# Patient Record
Sex: FEMALE | Race: WHITE | NOT HISPANIC OR LATINO | Employment: FULL TIME | URBAN - METROPOLITAN AREA
[De-identification: names, ages, dates, MRNs, and addresses within clinical notes are randomized per-mention and may not be internally consistent; named-entity substitution may affect disease eponyms.]

---

## 2017-01-03 RX ORDER — DICLOFENAC SODIUM 75 MG/1
TABLET, DELAYED RELEASE ORAL
Qty: 180 TABLET | Refills: 0 | Status: SHIPPED | OUTPATIENT
Start: 2017-01-03 | End: 2017-07-17 | Stop reason: SDUPTHER

## 2017-01-24 ENCOUNTER — OFFICE VISIT (OUTPATIENT)
Dept: FAMILY MEDICINE CLINIC | Facility: CLINIC | Age: 62
End: 2017-01-24

## 2017-01-24 VITALS
RESPIRATION RATE: 16 BRPM | WEIGHT: 243.3 LBS | HEART RATE: 78 BPM | OXYGEN SATURATION: 94 % | DIASTOLIC BLOOD PRESSURE: 78 MMHG | TEMPERATURE: 98.4 F | SYSTOLIC BLOOD PRESSURE: 124 MMHG | BODY MASS INDEX: 38.19 KG/M2 | HEIGHT: 67 IN

## 2017-01-24 DIAGNOSIS — R73.9 BLOOD GLUCOSE ELEVATED: ICD-10-CM

## 2017-01-24 DIAGNOSIS — E78.00 PURE HYPERCHOLESTEROLEMIA: ICD-10-CM

## 2017-01-24 DIAGNOSIS — Z00.00 HEALTHCARE MAINTENANCE: Primary | ICD-10-CM

## 2017-01-24 PROCEDURE — 90471 IMMUNIZATION ADMIN: CPT | Performed by: INTERNAL MEDICINE

## 2017-01-24 PROCEDURE — 90715 TDAP VACCINE 7 YRS/> IM: CPT | Performed by: INTERNAL MEDICINE

## 2017-01-24 PROCEDURE — 99396 PREV VISIT EST AGE 40-64: CPT | Performed by: INTERNAL MEDICINE

## 2017-01-24 NOTE — PROGRESS NOTES
"Subjective   Patient ID: Liza Aaron is a 61 y.o. female presents with   Chief Complaint   Patient presents with   • Annual Exam     already had labs in december HPI - this patient presents today for a yearly physical.  Since I saw her last she's lost about 15 pounds.  She feels better is exercising and eating a better diet.  She is caught up on routine screenings such as colonoscopy and mammogram.  We reviewed her labs cholesterol and sugar and blood pressure well controlled.  She needs a TDap.    Assessment plan    Health care maintenance-continue exercise diet and weight loss.  T DTaP    Hyperlipidemia Crestor 10    Elevated fasting glucose-exercise diet weight loss            Allergies   Allergen Reactions   • Penicillins        The following portions of the patient's history were reviewed and updated as appropriate: allergies, current medications, past family history, past medical history, past social history, past surgical history and problem list.      Review of Systems   Constitutional: Negative.    HENT: Negative.    Eyes: Negative.    Respiratory: Negative.    Cardiovascular: Negative.    Gastrointestinal: Negative.    Genitourinary: Positive for frequency.   Musculoskeletal: Positive for arthralgias.   Skin: Negative.    Neurological: Negative.    Psychiatric/Behavioral: Negative.        Objective     Vitals:    01/24/17 0807   BP: 124/78   Pulse: 78   Resp: 16   Temp: 98.4 °F (36.9 °C)   TempSrc: Oral   SpO2: 94%   Weight: 243 lb 4.8 oz (110 kg)   Height: 66.5\" (168.9 cm)         Physical Exam   Constitutional: She is oriented to person, place, and time. She appears well-developed and well-nourished. No distress.   HENT:   Head: Normocephalic and atraumatic.   Eyes: Conjunctivae and EOM are normal. Pupils are equal, round, and reactive to light. Right eye exhibits no discharge. Left eye exhibits no discharge. No scleral icterus.   Neck: Normal range of motion. Neck supple. No tracheal deviation " present. No thyromegaly present.   Cardiovascular: Normal rate, regular rhythm, normal heart sounds and normal pulses.  Exam reveals no gallop.    No murmur heard.  Pulmonary/Chest: Effort normal and breath sounds normal. No respiratory distress. She has no wheezes. She has no rales.   Abdominal: Soft. Bowel sounds are normal. There is no tenderness.   Musculoskeletal: Normal range of motion.   Neurological: She is alert and oriented to person, place, and time.   Skin: Skin is warm and dry. No rash noted. No erythema. No pallor.   Psychiatric: She has a normal mood and affect. Her behavior is normal. Judgment and thought content normal.   Nursing note and vitals reviewed.        Liza PALACIO was seen today for annual exam.    Diagnoses and all orders for this visit:    Healthcare maintenance    Blood glucose elevated    Pure hypercholesterolemia    Other orders  -     Tdap Vaccine Greater Than or Equal To 6yo IM        Call or return to clinic prn if these symptoms worsen or fail to improve as anticipated.

## 2017-01-24 NOTE — MR AVS SNAPSHOT
Liza Aaron   1/24/2017 8:00 AM   Office Visit    Dept Phone:  [phone]   Encounter #:  [id number]    Provider:  Radhames Olmos MD   Department:  [facility] PRIMARY CARE                Your Full Care Plan              Today's Medication Changes          These changes are accurate as of: 1/24/17  8:53 AM.  If you have any questions, ask your nurse or doctor.               Stop taking medication(s)listed here:     azithromycin 250 MG tablet   Commonly known as:  ZITHROMAX Z-KHADAR   Stopped by:  Radhames Olmos MD           HYDROcodone-homatropine 5-1.5 MG/5ML syrup   Commonly known as:  HYCODAN   Stopped by:  Radhames Olmos MD                      Your Updated Medication List          This list is accurate as of: 1/24/17  8:53 AM.  Always use your most recent med list.                aspirin-sod bicarb-citric acid 325 MG effervescent tablet   Commonly known as:  ONUR-SELTZER       Calcium 500 MG tablet       CRESTOR 10 MG tablet   Generic drug:  rosuvastatin   TAKE ONE TABLET BY MOUTH DAILY       diclofenac 75 MG EC tablet   Commonly known as:  VOLTAREN   TAKE ONE TABLET BY MOUTH TWICE A DAY WITH FOOD       MULTI COMPLETE PO               We Performed the Following     Tdap Vaccine Greater Than or Equal To 8yo IM       You Were Diagnosed With        Codes Comments    Healthcare maintenance    -  Primary ICD-10-CM: Z00.00  ICD-9-CM: V70.0       Instructions     None    Patient Instructions History      Upcoming Appointments     Visit Type Date Time Department    PHYSICAL 1/24/2017  8:00 AM [employer] Signup     [facility] allows you to send messages to your doctor, view your test results, renew your prescriptions, schedule appointments, and more. To sign up, go to [employer] and click on the Sign Up Now link in the New User? box. Enter your [facility] Activation Code exactly as it appears below along with the last four digits of your  "Social Security Number and your Date of Birth () to complete the sign-up process. If you do not sign up before the expiration date, you must request a new code.    [employer] Activation Code: [email or web address]  Expires: 2017  8:52 AM    If you have questions, you can email [email or web address] or call [phone] to talk to our [employer] staff. Remember, [employer] is NOT to be used for urgent needs. For medical emergencies, dial 911.               Other Info from Your Visit           Allergies     Penicillins        Reason for Visit     Annual Exam already had labs in december      Vital Signs     Blood Pressure Pulse Temperature Respirations Height Weight    124/78 78 98.4 °F (36.9 °C) (Oral) 16 66.5\" (168.9 cm) 243 lb 4.8 oz (110 kg)    Oxygen Saturation Body Mass Index Smoking Status             94% 38.68 kg/m2 Former Smoker         Problems and Diagnoses Noted     Routine medical exam        "

## 2017-04-03 ENCOUNTER — OFFICE VISIT (OUTPATIENT)
Dept: SURGERY | Facility: CLINIC | Age: 62
End: 2017-04-03

## 2017-04-03 VITALS — WEIGHT: 243.2 LBS | OXYGEN SATURATION: 97 % | HEART RATE: 72 BPM | BODY MASS INDEX: 38.17 KG/M2 | HEIGHT: 67 IN

## 2017-04-03 DIAGNOSIS — K43.2 INCISIONAL HERNIA, WITHOUT OBSTRUCTION OR GANGRENE: Primary | ICD-10-CM

## 2017-04-03 PROCEDURE — 99204 OFFICE O/P NEW MOD 45 MIN: CPT | Performed by: SURGERY

## 2017-04-03 NOTE — PROGRESS NOTES
Chief Complaint:  Incisional hernia    HPI    Patient is a 62 y.o. female who happens to be R  at [employer], who comes in with a possible incisional hernia.  She had a GRICEL/BSO by Dr. Moya, in December 2004, and had a follow-up exam with Dr. Brittney Madera recently.  Happily her pathology on that surgery was benign.  Dr. Madera thought perhaps she had a hernia, this being discovered when Liza indicated that she was having mild discomfort at the site.  The pain is worse after she's been sitting and she stands up described as sharp, perhaps 4 or 5 out of 10.  It's notable when she does twisting, but not necessarily lifting.  She says it is sore sometimes, but not often associated with severe pain.  She's been noticing this for months.    She denies any family history of having any hernias.  Her mother did have breast cancer, and she has stayed up to date on her mammograms, due for one soon.    She is active, swimming, doing water aerobics and exercises to increase her core strength, and has been trying to lose weight.    Past Medical History:   Diagnosis Date   • Diverticulosis    • Hyperglycemia    • Hyperlipidemia      Past Surgical History:   Procedure Laterality Date   • COLONOSCOPY N/A 12/14/2015    Diverticulosis in the entire examined colon, non-bleeding internal hemorrhodis, no specimens collected-Dr. Rosario Flores   • TOTAL ABDOMINAL HYSTERECTOMY WITH SALPINGO OOPHORECTOMY Bilateral 12/16/2004    Dr. Dominick Villarreal   • UMBILICAL HERNIA REPAIR N/A 12/16/2004    Dr. Dominick Villarreal     Family History   Problem Relation Age of Onset   • Cancer Mother    • Breast cancer Mother    • Hypertension Mother    • Heart disease Father    • Cancer Maternal Grandmother      Social History     Social History   • Marital status: Unknown     Spouse name: EDDIE IVAN   • Number of children: 5   • Years of education: [employer]YEARS [employer]     Occupational History   •  [employer]     Social History  "Main Topics   • Smoking status: Former Smoker   • Smokeless tobacco: Never Used   • Alcohol use Yes   • Drug use: No   • Sexual activity: Defer     Other Topics Concern   • Not on file     Social History Narrative       Occupation/Additional Social Hx:  [employer]      Current Outpatient Prescriptions:   •  Calcium 500 MG tablet, Take 2 tablets by mouth., Disp: , Rfl:   •  CRESTOR 10 MG tablet, TAKE ONE TABLET BY MOUTH DAILY, Disp: 90 tablet, Rfl: 0  •  diclofenac (VOLTAREN) 75 MG EC tablet, TAKE ONE TABLET BY MOUTH TWICE A DAY WITH FOOD, Disp: 180 tablet, Rfl: 0  •  Multiple Vitamins-Minerals (MULTI COMPLETE PO), Take  by mouth daily., Disp: , Rfl:     Allergies   Allergen Reactions   • Penicillins Rash       Preventative Medicine  Colonoscopy: 2015    Review of Systems   Respiratory: Shortness of breath: mild shortness of breath with exercise.    Cardiovascular: Negative for chest pain and leg swelling.   All other systems reviewed and are negative.      Vitals:    04/03/17 1350   Pulse: 72   SpO2: 97%   Weight: 243 lb 3.2 oz (110 kg)   Height: 67\" (170.2 cm)       PHYSICAL EXAM:    Pulse 72  Ht 67\" (170.2 cm)  Wt 243 lb 3.2 oz (110 kg)  SpO2 97%  BMI 38.09 kg/m2  Body mass index is 38.09 kg/(m^2).    Constitutional: well developed, well nourished, well-groomed  Eyes: sclera nonicteric, conjunctiva not injected  ENMT: Hearing intact, trachea midline, thyroid without masses  CVS: RRR, no murmur, peripheral edema not present  Respiratory: CTA, normal respiratory effort   Gastrointestinal: no hepatosplenomegaly, abdomen soft, visible suggestion of asymmetry at the umbilicus and off to the right below that, in the region of her incision that extended upward and around to the right of the umbilicus, protruding out about 1-2 cm from the abdominal wall plane, with fatty tissue protruding and eating reducible about the size of a walnut, estimated fascial defect 1.5-2 cm.  Prominent ventral diastases, " likely 4 cm at least, with then saucer like convex protrusions leading up to the defect, consistent with possible adjacent hernias or very thin fascia adjacent to the fascial defect.  Midline incisional scar from the right of the umbilicus down towards the pubis.  Hernia much more detectable with the patient standing then when recumbent, .  Genitourinary: inguinal hernia not present  Musculoskeletal: gait normal, muscle mass .,  Normal range of motion, not stiff  Skin: warm and dry, no rashes visible  Neurological: awake and alert, seems to have reasonable capacity for understanding for medical decision making  Psychiatric: appears to have reasonable judgement, obviously capable of making medical decisions, affable  Lymphatics: no cervical adenopathy, no axillary adenopathy    Radiographic Findings: None to review    Lab Findings: None to review    IMPRESSION:  · Incisional hernia, reducible, with likely adjacent additional small defects, of a [place] cheese type nature, versus adjacent very thin fascia.  Discussed with her the difficulty of her repair in this situation.  Is extremely unlikely the bowel would get caught in this defect based on its size, and exam today.  · Ventral diastases, notable  · BMI 38  · Mother with breast cancer  · Actively engaging in exercise      PLAN:  · Long discussion regarding the hernia repair types, with the risk associated with intraperitoneal mesh versus a larger operation, with bilateral musculofascial advancement flaps, closure of the posterior rectus, with extraperitoneal dissolvable mesh, and return to a more ergonomic core.  Discussed the need to potentially excise skin tissue, and an eccentric umbilicus, which is too dramatic can result in skin sloughing.    Noted the likelihood that this is certainly not urgent, the ability to schedule around her convenience, the need to consider at least 4 weeks off the more complex surgery is chosen, but with likely the better long-term  results.  Discussed the greater likelihood of complication from the more complex surgery, but again a less likely incidence of bowel adhesion for foreign body reaction, with the more complex surgery.  Recommended the surgery with intraperitoneal mesh if she simply wanted to deal with the present problem, but recommended consideration of the other if she wished better long-term results more prominently.    Schematic of the abdominal wall musculature and fascia and discuss the issue of the ventral diastases being a complicated factor as well.  Discussed the fact that complication rate increased as weight increases, but those rates have decreased with the use of transverses flap advances rather than the external oblique, and the use of the PRP, which I would recommend.     Recommended follow up in 6 months with visit sooner if symptoms escalate.  She should not stop her water exercises.     Liza Crawford MD  04/03/17

## 2017-05-08 ENCOUNTER — APPOINTMENT (OUTPATIENT)
Dept: WOMENS IMAGING | Facility: HOSPITAL | Age: 62
End: 2017-05-08

## 2017-05-08 PROCEDURE — 77067 SCR MAMMO BI INCL CAD: CPT | Performed by: RADIOLOGY

## 2017-05-08 PROCEDURE — 77063 BREAST TOMOSYNTHESIS BI: CPT | Performed by: RADIOLOGY

## 2017-05-08 PROCEDURE — G0202 SCR MAMMO BI INCL CAD: HCPCS | Performed by: RADIOLOGY

## 2017-07-17 RX ORDER — DICLOFENAC SODIUM 75 MG/1
TABLET, DELAYED RELEASE ORAL
Qty: 180 TABLET | Refills: 0 | Status: SHIPPED | OUTPATIENT
Start: 2017-07-17 | End: 2018-01-16 | Stop reason: SDUPTHER

## 2017-09-20 RX ORDER — ROSUVASTATIN CALCIUM 10 MG/1
TABLET, COATED ORAL
Qty: 90 TABLET | Refills: 1 | Status: SHIPPED | OUTPATIENT
Start: 2017-09-20 | End: 2018-04-12 | Stop reason: SDUPTHER

## 2018-01-17 RX ORDER — DICLOFENAC SODIUM 75 MG/1
TABLET, DELAYED RELEASE ORAL
Qty: 180 TABLET | Refills: 1 | Status: SHIPPED | OUTPATIENT
Start: 2018-01-17 | End: 2018-09-04 | Stop reason: SDUPTHER

## 2018-04-12 RX ORDER — ROSUVASTATIN CALCIUM 10 MG/1
TABLET, COATED ORAL
Qty: 90 TABLET | Refills: 1 | Status: SHIPPED | OUTPATIENT
Start: 2018-04-12 | End: 2018-09-04 | Stop reason: SDUPTHER

## 2018-05-14 ENCOUNTER — APPOINTMENT (OUTPATIENT)
Dept: WOMENS IMAGING | Facility: HOSPITAL | Age: 63
End: 2018-05-14

## 2018-05-14 PROCEDURE — 77063 BREAST TOMOSYNTHESIS BI: CPT | Performed by: RADIOLOGY

## 2018-05-14 PROCEDURE — 77067 SCR MAMMO BI INCL CAD: CPT | Performed by: RADIOLOGY

## 2018-05-17 DIAGNOSIS — Z23 IMMUNIZATION DUE: Primary | ICD-10-CM

## 2018-05-18 ENCOUNTER — CLINICAL SUPPORT (OUTPATIENT)
Dept: FAMILY MEDICINE CLINIC | Facility: CLINIC | Age: 63
End: 2018-05-18

## 2018-05-18 DIAGNOSIS — Z00.00 PREVENTATIVE HEALTH CARE: ICD-10-CM

## 2018-05-18 DIAGNOSIS — Z23 IMMUNIZATION DUE: Primary | ICD-10-CM

## 2018-05-18 PROCEDURE — 90471 IMMUNIZATION ADMIN: CPT | Performed by: INTERNAL MEDICINE

## 2018-05-18 PROCEDURE — 90632 HEPA VACCINE ADULT IM: CPT | Performed by: INTERNAL MEDICINE

## 2018-09-04 ENCOUNTER — TRANSCRIBE ORDERS (OUTPATIENT)
Dept: ADMINISTRATIVE | Facility: HOSPITAL | Age: 63
End: 2018-09-04

## 2018-09-04 ENCOUNTER — OFFICE VISIT (OUTPATIENT)
Dept: INTERNAL MEDICINE | Facility: CLINIC | Age: 63
End: 2018-09-04

## 2018-09-04 VITALS
DIASTOLIC BLOOD PRESSURE: 70 MMHG | BODY MASS INDEX: 41.78 KG/M2 | HEIGHT: 66 IN | OXYGEN SATURATION: 95 % | SYSTOLIC BLOOD PRESSURE: 100 MMHG | HEART RATE: 71 BPM | WEIGHT: 260 LBS | TEMPERATURE: 98.2 F

## 2018-09-04 DIAGNOSIS — E66.01 MORBID OBESITY (HCC): ICD-10-CM

## 2018-09-04 DIAGNOSIS — E78.2 MIXED HYPERLIPIDEMIA: ICD-10-CM

## 2018-09-04 DIAGNOSIS — Z13.6 SCREENING FOR CARDIOVASCULAR CONDITION: Primary | ICD-10-CM

## 2018-09-04 DIAGNOSIS — Z11.59 NEED FOR HEPATITIS C SCREENING TEST: ICD-10-CM

## 2018-09-04 DIAGNOSIS — M15.9 OSTEOARTHRITIS OF MULTIPLE JOINTS, UNSPECIFIED OSTEOARTHRITIS TYPE: ICD-10-CM

## 2018-09-04 DIAGNOSIS — Z00.00 PHYSICAL EXAM, ANNUAL: Primary | ICD-10-CM

## 2018-09-04 PROCEDURE — 99396 PREV VISIT EST AGE 40-64: CPT | Performed by: FAMILY MEDICINE

## 2018-09-04 RX ORDER — DICLOFENAC SODIUM 75 MG/1
75 TABLET, DELAYED RELEASE ORAL DAILY
Qty: 90 TABLET | Refills: 1 | Status: SHIPPED | OUTPATIENT
Start: 2018-09-04 | End: 2018-10-01

## 2018-09-04 RX ORDER — ROSUVASTATIN CALCIUM 10 MG/1
10 TABLET, COATED ORAL DAILY
Qty: 90 TABLET | Refills: 1 | Status: SHIPPED | OUTPATIENT
Start: 2018-09-04 | End: 2019-03-06 | Stop reason: SDUPTHER

## 2018-09-04 NOTE — PROGRESS NOTES
Subjective   Liza Aaron is a 63 y.o. female.   Chief Complaint   Patient presents with   • Annual Exam   • Osteoarthritis     hips and knees    • Hyperlipidemia       History of Present Illness     This is a new patient in our office.  She is here for complete physical exam without GYN evaluation and for follow-up on hypertension and osteoarthritis.    CPE- she has no complaints.  Medical history significant for hyperlipidemia.  Patient is on Crestor 10 mg a day.  She takes it everyday.  She has no side effects.  No muscle aches, no muscle cramps.  She was diagnosed years ago.  Osteoarthritis-localized in hips and knees.  Patient is on diclofenac 75 mg.  She takes it every day.  She takes one tablet a day.  She has no side effects.  She was off diclofenac for a few days recently and had sore hips and sore knees.  She does not smoke cigarettes.  She smoked for about 5 years 1 pack per day.  She quit in 1982.  Alcohol-she drinks 2-3 drinks a week.  No drugs.  She exercises 2-3 times a week for 45-60 minutes of water exercise.  Dental appointments every 4 months.  Last eye exam in 2016.  Tetanus vaccine in 2017.  Colonoscopy in December 2015.  Next was recommended 5 years later.  Last mammogram 8/2018.  GYN exam that included breast exam, rectal and pelvic exam in 8/2018.  Patient is post hysterectomy and bilateral oophorectomy secondary to enlarged ovary.  There was no cancer.      Family history of heart disease with her father having a first attack in his late 40s.    For more history please see health history form which was reviewed by me and will be scanned.    The following portions of the patient's history were reviewed and updated as appropriate: allergies, current medications, past family history, past medical history, past social history, past surgical history and problem list.    Review of Systems   Constitutional: Negative.    HENT: Negative.    Respiratory: Negative.    Cardiovascular: Negative.     Gastrointestinal: Negative.  Negative for blood in stool.   Neurological: Negative.          Objective   Wt Readings from Last 3 Encounters:   09/04/18 118 kg (260 lb)   04/03/17 110 kg (243 lb 3.2 oz)   01/24/17 110 kg (243 lb 4.8 oz)      Vitals:    09/04/18 0829   BP: 100/70   Pulse: 71   Temp: 98.2 °F (36.8 °C)   SpO2: 95%     Temp Readings from Last 3 Encounters:   09/04/18 98.2 °F (36.8 °C)   01/24/17 98.4 °F (36.9 °C) (Oral)   10/27/16 98.2 °F (36.8 °C) (Oral)     BP Readings from Last 3 Encounters:   09/04/18 100/70   01/24/17 124/78   10/27/16 130/74     Pulse Readings from Last 3 Encounters:   09/04/18 71   04/03/17 72   01/24/17 78       Physical Exam   Constitutional: She is oriented to person, place, and time. She appears well-developed and well-nourished. No distress.   HENT:   Head: Normocephalic and atraumatic. Hair is normal.   Right Ear: Hearing, tympanic membrane, external ear and ear canal normal. No drainage. No decreased hearing is noted.   Left Ear: Hearing, tympanic membrane, external ear and ear canal normal. No decreased hearing is noted.   Nose: No nasal deformity.   Mouth/Throat: Oropharynx is clear and moist.   Eyes: Pupils are equal, round, and reactive to light. Conjunctivae, EOM and lids are normal. Right eye exhibits no discharge. Left eye exhibits no discharge.   Neck: Normal range of motion. Neck supple. No JVD present. No tracheal deviation present. No thyromegaly present.   Cardiovascular: Normal rate, regular rhythm, normal heart sounds, intact distal pulses and normal pulses.  Exam reveals no gallop and no friction rub.    No murmur heard.  Pulmonary/Chest: Effort normal and breath sounds normal. No respiratory distress. She has no wheezes. She has no rales. She exhibits no tenderness.   Abdominal: Soft. She exhibits no distension and no mass. There is no tenderness. There is no rebound and no guarding. A hernia is present.   Musculoskeletal: Normal range of motion. She  exhibits no edema, tenderness or deformity.   Lymphadenopathy:     She has no cervical adenopathy.   Neurological: She is alert and oriented to person, place, and time. She has normal reflexes. She displays normal reflexes. No cranial nerve deficit. She exhibits normal muscle tone. Coordination normal.   Skin: Skin is warm and dry. No rash noted. She is not diaphoretic. No erythema.   Psychiatric: She has a normal mood and affect. Her behavior is normal. Judgment and thought content normal.   Vitals reviewed.      Assessment/Plan   Liza was seen today for annual exam, osteoarthritis and hyperlipidemia.    Diagnoses and all orders for this visit:    Physical exam, annual  -     CBC (No Diff)  -     Vitamin D 25 Hydroxy  -     Lipid Panel With LDL / HDL Ratio  -     Comprehensive Metabolic Panel  -     Thyroid Cascade Profile    Mixed hyperlipidemia    Osteoarthritis of multiple joints, unspecified osteoarthritis type    Morbid obesity (CMS/HCC)    Need for hepatitis C screening test  -     Hepatitis C Antibody    Other orders  -     rosuvastatin (CRESTOR) 10 MG tablet; Take 1 tablet by mouth Daily.  -     diclofenac (VOLTAREN) 75 MG EC tablet; Take 1 tablet by mouth Daily.        #1 CPE- we are checking labs.  Focus on reducing cardiac risk factors.  Patient will continue statin.  She will increase exercise to at least   30 minutes a day, 5 days a week.  Morbid obesity increases risk for developing diabetes and heart disease.  We talked about Weight Watchers.  Patient will get shingrix at the pharmacy.    #2 hyperlipidemia-continue current treatment.  Follow-up in 6 months.    #3 osteoarthritis-diclofenac as all other NSAIDs increases risk of GI bleeding, kidney failure and cardiovascular risks.  Patient will try to use Tylenol instead.  Maximum dose is 2 extra strength Tylenols every 8 hours.  Follow-up in 6 months.

## 2018-09-05 LAB
25(OH)D3+25(OH)D2 SERPL-MCNC: 28.5 NG/ML (ref 30–100)
ALBUMIN SERPL-MCNC: 4.4 G/DL (ref 3.5–5.2)
ALBUMIN/GLOB SERPL: 1.9 G/DL
ALP SERPL-CCNC: 71 U/L (ref 39–117)
ALT SERPL-CCNC: 14 U/L (ref 1–33)
AST SERPL-CCNC: 21 U/L (ref 1–32)
BILIRUB SERPL-MCNC: 0.6 MG/DL (ref 0.1–1.2)
BUN SERPL-MCNC: 10 MG/DL (ref 8–23)
BUN/CREAT SERPL: 14.3 (ref 7–25)
CALCIUM SERPL-MCNC: 9.4 MG/DL (ref 8.6–10.5)
CHLORIDE SERPL-SCNC: 105 MMOL/L (ref 98–107)
CHOLEST SERPL-MCNC: 165 MG/DL (ref 0–200)
CO2 SERPL-SCNC: 25.9 MMOL/L (ref 22–29)
CREAT SERPL-MCNC: 0.7 MG/DL (ref 0.57–1)
ERYTHROCYTE [DISTWIDTH] IN BLOOD BY AUTOMATED COUNT: 13.5 % (ref 11.7–13)
GLOBULIN SER CALC-MCNC: 2.3 GM/DL
GLUCOSE SERPL-MCNC: 107 MG/DL (ref 65–99)
HCT VFR BLD AUTO: 40.2 % (ref 35.6–45.5)
HCV AB S/CO SERPL IA: <0.1 S/CO RATIO (ref 0–0.9)
HDLC SERPL-MCNC: 52 MG/DL (ref 40–60)
HGB BLD-MCNC: 12.7 G/DL (ref 11.9–15.5)
LDLC SERPL CALC-MCNC: 88 MG/DL (ref 0–100)
LDLC/HDLC SERPL: 1.69 {RATIO}
MCH RBC QN AUTO: 29.7 PG (ref 26.9–32)
MCHC RBC AUTO-ENTMCNC: 31.6 G/DL (ref 32.4–36.3)
MCV RBC AUTO: 93.9 FL (ref 80.5–98.2)
PLATELET # BLD AUTO: 221 10*3/MM3 (ref 140–500)
POTASSIUM SERPL-SCNC: 4.5 MMOL/L (ref 3.5–5.2)
PROT SERPL-MCNC: 6.7 G/DL (ref 6–8.5)
RBC # BLD AUTO: 4.28 10*6/MM3 (ref 3.9–5.2)
SODIUM SERPL-SCNC: 145 MMOL/L (ref 136–145)
TRIGL SERPL-MCNC: 125 MG/DL (ref 0–150)
TSH SERPL DL<=0.005 MIU/L-ACNC: 3.54 UIU/ML (ref 0.45–4.5)
VLDLC SERPL CALC-MCNC: 25 MG/DL (ref 5–40)
WBC # BLD AUTO: 4.11 10*3/MM3 (ref 4.5–10.7)

## 2018-09-06 LAB
HBA1C MFR BLD: 5.1 % (ref 4.8–5.6)
Lab: NORMAL
WRITTEN AUTHORIZATION: NORMAL

## 2018-09-07 ENCOUNTER — HOSPITAL ENCOUNTER (OUTPATIENT)
Dept: CARDIOLOGY | Facility: HOSPITAL | Age: 63
Discharge: HOME | End: 2018-09-07
Admitting: FAMILY MEDICINE

## 2018-09-07 VITALS
DIASTOLIC BLOOD PRESSURE: 68 MMHG | HEART RATE: 64 BPM | HEIGHT: 66 IN | SYSTOLIC BLOOD PRESSURE: 117 MMHG | WEIGHT: 253 LBS | BODY MASS INDEX: 40.66 KG/M2

## 2018-09-07 DIAGNOSIS — Z13.6 SCREENING FOR CARDIOVASCULAR CONDITION: ICD-10-CM

## 2018-09-07 LAB
BH CV ECHO MEAS - DIST AO DIAM: 1.55 CM
BH CV VAS BP LEFT ARM: NORMAL MMHG
BH CV VAS BP RIGHT ARM: NORMAL MMHG
BH CV XLRA MEAS - MID AO DIAM: 1.81 CM
BH CV XLRA MEAS - PAD LEFT ABI DP: 1.14
BH CV XLRA MEAS - PAD LEFT ABI PT: 1.12
BH CV XLRA MEAS - PAD LEFT ARM: 117 MMHG
BH CV XLRA MEAS - PAD LEFT LEG DP: 134 MMHG
BH CV XLRA MEAS - PAD LEFT LEG PT: 132 MMHG
BH CV XLRA MEAS - PAD RIGHT ABI DP: 1.11
BH CV XLRA MEAS - PAD RIGHT ABI PT: 1.16
BH CV XLRA MEAS - PAD RIGHT ARM: 114 MMHG
BH CV XLRA MEAS - PAD RIGHT LEG DP: 130 MMHG
BH CV XLRA MEAS - PAD RIGHT LEG PT: 136 MMHG
BH CV XLRA MEAS - PROX AO DIAM: 2.1 CM
BH CV XLRA MEAS LEFT ICA/CCA RATIO: 1.51
BH CV XLRA MEAS LEFT MID CCA PSV: NORMAL CM/SEC
BH CV XLRA MEAS LEFT MID ICA PSV: NORMAL CM/SEC
BH CV XLRA MEAS LEFT PROX ECA PSV: NORMAL CM/SEC
BH CV XLRA MEAS RIGHT ICA/CCA RATIO: 0.95
BH CV XLRA MEAS RIGHT MID CCA PSV: NORMAL CM/SEC
BH CV XLRA MEAS RIGHT MID ICA PSV: NORMAL CM/SEC
BH CV XLRA MEAS RIGHT PROX ECA PSV: NORMAL CM/SEC

## 2018-09-07 PROCEDURE — 93799 UNLISTED CV SVC/PROCEDURE: CPT

## 2018-10-01 ENCOUNTER — OFFICE VISIT (OUTPATIENT)
Dept: SURGERY | Facility: CLINIC | Age: 63
End: 2018-10-01

## 2018-10-01 VITALS — OXYGEN SATURATION: 96 % | BODY MASS INDEX: 37.44 KG/M2 | WEIGHT: 247 LBS | HEART RATE: 90 BPM | HEIGHT: 68 IN

## 2018-10-01 DIAGNOSIS — K43.2 INCISIONAL HERNIA, WITHOUT OBSTRUCTION OR GANGRENE: Primary | ICD-10-CM

## 2018-10-01 PROCEDURE — 99213 OFFICE O/P EST LOW 20 MIN: CPT | Performed by: SURGERY

## 2018-12-14 ENCOUNTER — CLINICAL SUPPORT (OUTPATIENT)
Dept: INTERNAL MEDICINE | Facility: CLINIC | Age: 63
End: 2018-12-14

## 2018-12-14 DIAGNOSIS — Z23 NEED FOR HEPATITIS A VACCINATION: Primary | ICD-10-CM

## 2018-12-14 PROCEDURE — 90632 HEPA VACCINE ADULT IM: CPT | Performed by: FAMILY MEDICINE

## 2018-12-14 PROCEDURE — 90471 IMMUNIZATION ADMIN: CPT | Performed by: FAMILY MEDICINE

## 2019-03-06 ENCOUNTER — OFFICE VISIT (OUTPATIENT)
Dept: INTERNAL MEDICINE | Facility: CLINIC | Age: 64
End: 2019-03-06

## 2019-03-06 ENCOUNTER — HOSPITAL ENCOUNTER (OUTPATIENT)
Dept: GENERAL RADIOLOGY | Facility: HOSPITAL | Age: 64
Discharge: HOME | End: 2019-03-06
Admitting: FAMILY MEDICINE

## 2019-03-06 VITALS
OXYGEN SATURATION: 95 % | SYSTOLIC BLOOD PRESSURE: 118 MMHG | HEART RATE: 82 BPM | DIASTOLIC BLOOD PRESSURE: 80 MMHG | TEMPERATURE: 98.2 F | WEIGHT: 228 LBS | BODY MASS INDEX: 35.79 KG/M2 | HEIGHT: 67 IN

## 2019-03-06 DIAGNOSIS — M25.551 PAIN OF BOTH HIP JOINTS: ICD-10-CM

## 2019-03-06 DIAGNOSIS — M25.552 PAIN OF BOTH HIP JOINTS: ICD-10-CM

## 2019-03-06 DIAGNOSIS — R00.9 ABNORMAL HEART RATE: ICD-10-CM

## 2019-03-06 DIAGNOSIS — E55.9 VITAMIN D DEFICIENCY: ICD-10-CM

## 2019-03-06 DIAGNOSIS — E78.00 PURE HYPERCHOLESTEROLEMIA: ICD-10-CM

## 2019-03-06 DIAGNOSIS — Z82.49 FAMILY HISTORY OF HEART DISEASE: Primary | ICD-10-CM

## 2019-03-06 PROBLEM — E66.9 CLASS 2 OBESITY WITHOUT SERIOUS COMORBIDITY WITH BODY MASS INDEX (BMI) OF 35.0 TO 35.9 IN ADULT: Status: ACTIVE | Noted: 2018-09-04

## 2019-03-06 PROBLEM — E66.812 CLASS 2 OBESITY WITHOUT SERIOUS COMORBIDITY WITH BODY MASS INDEX (BMI) OF 35.0 TO 35.9 IN ADULT: Status: ACTIVE | Noted: 2018-09-04

## 2019-03-06 PROCEDURE — 99214 OFFICE O/P EST MOD 30 MIN: CPT | Performed by: FAMILY MEDICINE

## 2019-03-06 PROCEDURE — 73521 X-RAY EXAM HIPS BI 2 VIEWS: CPT

## 2019-03-06 PROCEDURE — 93000 ELECTROCARDIOGRAM COMPLETE: CPT | Performed by: FAMILY MEDICINE

## 2019-03-06 RX ORDER — ROSUVASTATIN CALCIUM 10 MG/1
10 TABLET, COATED ORAL DAILY
Qty: 90 TABLET | Refills: 1 | Status: SHIPPED | OUTPATIENT
Start: 2019-03-06 | End: 2019-09-20 | Stop reason: SDUPTHER

## 2019-03-06 NOTE — PROGRESS NOTES
Subjective   Liza Aaron is a 64 y.o. female.   Chief Complaint   Patient presents with   • Hyperlipidemia   • Hip Pain   • Vitamin D Deficiency       History of Present Illness     1  Hyperlipidemia-patient is on Crestor 10 mg a day.  She takes it every day.  She has no side effects.  She has no chest pain, no shortness of breath, no dizziness.  She has occasional extra beats, but they are asymptomatic.   She has a strong family history of heart disease.  Her brother was just recently diagnosed with coronary artery disease.  Her father had a heart disease and maternal grandfather had heart disease.    She exercises regularly.  She does water exercise 3-5 times a week for 60 minutes.  She joined [employer] in September 2018.  She lost 32 pounds.      2.  Vitamin D deficiency-patient is on multivitamin and vitamin D3 daily.  Not sure about those of Vitamin D.    3.  Hip pain-bilateral-noticed a few months ago when patient discontinued diclofenac.  She is able to function without diclofenac.  She takes Tylenol as needed.  She has pain in both hips.  It is achy pain, daily, intensity 5 out of 10.  Sometimes it wakes her up at night.  It is worse after being on her legs for long time.  No other symptoms associated.    The following portions of the patient's history were reviewed and updated as appropriate: allergies, current medications, past family history, past medical history, past social history and problem list.    Review of Systems   Constitutional: Negative.    Respiratory: Negative.    Cardiovascular: Negative.          Objective   Wt Readings from Last 3 Encounters:   03/06/19 103 kg (228 lb)   10/01/18 112 kg (247 lb)   09/07/18 115 kg (253 lb)      Vitals:    03/06/19 0902   BP: 118/80   Pulse: 82   Temp: 98.2 °F (36.8 °C)   SpO2: 95%     Temp Readings from Last 3 Encounters:   03/06/19 98.2 °F (36.8 °C)   09/04/18 98.2 °F (36.8 °C)   01/24/17 98.4 °F (36.9 °C) (Oral)     BP Readings from Last 3  Encounters:   03/06/19 118/80   09/07/18 117/68   09/04/18 100/70     Pulse Readings from Last 3 Encounters:   03/06/19 82   10/01/18 90   09/07/18 64       Physical Exam   Constitutional: She is oriented to person, place, and time. She appears well-developed and well-nourished.   HENT:   Head: Normocephalic and atraumatic.   Neck: Neck supple. Carotid bruit is not present. No thyromegaly present.   Cardiovascular: Normal rate, regular rhythm and normal heart sounds.   Pulmonary/Chest: Effort normal and breath sounds normal.   Musculoskeletal:   Decreased extension and internal rotation both hips.  MM strength 5/5 BL x LE.   Neurological: She is alert and oriented to person, place, and time.   Skin: Skin is warm, dry and intact.   Psychiatric: She has a normal mood and affect. Her behavior is normal.       Assessment/Plan   Liza was seen today for hyperlipidemia, hip pain and vitamin d deficiency.    Diagnoses and all orders for this visit:    Family history of heart disease  -     Ambulatory Referral to Cardiology    Pain of both hip joints  -     XR Hips Bilateral With or Without Pelvis 2 View; Future  -     Ambulatory Referral to Physical Therapy    Pure hypercholesterolemia  -     Lipid Panel With LDL / HDL Ratio  -     Comprehensive Metabolic Panel    Vitamin D deficiency  -     Vitamin D 25 Hydroxy    Other orders  -     rosuvastatin (CRESTOR) 10 MG tablet; Take 1 tablet by mouth Daily.        #1  hyperlipidemia-we will continue current treatment. Check labs today.  Patient did greater job losing weight.  She will continue to work with weight watchers.  Follow-up in 6 months.    2.  Family history of heart disease- EKG done in the office today is negative.  Tracing and interpretation will be scanned.  Referral to cardiologist.    3.  Hip pain-we will check an x-ray.  Referral to physical therapy.  Tylenol as needed.    4.  Vitamin D deficiency- check levels today.

## 2019-03-11 LAB
ALBUMIN SERPL-MCNC: 4.6 G/DL (ref 3.5–5.2)
ALBUMIN/GLOB SERPL: 2.3 G/DL
ALP SERPL-CCNC: 62 U/L (ref 39–117)
ALT SERPL-CCNC: 12 U/L (ref 1–33)
AST SERPL-CCNC: 20 U/L (ref 1–32)
BILIRUB SERPL-MCNC: 0.6 MG/DL (ref 0.1–1.2)
BUN SERPL-MCNC: 14 MG/DL (ref 8–23)
BUN/CREAT SERPL: 21.5 (ref 7–25)
CALCIUM SERPL-MCNC: 9.9 MG/DL (ref 8.6–10.5)
CHLORIDE SERPL-SCNC: 99 MMOL/L (ref 98–107)
CHOLEST SERPL-MCNC: 150 MG/DL (ref 0–200)
CO2 SERPL-SCNC: 27.5 MMOL/L (ref 22–29)
CREAT SERPL-MCNC: 0.65 MG/DL (ref 0.57–1)
GLOBULIN SER CALC-MCNC: 2 GM/DL
GLUCOSE SERPL-MCNC: 110 MG/DL (ref 65–99)
HDLC SERPL-MCNC: 60 MG/DL (ref 40–60)
LDLC SERPL CALC-MCNC: 63 MG/DL (ref 0–100)
LDLC/HDLC SERPL: 1.05 {RATIO}
POTASSIUM SERPL-SCNC: 4.1 MMOL/L (ref 3.5–5.2)
PROT SERPL-MCNC: 6.6 G/DL (ref 6–8.5)
SODIUM SERPL-SCNC: 139 MMOL/L (ref 136–145)
TRIGL SERPL-MCNC: 134 MG/DL (ref 0–150)
VLDLC SERPL CALC-MCNC: 26.8 MG/DL (ref 5–40)

## 2019-03-12 LAB — 25(OH)D3+25(OH)D2 SERPL-MCNC: 35.7 NG/ML (ref 30–100)

## 2019-03-21 ENCOUNTER — TREATMENT (OUTPATIENT)
Dept: PHYSICAL THERAPY | Facility: CLINIC | Age: 64
End: 2019-03-21

## 2019-03-21 DIAGNOSIS — M25.551 PAIN OF BOTH HIP JOINTS: Primary | ICD-10-CM

## 2019-03-21 DIAGNOSIS — M25.60 LIMITED JOINT RANGE OF MOTION (ROM): ICD-10-CM

## 2019-03-21 DIAGNOSIS — M25.552 PAIN OF BOTH HIP JOINTS: Primary | ICD-10-CM

## 2019-03-21 DIAGNOSIS — R26.89 IMPAIRED GAIT AND MOBILITY: ICD-10-CM

## 2019-03-21 PROCEDURE — 97162 PT EVAL MOD COMPLEX 30 MIN: CPT | Performed by: PHYSICAL THERAPIST

## 2019-03-21 PROCEDURE — 97012 MECHANICAL TRACTION THERAPY: CPT | Performed by: PHYSICAL THERAPIST

## 2019-03-21 PROCEDURE — 97110 THERAPEUTIC EXERCISES: CPT | Performed by: PHYSICAL THERAPIST

## 2019-03-21 NOTE — PROGRESS NOTES
Physical Therapy Initial Evaluation and Plan of Care        Patient: Liza aAron   : 1955  Diagnosis/ICD-10 Code:  Pain of both hip joints [M25.551, M25.552]  Referring practitioner: Minnie Whaley MD  Date of Initial Visit: 3/21/2019  Today's Date: 3/22/2019  Patient seen for 1 sessions           Subjective Questionnaire: LEFS: 40/80      Subjective Evaluation    History of Present Illness  Date of onset: 3/6/2019  Mechanism of injury: Patient reports hip pain as well as bilateral knee and low back pain.  States she has had to stop walking due to increased pain in back and knees.  Has been doing water exercises recently and would like to be able to return to walking.    PMH: Hyperlipidemia; Hyperglycemia; Diverticulosis, arthritis, history of falls a couple years ago, right knee torn meniscus-conservative management.    Subjective comment: Patient reports hip pain.  Patient Occupation: [employer] Quality of life: good    Pain  Current pain ratin  At worst pain ratin  Location: Bilateral hips-lateral and posterior  Quality: sharp and discomfort  Relieving factors: medications, rest and change in position (therapy pool)  Aggravating factors: standing, ambulation, squatting and sleeping (Sustained continuous walking 3 hours max limit due to pain.  Difficulty getting up from chair and out of car.)    Social Support  Lives in: multiple-level home  Lives with: spouse    Hand dominance: right    Diagnostic Tests  X-ray: abnormal    Treatments  No previous or current treatments  Patient Goals  Patient goals for therapy: decreased pain, increased motion and increased strength  Patient goal: Improve walking tolerance.           Objective       Palpation   Left   Tenderness of the gluteus medius, iliopsoas, lumbar paraspinals, piriformis, quadratus lumborum and TFL.     Right Tenderness of the gluteus medius, iliopsoas, lumbar paraspinals, piriformis, quadratus lumborum and TFL.      Tenderness     Left Hip   Tenderness in the PSIS, greater trochanter and sacroiliac joint.     Right Hip   Tenderness in the PSIS, greater trochanter and sacroiliac joint.     Active Range of Motion   Left Hip   Flexion: 80 degrees   Extension: 3 (Lag) degrees   Abduction: 15 degrees   Adduction: 12 degrees   External rotation (90/90): 45 degrees   Internal rotation (90/90): 5 (Lag) degrees     Right Hip   Flexion: 80 degrees   Extension: 5 (Lag) degrees   Abduction: 10 degrees   Adduction: 10 degrees   External rotation (90/90): 40 degrees   Internal rotation (90/90): 20 (Lag) degrees   Left Knee   Flexion: 120 degrees with pain  Extensor la degrees     Right Knee   Flexion: 110 degrees with pain  Extensor la degrees with pain    Joint Play   Left Hip   Hypomobile in the posterior hip capsule, anterior hip capsule, lateral hip capsule and long axis distraction.    Right Hip     Hypomobile in the posterior hip capsule, anterior hip capsule, lateral hip capsule and long axis distraction    Strength/Myotome Testing     Left Hip   Planes of Motion   Flexion: 3+  Extension: 4-  Abduction: 3-  Adduction: 4-    Right Hip   Planes of Motion   Flexion: 3+  Extension: 4-  Abduction: 3-  Adduction: 4-    Left Knee   Flexion: 4+  Extension: 4+  Quadriceps contraction: fair    Right Knee   Flexion: 4+  Extension: 4+  Quadriceps contraction: fair    Tests     Left Hip   Positive CAMMY and SI compression.   Modified Parth: Positive.     Right Hip   Positive CAMMY and SI compression.   Modified Parth: Positive.     Ambulation     Observational Gait   Gait: antalgic and asymmetric   Decreased walking speed.     Quality of Movement During Gait     Pelvis    Pelvis (Left): Positive Trendelenburg.   Pelvis (Right): Positive Trendelenburg.          Assessment & Plan     Assessment  Impairments: abnormal gait, abnormal or restricted ROM, impaired physical strength, pain with function and weight-bearing  intolerance  Assessment details: Liza Aaron is a pleasant 64 y.o. female that presents with signs and symptoms consistent with the above diagnosis.   Bilateral hip ROM is markedly limited, left more the right.  Bilateral knee ROM is limited as well.  Gait deviations are a result of limited hip and knee ROM as well as decreased bilateral hip strength.  Pt would benefit from skilled PT services in order to address listed impairments and increase tolerance to normal daily activities including ADLs, work and recreational activities.  She would also benefit from an orthopedic consultation.  Prognosis: fair  Prognosis details: Patient demonstrates fair rehab potential as evidenced by level of deficits and radiologic findings.  Functional Limitations: walking and uncomfortable because of pain  Goals  Plan Goals: Short Term Goals (2-3 wks):  1.  Patient will have increased bilateral hip ROM to: Flexion 100 deg, Extension 0 deg, IR 0 deg to allow for increased functional joint mobility.  2.  Patient will have increased bilateral knee ROM to: Flexion 125 deg, Extension 0 deg.  3.  Patient will have increased bilateral hip strength to 4/5 to allow for increased joint stabilization and support.  4.  Patient will have decreased pain to 5/10 at worst to allow for increased comfort with functional activities and allow for improved restorative sleep.  5.  Patient will have increased LE muscle flexibility to WNLs.      Long Term Goals (6 wks):  1.  Patient will be independent in performance of HEP for carryover upon discharge from skilled PT services.  2.  Patient will have normalized gait mechanics.  3.  Patient will report return to performance of ADLs/Work/Leisure activities with minimal to no symptom reproduction/pain limitations.  4.  Patient will having improved LEFS score to 50/80 or better.      Plan  Therapy options: will be seen for skilled physical therapy services  Planned modality interventions: iontophoresis,  cryotherapy, high voltage pulsed current (pain management), ultrasound and thermotherapy (hydrocollator packs)  Other planned modality interventions: Dry Needling  Planned therapy interventions: manual therapy, soft tissue mobilization, strengthening, stretching, joint mobilization, flexibility, functional ROM exercises, gait training and home exercise program  Frequency: 2x week  Duration in visits: 12  Treatment plan discussed with: patient  Plan details: Pt was educated on the importance of their HEP and their current need for continued skilled physical therapy. Patients goals and potential limitations were discussed and pt is in agreement with current plan of care and treatment emphasis.          Manual Therapy:    17     mins  13224;  Therapeutic Exercise:    8     mins  58949;     Neuromuscular Fide:        mins  73363;    Therapeutic Activity:          mins  10332;     Gait Training:           mins  74177;     Ultrasound:          mins  58278;    Electrical Stimulation:         mins  53459 ( );  Dry Needling          mins self-pay    Timed Treatment:   25   mins   Total Treatment:     70   mins    PT SIGNATURE: Dee Moise, PT   DATE TREATMENT INITIATED: 3/22/2019    Initial Certification  Certification Period: 6/20/2019  I certify that the therapy services are furnished while this patient is under my care.  The services outlined above are required by this patient, and will be reviewed every 90 days.     PHYSICIAN: Minnie Whaley MD      DATE:     Please sign and return via fax to [phone].. Thank you, [facility].

## 2019-03-21 NOTE — PATIENT INSTRUCTIONS
Access Code: [email or web address]   URL: [email or web address]   Date: 03/21/2019   Prepared by: Dee Fernandez   Seated Hip Adduction Isometrics with Ball - 10 reps - 1 sets - 5 sec. hold - 1x daily - 7x weekly   Seated Hip Internal Rotation AROM - 10 reps - 1 sets - 5 sec. hold - 1x daily - 7x weekly

## 2019-03-22 DIAGNOSIS — M19.041 OSTEOARTHRITIS OF BOTH HANDS, UNSPECIFIED OSTEOARTHRITIS TYPE: Primary | ICD-10-CM

## 2019-03-22 DIAGNOSIS — M19.042 OSTEOARTHRITIS OF BOTH HANDS, UNSPECIFIED OSTEOARTHRITIS TYPE: Primary | ICD-10-CM

## 2019-03-29 ENCOUNTER — TREATMENT (OUTPATIENT)
Dept: PHYSICAL THERAPY | Facility: CLINIC | Age: 64
End: 2019-03-29

## 2019-03-29 DIAGNOSIS — M25.60 LIMITED JOINT RANGE OF MOTION (ROM): ICD-10-CM

## 2019-03-29 DIAGNOSIS — M25.551 PAIN OF BOTH HIP JOINTS: Primary | ICD-10-CM

## 2019-03-29 DIAGNOSIS — M25.552 PAIN OF BOTH HIP JOINTS: Primary | ICD-10-CM

## 2019-03-29 DIAGNOSIS — R26.89 IMPAIRED GAIT AND MOBILITY: ICD-10-CM

## 2019-03-29 PROCEDURE — 97110 THERAPEUTIC EXERCISES: CPT | Performed by: PHYSICAL THERAPIST

## 2019-03-29 PROCEDURE — 97140 MANUAL THERAPY 1/> REGIONS: CPT | Performed by: PHYSICAL THERAPIST

## 2019-03-29 NOTE — PROGRESS NOTES
Physical Therapy Daily Progress Note        Patient: Liza Aaron   : 1955  Diagnosis/ICD-10 Code:  Pain of both hip joints [M25.551, M25.552]  Referring practitioner: Minnie Whaley MD  Date of Initial Visit: Type: THERAPY  Noted: 3/21/2019  Today's Date: 3/29/2019  Patient seen for 2 sessions         Liza Aaron reports:     Subjective   Patient reports she had a little soreness in her hips but that is better today and she feels she has a little more flexibility.    Objective   See Exercise, Manual, and Modality Logs for complete treatment.       Assessment/Plan  Patient tolerated exercise progression well with no adverse symptoms.  Improved mobility noted after treatment.  Patient to be out of town on vacation, will resume upon her return.  Progress per Plan of Care           Manual Therapy:    10     mins  36187;  Therapeutic Exercise:    25     mins  73913;     Neuromuscular Fide:        mins  73064;    Therapeutic Activity:          mins  71857;     Gait Training:           mins  22564;     Ultrasound:          mins  80670;    Electrical Stimulation:         mins  36290 ( );  Dry Needling          mins self-pay    Timed Treatment:   35   mins   Total Treatment:     40   mins    Dee Moise PT  Physical Therapist

## 2019-04-08 ENCOUNTER — TREATMENT (OUTPATIENT)
Dept: PHYSICAL THERAPY | Facility: CLINIC | Age: 64
End: 2019-04-08

## 2019-04-08 DIAGNOSIS — M25.60 LIMITED JOINT RANGE OF MOTION (ROM): ICD-10-CM

## 2019-04-08 DIAGNOSIS — M25.552 PAIN OF BOTH HIP JOINTS: Primary | ICD-10-CM

## 2019-04-08 DIAGNOSIS — R26.89 IMPAIRED GAIT AND MOBILITY: ICD-10-CM

## 2019-04-08 DIAGNOSIS — M25.551 PAIN OF BOTH HIP JOINTS: Primary | ICD-10-CM

## 2019-04-08 PROCEDURE — 97110 THERAPEUTIC EXERCISES: CPT | Performed by: PHYSICAL THERAPIST

## 2019-04-08 PROCEDURE — 97140 MANUAL THERAPY 1/> REGIONS: CPT | Performed by: PHYSICAL THERAPIST

## 2019-04-08 NOTE — PROGRESS NOTES
Physical Therapy Daily Progress Note        Patient: Liza Aaron   : 1955  Diagnosis/ICD-10 Code:  Pain of both hip joints [M25.551, M25.552]  Referring practitioner: Minnie Whaley MD  Date of Initial Visit: Type: THERAPY  Noted: 3/21/2019  Today's Date: 2019  Patient seen for 3 sessions         Liza Aaron reports:     Subjective   Patient reports she had increased soreness in hips after activity while on vacation and continues to have most of her pain at night.    Objective   See Exercise, Manual, and Modality Logs for complete treatment.       Assessment/Plan  Patient tolerated treatment and performed exercise program with mild discomfort.  Progressed HEP and issued written instructions for home use.  Continues with hard end feel in bilateral hip flexion and IR.  Progress per Plan of Care           Manual Therapy:    12     mins  83960;  Therapeutic Exercise:    20     mins  81479 (10 minutes concurrently);     Neuromuscular Fide:        mins  36983;    Therapeutic Activity:          mins  13232;     Gait Training:           mins  77847;     Ultrasound:          mins  81084;    Electrical Stimulation:         mins  33651 ( );  Dry Needling          mins self-pay    Timed Treatment:   32   mins   Total Treatment:     45   mins    Dee Moise PT  Physical Therapist

## 2019-04-15 ENCOUNTER — TREATMENT (OUTPATIENT)
Dept: PHYSICAL THERAPY | Facility: CLINIC | Age: 64
End: 2019-04-15

## 2019-04-15 DIAGNOSIS — M25.552 PAIN OF BOTH HIP JOINTS: Primary | ICD-10-CM

## 2019-04-15 DIAGNOSIS — M25.60 LIMITED JOINT RANGE OF MOTION (ROM): ICD-10-CM

## 2019-04-15 DIAGNOSIS — M25.551 PAIN OF BOTH HIP JOINTS: Primary | ICD-10-CM

## 2019-04-15 DIAGNOSIS — R26.89 IMPAIRED GAIT AND MOBILITY: ICD-10-CM

## 2019-04-15 PROCEDURE — 97140 MANUAL THERAPY 1/> REGIONS: CPT | Performed by: PHYSICAL THERAPIST

## 2019-04-15 PROCEDURE — 97110 THERAPEUTIC EXERCISES: CPT | Performed by: PHYSICAL THERAPIST

## 2019-04-15 NOTE — PROGRESS NOTES
Physical Therapy Daily Progress Note        Patient: Liza Aaron   : 1955  Diagnosis/ICD-10 Code:  Pain of both hip joints [M25.551, M25.552]  Referring practitioner: Minnie Whaley MD  Date of Initial Visit: Type: THERAPY  Noted: 3/21/2019  Today's Date: 4/15/2019  Patient seen for 4 sessions         Liza Aaron reports:       Subjective   Patient reports she did some painting this weekend and had increased discomfort and fatigue in both hips/legs.    Objective   See Exercise, Manual, and Modality Logs for complete treatment.       Assessment/Plan  Patient was able to to perform progressed exercises without adverse symptoms.  She had some mild discomfort with manual therapy although she did have increased hip ROM after.  Progress per Plan of Care           Manual Therapy:    13     mins  34007;  Therapeutic Exercise:    22     mins  02512;     Neuromuscular Fide:        mins  39645;    Therapeutic Activity:          mins  70433;     Gait Training:           mins  71957;     Ultrasound:          mins  42355;    Electrical Stimulation:         mins  87686 ( );  Dry Needling          mins self-pay    Timed Treatment:   35   mins   Total Treatment:     40   mins    Dee Moise PT  Physical Therapist

## 2019-04-16 ENCOUNTER — OFFICE VISIT (OUTPATIENT)
Dept: CARDIOLOGY | Facility: CLINIC | Age: 64
End: 2019-04-16

## 2019-04-16 VITALS
WEIGHT: 223 LBS | RESPIRATION RATE: 16 BRPM | HEIGHT: 68 IN | BODY MASS INDEX: 33.8 KG/M2 | HEART RATE: 71 BPM | SYSTOLIC BLOOD PRESSURE: 118 MMHG | DIASTOLIC BLOOD PRESSURE: 78 MMHG

## 2019-04-16 DIAGNOSIS — E66.09 CLASS 2 OBESITY DUE TO EXCESS CALORIES WITHOUT SERIOUS COMORBIDITY WITH BODY MASS INDEX (BMI) OF 35.0 TO 35.9 IN ADULT: ICD-10-CM

## 2019-04-16 DIAGNOSIS — Z82.49 FAMILY HISTORY OF PREMATURE CORONARY ARTERY DISEASE: ICD-10-CM

## 2019-04-16 DIAGNOSIS — E78.2 MIXED HYPERLIPIDEMIA: Primary | ICD-10-CM

## 2019-04-16 PROCEDURE — 93000 ELECTROCARDIOGRAM COMPLETE: CPT | Performed by: INTERNAL MEDICINE

## 2019-04-16 PROCEDURE — 99203 OFFICE O/P NEW LOW 30 MIN: CPT | Performed by: INTERNAL MEDICINE

## 2019-04-16 NOTE — PROGRESS NOTES
PATIENTINFORMATION    Date of Office Visit: 2019  Encounter Provider: Bridgett Julian MD  Place of Service: [facility] CARDIOLOGY  Patient Name: Liza Aaron  : 1955    Subjective:     Encounter Date:2019      Patient ID: Liza Aaron is a 64 y.o. female.      History of Present Illness    This is a nice lady with a family history of coronary disease.  Her father had his first heart attack at the age of 49.  Her brother, who is three years younger, just had his first heart attack at 60.  She has a personal history of hyperlipidemia, which is controlled with Crestor.  She had vascular screening in 2018, which was normal.  She does water aerobics and tries to exercise several times a week and is not having exertional symptoms at this time.  She denies chest pain, palpitations, edema, or lightheadedness.      Review of Systems   Constitution: Negative for fever, malaise/fatigue, weight gain and weight loss.   HENT: Negative for ear pain, hearing loss, nosebleeds and sore throat.    Eyes: Negative for double vision, pain, vision loss in left eye and vision loss in right eye.   Cardiovascular:        See history of present illness.   Respiratory: Positive for shortness of breath. Negative for cough, sleep disturbances due to breathing, snoring and wheezing.    Endocrine: Negative for cold intolerance, heat intolerance and polyuria.   Skin: Negative for itching, poor wound healing and rash.   Musculoskeletal: Positive for joint pain. Negative for joint swelling and myalgias.   Gastrointestinal: Negative for abdominal pain, diarrhea, hematochezia, nausea and vomiting.   Genitourinary: Negative for hematuria and hesitancy.   Neurological: Negative for numbness, paresthesias and seizures.   Psychiatric/Behavioral: Negative for depression. The patient is not nervous/anxious.            ECG 12 Lead  Date/Time: 2019 12:09 PM  Performed by: Bridgett Julian  "MD  Authorized by: Bridgett Julian MD   Comparison: compared with previous ECG from 3/6/2019  Similar to previous ECG  Rhythm: sinus rhythm  Ectopy: atrial premature contractions  BPM: 71    Clinical impression: normal ECG               Objective:     /78 (BP Location: Left arm, Patient Position: Sitting, Cuff Size: Large Adult)   Pulse 71   Resp 16   Ht 172.7 cm (68\")   Wt 101 kg (223 lb)   BMI 33.91 kg/m²  Body mass index is 33.91 kg/m².     Physical Exam   Constitutional: She appears well-developed.   HENT:   Head: Normocephalic and atraumatic.   Eyes: Conjunctivae and lids are normal. Pupils are equal, round, and reactive to light. Lids are everted and swept, no foreign bodies found.   Neck: Normal range of motion. No JVD present. Carotid bruit is not present. No tracheal deviation present. No thyroid mass present.   Cardiovascular: Normal rate, regular rhythm and normal heart sounds.  Extrasystoles are present.   Pulses:       Dorsalis pedis pulses are 2+ on the right side, and 2+ on the left side.   Pulmonary/Chest: Effort normal and breath sounds normal.   Abdominal: Normal appearance and bowel sounds are normal.   Musculoskeletal: Normal range of motion.   Neurological: She is alert. She has normal strength.   Skin: Skin is warm, dry and intact.   Psychiatric: She has a normal mood and affect. Her behavior is normal.   Vitals reviewed.          Assessment/Plan:       1. Family history of premature coronary disease.  I recommend a calcium score for further evaluation of her coronaries.  If her score were high, I would consider adding aspirin.    2. Hyperlipidemia.  I reviewed her labs from 03/2019, and I feel like she is on a perfect dose of Crestor.   3. Obesity.  She has been exercising and lost 35 pounds.  I encouraged her to continue with this.       Orders Placed This Encounter   Procedures   • CT Cardiac Calcium Score Without Dye     Standing Status:   Future     Standing Expiration Date:   " 4/15/2020   • ECG 12 Lead     This order was created via procedure documentation        Discharge Medications           Accurate as of 4/16/19 12:46 PM. If you have any questions, ask your nurse or doctor.               Continue These Medications      Instructions Start Date   Calcium 500 MG tablet   1 tablet, Oral, Daily      MULTI COMPLETE PO   1 tablet, Oral, Daily      rosuvastatin 10 MG tablet  Commonly known as:  CRESTOR   10 mg, Oral, Daily      VITAMIN D PO   500 mg, Oral, Daily                    Bridgett Julian MD  04/16/19  12:46 PM

## 2019-04-22 ENCOUNTER — TREATMENT (OUTPATIENT)
Dept: PHYSICAL THERAPY | Facility: CLINIC | Age: 64
End: 2019-04-22

## 2019-04-22 DIAGNOSIS — R26.89 IMPAIRED GAIT AND MOBILITY: ICD-10-CM

## 2019-04-22 DIAGNOSIS — M25.552 PAIN OF BOTH HIP JOINTS: Primary | ICD-10-CM

## 2019-04-22 DIAGNOSIS — M25.551 PAIN OF BOTH HIP JOINTS: Primary | ICD-10-CM

## 2019-04-22 DIAGNOSIS — M25.60 LIMITED JOINT RANGE OF MOTION (ROM): ICD-10-CM

## 2019-04-22 PROCEDURE — 97140 MANUAL THERAPY 1/> REGIONS: CPT | Performed by: PHYSICAL THERAPIST

## 2019-04-22 PROCEDURE — 97110 THERAPEUTIC EXERCISES: CPT | Performed by: PHYSICAL THERAPIST

## 2019-04-22 NOTE — PROGRESS NOTES
Physical Therapy Re-Assessment / Re-Certification        Patient: Liza Aaron   : 1955  Diagnosis/ICD-10 Code:  Pain of both hip joints [M25.551, M25.552]  Referring practitioner: Minnie Whaley MD  Date of Initial Visit: Type: THERAPY  Noted: 3/21/2019  Today's Date: 2019  Patient seen for 5 sessions      Subjective:   Liza Aaron reports:   Clinical Progress: improved  Home Program Compliance: Yes  Treatment has included: therapeutic exercise and manual therapy    Subjective   Patient reports she feels her legs are feeling better but still notes tightness.  States she is having an easier time putting her socks on her left side.    Objective     Active Range of Motion   Left Hip   Flexion: 88 degrees   Extension: 0 (Lag) degrees   Abduction: 15 degrees   Adduction: 12 degrees   External rotation (90/90): 45 degrees   Internal rotation (90/90): 0 degrees      Right Hip   Flexion: 85 degrees   Extension: 5 (Lag) degrees   Abduction: 15 degrees   Adduction: 10 degrees   External rotation (90/90): 40 degrees   Internal rotation (90/90): 7 (Lag) degrees     Left Knee   Flexion: 130 degrees with pain  Extensor la degrees      Right Knee   Flexion: 110 degrees with pain  Extensor lag: 15 degrees with pain    Strength/Myotome Testing      Left Hip   Planes of Motion   Flexion: 4  Extension: 4-  Abduction: 4  Adduction: 4-     Right Hip   Planes of Motion   Flexion: 4  Extension: 4-  Abduction: 3+  Adduction: 4-     Left Knee   Flexion: 4+  Extension: 4+       Right Knee   Flexion: 4+  Extension: 4+          Assessment/Plan   Patient presents with limited bilateral hip and knee ROM although some improvement is noted in ROM.  She continues to have limitations in strength and altered gait mechanics as a function of weakness and ROM limitations.  She will benefit from continued PT.  Progress toward previous goals: Progressing    Goal Review  Short Term Goals (2-3 wks):  1.  Patient will have increased  bilateral hip ROM to: Flexion 100 deg, Extension 0 deg, IR 0 deg to allow for increased functional joint mobility.  2.  Patient will have increased bilateral knee ROM to: Flexion 125 deg, Extension 0 deg.  3.  Patient will have increased bilateral hip strength to 4/5 to allow for increased joint stabilization and support.  4.  Patient will have decreased pain to 5/10 at worst to allow for increased comfort with functional activities and allow for improved restorative sleep.  5.  Patient will have increased LE muscle flexibility to WNLs.      Long Term Goals (4 wks):  1.  Patient will be independent in performance of HEP for carryover upon discharge from skilled PT services.  2.  Patient will have normalized gait mechanics.  3.  Patient will report return to performance of ADLs/Work/Leisure activities with minimal to no symptom reproduction/pain limitations.  4.  Patient will having improved LEFS score to 50/80 or better.      Recommendations: Continue as planned  Timeframe: 1 month  Prognosis to achieve goals: fair    PT Signature: Dee Moise PT      Based upon review of the patient's progress and continued therapy plan, it is my medical opinion that Liza Aaron should continue physical therapy treatment at [facility]  [address] [place] [id number] [phone].    Signature: __________________________________  Minnie Whaley MD    Manual Therapy:    10     mins  15427;  Therapeutic Exercise:    15     mins  12780 (10 minutes concurrently);     Neuromuscular Fide:        mins  59332;    Therapeutic Activity:          mins  99599;     Gait Training:           mins  17591;     Ultrasound:          mins  63445;    Electrical Stimulation:         mins  70145 ( );  Dry Needling          mins self-pay    Timed Treatment:   25   mins   Total Treatment:     40   mins

## 2019-04-29 ENCOUNTER — TREATMENT (OUTPATIENT)
Dept: PHYSICAL THERAPY | Facility: CLINIC | Age: 64
End: 2019-04-29

## 2019-04-29 DIAGNOSIS — M25.60 LIMITED JOINT RANGE OF MOTION (ROM): ICD-10-CM

## 2019-04-29 DIAGNOSIS — M25.552 PAIN OF BOTH HIP JOINTS: Primary | ICD-10-CM

## 2019-04-29 DIAGNOSIS — R26.89 IMPAIRED GAIT AND MOBILITY: ICD-10-CM

## 2019-04-29 DIAGNOSIS — M25.551 PAIN OF BOTH HIP JOINTS: Primary | ICD-10-CM

## 2019-04-29 PROCEDURE — 97110 THERAPEUTIC EXERCISES: CPT | Performed by: PHYSICAL THERAPIST

## 2019-04-29 PROCEDURE — 97140 MANUAL THERAPY 1/> REGIONS: CPT | Performed by: PHYSICAL THERAPIST

## 2019-04-29 NOTE — PROGRESS NOTES
Physical Therapy Daily Progress Note        Patient: Liza Aaron   : 1955  Diagnosis/ICD-10 Code:  Pain of both hip joints [M25.551, M25.552]  Referring practitioner: Minnie Whaley MD  Date of Initial Visit: Type: THERAPY  Noted: 3/21/2019  Today's Date: 2019  Patient seen for 6 sessions         Liza Aaron reports:       Subjective   Patient reports she continues to have reduced pain and was able to stand for over an hour before she required a sitting break.      Objective   See Exercise, Manual, and Modality Logs for complete treatment.       Assessment/Plan  Patient tolerated treatment well with no adverse symptoms. Her ROM is progressing, still having more limitation on the right hip than the left.  Progress per Plan of Care           Manual Therapy:    14     mins  12664;  Therapeutic Exercise:    13     mins  40126;     Neuromuscular Fide:        mins  34687;    Therapeutic Activity:          mins  86698;     Gait Training:           mins  13320;     Ultrasound:          mins  60540;    Electrical Stimulation:         mins  51131 ( );  Dry Needling          mins self-pay    Timed Treatment:   27   mins   Total Treatment:     30   mins    Dee Moise PT  Physical Therapist

## 2019-05-08 ENCOUNTER — TREATMENT (OUTPATIENT)
Dept: PHYSICAL THERAPY | Facility: CLINIC | Age: 64
End: 2019-05-08

## 2019-05-08 DIAGNOSIS — R26.89 IMPAIRED GAIT AND MOBILITY: ICD-10-CM

## 2019-05-08 DIAGNOSIS — M25.60 LIMITED JOINT RANGE OF MOTION (ROM): ICD-10-CM

## 2019-05-08 DIAGNOSIS — M25.552 PAIN OF BOTH HIP JOINTS: Primary | ICD-10-CM

## 2019-05-08 DIAGNOSIS — M25.551 PAIN OF BOTH HIP JOINTS: Primary | ICD-10-CM

## 2019-05-08 PROCEDURE — 97110 THERAPEUTIC EXERCISES: CPT | Performed by: PHYSICAL THERAPIST

## 2019-05-08 PROCEDURE — 97140 MANUAL THERAPY 1/> REGIONS: CPT | Performed by: PHYSICAL THERAPIST

## 2019-05-08 NOTE — PROGRESS NOTES
Physical Therapy Daily Progress Note        Patient: Liza Aaron   : 1955  Diagnosis/ICD-10 Code:  Pain of both hip joints [M25.551, M25.552]  Referring practitioner: Minnie Whaley MD  Date of Initial Visit: Type: THERAPY  Noted: 3/21/2019  Today's Date: 2019  Patient seen for 7 sessions         Liza Aaron reports:       Subjective   Patient reports she had a lot of fatigue and some soreness after all the [place] festivities.  States she is having less discomfort with routine activities but did have soreness after planting flowers.  States she is having less difficulty putting her socks and shoes on although she is still aware of the limited mobility and is still limited in her sitting, standing and walking tolerance.    Objective     Active Range of Motion   Left Hip   Flexion: 95 degrees   Extension: 3 (Lag) degrees   Abduction: 20 degrees   Adduction: 15 degrees   External rotation (90/90): 45 degrees   Internal rotation (90/90): 0 degrees      Right Hip   Flexion: 90 degrees   Extension: 5 (Lag) degrees   Abduction: 15 degrees   Adduction: 10 degrees   External rotation (90/90): 40 degrees   Internal rotation (90/90): 5 (Lag) degrees      Left Knee   Flexion: 125 degrees with pain  Extensor la degrees      Right Knee   Flexion: 100 degrees  Extensor lag: 15 degrees  See Exercise, Manual, and Modality Logs for complete treatment.       Assessment/Plan  Patient has had ROM improvements in bilateral knees and hips although limitations persist.  She is most limited in her right hip and knee.  Posture and gait mechanics continue to be altered due to ROM limitations.  Patient to see Dr. Johnson regarding knees and hips, will continue per Dr. Johnson's recommendations.             Manual Therapy:    20     mins  83457;  Therapeutic Exercise:    15     mins  39697;     Neuromuscular Fide:        mins  47703;    Therapeutic Activity:          mins  74726;     Gait Training:           mins  89218;      Ultrasound:          mins  66998;    Electrical Stimulation:         mins  81265 ( );  Dry Needling          mins self-pay    Timed Treatment:   35   mins   Total Treatment:     40   mins    Dee Moise, PT  Physical Therapist

## 2019-05-13 ENCOUNTER — HOSPITAL ENCOUNTER (OUTPATIENT)
Dept: CT IMAGING | Facility: HOSPITAL | Age: 64
Discharge: HOME | End: 2019-05-13
Admitting: INTERNAL MEDICINE

## 2019-05-13 DIAGNOSIS — E78.2 MIXED HYPERLIPIDEMIA: ICD-10-CM

## 2019-05-13 DIAGNOSIS — Z82.49 FAMILY HISTORY OF PREMATURE CORONARY ARTERY DISEASE: ICD-10-CM

## 2019-05-13 DIAGNOSIS — E66.09 CLASS 2 OBESITY DUE TO EXCESS CALORIES WITHOUT SERIOUS COMORBIDITY WITH BODY MASS INDEX (BMI) OF 35.0 TO 35.9 IN ADULT: ICD-10-CM

## 2019-05-13 PROCEDURE — 75571 CT HRT W/O DYE W/CA TEST: CPT

## 2019-05-14 ENCOUNTER — OFFICE VISIT (OUTPATIENT)
Dept: ORTHOPEDIC SURGERY | Facility: CLINIC | Age: 64
End: 2019-05-14

## 2019-05-14 VITALS — HEIGHT: 68 IN | BODY MASS INDEX: 34.25 KG/M2 | TEMPERATURE: 98 F | WEIGHT: 226 LBS

## 2019-05-14 DIAGNOSIS — M16.0 PRIMARY OSTEOARTHRITIS OF BOTH HIPS: Primary | ICD-10-CM

## 2019-05-14 PROCEDURE — 99203 OFFICE O/P NEW LOW 30 MIN: CPT | Performed by: ORTHOPAEDIC SURGERY

## 2019-05-14 NOTE — PROGRESS NOTES
"Patient: Liza Aaron  YOB: 1955 64 y.o. female  Medical Record Number: [id number]    Chief Complaints:   Chief Complaint   Patient presents with   • Left Hip - Establish Care   • Right Hip - Establish Care       History of Present Illness:Liza Aaron is a 64 y.o. female who presents with bilateral hip pain and stiffness.  Has been present for about a year although stiffness longer than that.  She describes a moderate intermittent ache worse with certain positions or activities.  It has progressed over this time..  She is working with a physical therapist on range of motion and has made some improvement.  Bilateral hip advanced osteoarthritis.  Not bad enough to consider surgery at this point.    Allergies:   Allergies   Allergen Reactions   • Penicillins Rash       Medications:   Current Outpatient Medications   Medication Sig Dispense Refill   • Calcium 500 MG tablet Take 1 tablet by mouth Daily.     • Cholecalciferol (VITAMIN D PO) Take 500 mg by mouth Daily.     • Multiple Vitamins-Minerals (MULTI COMPLETE PO) Take 1 tablet by mouth Daily.     • rosuvastatin (CRESTOR) 10 MG tablet Take 1 tablet by mouth Daily. 90 tablet 1     No current facility-administered medications for this visit.          The following portions of the patient's history were reviewed and updated as appropriate: allergies, current medications, past family history, past medical history, past social history, past surgical history and problem list.    Review of Systems:   A 14 point review of systems was performed. All systems negative except pertinent positives/negative listed in HPI above    Physical Exam:   Vitals:    05/14/19 1357   Temp: 98 °F (36.7 °C)   Weight: 103 kg (226 lb)   Height: 172.7 cm (68\")       General: A and O x 3, ASA, NAD    SCLERA:    Normal    DENTITION:   Normal  Hip:  bilateral    LEG ALIGNMENT:     Neutral        LEG LENGTH DISCREPANCY   :    none    GAIT:     Antalgic    SKIN:     No " abnormality    RANGE OF MOTION:     Limited by joint irritability    STRENGTH:     Limited by joint irratibility    DISTAL PULSES:    Paplable    DISTAL SENSATION :   Intact    LYMPHATICS:     No   lymphadenopathy    OTHER:          +/-   Stinchfeld test      -    log roll      -   Tenderness to palpation trochanteric bursa        Radiology:  Xrays 2views (AP bilateral hips, and lateral of the hip) taken at the hospital were reviewed  demonstrating  advanced, end-satge osteoarthritis with bone on bone articluation, periarticular osteophytes, and subchondral cysts    Assessment/Plan: I like to recheck her in 6 months.  She will continue to work on weight loss and stretching and strengthening with physical therapy.  We will repeat x-rays when she returns in 6 months.      Cesar Johnson MD  5/14/2019

## 2019-05-15 ENCOUNTER — TELEPHONE (OUTPATIENT)
Dept: CARDIOLOGY | Facility: CLINIC | Age: 64
End: 2019-05-15

## 2019-05-15 NOTE — TELEPHONE ENCOUNTER
Total coronary artery Agatston calcification score is 0.    1. Family history of premature coronary disease.  I recommend a calcium score for further evaluation of her coronaries.  If her score were high, I would consider adding aspirin.    2. Hyperlipidemia.  I reviewed her labs from 03/2019, and I feel like she is on a perfect dose of Crestor.   3. Obesity.  She has been exercising and lost 35 pounds.  I encouraged her to continue with this.        Please let her know that her calcium score is normal.  Continue current medications and keep exercising and weight loss.

## 2019-07-18 ENCOUNTER — APPOINTMENT (OUTPATIENT)
Dept: WOMENS IMAGING | Facility: HOSPITAL | Age: 64
End: 2019-07-18

## 2019-07-18 PROCEDURE — 77067 SCR MAMMO BI INCL CAD: CPT | Performed by: RADIOLOGY

## 2019-07-18 PROCEDURE — 77063 BREAST TOMOSYNTHESIS BI: CPT | Performed by: RADIOLOGY

## 2019-08-02 NOTE — PROGRESS NOTES
"Chief Complaint:  Incisional hernia    HPI    Patient is a 64 y.o. 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  We decided at that time that it was not urgent, and she certainly had plenty going on, and decided to wait.  Since then, things at work have quieted down a bit, but unfortunately she is having a lot of trouble with her hips, and plans right hip surgery in December, to be followed by the left hip at a time down the road.  She still describes very few symptoms with only sporadic discomfort and no \"real pain\".  She really cannot feel anything at that site.  She had a GRICEL/BSO by Dr. Moya, in December 2004, along with an umbilical hernia repair.    She denies any family history of having any hernias.  Her mother did have breast cancer..      She is active, swimming, doing water aerobics and exercises to increase her core strength, and has been trying to lose weight.  She has lost about 30 pounds.    Past Medical History:   Diagnosis Date   • Arthritis    • Diverticulosis    • Hyperglycemia    • Hyperlipidemia    • Pure hypercholesterolemia    • Vitamin D deficiency      Past Surgical History:   Procedure Laterality Date   • COLONOSCOPY N/A 12/14/2015    Diverticulosis in the entire examined colon, non-bleeding internal hemorrhodis, no specimens collected-Dr. Rosario Flores   • TOTAL ABDOMINAL HYSTERECTOMY WITH SALPINGO OOPHORECTOMY Bilateral 12/16/2004    Dr. Dominick Villarreal   • UMBILICAL HERNIA REPAIR N/A 12/16/2004    Dr. Dominick Villarreal     Family History   Problem Relation Age of Onset   • Breast cancer Mother    • Hypertension Mother    • Heart disease Father    • Cancer Maternal Grandmother    • Heart disease Brother      Social History     Socioeconomic History   • Marital status:      Spouse name: EDDIE IVAN   • Number of children: 5   • Years of education: " "17YEARS [employer] SCHO[employer]   • Highest education level: Not on file   Occupational History   • Occupation:      Employer: [employer]   Tobacco Use   • Smoking status: Former Smoker     Packs/day: 1.00     Years: 5.00     Pack years: 5.00     Types: Cigarettes     Last attempt to quit: 1982     Years since quittin.6   • Smokeless tobacco: Never Used   • Tobacco comment: caffiene twice a week   Substance and Sexual Activity   • Alcohol use: Yes     Comment: 3 drinks per week   • Drug use: No   • Sexual activity: Defer       Occupation/Additional Social Hx:  [employer]      Current Outpatient Medications:   •  Calcium 500 MG tablet, Take 1 tablet by mouth Daily., Disp: , Rfl:   •  Cholecalciferol (VITAMIN D PO), Take 500 mg by mouth Daily., Disp: , Rfl:   •  melatonin 1 MG tablet, Take 1 mg by mouth At Night As Needed for Sleep., Disp: , Rfl:   •  Multiple Vitamins-Minerals (MULTI COMPLETE PO), Take 1 tablet by mouth Daily., Disp: , Rfl:   •  rosuvastatin (CRESTOR) 10 MG tablet, Take 1 tablet by mouth Daily., Disp: 90 tablet, Rfl: 1    Allergies   Allergen Reactions   • Penicillins Rash       Preventative Medicine  Colonoscopy: 2015    Review of Systems   Respiratory: Shortness of breath: mild shortness of breath with exercise.    Cardiovascular: Negative for chest pain and leg swelling.   All other systems reviewed and are negative.      Vitals:    19 1342   Pulse: 64   SpO2: 97%   Weight: 104 kg (229 lb)   Height: 167.6 cm (66\")       PHYSICAL EXAM:    Pulse 64   Ht 167.6 cm (66\")   Wt 104 kg (229 lb)   SpO2 97%   BMI 36.96 kg/m²   Body mass index is 36.96 kg/m².    Constitutional: well developed, well nourished, well-groomed  Eyes: sclera nonicteric, conjunctiva not injected  ENMT: Hearing intact, trachea midline, thyroid without masses  CVS: RRR, no murmur, peripheral edema not present  Respiratory: CTA, normal respiratory effort "   Gastrointestinal: no hepatosplenomegaly, abdomen soft, visible suggestion of asymmetry at the umbilicus and off to the right of that, with visible bowing out, no discrete mass.  On palpation, however today there is a sense of a mass under there that larger than a walnut, and when I asked her to become recumbent and then set up, there is a pretty noticeable difference in the right side from the left with a large billowing out.  Midline incisional scar from the right of the umbilicus down towards the pubis.  She also has a ventral diastases  Genitourinary: inguinal hernia not present  Musculoskeletal: gait normal, muscle mass .  Normal range of motion, not stiff  Skin: warm and dry, no rashes visible  Neurological: awake and alert, seems to have reasonable capacity for understanding for medical decision making  Psychiatric: appears to have reasonable judgement, obviously capable of making medical decisions, affable  Lymphatics: no cervical adenopathy, no axillary adenopathy    Radiographic Findings: None to review    Lab Findings: None to review    IMPRESSION:  · Incisional hernia, reducible, with likely adjacent additional small defects, of a [place] cheese type nature suspected on prior visit, seemingly larger today, with a pretty dramatic bowing on the right which I could not see last time.  Discussed with her the difficulty of a repair if in fact she has multiple small incisional hernias, with the need for a large piece of mesh, with exposure of the bowel as a potential.    I think it is more likely than previously, that the bowel could get caught.  I discussed with her that I am really concerned if there is bowel in there and that she should not have her hip surgery prior to getting the hernia repaired.    · Ventral diastases, notable, about 3-4 cm  · Body mass index is 36.96 kg/m².   Has lost about 30 pounds through exercise and watching her diet   · mother with breast cancer, up to date on Mammo  · Actively  engaging in exercise    PLAN:  · Previous discussion regarding the hernia repair types, with the risk associated with intraperitoneal mesh versus a larger operation, with bilateral musculofascial advancement flaps, closure of the posterior rectus, with extraperitoneal dissolvable mesh, and return to a more ergonomic core.    · Discussed today that I think is more urgent than I had previously due to the fact that it seems larger, and if she is going to have hip surgery, then she will be straining her abdominal muscles much more dramatically, and may wind up with an incarceration.  To that and I think we need to make sure there is not bowel included there now, and I ordered a CT scan.  If in fact there is bowel they are going to go ahead and recommend that we proceed with the hernia first    Previously discussed the greater likelihood of complication from the more complex surgery, but again a less likely incidence of bowel adhesion for foreign body reaction, with the more complex surgery.  Recommended the surgery with intraperitoneal mesh if she simply wanted to deal with the present problem, but recommended consideration of the other if she wished better long-term results more prominently.    She should not stop her water exercises.     Liza Crawford MD  08/05/19  1:40 PM    In order to provide a more personal and interactive patient experience as well as improve efficiency, this note was started prior to the day of visit.    ADDEND  CT shows defect just above the umbilicus with 2.2 cm defect, 4 cm sac, and then about 1.5 cm below that, she has a 4.4 cm defect without defined sac.  The intervening tissue looks very thin.  Also, despite lack of symptoms, there is actually fairly convincing evidence of proximal descending colon diverticulitis.  Liza Crawford MD  08/21/19

## 2019-08-05 ENCOUNTER — OFFICE VISIT (OUTPATIENT)
Dept: SURGERY | Facility: CLINIC | Age: 64
End: 2019-08-05

## 2019-08-05 VITALS — WEIGHT: 229 LBS | HEART RATE: 64 BPM | HEIGHT: 66 IN | BODY MASS INDEX: 36.8 KG/M2 | OXYGEN SATURATION: 97 %

## 2019-08-05 DIAGNOSIS — K43.2 INCISIONAL HERNIA, WITHOUT OBSTRUCTION OR GANGRENE: Primary | ICD-10-CM

## 2019-08-05 DIAGNOSIS — E66.09 CLASS 2 OBESITY DUE TO EXCESS CALORIES WITHOUT SERIOUS COMORBIDITY WITH BODY MASS INDEX (BMI) OF 35.0 TO 35.9 IN ADULT: ICD-10-CM

## 2019-08-05 PROCEDURE — 99213 OFFICE O/P EST LOW 20 MIN: CPT | Performed by: SURGERY

## 2019-08-05 RX ORDER — UREA 10 %
1 LOTION (ML) TOPICAL NIGHTLY PRN
COMMUNITY
End: 2020-02-06 | Stop reason: HOSPADM

## 2019-08-15 ENCOUNTER — HOSPITAL ENCOUNTER (OUTPATIENT)
Dept: CT IMAGING | Facility: HOSPITAL | Age: 64
Discharge: HOME | End: 2019-08-15
Admitting: SURGERY

## 2019-08-15 DIAGNOSIS — K43.2 INCISIONAL HERNIA, WITHOUT OBSTRUCTION OR GANGRENE: ICD-10-CM

## 2019-08-15 LAB — CREAT BLDA-MCNC: 0.6 MG/DL (ref 0.6–1.3)

## 2019-08-15 PROCEDURE — 74177 CT ABD & PELVIS W/CONTRAST: CPT

## 2019-08-15 PROCEDURE — 82565 ASSAY OF CREATININE: CPT

## 2019-08-15 PROCEDURE — 25010000002 IOPAMIDOL 61 % SOLUTION: Performed by: SURGERY

## 2019-08-15 RX ADMIN — IOPAMIDOL 85 ML: 612 INJECTION, SOLUTION INTRAVENOUS at 09:09

## 2019-09-17 DIAGNOSIS — E78.00 PURE HYPERCHOLESTEROLEMIA: Primary | ICD-10-CM

## 2019-09-17 LAB
ALBUMIN SERPL-MCNC: 4.7 G/DL (ref 3.5–5.2)
ALBUMIN/GLOB SERPL: 2.2 G/DL
ALP SERPL-CCNC: 68 U/L (ref 39–117)
ALT SERPL-CCNC: 10 U/L (ref 1–33)
AST SERPL-CCNC: 24 U/L (ref 1–32)
BILIRUB SERPL-MCNC: 0.8 MG/DL (ref 0.2–1.2)
BUN SERPL-MCNC: 11 MG/DL (ref 8–23)
BUN/CREAT SERPL: 16.2 (ref 7–25)
CALCIUM SERPL-MCNC: 9.3 MG/DL (ref 8.6–10.5)
CHLORIDE SERPL-SCNC: 101 MMOL/L (ref 98–107)
CHOLEST SERPL-MCNC: 141 MG/DL (ref 0–200)
CO2 SERPL-SCNC: 27.7 MMOL/L (ref 22–29)
CREAT SERPL-MCNC: 0.68 MG/DL (ref 0.57–1)
GLOBULIN SER CALC-MCNC: 2.1 GM/DL
GLUCOSE SERPL-MCNC: 103 MG/DL (ref 65–99)
HDLC SERPL-MCNC: 54 MG/DL (ref 40–60)
LDLC SERPL CALC-MCNC: 64 MG/DL (ref 0–100)
LDLC/HDLC SERPL: 1.19 {RATIO}
POTASSIUM SERPL-SCNC: 4.6 MMOL/L (ref 3.5–5.2)
PROT SERPL-MCNC: 6.8 G/DL (ref 6–8.5)
SODIUM SERPL-SCNC: 141 MMOL/L (ref 136–145)
TRIGL SERPL-MCNC: 115 MG/DL (ref 0–150)
VLDLC SERPL CALC-MCNC: 23 MG/DL

## 2019-09-20 ENCOUNTER — OFFICE VISIT (OUTPATIENT)
Dept: INTERNAL MEDICINE | Facility: CLINIC | Age: 64
End: 2019-09-20

## 2019-09-20 VITALS
WEIGHT: 229 LBS | OXYGEN SATURATION: 98 % | DIASTOLIC BLOOD PRESSURE: 80 MMHG | HEIGHT: 67 IN | HEART RATE: 78 BPM | BODY MASS INDEX: 35.94 KG/M2 | TEMPERATURE: 98.4 F | SYSTOLIC BLOOD PRESSURE: 120 MMHG

## 2019-09-20 DIAGNOSIS — R73.9 HYPERGLYCEMIA: ICD-10-CM

## 2019-09-20 DIAGNOSIS — E66.09 CLASS 2 OBESITY DUE TO EXCESS CALORIES WITHOUT SERIOUS COMORBIDITY WITH BODY MASS INDEX (BMI) OF 35.0 TO 35.9 IN ADULT: ICD-10-CM

## 2019-09-20 DIAGNOSIS — E78.00 PURE HYPERCHOLESTEROLEMIA: Primary | ICD-10-CM

## 2019-09-20 DIAGNOSIS — Z00.00 PHYSICAL EXAM, ANNUAL: ICD-10-CM

## 2019-09-20 PROCEDURE — 99213 OFFICE O/P EST LOW 20 MIN: CPT | Performed by: FAMILY MEDICINE

## 2019-09-20 RX ORDER — ROSUVASTATIN CALCIUM 10 MG/1
10 TABLET, COATED ORAL DAILY
Qty: 90 TABLET | Refills: 1 | Status: SHIPPED | OUTPATIENT
Start: 2019-09-20 | End: 2020-03-16

## 2019-09-20 NOTE — PROGRESS NOTES
Subjective   Liza Aaron is a 64 y.o. female.   Chief Complaint   Patient presents with   • Hyperlipidemia       History of Present Illness     #1  Hyperlipidemia-on Crestor 10 mg a day.  She takes it every day.  She has no side effects.  No muscle aches, no muscle cramps.  LDL 64, HDL 54, LFTs normal.     OA- Severe osteoarthritis of both hips.  Physical therapy helped a little, patient is planning hip replacement.  She saw Dr. Johnson.  She would like to lose some extra weight prior to surgery.  She is very successful with weight watchers.  She gained 3 pounds from May, but she was recently on vacation.  She continues with weight watchers.  She does water exercise and she enjoys it.    The following portions of the patient's history were reviewed and updated as appropriate: allergies, current medications, past medical history, past social history and problem list.    Review of Systems   Constitutional: Negative.    Musculoskeletal: Positive for arthralgias. Negative for myalgias.         Objective   Wt Readings from Last 3 Encounters:   09/20/19 104 kg (229 lb)   08/05/19 104 kg (229 lb)   05/14/19 103 kg (226 lb)      Vitals:    09/20/19 1515   BP: 120/80   Pulse: 78   Temp: 98.4 °F (36.9 °C)   SpO2: 98%     Temp Readings from Last 3 Encounters:   09/20/19 98.4 °F (36.9 °C)   05/14/19 98 °F (36.7 °C)   03/06/19 98.2 °F (36.8 °C)     BP Readings from Last 3 Encounters:   09/20/19 120/80   04/16/19 118/78   03/06/19 118/80     Pulse Readings from Last 3 Encounters:   09/20/19 78   08/05/19 64   04/16/19 71       Physical Exam   Constitutional: She is oriented to person, place, and time. She appears well-developed and well-nourished.   HENT:   Head: Normocephalic and atraumatic.   Neck: Neck supple. Carotid bruit is not present. No thyromegaly present.   Cardiovascular: Normal rate, regular rhythm and normal heart sounds.   Pulmonary/Chest: Effort normal and breath sounds normal.   Neurological: She is alert and  oriented to person, place, and time.   Skin: Skin is warm, dry and intact.   Psychiatric: She has a normal mood and affect. Her behavior is normal.       Assessment/Plan   Liza was seen today for hyperlipidemia.    Diagnoses and all orders for this visit:    Pure hypercholesterolemia    Class 2 obesity due to excess calories without serious comorbidity with body mass index (BMI) of 35.0 to 35.9 in adult    Physical exam, annual  -     CBC (No Diff); Future  -     Comprehensive Metabolic Panel; Future  -     Lipid Panel With LDL / HDL Ratio; Future  -     Thyroid Cascade Profile; Future  -     Vitamin D 25 Hydroxy; Future  -     Hemoglobin A1c; Future    Hyperglycemia  -     Hemoglobin A1c; Future    Other orders  -     rosuvastatin (CRESTOR) 10 MG tablet; Take 1 tablet by mouth Daily.        #1 HLP-we will continue current treatment.  Follow-up in 6 months.    #2 Obesity-great job with weight loss.  Continue to work with weight watchers.  Exercise as tolerated.

## 2019-10-01 ENCOUNTER — OFFICE VISIT (OUTPATIENT)
Dept: ORTHOPEDIC SURGERY | Facility: CLINIC | Age: 64
End: 2019-10-01

## 2019-10-01 VITALS — BODY MASS INDEX: 35.63 KG/M2 | HEIGHT: 67 IN | WEIGHT: 227 LBS | TEMPERATURE: 98.3 F

## 2019-10-01 DIAGNOSIS — M16.0 PRIMARY OSTEOARTHRITIS OF BOTH HIPS: Primary | ICD-10-CM

## 2019-10-01 DIAGNOSIS — M16.11 PRIMARY OSTEOARTHRITIS OF RIGHT HIP: ICD-10-CM

## 2019-10-01 DIAGNOSIS — M25.561 RIGHT KNEE PAIN, UNSPECIFIED CHRONICITY: ICD-10-CM

## 2019-10-01 PROCEDURE — 73562 X-RAY EXAM OF KNEE 3: CPT | Performed by: ORTHOPAEDIC SURGERY

## 2019-10-01 PROCEDURE — 99214 OFFICE O/P EST MOD 30 MIN: CPT | Performed by: ORTHOPAEDIC SURGERY

## 2019-10-01 PROCEDURE — 73521 X-RAY EXAM HIPS BI 2 VIEWS: CPT | Performed by: ORTHOPAEDIC SURGERY

## 2019-10-01 RX ORDER — MELOXICAM 15 MG/1
15 TABLET ORAL ONCE
Status: CANCELLED | OUTPATIENT
Start: 2020-02-05 | End: 2019-10-01

## 2019-10-01 RX ORDER — CLINDAMYCIN PHOSPHATE 900 MG/50ML
900 INJECTION INTRAVENOUS ONCE
Status: CANCELLED | OUTPATIENT
Start: 2020-02-05 | End: 2019-10-01

## 2019-10-01 RX ORDER — PREGABALIN 75 MG/1
150 CAPSULE ORAL ONCE
Status: CANCELLED | OUTPATIENT
Start: 2020-02-05 | End: 2019-10-01

## 2019-10-01 NOTE — PROGRESS NOTES
"Patient: Liza Aaron  YOB: 1955 64 y.o. female  Medical Record Number: [id number]    Chief Complaint:   Chief Complaint   Patient presents with   • Left Hip - Follow-up   • Right Hip - Follow-up       History of Present Illness:Liza Aaron is a 64 y.o. female who presents for follow-up of bilateral hip pain and also right knee pain.  The pain is progressively worsened over the last 6 months or so to the point where she is now having limitations of activities of daily living.  Certain positions cause severe pain.  She describes an aching stabbing pain within the groin.  Also complaining of progressive knee pain she is diffuse anterior lateral pain worse with weightbearing activity.  She notes worsening of motion and inability to extend the knee over the last few years.    Allergies:   Allergies   Allergen Reactions   • Penicillins Rash       Medications:   Current Outpatient Medications   Medication Sig Dispense Refill   • Calcium 500 MG tablet Take 1 tablet by mouth Daily.     • Cholecalciferol (VITAMIN D PO) Take 500 mg by mouth Daily.     • melatonin 1 MG tablet Take 1 mg by mouth At Night As Needed for Sleep.     • Multiple Vitamins-Minerals (MULTI COMPLETE PO) Take 1 tablet by mouth Daily.     • rosuvastatin (CRESTOR) 10 MG tablet Take 1 tablet by mouth Daily. 90 tablet 1     No current facility-administered medications for this visit.          The following portions of the patient's history were reviewed and updated as appropriate: allergies, current medications, past family history, past medical history, past social history, past surgical history and problem list.    Review of Systems:   A 14 point review of systems was performed. All systems negative except pertinent positives/negative listed in HPI above    Physical Exam:   Vitals:    10/01/19 1018   Temp: 98.3 °F (36.8 °C)   TempSrc: Temporal   Weight: 103 kg (227 lb)   Height: 170.2 cm (67\")       General: A and O x 3, ASA, NAD    SCLERA:   "  Normal    DENTITION:   Normal  Hips are quite limited in motion.  She has roughly 30% external rotation contracture and 90 degrees of flexion.  She has a positive Stinchfield test.  She abducts about 30 to 40 degrees.    Right knee lacks about 10 degrees of extension she has pain and crepitus with motion trace effusion skin is normal she stands in neutral alignment walks with an antalgic gait    Radiology:    Xrays 2views both hips (AP bilateral hips and lateral hip) were ordered and reviewed for evaluation of hip pain demonstrating advanced, end-satge osteoarthritis with bone on bone articluation, periarticular osteophytes, and subchondral cysts  Comparison views: todays xrays were compared to previous xrays and show new findings as described above    X-rays 3 views of the right knee AP lateral sunrise ordered and reviewed show fairly advanced near bone-on-bone valgus osteoarthritis.  There are no previous films comparison    Assessment/Plan:  Bilateral severe end-stage hip osteoarthritis.    Advanced right knee near end-stage Nisa arthritis.  Continuation of conservative management vs. ALEX discussed.  The patient wishes to proceed with total hip replacement.  At this point the patient has failed the full gamut of conservative treatment and stating complete understanding of the risks/benefits/ anternatives wishes to proceed with surgical treatment.    Risk and benefits of surgery were reviewed.  Including, but not limited to, blood clots, anesthesia risk, infection, leg length discrepancy, fracture, skin/leg numbness, failure of the implant, need for future surgeries, continued pain, hematoma, need for transfusion, and death, among others.  The patient understands and wishes to proceed.     The spectrum of treatment options were discussed with the patient in detail including both the nonoperative and operative treatment modalities and their respective risks and benefits.  After thorough discussion, the patient  has elected to undergo surgical treatment.  The details of the surgical procedure were explained including the location of probable incisions and a description of the likely implants to be used.  Models and diagrams were used as educational resources. The patient understands the likely convalescence after surgery, as well as the rehabilitation required.  We thoroughly discussed the risks, benefits, and alternatives to surgery.  The risks include but are not limited to the risk of infection, joint stiffness, blood clots (including DVT and/or pulmonary embolus along with the risk of death), neurologic and/or vascular injury, fracture, dislocation, nonunion, malunion, need for further surgery including hardware failure requiring revision, and continued pain.  It was explained that if tissue has been repaired or reconstructed, there is also a chance of failure which may require further management.  Following the completion of the discussion, the patient expressed understanding of this planned course of care, all their questions were answered and consent will be obtained preoperatively.    Operative Plan: Posterior approach Total Hip Replacement an overnight staywith home health rehab          Cesar Johnson MD  10/1/2019

## 2019-11-08 PROBLEM — M16.11 PRIMARY OSTEOARTHRITIS OF RIGHT HIP: Status: ACTIVE | Noted: 2019-11-08

## 2019-12-26 ENCOUNTER — TELEPHONE (OUTPATIENT)
Dept: PHARMACY | Facility: HOSPITAL | Age: 64
End: 2019-12-26

## 2020-01-16 ENCOUNTER — APPOINTMENT (OUTPATIENT)
Dept: PREADMISSION TESTING | Facility: HOSPITAL | Age: 65
End: 2020-01-16

## 2020-01-16 VITALS
SYSTOLIC BLOOD PRESSURE: 108 MMHG | WEIGHT: 232 LBS | DIASTOLIC BLOOD PRESSURE: 71 MMHG | BODY MASS INDEX: 36.41 KG/M2 | HEART RATE: 76 BPM | TEMPERATURE: 98.1 F | OXYGEN SATURATION: 96 % | HEIGHT: 67 IN | RESPIRATION RATE: 18 BRPM

## 2020-01-16 DIAGNOSIS — M16.11 PRIMARY OSTEOARTHRITIS OF RIGHT HIP: ICD-10-CM

## 2020-01-16 LAB
ANION GAP SERPL CALCULATED.3IONS-SCNC: 13.3 MMOL/L (ref 5–15)
BILIRUB UR QL STRIP: NEGATIVE
BUN BLD-MCNC: 11 MG/DL (ref 8–23)
BUN/CREAT SERPL: 17.5 (ref 7–25)
CALCIUM SPEC-SCNC: 9.3 MG/DL (ref 8.6–10.5)
CHLORIDE SERPL-SCNC: 101 MMOL/L (ref 98–107)
CLARITY UR: CLEAR
CO2 SERPL-SCNC: 24.7 MMOL/L (ref 22–29)
COLOR UR: YELLOW
CREAT BLD-MCNC: 0.63 MG/DL (ref 0.57–1)
DEPRECATED RDW RBC AUTO: 41.3 FL (ref 37–54)
ERYTHROCYTE [DISTWIDTH] IN BLOOD BY AUTOMATED COUNT: 12.8 % (ref 12.3–15.4)
GFR SERPL CREATININE-BSD FRML MDRD: 95 ML/MIN/1.73
GLUCOSE BLD-MCNC: 94 MG/DL (ref 65–99)
GLUCOSE UR STRIP-MCNC: NEGATIVE MG/DL
HCT VFR BLD AUTO: 38.1 % (ref 34–46.6)
HGB BLD-MCNC: 13.3 G/DL (ref 12–15.9)
HGB UR QL STRIP.AUTO: NEGATIVE
KETONES UR QL STRIP: NEGATIVE
LEUKOCYTE ESTERASE UR QL STRIP.AUTO: NEGATIVE
MCH RBC QN AUTO: 30.5 PG (ref 26.6–33)
MCHC RBC AUTO-ENTMCNC: 34.9 G/DL (ref 31.5–35.7)
MCV RBC AUTO: 87.4 FL (ref 79–97)
NITRITE UR QL STRIP: NEGATIVE
PH UR STRIP.AUTO: 6.5 [PH] (ref 5–8)
PLATELET # BLD AUTO: 201 10*3/MM3 (ref 140–450)
PMV BLD AUTO: 9.2 FL (ref 6–12)
POTASSIUM BLD-SCNC: 3.9 MMOL/L (ref 3.5–5.2)
PROT UR QL STRIP: NEGATIVE
RBC # BLD AUTO: 4.36 10*6/MM3 (ref 3.77–5.28)
SODIUM BLD-SCNC: 139 MMOL/L (ref 136–145)
SP GR UR STRIP: 1.01 (ref 1–1.03)
UROBILINOGEN UR QL STRIP: NORMAL
WBC NRBC COR # BLD: 3.9 10*3/MM3 (ref 3.4–10.8)

## 2020-01-16 PROCEDURE — 93005 ELECTROCARDIOGRAM TRACING: CPT

## 2020-01-16 PROCEDURE — 36415 COLL VENOUS BLD VENIPUNCTURE: CPT

## 2020-01-16 PROCEDURE — 93010 ELECTROCARDIOGRAM REPORT: CPT | Performed by: INTERNAL MEDICINE

## 2020-01-16 PROCEDURE — 80048 BASIC METABOLIC PNL TOTAL CA: CPT | Performed by: ORTHOPAEDIC SURGERY

## 2020-01-16 PROCEDURE — 81003 URINALYSIS AUTO W/O SCOPE: CPT | Performed by: ORTHOPAEDIC SURGERY

## 2020-01-16 PROCEDURE — 85027 COMPLETE CBC AUTOMATED: CPT | Performed by: ORTHOPAEDIC SURGERY

## 2020-01-16 RX ORDER — IBUPROFEN 200 MG
400 TABLET ORAL EVERY 6 HOURS PRN
COMMUNITY
End: 2020-02-06 | Stop reason: HOSPADM

## 2020-01-16 RX ORDER — CHLORHEXIDINE GLUCONATE 500 MG/1
1 CLOTH TOPICAL TAKE AS DIRECTED
COMMUNITY
End: 2020-02-06 | Stop reason: HOSPADM

## 2020-01-16 RX ORDER — MULTIVIT-MIN/IRON/FOLIC ACID/K 18-600-40
2000 CAPSULE ORAL DAILY
COMMUNITY
End: 2020-02-06 | Stop reason: HOSPADM

## 2020-01-16 ASSESSMENT — HOOS JR
HOOS JR SCORE: 9
HOOS JR SCORE: 61.815

## 2020-01-16 NOTE — DISCHARGE INSTRUCTIONS
Take the following medications the morning of surgery:  NONE      Arrive to hospital on your day of surgery at 5:30 AM.      General Instructions:  • Do not eat solid food after midnight the night before surgery.  • You may drink clear liquids day of surgery but must stop at least one hour before your hospital arrival time.  • It is beneficial for you to have a clear drink that contains carbohydrates the day of surgery.  We suggest a 12 to 20 ounce bottle of Gatorade or Powerade for non-diabetic patients or a 12 to 20 ounce bottle of G2 or Powerade Zero for diabetic patients. (Pediatric patients, are not advised to drink a 12 to 20 ounce carbohydrate drink)    Clear liquids are liquids you can see through.  Nothing red in color.     Plain water                               Sports drinks  Sodas                                   Gelatin (Jell-O)  Fruit juices without pulp such as white grape juice and apple juice  Popsicles that contain no fruit or yogurt  Tea or coffee (no cream or milk added)  Gatorade / Powerade  G2 / Powerade Zero    • Infants may have breast milk up to four hours before surgery.  • Infants drinking formula may drink formula up to six hours before surgery.   • Patients who avoid smoking, chewing tobacco and alcohol for 4 weeks prior to surgery have a reduced risk of post-operative complications.  Quit smoking as many days before surgery as you can.  • Do not smoke, use chewing tobacco or drink alcohol the day of surgery.   • If applicable bring your C-PAP/ BI-PAP machine.  • Bring any papers given to you in the doctor’s office.  • Wear clean comfortable clothes.  • Do not wear contact lenses, false eyelashes or make-up.  Bring a case for your glasses.   • Bring crutches or walker if applicable.  • Remove all piercings.  Leave jewelry and any other valuables at home.  • Hair extensions with metal clips must be removed prior to surgery.  • The Pre-Admission Testing nurse will instruct you to bring  medications if unable to obtain an accurate list in Pre-Admission Testing.        If you were given a blood bank ID arm band remember to bring it with you the day of surgery.    Preventing a Surgical Site Infection:  • For 2 to 3 days before surgery, avoid shaving with a razor because the razor can irritate skin and make it easier to develop an infection.    • Any areas of open skin can increase the risk of a post-operative wound infection by allowing bacteria to enter and travel throughout the body.  Notify your surgeon if you have any skin wounds / rashes even if it is not near the expected surgical site.  The area will need assessed to determine if surgery should be delayed until it is healed.  • The night prior to surgery sleep in a clean bed with clean clothing.  Do not allow pets to sleep with you.  • Shower on the morning of surgery using a fresh bar of anti-bacterial soap (such as Dial) and clean washcloth.  Dry with a clean towel and dress in clean clothing.  • Ask your surgeon if you will be receiving antibiotics prior to surgery.  • Make sure you, your family, and all healthcare providers clean their hands with soap and water or an alcohol based hand  before caring for you or your wound.    Day of surgery:  Your arrival time is approximately two hours before your scheduled surgery time.  Upon arrival, a Pre-op nurse and Anesthesiologist will review your health history, obtain vital signs, and answer questions you may have.  The only belongings needed at this time will be a list of your home medications and if applicable your C-PAP/BI-PAP machine.  If you are staying overnight your family can leave the rest of your belongings in the car and bring them to your room later.  A Pre-op nurse will start an IV and you may receive medication in preparation for surgery, including something to help you relax.  Your family will be able to see you in the Pre-op area.  Two visitors at a time will be allowed in  the Pre-op room.  While you are in surgery your family should notify the waiting room  if they leave the waiting room area and provide a contact phone number.    Please be aware that surgery does come with discomfort.  We want to make every effort to control your discomfort so please discuss any uncontrolled symptoms with your nurse.   Your doctor will most likely have prescribed pain medications.      If you are going home after surgery you will receive individualized written care instructions before being discharged.  A responsible adult must drive you to and from the hospital on the day of your surgery and stay with you for 24 hours.    If you are staying overnight following surgery, you will be transported to your hospital room following the recovery period.  [facility] has all private rooms.    If you have any questions please call Pre-Admission Testing at [phone].  Deductibles and co-payments are collected on the day of service. Please be prepared to pay the required co-pay, deductible or deposit on the day of service as defined by your plan.    2% CHLORHEXIDINE GLUCONATE* CLOTH  Preparing or “prepping” skin before surgery can reduce the risk of infection at the surgical site. To make the process easier, [facility] has chosen disposable cloths moistened with a rinse-free, 2% Chlorhexidine Gluconate (CHG) antiseptic solution. The steps below outline the prepping process and should be carefully followed.        Use the prep cloth on the area that is circled in the diagram             Directions Night before Surgery  1) Shower using a fresh bar of anti-bacterial soap (such as Dial) and clean washcloth.  Use a clean towel to completely dry your skin.  2) Do not use any lotions, oils or creams on your skin.  3) Open the package and remove 1 cloth, wipe your skin for 30 seconds in a circular motion.  Allow to dry for 3 minutes.  4) Repeat #3 with second cloth.  5) Do not  touch your eyes, ears, or mouth with the prep cloth.  6) Allow the wet prep solution to air dry.  7) Discard the prep cloth and wash your hands with soap and water.   8) Dress in clean bed clothes and sleep on fresh clean bed sheets.   9) You may experience some temporary itching after the prep.    Directions Day of Surgery  1) Repeat steps 1,2,3,4,5,6,7, and 9.   2) Dress in clean clothes before coming to the hospital.    BACTROBAN NASAL OINTMENT  There are many germs normally in your nose. Bactroban is an ointment that will help reduce these germs. Please follow these instructions for Bactroban use:      ____The day before surgery in the morning  Date________    ____The day before surgery in the evening              Date________    ____The day of surgery in the morning    Date________    **Squirt ½ package of Bactroban Ointment onto a cotton applicator and apply to inside of 1st nostril.  Squirt the remaining Bactroban and apply to the inside of the other nostril.

## 2020-01-23 ENCOUNTER — OFFICE VISIT (OUTPATIENT)
Dept: ORTHOPEDIC SURGERY | Facility: CLINIC | Age: 65
End: 2020-01-23

## 2020-01-23 VITALS — TEMPERATURE: 97.2 F | BODY MASS INDEX: 36.34 KG/M2 | HEIGHT: 67 IN

## 2020-01-23 DIAGNOSIS — M16.11 PRIMARY OSTEOARTHRITIS OF RIGHT HIP: Primary | ICD-10-CM

## 2020-01-23 PROCEDURE — S0260 H&P FOR SURGERY: HCPCS | Performed by: NURSE PRACTITIONER

## 2020-01-23 RX ORDER — PRASTERONE 6.5 MG/1
INSERT VAGINAL DAILY PRN
COMMUNITY
Start: 2020-01-20 | End: 2020-12-11 | Stop reason: HOSPADM

## 2020-01-23 NOTE — H&P
"Patient: Liza Aaron    Date of Admission: 2/5/20    YOB: 1955    Medical Record Number: [id number]    Admitting Physician: Dr. Cesar Johnson    Reason for Admission: End Stage Right Hip OA    History of Present Illness: 64 y.o. female presents with severe end stage hip osteoarthritis which has not been responsive to the full compliment of conservative measures. Despite conservative attempts, there is still severe, constant activity limiting hip pain. Given the severity of the pain, the patient has elected to proceed with hip replacement.    Allergies:   Allergies   Allergen Reactions   • Penicillins Rash         Current Medications:  Home Medications:    Current Outpatient Medications on File Prior to Visit   Medication Sig   • Calcium Carb-Cholecalciferol (CALCIUM 600 + D PO) Take 1 tablet by mouth Daily.   • Cholecalciferol (VITAMIN D) 50 MCG (2000 UT) capsule Take 2,000 Units by mouth Daily.   • INTRAROSA 6.5 MG insert    • melatonin 1 MG tablet Take 1 mg by mouth At Night As Needed for Sleep.   • Multiple Vitamins-Minerals (MULTI COMPLETE PO) Take 1 tablet by mouth Daily.   • rosuvastatin (CRESTOR) 10 MG tablet Take 1 tablet by mouth Daily.   • Chlorhexidine Gluconate Cloth 2 % pads Apply 1 application topically Take As Directed. Use as directed prior to OR   • ibuprofen (ADVIL,MOTRIN) 200 MG tablet Take 400 mg by mouth Every 6 (Six) Hours As Needed for Mild Pain  (PT TO HOLD FOR SUGERY).   • mupirocin (BACTROBAN) 2 % nasal ointment 1 application into the nostril(s) as directed by provider Take As Directed. Use as directed prior to OR     No current facility-administered medications on file prior to visit.      PRN Meds:.    PMH:  Past Medical History:   Diagnosis Date   • Arthritis    • Diverticulosis    • Family history of coronary artery disease     SAW DR THOMPSON 4/2019   • Hip pain     BILATERAL   • Hyperglycemia    • Hyperlipidemia    • Irregular heart beat     OCCASIONAL \"SKIPPED BEAT\"   • " "Pure hypercholesterolemia    • Umbilical hernia    • Vitamin D deficiency         PSURGH:  Past Surgical History:   Procedure Laterality Date   • COLONOSCOPY N/A 2015    Diverticulosis in the entire examined colon, non-bleeding internal hemorrhodis, no specimens collected-Dr. Rosario Flores   • TOTAL ABDOMINAL HYSTERECTOMY WITH SALPINGO OOPHORECTOMY Bilateral 2004    Dr. Dominick Villarreal   • UMBILICAL HERNIA REPAIR N/A 2004    Dr. Dominick Villarreal       SocialHx:  Social History     Occupational History   • Occupation:      Employer: [employer]   Tobacco Use   • Smoking status: Former Smoker     Packs/day: 1.00     Years: 5.00     Pack years: 5.00     Types: Cigarettes     Last attempt to quit: 1982     Years since quittin.0   • Smokeless tobacco: Never Used   • Tobacco comment: caffiene twice a week   Substance and Sexual Activity   • Alcohol use: Yes     Comment: 3 drinks per week   • Drug use: No   • Sexual activity: Defer      Social History     Social History Narrative   • Not on file       FamHx:  Family History   Problem Relation Age of Onset   • Breast cancer Mother    • Hypertension Mother    • Heart disease Father    • Cancer Maternal Grandmother    • Heart disease Brother    • Malig Hyperthermia Neg Hx          Review of Systems:   A 14 point review of systems was performed, pertinent positives discussed above, all other systems are negative    Physical Exam: 64 y.o. female  Vital Signs :   Vitals:    20 1359   Temp: 97.2 °F (36.2 °C)   TempSrc: Temporal   Height: 170.2 cm (67\")     General: Alert and Oriented x 3, No acute distress.  Psych: mood and affect appropriate; recent and remote memory intact  Eyes: conjunctiva clear; pupils equally round and reactive, sclera nonicteric  CV: no peripheral edema  Resp: normal respiratory effort  Skin: no rashes or wounds; normal turgor    Labs:    Appointment on 2020   Component Date Value Ref Range " Status   • Glucose 01/16/2020 94  65 - 99 mg/dL Final   • BUN 01/16/2020 11  8 - 23 mg/dL Final   • Creatinine 01/16/2020 0.63  0.57 - 1.00 mg/dL Final   • Sodium 01/16/2020 139  136 - 145 mmol/L Final   • Potassium 01/16/2020 3.9  3.5 - 5.2 mmol/L Final   • Chloride 01/16/2020 101  98 - 107 mmol/L Final   • CO2 01/16/2020 24.7  22.0 - 29.0 mmol/L Final   • Calcium 01/16/2020 9.3  8.6 - 10.5 mg/dL Final   • eGFR Non African Amer 01/16/2020 95  >60 mL/min/1.73 Final   • BUN/Creatinine Ratio 01/16/2020 17.5  7.0 - 25.0 Final   • Anion Gap 01/16/2020 13.3  5.0 - 15.0 mmol/L Final   • WBC 01/16/2020 3.90  3.40 - 10.80 10*3/mm3 Final   • RBC 01/16/2020 4.36  3.77 - 5.28 10*6/mm3 Final   • Hemoglobin 01/16/2020 13.3  12.0 - 15.9 g/dL Final   • Hematocrit 01/16/2020 38.1  34.0 - 46.6 % Final   • MCV 01/16/2020 87.4  79.0 - 97.0 fL Final   • MCH 01/16/2020 30.5  26.6 - 33.0 pg Final   • MCHC 01/16/2020 34.9  31.5 - 35.7 g/dL Final   • RDW 01/16/2020 12.8  12.3 - 15.4 % Final   • RDW-SD 01/16/2020 41.3  37.0 - 54.0 fl Final   • MPV 01/16/2020 9.2  6.0 - 12.0 fL Final   • Platelets 01/16/2020 201  140 - 450 10*3/mm3 Final   • Color, UA 01/16/2020 Yellow  Yellow, Straw Final   • Appearance, UA 01/16/2020 Clear  Clear Final   • pH, UA 01/16/2020 6.5  5.0 - 8.0 Final   • Specific Gravity, UA 01/16/2020 1.008  1.005 - 1.030 Final   • Glucose, UA 01/16/2020 Negative  Negative Final   • Ketones, UA 01/16/2020 Negative  Negative Final   • Bilirubin, UA 01/16/2020 Negative  Negative Final   • Blood, UA 01/16/2020 Negative  Negative Final   • Protein, UA 01/16/2020 Negative  Negative Final   • Leuk Esterase, UA 01/16/2020 Negative  Negative Final   • Nitrite, UA 01/16/2020 Negative  Negative Final   • Urobilinogen, UA 01/16/2020 0.2 E.U./dL  0.2 - 1.0 E.U./dL Final     Xrays:  Xrays AP pelvis and a lateral of the Right hip were reviewed demonstrating  End stage hip OA with bone on bone articulation, subchondral cysts and periarticular  osteophytes.    Assessment:  End-stage Right hip osteoarthritis. Conservative measures have failed.      Plan:  The plan is to proceed with Right Total Hip Replacement. The patient voiced understanding of the risks, benefits, and alternative forms of treatment that were discussed with Dr Johnson at the time of scheduling.  Patient's plan on going home with home health next day    Angelica Montalvo, APRN  1/23/2020

## 2020-02-05 ENCOUNTER — HOSPITAL ENCOUNTER (INPATIENT)
Facility: HOSPITAL | Age: 65
LOS: 1 days | Discharge: HOME HEALTH | End: 2020-02-06
Attending: ORTHOPAEDIC SURGERY | Admitting: ORTHOPAEDIC SURGERY

## 2020-02-05 ENCOUNTER — ANESTHESIA EVENT (OUTPATIENT)
Dept: PERIOP | Facility: HOSPITAL | Age: 65
End: 2020-02-05

## 2020-02-05 ENCOUNTER — APPOINTMENT (OUTPATIENT)
Dept: GENERAL RADIOLOGY | Facility: HOSPITAL | Age: 65
End: 2020-02-05

## 2020-02-05 ENCOUNTER — ANESTHESIA (OUTPATIENT)
Dept: PERIOP | Facility: HOSPITAL | Age: 65
End: 2020-02-05

## 2020-02-05 DIAGNOSIS — Z98.890 STATUS POST HIP SURGERY: ICD-10-CM

## 2020-02-05 DIAGNOSIS — M16.11 PRIMARY OSTEOARTHRITIS OF RIGHT HIP: ICD-10-CM

## 2020-02-05 DIAGNOSIS — R26.2 DIFFICULTY WALKING: Primary | ICD-10-CM

## 2020-02-05 PROBLEM — M16.0 PRIMARY OSTEOARTHRITIS OF BOTH HIPS: Status: ACTIVE | Noted: 2020-02-05

## 2020-02-05 LAB
ABO GROUP BLD: NORMAL
BLD GP AB SCN SERPL QL: NEGATIVE
RH BLD: POSITIVE
T&S EXPIRATION DATE: NORMAL

## 2020-02-05 PROCEDURE — 25010000002 ONDANSETRON PER 1 MG: Performed by: NURSE ANESTHETIST, CERTIFIED REGISTERED

## 2020-02-05 PROCEDURE — 25010000002 NEOSTIGMINE PER 0.5 MG: Performed by: NURSE ANESTHETIST, CERTIFIED REGISTERED

## 2020-02-05 PROCEDURE — 25010000002 DEXAMETHASONE PER 1 MG: Performed by: NURSE ANESTHETIST, CERTIFIED REGISTERED

## 2020-02-05 PROCEDURE — 97161 PT EVAL LOW COMPLEX 20 MIN: CPT

## 2020-02-05 PROCEDURE — 25010000002 FENTANYL CITRATE (PF) 100 MCG/2ML SOLUTION: Performed by: NURSE ANESTHETIST, CERTIFIED REGISTERED

## 2020-02-05 PROCEDURE — C1776 JOINT DEVICE (IMPLANTABLE): HCPCS | Performed by: ORTHOPAEDIC SURGERY

## 2020-02-05 PROCEDURE — 25010000002 PHENYLEPHRINE PER 1 ML: Performed by: NURSE ANESTHETIST, CERTIFIED REGISTERED

## 2020-02-05 PROCEDURE — 25010000002 KETOROLAC TROMETHAMINE PER 15 MG: Performed by: NURSE ANESTHETIST, CERTIFIED REGISTERED

## 2020-02-05 PROCEDURE — 25010000002 ROPIVACAINE PER 1 MG: Performed by: ORTHOPAEDIC SURGERY

## 2020-02-05 PROCEDURE — 94799 UNLISTED PULMONARY SVC/PX: CPT

## 2020-02-05 PROCEDURE — 25010000002 PROPOFOL 10 MG/ML EMULSION: Performed by: NURSE ANESTHETIST, CERTIFIED REGISTERED

## 2020-02-05 PROCEDURE — 25010000002 MIDAZOLAM PER 1 MG: Performed by: ANESTHESIOLOGY

## 2020-02-05 PROCEDURE — 73501 X-RAY EXAM HIP UNI 1 VIEW: CPT

## 2020-02-05 PROCEDURE — 27130 TOTAL HIP ARTHROPLASTY: CPT | Performed by: ORTHOPAEDIC SURGERY

## 2020-02-05 PROCEDURE — 86850 RBC ANTIBODY SCREEN: CPT | Performed by: ORTHOPAEDIC SURGERY

## 2020-02-05 PROCEDURE — 86901 BLOOD TYPING SEROLOGIC RH(D): CPT | Performed by: ORTHOPAEDIC SURGERY

## 2020-02-05 PROCEDURE — 86900 BLOOD TYPING SEROLOGIC ABO: CPT | Performed by: ORTHOPAEDIC SURGERY

## 2020-02-05 PROCEDURE — 25010000002 HYDROMORPHONE PER 4 MG: Performed by: NURSE ANESTHETIST, CERTIFIED REGISTERED

## 2020-02-05 PROCEDURE — 0SR906A REPLACEMENT OF RIGHT HIP JOINT WITH OXIDIZED ZIRCONIUM ON POLYETHYLENE SYNTHETIC SUBSTITUTE, UNCEMENTED, OPEN APPROACH: ICD-10-PCS | Performed by: ORTHOPAEDIC SURGERY

## 2020-02-05 PROCEDURE — 25010000002 VANCOMYCIN 10 G RECONSTITUTED SOLUTION: Performed by: ORTHOPAEDIC SURGERY

## 2020-02-05 PROCEDURE — 25010000002 KETOROLAC TROMETHAMINE PER 15 MG: Performed by: ORTHOPAEDIC SURGERY

## 2020-02-05 PROCEDURE — 97110 THERAPEUTIC EXERCISES: CPT

## 2020-02-05 PROCEDURE — 25010000002 MORPHINE (PF) 10 MG/ML SOLUTION 1 ML VIAL: Performed by: ORTHOPAEDIC SURGERY

## 2020-02-05 DEVICE — R3 20 DEGREE XLPE ACETABULAR LINER                                    36MM ID X OD 56MM
Type: IMPLANTABLE DEVICE | Site: ACETABULUM | Status: FUNCTIONAL
Brand: R3

## 2020-02-05 DEVICE — REFLECTION SPHERICAL HEAD SCREW 30MM
Type: IMPLANTABLE DEVICE | Site: ACETABULUM | Status: FUNCTIONAL
Brand: REFLECTION

## 2020-02-05 DEVICE — OXINIUM FEMORAL HEAD 12/14 TAPER                                    36 MM M/+4
Type: IMPLANTABLE DEVICE | Site: HIP | Status: FUNCTIONAL
Brand: OXINIUM

## 2020-02-05 DEVICE — IMPLANTABLE DEVICE: Type: IMPLANTABLE DEVICE | Site: ACETABULUM | Status: FUNCTIONAL

## 2020-02-05 DEVICE — REFLECTION SPHERICAL HEAD SCREW 25MM
Type: IMPLANTABLE DEVICE | Site: ACETABULUM | Status: FUNCTIONAL
Brand: REFLECTION

## 2020-02-05 DEVICE — POLARSTEM STANDARD NON-CEMENTED                                    WITH TI/HA 3
Type: IMPLANTABLE DEVICE | Site: HIP | Status: FUNCTIONAL
Brand: POLARSTEM

## 2020-02-05 DEVICE — R3 3 HOLE ACETABULAR SHELL 56MM
Type: IMPLANTABLE DEVICE | Site: ACETABULUM | Status: FUNCTIONAL
Brand: R3 ACETABULAR

## 2020-02-05 RX ORDER — EPHEDRINE SULFATE 50 MG/ML
5 INJECTION, SOLUTION INTRAVENOUS ONCE AS NEEDED
Status: DISCONTINUED | OUTPATIENT
Start: 2020-02-05 | End: 2020-02-05 | Stop reason: HOSPADM

## 2020-02-05 RX ORDER — HYDROCODONE BITARTRATE AND ACETAMINOPHEN 7.5; 325 MG/1; MG/1
1 TABLET ORAL EVERY 4 HOURS PRN
Status: DISCONTINUED | OUTPATIENT
Start: 2020-02-05 | End: 2020-02-06 | Stop reason: HOSPADM

## 2020-02-05 RX ORDER — PANTOPRAZOLE SODIUM 40 MG/1
40 TABLET, DELAYED RELEASE ORAL
Status: DISCONTINUED | OUTPATIENT
Start: 2020-02-06 | End: 2020-02-06 | Stop reason: HOSPADM

## 2020-02-05 RX ORDER — DOCUSATE SODIUM 100 MG/1
100 CAPSULE, LIQUID FILLED ORAL 2 TIMES DAILY
Status: DISCONTINUED | OUTPATIENT
Start: 2020-02-05 | End: 2020-02-06 | Stop reason: HOSPADM

## 2020-02-05 RX ORDER — SODIUM CHLORIDE 0.9 % (FLUSH) 0.9 %
10 SYRINGE (ML) INJECTION EVERY 12 HOURS SCHEDULED
Status: DISCONTINUED | OUTPATIENT
Start: 2020-02-05 | End: 2020-02-05 | Stop reason: HOSPADM

## 2020-02-05 RX ORDER — TRANEXAMIC ACID 100 MG/ML
INJECTION, SOLUTION INTRAVENOUS AS NEEDED
Status: DISCONTINUED | OUTPATIENT
Start: 2020-02-05 | End: 2020-02-05 | Stop reason: SURG

## 2020-02-05 RX ORDER — MELOXICAM 15 MG/1
15 TABLET ORAL DAILY
Status: DISCONTINUED | OUTPATIENT
Start: 2020-02-06 | End: 2020-02-06 | Stop reason: HOSPADM

## 2020-02-05 RX ORDER — WOUND DRESSING ADHESIVE - LIQUID
LIQUID MISCELLANEOUS AS NEEDED
Status: DISCONTINUED | OUTPATIENT
Start: 2020-02-05 | End: 2020-02-05 | Stop reason: HOSPADM

## 2020-02-05 RX ORDER — CLINDAMYCIN PHOSPHATE 900 MG/50ML
900 INJECTION INTRAVENOUS ONCE
Status: COMPLETED | OUTPATIENT
Start: 2020-02-05 | End: 2020-02-05

## 2020-02-05 RX ORDER — ONDANSETRON 4 MG/1
4 TABLET, FILM COATED ORAL EVERY 6 HOURS PRN
Status: DISCONTINUED | OUTPATIENT
Start: 2020-02-05 | End: 2020-02-06 | Stop reason: HOSPADM

## 2020-02-05 RX ORDER — DEXAMETHASONE SODIUM PHOSPHATE 10 MG/ML
INJECTION INTRAMUSCULAR; INTRAVENOUS AS NEEDED
Status: DISCONTINUED | OUTPATIENT
Start: 2020-02-05 | End: 2020-02-05 | Stop reason: SURG

## 2020-02-05 RX ORDER — ACETAMINOPHEN 325 MG/1
650 TABLET ORAL ONCE AS NEEDED
Status: DISCONTINUED | OUTPATIENT
Start: 2020-02-05 | End: 2020-02-05 | Stop reason: HOSPADM

## 2020-02-05 RX ORDER — DIPHENHYDRAMINE HCL 25 MG
25 CAPSULE ORAL
Status: DISCONTINUED | OUTPATIENT
Start: 2020-02-05 | End: 2020-02-05 | Stop reason: HOSPADM

## 2020-02-05 RX ORDER — LIDOCAINE HYDROCHLORIDE 20 MG/ML
INJECTION, SOLUTION INFILTRATION; PERINEURAL AS NEEDED
Status: DISCONTINUED | OUTPATIENT
Start: 2020-02-05 | End: 2020-02-05 | Stop reason: SURG

## 2020-02-05 RX ORDER — ACETAMINOPHEN 325 MG/1
325 TABLET ORAL EVERY 4 HOURS PRN
Status: DISCONTINUED | OUTPATIENT
Start: 2020-02-05 | End: 2020-02-06 | Stop reason: HOSPADM

## 2020-02-05 RX ORDER — HYDROMORPHONE HYDROCHLORIDE 1 MG/ML
0.5 INJECTION, SOLUTION INTRAMUSCULAR; INTRAVENOUS; SUBCUTANEOUS
Status: DISCONTINUED | OUTPATIENT
Start: 2020-02-05 | End: 2020-02-05 | Stop reason: HOSPADM

## 2020-02-05 RX ORDER — FENTANYL CITRATE 50 UG/ML
50 INJECTION, SOLUTION INTRAMUSCULAR; INTRAVENOUS
Status: DISCONTINUED | OUTPATIENT
Start: 2020-02-05 | End: 2020-02-05 | Stop reason: HOSPADM

## 2020-02-05 RX ORDER — SODIUM CHLORIDE, SODIUM LACTATE, POTASSIUM CHLORIDE, CALCIUM CHLORIDE 600; 310; 30; 20 MG/100ML; MG/100ML; MG/100ML; MG/100ML
9 INJECTION, SOLUTION INTRAVENOUS CONTINUOUS PRN
Status: DISCONTINUED | OUTPATIENT
Start: 2020-02-05 | End: 2020-02-05 | Stop reason: HOSPADM

## 2020-02-05 RX ORDER — FLUMAZENIL 0.1 MG/ML
0.2 INJECTION INTRAVENOUS AS NEEDED
Status: DISCONTINUED | OUTPATIENT
Start: 2020-02-05 | End: 2020-02-05 | Stop reason: HOSPADM

## 2020-02-05 RX ORDER — LIDOCAINE HYDROCHLORIDE 40 MG/ML
SOLUTION TOPICAL AS NEEDED
Status: DISCONTINUED | OUTPATIENT
Start: 2020-02-05 | End: 2020-02-05 | Stop reason: SURG

## 2020-02-05 RX ORDER — EPHEDRINE SULFATE 50 MG/ML
INJECTION, SOLUTION INTRAVENOUS AS NEEDED
Status: DISCONTINUED | OUTPATIENT
Start: 2020-02-05 | End: 2020-02-05 | Stop reason: SURG

## 2020-02-05 RX ORDER — PROMETHAZINE HYDROCHLORIDE 25 MG/ML
6.25 INJECTION, SOLUTION INTRAMUSCULAR; INTRAVENOUS
Status: DISCONTINUED | OUTPATIENT
Start: 2020-02-05 | End: 2020-02-05 | Stop reason: HOSPADM

## 2020-02-05 RX ORDER — LABETALOL HYDROCHLORIDE 5 MG/ML
5 INJECTION, SOLUTION INTRAVENOUS
Status: DISCONTINUED | OUTPATIENT
Start: 2020-02-05 | End: 2020-02-05 | Stop reason: HOSPADM

## 2020-02-05 RX ORDER — ONDANSETRON 2 MG/ML
INJECTION INTRAMUSCULAR; INTRAVENOUS AS NEEDED
Status: DISCONTINUED | OUTPATIENT
Start: 2020-02-05 | End: 2020-02-05 | Stop reason: SURG

## 2020-02-05 RX ORDER — ACETAMINOPHEN 10 MG/ML
1000 INJECTION, SOLUTION INTRAVENOUS ONCE
Status: COMPLETED | OUTPATIENT
Start: 2020-02-05 | End: 2020-02-05

## 2020-02-05 RX ORDER — PROMETHAZINE HYDROCHLORIDE 25 MG/ML
12.5 INJECTION, SOLUTION INTRAMUSCULAR; INTRAVENOUS ONCE AS NEEDED
Status: DISCONTINUED | OUTPATIENT
Start: 2020-02-05 | End: 2020-02-05 | Stop reason: HOSPADM

## 2020-02-05 RX ORDER — PROPOFOL 10 MG/ML
VIAL (ML) INTRAVENOUS AS NEEDED
Status: DISCONTINUED | OUTPATIENT
Start: 2020-02-05 | End: 2020-02-05 | Stop reason: SURG

## 2020-02-05 RX ORDER — DIPHENHYDRAMINE HYDROCHLORIDE 50 MG/ML
12.5 INJECTION INTRAMUSCULAR; INTRAVENOUS
Status: DISCONTINUED | OUTPATIENT
Start: 2020-02-05 | End: 2020-02-05 | Stop reason: HOSPADM

## 2020-02-05 RX ORDER — PROMETHAZINE HYDROCHLORIDE 25 MG/1
25 TABLET ORAL ONCE AS NEEDED
Status: DISCONTINUED | OUTPATIENT
Start: 2020-02-05 | End: 2020-02-05 | Stop reason: HOSPADM

## 2020-02-05 RX ORDER — HYDRALAZINE HYDROCHLORIDE 20 MG/ML
5 INJECTION INTRAMUSCULAR; INTRAVENOUS
Status: DISCONTINUED | OUTPATIENT
Start: 2020-02-05 | End: 2020-02-05 | Stop reason: HOSPADM

## 2020-02-05 RX ORDER — FENTANYL CITRATE 50 UG/ML
INJECTION, SOLUTION INTRAMUSCULAR; INTRAVENOUS AS NEEDED
Status: DISCONTINUED | OUTPATIENT
Start: 2020-02-05 | End: 2020-02-05 | Stop reason: SURG

## 2020-02-05 RX ORDER — KETOROLAC TROMETHAMINE 30 MG/ML
INJECTION, SOLUTION INTRAMUSCULAR; INTRAVENOUS AS NEEDED
Status: DISCONTINUED | OUTPATIENT
Start: 2020-02-05 | End: 2020-02-05 | Stop reason: SURG

## 2020-02-05 RX ORDER — HYDROCODONE BITARTRATE AND ACETAMINOPHEN 7.5; 325 MG/1; MG/1
1 TABLET ORAL ONCE AS NEEDED
Status: DISCONTINUED | OUTPATIENT
Start: 2020-02-05 | End: 2020-02-05 | Stop reason: HOSPADM

## 2020-02-05 RX ORDER — KETOROLAC TROMETHAMINE 30 MG/ML
30 INJECTION, SOLUTION INTRAMUSCULAR; INTRAVENOUS EVERY 8 HOURS
Status: DISCONTINUED | OUTPATIENT
Start: 2020-02-05 | End: 2020-02-06 | Stop reason: HOSPADM

## 2020-02-05 RX ORDER — ONDANSETRON 2 MG/ML
4 INJECTION INTRAMUSCULAR; INTRAVENOUS EVERY 6 HOURS PRN
Status: DISCONTINUED | OUTPATIENT
Start: 2020-02-05 | End: 2020-02-06 | Stop reason: HOSPADM

## 2020-02-05 RX ORDER — HYDROCODONE BITARTRATE AND ACETAMINOPHEN 7.5; 325 MG/1; MG/1
2 TABLET ORAL EVERY 4 HOURS PRN
Status: DISCONTINUED | OUTPATIENT
Start: 2020-02-05 | End: 2020-02-06 | Stop reason: HOSPADM

## 2020-02-05 RX ORDER — PREGABALIN 75 MG/1
150 CAPSULE ORAL ONCE
Status: COMPLETED | OUTPATIENT
Start: 2020-02-05 | End: 2020-02-05

## 2020-02-05 RX ORDER — ONDANSETRON 2 MG/ML
4 INJECTION INTRAMUSCULAR; INTRAVENOUS ONCE AS NEEDED
Status: DISCONTINUED | OUTPATIENT
Start: 2020-02-05 | End: 2020-02-05 | Stop reason: HOSPADM

## 2020-02-05 RX ORDER — FAMOTIDINE 10 MG/ML
20 INJECTION, SOLUTION INTRAVENOUS
Status: COMPLETED | OUTPATIENT
Start: 2020-02-05 | End: 2020-02-05

## 2020-02-05 RX ORDER — MAGNESIUM HYDROXIDE 1200 MG/15ML
LIQUID ORAL AS NEEDED
Status: DISCONTINUED | OUTPATIENT
Start: 2020-02-05 | End: 2020-02-05 | Stop reason: HOSPADM

## 2020-02-05 RX ORDER — NALOXONE HCL 0.4 MG/ML
0.2 VIAL (ML) INJECTION AS NEEDED
Status: DISCONTINUED | OUTPATIENT
Start: 2020-02-05 | End: 2020-02-05 | Stop reason: HOSPADM

## 2020-02-05 RX ORDER — OXYCODONE AND ACETAMINOPHEN 7.5; 325 MG/1; MG/1
1 TABLET ORAL ONCE AS NEEDED
Status: DISCONTINUED | OUTPATIENT
Start: 2020-02-05 | End: 2020-02-05 | Stop reason: HOSPADM

## 2020-02-05 RX ORDER — MIDAZOLAM HYDROCHLORIDE 1 MG/ML
2 INJECTION INTRAMUSCULAR; INTRAVENOUS
Status: DISCONTINUED | OUTPATIENT
Start: 2020-02-05 | End: 2020-02-05 | Stop reason: HOSPADM

## 2020-02-05 RX ORDER — ROCURONIUM BROMIDE 10 MG/ML
INJECTION, SOLUTION INTRAVENOUS AS NEEDED
Status: DISCONTINUED | OUTPATIENT
Start: 2020-02-05 | End: 2020-02-05 | Stop reason: SURG

## 2020-02-05 RX ORDER — SODIUM CHLORIDE 0.9 % (FLUSH) 0.9 %
10 SYRINGE (ML) INJECTION AS NEEDED
Status: DISCONTINUED | OUTPATIENT
Start: 2020-02-05 | End: 2020-02-05 | Stop reason: HOSPADM

## 2020-02-05 RX ORDER — ASPIRIN 325 MG
325 TABLET, DELAYED RELEASE (ENTERIC COATED) ORAL EVERY 12 HOURS SCHEDULED
Status: DISCONTINUED | OUTPATIENT
Start: 2020-02-06 | End: 2020-02-06 | Stop reason: HOSPADM

## 2020-02-05 RX ORDER — PROMETHAZINE HYDROCHLORIDE 25 MG/1
25 SUPPOSITORY RECTAL ONCE AS NEEDED
Status: DISCONTINUED | OUTPATIENT
Start: 2020-02-05 | End: 2020-02-05 | Stop reason: HOSPADM

## 2020-02-05 RX ORDER — MELOXICAM 15 MG/1
15 TABLET ORAL ONCE
Status: COMPLETED | OUTPATIENT
Start: 2020-02-05 | End: 2020-02-05

## 2020-02-05 RX ORDER — MIDAZOLAM HYDROCHLORIDE 1 MG/ML
1 INJECTION INTRAMUSCULAR; INTRAVENOUS
Status: DISCONTINUED | OUTPATIENT
Start: 2020-02-05 | End: 2020-02-05 | Stop reason: HOSPADM

## 2020-02-05 RX ORDER — GLYCOPYRROLATE 0.2 MG/ML
INJECTION INTRAMUSCULAR; INTRAVENOUS AS NEEDED
Status: DISCONTINUED | OUTPATIENT
Start: 2020-02-05 | End: 2020-02-05 | Stop reason: SURG

## 2020-02-05 RX ADMIN — EPHEDRINE SULFATE 10 MG: 50 INJECTION INTRAVENOUS at 07:11

## 2020-02-05 RX ADMIN — SODIUM CHLORIDE 1500 MG: 9 INJECTION, SOLUTION INTRAVENOUS at 17:25

## 2020-02-05 RX ADMIN — PHENYLEPHRINE HYDROCHLORIDE 50 MCG: 10 INJECTION INTRAVENOUS at 07:14

## 2020-02-05 RX ADMIN — HYDROMORPHONE HYDROCHLORIDE 0.5 MG: 1 INJECTION, SOLUTION INTRAMUSCULAR; INTRAVENOUS; SUBCUTANEOUS at 09:34

## 2020-02-05 RX ADMIN — LIDOCAINE HYDROCHLORIDE 100 MG: 20 INJECTION, SOLUTION INFILTRATION; PERINEURAL at 06:59

## 2020-02-05 RX ADMIN — ONDANSETRON HYDROCHLORIDE 4 MG: 2 SOLUTION INTRAMUSCULAR; INTRAVENOUS at 06:59

## 2020-02-05 RX ADMIN — PHENYLEPHRINE HYDROCHLORIDE 50 MCG: 10 INJECTION INTRAVENOUS at 07:11

## 2020-02-05 RX ADMIN — MELOXICAM 15 MG: 15 TABLET ORAL at 06:18

## 2020-02-05 RX ADMIN — POLYETHYLENE GLYCOL 3350 17 G: 17 POWDER, FOR SOLUTION ORAL at 20:25

## 2020-02-05 RX ADMIN — HYDROCODONE BITARTRATE AND ACETAMINOPHEN 1 TABLET: 7.5; 325 TABLET ORAL at 11:04

## 2020-02-05 RX ADMIN — LIDOCAINE HYDROCHLORIDE 1 EACH: 40 SOLUTION TOPICAL at 07:05

## 2020-02-05 RX ADMIN — DEXAMETHASONE SODIUM PHOSPHATE 8 MG: 10 INJECTION INTRAMUSCULAR; INTRAVENOUS at 07:11

## 2020-02-05 RX ADMIN — TRANEXAMIC ACID 1000 MG: 100 INJECTION, SOLUTION INTRAVENOUS at 07:15

## 2020-02-05 RX ADMIN — PHENYLEPHRINE HYDROCHLORIDE 100 MCG: 10 INJECTION INTRAVENOUS at 07:29

## 2020-02-05 RX ADMIN — HYDROCODONE BITARTRATE AND ACETAMINOPHEN 1 TABLET: 7.5; 325 TABLET ORAL at 15:46

## 2020-02-05 RX ADMIN — EPHEDRINE SULFATE 10 MG: 50 INJECTION INTRAVENOUS at 08:11

## 2020-02-05 RX ADMIN — PROPOFOL 150 MG: 10 INJECTION, EMULSION INTRAVENOUS at 06:59

## 2020-02-05 RX ADMIN — MIDAZOLAM 1 MG: 1 INJECTION INTRAMUSCULAR; INTRAVENOUS at 06:26

## 2020-02-05 RX ADMIN — ACETAMINOPHEN 1000 MG: 10 INJECTION, SOLUTION INTRAVENOUS at 07:16

## 2020-02-05 RX ADMIN — ROCURONIUM BROMIDE 50 MG: 10 INJECTION, SOLUTION INTRAVENOUS at 06:59

## 2020-02-05 RX ADMIN — NEOSTIGMINE METHYLSULFATE 3 MG: 1 INJECTION INTRAMUSCULAR; INTRAVENOUS; SUBCUTANEOUS at 08:22

## 2020-02-05 RX ADMIN — GLYCOPYRROLATE 0.6 MG: 0.2 INJECTION INTRAMUSCULAR; INTRAVENOUS at 08:22

## 2020-02-05 RX ADMIN — FENTANYL CITRATE 50 MCG: 50 INJECTION, SOLUTION INTRAMUSCULAR; INTRAVENOUS at 09:25

## 2020-02-05 RX ADMIN — TRANEXAMIC ACID 1000 MG: 100 INJECTION, SOLUTION INTRAVENOUS at 08:20

## 2020-02-05 RX ADMIN — PHENYLEPHRINE HYDROCHLORIDE 100 MCG: 10 INJECTION INTRAVENOUS at 07:55

## 2020-02-05 RX ADMIN — SODIUM CHLORIDE, POTASSIUM CHLORIDE, SODIUM LACTATE AND CALCIUM CHLORIDE: 600; 310; 30; 20 INJECTION, SOLUTION INTRAVENOUS at 08:23

## 2020-02-05 RX ADMIN — MUPIROCIN 1 APPLICATION: 20 OINTMENT TOPICAL at 20:25

## 2020-02-05 RX ADMIN — KETOROLAC TROMETHAMINE 30 MG: 30 INJECTION, SOLUTION INTRAMUSCULAR at 15:45

## 2020-02-05 RX ADMIN — CLINDAMYCIN PHOSPHATE 900 MG: 18 INJECTION, SOLUTION INTRAMUSCULAR; INTRAVENOUS at 07:07

## 2020-02-05 RX ADMIN — DOCUSATE SODIUM 100 MG: 100 CAPSULE, LIQUID FILLED ORAL at 20:25

## 2020-02-05 RX ADMIN — PREGABALIN 150 MG: 75 CAPSULE ORAL at 06:18

## 2020-02-05 RX ADMIN — PHENYLEPHRINE HYDROCHLORIDE 200 MCG: 10 INJECTION INTRAVENOUS at 08:01

## 2020-02-05 RX ADMIN — PHENYLEPHRINE HYDROCHLORIDE 100 MCG: 10 INJECTION INTRAVENOUS at 07:37

## 2020-02-05 RX ADMIN — KETOROLAC TROMETHAMINE 30 MG: 30 INJECTION, SOLUTION INTRAMUSCULAR; INTRAVENOUS at 08:22

## 2020-02-05 RX ADMIN — PHENYLEPHRINE HYDROCHLORIDE 200 MCG: 10 INJECTION INTRAVENOUS at 08:31

## 2020-02-05 RX ADMIN — FAMOTIDINE 20 MG: 10 INJECTION INTRAVENOUS at 06:26

## 2020-02-05 RX ADMIN — FENTANYL CITRATE 50 MCG: 50 INJECTION, SOLUTION INTRAMUSCULAR; INTRAVENOUS at 09:13

## 2020-02-05 RX ADMIN — HYDROCODONE BITARTRATE AND ACETAMINOPHEN 1 TABLET: 7.5; 325 TABLET ORAL at 20:25

## 2020-02-05 RX ADMIN — FENTANYL CITRATE 100 MCG: 50 INJECTION INTRAMUSCULAR; INTRAVENOUS at 06:59

## 2020-02-05 RX ADMIN — EPHEDRINE SULFATE 10 MG: 50 INJECTION INTRAVENOUS at 07:08

## 2020-02-05 RX ADMIN — HYDROMORPHONE HYDROCHLORIDE 0.5 MG: 1 INJECTION, SOLUTION INTRAMUSCULAR; INTRAVENOUS; SUBCUTANEOUS at 09:18

## 2020-02-05 RX ADMIN — VANCOMYCIN HYDROCHLORIDE 1500 MG: 10 INJECTION, POWDER, LYOPHILIZED, FOR SOLUTION INTRAVENOUS at 06:03

## 2020-02-05 RX ADMIN — SODIUM CHLORIDE, POTASSIUM CHLORIDE, SODIUM LACTATE AND CALCIUM CHLORIDE 9 ML/HR: 600; 310; 30; 20 INJECTION, SOLUTION INTRAVENOUS at 06:26

## 2020-02-05 NOTE — DISCHARGE PLACEMENT REQUEST
"Liza Aaron (64 y.o. Female)     Date of Birth Social Security Number Address Home Phone MRN    1955  [id number] [facility] [address] [phone] [id number]    [employer] Mar[employer] Status          [employer]        Admission Date Admission Type Admitting Provider Attending Provider Department, Room/Bed    2/5/20 Elective Cesar Johnson MD Brown, Reid B, MD [address], P776/1    Discharge Date Discharge Disposition Discharge Destination                       Attending Provider:  Cesar Johnson MD    Allergies:  Nickel, Penicillins    Isolation:  None   Infection:  None   Code Status:  CPR    Ht:  170.2 cm (67\")   Wt:  104 kg (229 lb 3.2 oz)    Admission Cmt:  None   Principal Problem:  Primary osteoarthritis of right hip [M16.11] More...                 Active Insurance as of 2/5/2020     Primary Coverage     Payor Plan Insurance Group Employer/Plan Group    [employer] [id number]     Payor Plan Address Payor Plan Phone Number Payor Plan Fax Number Effective Dates     [phone]  1/1/2018 - None Entered    [facility] [id number]       Subscriber Name Subscriber Birth Date Member ID       LIZA AARON 1955 [id number]                 Emergency Contacts      (Rel.) Home Phone Work Phone Mobile Phone    Handy Aaron (Spouse) [phone] -- --              "

## 2020-02-05 NOTE — THERAPY EVALUATION
"Acute Care - Physical Therapy Initial Evaluation  [facility]     Patient Name: Liza Aaron  : 1955  MRN: [id number]  Today's Date: 2020   Onset of Illness/Injury or Date of Surgery: 20  Date of Referral to PT: 20         Admit Date: 2020    Visit Dx:     ICD-10-CM ICD-9-CM   1. Difficulty walking R26.2 719.7   2. Primary osteoarthritis of right hip M16.11 715.15     Patient Active Problem List   Diagnosis   • HLD (hyperlipidemia)   • Incisional hernia, without obstruction or gangrene   • Osteoarthritis of multiple joints   • Class 2 obesity without serious comorbidity with body mass index (BMI) of 35.0 to 35.9 in adult   • Primary osteoarthritis of right hip   • Primary osteoarthritis of both hips     Past Medical History:   Diagnosis Date   • Arthritis    • Diverticulosis    • Family history of coronary artery disease     SAW DR THOMPSON 2019   • Hip pain     BILATERAL   • Hyperglycemia    • Hyperlipidemia    • Irregular heart beat     OCCASIONAL \"SKIPPED BEAT\"   • Pure hypercholesterolemia    • Umbilical hernia    • Vitamin D deficiency      Past Surgical History:   Procedure Laterality Date   • COLONOSCOPY N/A 2015    Diverticulosis in the entire examined colon, non-bleeding internal hemorrhodis, no specimens collected-Dr. Rosario Flores   • TOTAL ABDOMINAL HYSTERECTOMY WITH SALPINGO OOPHORECTOMY Bilateral 2004    Dr. Dominick Villarreal   • UMBILICAL HERNIA REPAIR N/A 2004    Dr. Dominick Villarreal        PT ASSESSMENT (last 12 hours)      Physical Therapy Evaluation     Row Name 20 1411          PT Evaluation Time/Intention    Subjective Information  complains of;pain  -EF     Document Type  evaluation  -EF     Mode of Treatment  physical therapy  -EF     Patient Effort  excellent  -EF     Symptoms Noted During/After Treatment  dizziness when looks down  -EF     Row Name 20 1411          General Information    Patient Profile Reviewed?  yes  -EF     Onset of " Illness/Injury or Date of Surgery  02/05/20  -EF     Patient Observations  alert;cooperative;agree to therapy  -EF     Patient/Family Observations   present  -EF     General Observations of Patient  supine in bed, IV  -EF     Prior Level of Function  independent:  -EF     Pertinent History of Current Functional Problem  s/p R ALEX due to end stage OA  -EF     Existing Precautions/Restrictions  fall;hip, posterior;right  -EF     Row Name 02/05/20 1411          Cognitive Assessment/Intervention- PT/OT    Orientation Status (Cognition)  oriented x 4  -EF     Follows Commands (Cognition)  WNL  -EF     Row Name 02/05/20 1411          Bed Mobility Assessment/Treatment    Bed Mobility Assessment/Treatment  supine-sit;sit-supine  -EF     Supine-Sit [place] (Bed Mobility)  contact guard;minimum assist (75% patient effort);verbal cues;nonverbal cues (demo/gesture)  -EF     Bed Mobility, Safety Issues  decreased use of legs for bridging/pushing  -EF     Assistive Device (Bed Mobility)  head of bed elevated  -EF     Row Name 02/05/20 1411          Transfer Assessment/Treatment    Transfer Assessment/Treatment  sit-stand transfer;stand-sit transfer;toilet transfer  -EF     Sit-Stand [place] (Transfers)  contact guard;verbal cues  -EF     Stand-Sit [place] (Transfers)  contact guard;verbal cues  -EF     [place] Level (Toilet Transfer)  contact guard;verbal cues  -EF     Assistive Device (Toilet Transfer)  raised toilet seat;walker, front-wheeled  -EF     Row Name 02/05/20 1411          Sit-Stand Transfer    Assistive Device (Sit-Stand Transfers)  walker, front-wheeled  -EF     Row Name 02/05/20 1411          Stand-Sit Transfer    Assistive Device (Stand-Sit Transfers)  walker, front-wheeled  -EF     Row Name 02/05/20 1411          Toilet Transfer    Type (Toilet Transfer)  sit-stand;stand-sit  -EF     Row Name 02/05/20 1411          Gait/Stairs Assessment/Training    [place] Level (Gait)  contact  guard;verbal cues  -EF     Assistive Device (Gait)  walker, front-wheeled  -EF     Distance in Feet (Gait)  40 x 1, 10 x 1  -EF     Deviations/Abnormal Patterns (Gait)  antalgic;griselda decreased  -EF     Right Sided Gait Deviations  weight shift ability decreased  -EF     Row Name 02/05/20 1411          General ROM    GENERAL ROM COMMENTS  WFL except R hip  -EF     Row Name 02/05/20 1411          MMT (Manual Muscle Testing)    General MMT Comments  WFL except R hip  -EF     Row Name 02/05/20 1411          Motor Assessment/Intervention    Additional Documentation  Balance (Group);Therapeutic Exercise (Group);Therapeutic Exercise Interventions (Group)  -EF     Row Name 02/05/20 1411          Therapeutic Exercise    Comment (Therapeutic Exercise)  10 reps ALEX protocol.  -EF     Row Name 02/05/20 1411          Balance    Balance  static sitting balance;static standing balance  -EF     Row Name 02/05/20 1411          Static Sitting Balance    Level of [place] (Unsupported Sitting, Static Balance)  conditional independence  -EF     Sitting Position (Unsupported Sitting, Static Balance)  sitting on edge of bed  -EF     Row Name 02/05/20 1411          Static Standing Balance    Level of [place] (Supported Standing, Static Balance)  supervision;contact guard assist  -EF     Assistive Device Utilized (Supported Standing, Static Balance)  walker, rolling  -EF     Row Name 02/05/20 1411          Pain Assessment    Additional Documentation  Pain Scale: Word Pre/Post-Treatment (Group)  -EF     Row Name 02/05/20 1411          Pain Scale: Numbers Pre/Post-Treatment    Pain Location - Side  Right  -EF     Pain Location  hip  -EF     Pain Intervention(s)  Medication (See MAR);Cold applied;Repositioned;Ambulation/increased activity  -EF     Row Name 02/05/20 1411          Pain Scale: Word Pre/Post-Treatment    Pain: Word Scale, Pretreatment  4 - moderate pain  -EF     Pain: Word Scale, Post-Treatment  4 - moderate pain  -EF      Row Name             Wound 02/05/20 Right anterior greater trochanter Incision    Wound - Properties Group Date first assessed: 02/05/20  -RG Side: Right  -RG Orientation: anterior  -RG Location: greater trochanter  -RG Primary Wound Type: Incision  -RG    Row Name 02/05/20 1411          Physical Therapy Clinical Impression    Date of Referral to PT  02/05/20  -EF     Criteria for Skilled Interventions Met (PT Clinical Impression)  yes;treatment indicated  -EF     Rehab Potential (PT Clinical Summary)  good, to achieve stated therapy goals  -EF     Row Name 02/05/20 1411          Physical Therapy Goals    Bed Mobility Goal Selection (PT)  bed mobility, PT goal 1  -EF     Transfer Goal Selection (PT)  transfer, PT goal 1  -EF     Gait Training Goal Selection (PT)  gait training, PT goal 1  -EF     Stairs Goal Selection (PT)  stairs, PT goal 1  -EF     Additional Documentation  Stairs Goal Selection (PT) (Row)  -EF     Row Name 02/05/20 1411          Bed Mobility Goal 1 (PT)    Activity/Assistive Device (Bed Mobility Goal 1, PT)  bed mobility activities, all  -EF     [place] Level/Cues Needed (Bed Mobility Goal 1, PT)  supervision required;contact guard assist  -EF     Time Frame (Bed Mobility Goal 1, PT)  2 days  -EF     Row Name 02/05/20 1411          Transfer Goal 1 (PT)    Activity/Assistive Device (Transfer Goal 1, PT)  transfers, all;walker, rolling  -EF     [place] Level/Cues Needed (Transfer Goal 1, PT)  supervision required;contact guard assist  -EF     Time Frame (Transfer Goal 1, PT)  2 days  -EF     Row Name 02/05/20 1411          Gait Training Goal 1 (PT)    Activity/Assistive Device (Gait Training Goal 1, PT)  gait (walking locomotion);assistive device use;walker, rolling  -EF     [place] Level (Gait Training Goal 1, PT)  supervision required  -EF     Distance (Gait Goal 1, PT)  80  -EF     Time Frame (Gait Training Goal 1, PT)  2 days  -EF     Row Name 02/05/20 1411          Stairs  Goal 1 (PT)    Activity/Assistive Device (Stairs Goal 1, PT)  stairs, all skills  -EF     [place] Level/Cues Needed (Stairs Goal 1, PT)  contact guard assist  -EF     Number of Stairs (Stairs Goal 1, PT)  3  -EF     Time Frame (Stairs Goal 1, PT)  2 days  -EF     Row Name 02/05/20 1411          Positioning and Restraints    Pre-Treatment Position  in bed  -EF     Post Treatment Position  chair  -EF     In Chair  reclined;call light within reach;encouraged to call for assist;with family/caregiver;RLE elevated  -EF       User Key  (r) = Recorded By, (t) = Taken By, (c) = Cosigned By    Initials Name Provider Type    EF Elisa Watts, PT Physical Therapist    Camelia Schwartz, RN Registered Nurse        Physical Therapy Education                 Title: PT OT SLP Therapies (Done)     Topic: Physical Therapy (Done)     Point: Mobility training (Done)     Description:   Instruct learner(s) on safety and technique for assisting patient out of bed, chair or wheelchair.  Instruct in the proper use of assistive devices, such as walker, crutches, cane or brace.              Patient Friendly Description:   It's important to get you on your feet again, but we need to do so in a way that is safe for you. Falling has serious consequences, and your personal safety is the most important thing of all.        When it's time to get out of bed, one of us or a family member will sit next to you on the bed to give you support.     If your doctor or nurse tells you to use a walker, crutches, a cane, or a brace, be sure you use it every time you get out of bed, even if you think you don't need it.    Learning Progress Summary           Patient Acceptance, E, VU,NR by EF at 2/5/2020 1417   Significant Other Acceptance, E, VU,NR by EF at 2/5/2020 1417                   Point: Home exercise program (Done)     Description:   Instruct learner(s) on appropriate technique for monitoring, assisting and/or progressing patient with  therapeutic exercises and activities.              Learning Progress Summary           Patient Acceptance, E, VU,NR by EF at 2/5/2020 1417   Significant Other Acceptance, E, VU,NR by EF at 2/5/2020 1417                   Point: Body mechanics (Done)     Description:   Instruct learner(s) on proper positioning and spine alignment for patient and/or caregiver during mobility tasks and/or exercises.              Learning Progress Summary           Patient Acceptance, E, VU,NR by EF at 2/5/2020 1417   Significant Other Acceptance, E, VU,NR by EF at 2/5/2020 1417                   Point: Precautions (Done)     Description:   Instruct learner(s) on prescribed precautions during mobility and gait tasks              Learning Progress Summary           Patient Acceptance, E, VU,NR by EF at 2/5/2020 1417   Significant Other Acceptance, E, VU,NR by EF at 2/5/2020 1417                               User Key     Initials Effective Dates Name Provider Type Discipline    EF 06/08/18 -  Elisa Watts, PT Physical Therapist PT              PT Recommendation and Plan  Anticipated Discharge Disposition (PT): home with home health  Therapy Frequency (PT Clinical Impression): 2 times/day  Outcome Summary/Treatment Plan (PT)  Anticipated Discharge Disposition (PT): home with home health  Plan of Care Reviewed With: patient, spouse  Outcome Measures     Row Name 02/05/20 1400             How much help from another person do you currently need...    Turning from your back to your side while in flat bed without using bedrails?  3  -EF      Moving from lying on back to sitting on the side of a flat bed without bedrails?  3  -EF      Moving to and from a bed to a chair (including a wheelchair)?  3  -EF      Standing up from a chair using your arms (e.g., wheelchair, bedside chair)?  3  -EF      Climbing 3-5 steps with a railing?  2  -EF      To walk in hospital room?  3  -EF      AM-PAC 6 Clicks Score (PT)  17  -EF         Functional  Assessment    Outcome Measure Options  AM-PAC 6 Clicks Basic Mobility (PT)  -EF        User Key  (r) = Recorded By, (t) = Taken By, (c) = Cosigned By    Initials Name Provider Type    Elisa Cronin PT Physical Therapist         Time Calculation:   PT Charges     Row Name 02/05/20 1420             Time Calculation    Start Time  1313  -EF      Stop Time  1401  -EF      Time Calculation (min)  48 min  -EF      PT Received On  02/05/20  -EF      PT - Next Appointment  02/06/20  -EF      PT Goal Re-Cert Due Date  02/07/20  -EF        User Key  (r) = Recorded By, (t) = Taken By, (c) = Cosigned By    Initials Name Provider Type    Elisa Cronin PT Physical Therapist        Therapy Charges for Today     Code Description Service Date Service Provider Modifiers Qty    36815905850 HC PT EVAL LOW COMPLEXITY 2 2/5/2020 Elisa Watts, PT GP 1    53190780265 HC PT THER PROC EA 15 MIN 2/5/2020 Elisa Watts, PT GP 3    10138496910 HC PT THER SUPP EA 15 MIN 2/5/2020 Elisa Watts, PT GP 2          PT G-Codes  Outcome Measure Options: AM-PAC 6 Clicks Basic Mobility (PT)  AM-PAC 6 Clicks Score (PT): 17      Elisa Watts, PT  2/5/2020

## 2020-02-05 NOTE — ANESTHESIA PROCEDURE NOTES
Airway  Urgency: elective    Date/Time: 2/5/2020 7:05 AM  Airway not difficult    General Information and Staff    Patient location during procedure: OR  Anesthesiologist: Milan Donato MD  CRNA: Donna Armando CRNA    Indications and Patient Condition  Indications for airway management: airway protection    Preoxygenated: yes  Mask difficulty assessment: 1 - vent by mask    Final Airway Details  Final airway type: endotracheal airway      Successful airway: ETT  Cuffed: yes   Successful intubation technique: direct laryngoscopy  Facilitating devices/methods: intubating stylet  Endotracheal tube insertion site: oral  Blade: Bonnie  Blade size: 3  ETT size (mm): 7.0  Cormack-Lehane Classification: grade I - full view of glottis  Placement verified by: chest auscultation and capnometry   Cuff volume (mL): 7  Measured from: lips  ETT/EBT  to lips (cm): 19  Number of attempts at approach: 1  Assessment: lips, teeth, and gum same as pre-op and atraumatic intubation

## 2020-02-05 NOTE — OP NOTE
Name: Liza Aaron  YOB: 1955    DATE OF SURGERY: 2/5/2020    PREOPERATIVE DIAGNOSIS: Right hip end-stage osteoarthritis    POSTOPERATIVE DIAGNOSIS: Right hip end-stage osteoarthritis    PROCEDURE PERFORMED: Right  total hip replacement    SURGEON: Cesar Johnson M.D.    ASSISTANT: YEYO GAINES    IMPLANTS:  Implant Name Type Inv. Item Serial No.  Lot No. LRB No. Used   SHLL ACET R3 3H STD 56MM - [id number] Implant SHLL ACET R3 3H STD 56MM  [employer] [id number] Right 1   LINER ACET R3 XLPE 20D [id number] - [id number] Implant LINER ACET R3 XLPE 20D [id number]  [employer] [id number] Right 1   SCRW SPH HD REFLECTION 6.5X25MM - [id number] Implant SCRW SPH HD REFLECTION 6.5X25MM  [employer] [id number] Right 1   SCRW SPH HD REFLECTION 6.5X30MM - [id number] Implant SCRW SPH HD REFLECTION 6.5X30MM  [employer] [id number] Right 1   STEM POLARSTEM CMTLESS STD TI/HA 3 - [id number] Implant STEM POLARSTEM CMTLESS STD TI/HA 3  [employer] [id number] Right 1   HD FEM OXINIUM TPR 12 14 36MM PLS4 - [id number] Implant HD FEM OXINIUM TPR 12 14 36MM PLS4  [employer] [id number] Right 1       Estimated Blood Loss: 200cc  Specimens : none  Complications: none    DESCRIPTION OF PROCEDURE:    The patient was taken to the operating room and placed in the supine position. A sequential compression device was carefully placed on the non-operative leg. Preoperative antibiotics were administered. Surgical time out was performed. After adequate induction of anesthesia the patient was then transferred onto the  table and positioned appropriately in the lateral decubitus position. The hip was then prepped and draped in the usual sterile fashion.    A posterior lateral surgical incision was then made.  The gluteus leidy fascia was then divided the gluteus leidy muscle was then bluntly dissected.  A Charnley self-retaining retractor was then placed.  A posterior capsulotomy was then performed.   The superior limb was divided in line with the piriformis tendon.  The hip was then dislocated.  There were end-stage arthritic findings.  The femoral neck osteotomy was performed according to the level dictated by the template.  The acetabulum was exposed with standard retractors.  The labrum and pulvinar were then excised.  The cup was then medialized with the starting acetabular reamer to the medial wall.  I then progressively reamed up to the appropriate size and 45° of abduction and 20° of anteversion. Line to line reaming was performed. At this point the bone was nicely prepared there was excellent bleeding bone. We then impacted the acetabular component in 45 of abduction and 20 of anteversion the cup was stable.  Per routine we augmented the fixation with 2 screws in the posterior and superior quadrant  The final liner was then placed and it locked in nicely.  At this point we injected the hip with anesthetic cocktail solution.  We then turned our attention to the femur.   Standard retractors were placed to expose the femur.  The box osteotome was used to create a starting point.  The canal finder was then used to sound the canal.  The lateralizing reamer was then used to lateralize into the greater trochanter.  We progressively reamed and broached until the broach was very solid to axial and rotational stresses.  At this point we placed the trial neck and head hip was very stable to flexion and internal rotation as well as extension and external rotation.  The leg lengths were measured to be equal.  We then removed the trial components,copiously irrigated the hip, and then impacted the final stem.  At this point we then trialed and chose the final head. The head was placed on a clean dry taper.  The leg lengths were again measured to be equal.  At this point the hip was copiously irrigated with pulse lavage and the capsule was then reapproximated with #1 PDS suture, and the remainder of the hip was closed  in multiple layers in standard fashion..  There was excellent hemostasis. We placed a one-eighth inch Hemovac drain.  Sterile dressing were applied. At the end of the case, the sponge and needle counts were reported as being correct. There were no known complications. The patient was then transported to the recovery room.      Cesar Johnson M.D.  2/5/2020

## 2020-02-05 NOTE — PROGRESS NOTES
Discharge Planning Assessment  [facility]     Patient Name: Liza Aaron  MRN: [id number]  Today's Date: 2/5/2020    Admit Date: 2/5/2020    Discharge Needs Assessment     Row Name 02/05/20 1759       Living Environment    Lives With  spouse    Current Living Arrangements  home/apartment/condo    Family Caregiver if Needed  spouse    Quality of Family Relationships  helpful;involved    Able to Return to Prior Arrangements  yes       Resource/Environmental Concerns    Resource/Environmental Concerns  none    Home Accessibility Concerns  stairs to enter home       Transition Planning    Patient/Family Anticipates Transition to  home with family    Patient/Family Anticipated Services at Transition  home health care    Transportation Anticipated  family or friend will provide       Discharge Needs Assessment    Readmission Within the Last 30 Days  no previous admission in last 30 days    Concerns to be Addressed  no discharge needs identified;adjustment to diagnosis/illness    Equipment Currently Used at Home  none    Discharge Facility/Level of Care Needs  home with home health    Provided post acute provider list?  N/A        Discharge Plan     Row Name 02/05/20 1800       Plan    Plan  Home w/ spouse and [facility]     Patient/Family in Agreement with Plan  yes    Plan Comments  S/w pt verified facesheet and dc plan. Pt plans to dc home w/ the help of her spouse. She requested [facility] for HH; Epic referral sent to [facility]. No other dc needs identified.         Destination      Coordination has not been started for this encounter.      Durable Medical Equipment      Coordination has not been started for this encounter.      Dialysis/Infusion      Coordination has not been started for this encounter.      Home Medical Care      Service Provider Request Status Selected Services Address Phone Number Fax Number    [facility] Pending - Request Sent N/A [id number] [address]  [phone]      Therapy      Coordination has not been started for this encounter.      Community Resources      Coordination has not been started for this encounter.          Demographic Summary    No documentation.       Functional Status     Row Name 02/05/20 1800       Functional Status    Usual Activity Tolerance  good    Current Activity Tolerance  fair       Functional Status, IADL    Medications  independent    Meal Preparation  independent    Housekeeping  independent    Laundry  independent    Shopping  independent        Psychosocial    No documentation.       Abuse/Neglect    No documentation.       Legal    No documentation.       Substance Abuse    No documentation.       Patient Forms    No documentation.           Marissa Higgins RN

## 2020-02-05 NOTE — ANESTHESIA POSTPROCEDURE EVALUATION
"Patient: Liza Aaron    Procedure Summary     Date:  02/05/20 Room / Location:  [facility] OR [address] [place] [address] MAIN OR    Anesthesia Start:  0653 Anesthesia Stop:  0857    Procedure:  TOTAL HIP ARTHROPLASTY (Right Hip) Diagnosis:       Primary osteoarthritis of right hip      (Primary osteoarthritis of right hip [M16.11])    Surgeon:  Cesar Johnson MD Provider:  Milan Donato MD    Anesthesia Type:  general ASA Status:  3          Anesthesia Type: general    Vitals  Vitals Value Taken Time   /59 2/5/2020 10:10 AM   Temp 36.3 °C (97.4 °F) 2/5/2020 10:10 AM   Pulse 75 2/5/2020 10:12 AM   Resp 16 2/5/2020 10:10 AM   SpO2 96 % 2/5/2020 10:13 AM   Vitals shown include unvalidated device data.        Post Anesthesia Care and Evaluation    Patient participation: complete - patient cannot participate  Anesthetic complications: No anesthetic complications    Cardiovascular status: acceptable  Respiratory status: acceptable  Hydration status: acceptable    Comments: Patient was discharged prior to being evaluated by Anesthesia...per chart review, no anesthetic complications were noted.  NOT MEDICALLY DIRECTED  BP 92/54 (BP Location: Right arm, Patient Position: Lying)   Pulse 76   Temp 36.4 °C (97.6 °F) (Oral)   Resp 16   Ht 170.2 cm (67\")   Wt 104 kg (229 lb 3.2 oz)   LMP  (LMP Unknown)   SpO2 96%   BMI 35.90 kg/m²         "

## 2020-02-05 NOTE — ANESTHESIA PREPROCEDURE EVALUATION
Anesthesia Evaluation     Patient summary reviewed                Airway   Mallampati: II  No difficulty expected  Dental      Pulmonary    Cardiovascular     ECG reviewed  Rhythm: regular    (+) hyperlipidemia,       Neuro/Psych  GI/Hepatic/Renal/Endo    (+) obesity,       Musculoskeletal     Abdominal    Substance History      OB/GYN          Other   arthritis,                      Anesthesia Plan    ASA 3     general       Anesthetic plan, all risks, benefits, and alternatives have been provided, discussed and informed consent has been obtained with: patient.  Use of blood products discussed with patient .

## 2020-02-05 NOTE — PLAN OF CARE
Pt arrived to unit around  11 am, no c/o at this time, showed her the video and oriented her to the unit. Pt getting norco for pain, up to BSC with one assist and walker, worked with PT. Will cont to monitor

## 2020-02-05 NOTE — PERIOPERATIVE NURSING NOTE
Dr. Johnson notified pt reports nickel allergy and shown pink area Rt inner groin.  No new orders rec'd.

## 2020-02-05 NOTE — PLAN OF CARE
Problem: Patient Care Overview  Goal: Plan of Care Review  Flowsheets (Taken 2/5/2020 8626)  Plan of Care Reviewed With: patient; spouse  Note:   Pt is 63 yo female s/p R ALEX due to end stage OA. She presents with decreased bed mob, txfs, gait, R hip strength/ROM & will benefit from skilled PT to address deficits to facilitate recovery and improved function for return home.

## 2020-02-06 VITALS
HEIGHT: 67 IN | TEMPERATURE: 98.1 F | OXYGEN SATURATION: 97 % | HEART RATE: 66 BPM | SYSTOLIC BLOOD PRESSURE: 107 MMHG | RESPIRATION RATE: 16 BRPM | BODY MASS INDEX: 35.97 KG/M2 | DIASTOLIC BLOOD PRESSURE: 62 MMHG | WEIGHT: 229.2 LBS

## 2020-02-06 LAB
HCT VFR BLD AUTO: 28.8 % (ref 34–46.6)
HGB BLD-MCNC: 9.7 G/DL (ref 12–15.9)

## 2020-02-06 PROCEDURE — 97150 GROUP THERAPEUTIC PROCEDURES: CPT

## 2020-02-06 PROCEDURE — 99024 POSTOP FOLLOW-UP VISIT: CPT | Performed by: NURSE PRACTITIONER

## 2020-02-06 PROCEDURE — 97110 THERAPEUTIC EXERCISES: CPT

## 2020-02-06 PROCEDURE — 85018 HEMOGLOBIN: CPT | Performed by: ORTHOPAEDIC SURGERY

## 2020-02-06 PROCEDURE — 25010000002 KETOROLAC TROMETHAMINE PER 15 MG: Performed by: ORTHOPAEDIC SURGERY

## 2020-02-06 PROCEDURE — 85014 HEMATOCRIT: CPT | Performed by: ORTHOPAEDIC SURGERY

## 2020-02-06 RX ORDER — ONDANSETRON 4 MG/1
4 TABLET, FILM COATED ORAL EVERY 6 HOURS PRN
Qty: 10 TABLET | Refills: 0 | Status: SHIPPED | OUTPATIENT
Start: 2020-02-06 | End: 2020-05-19

## 2020-02-06 RX ORDER — POLYETHYLENE GLYCOL 3350 17 G/17G
17 POWDER, FOR SOLUTION ORAL 2 TIMES DAILY
Qty: 510 G | Refills: 0 | Status: SHIPPED | OUTPATIENT
Start: 2020-02-06 | End: 2020-02-21

## 2020-02-06 RX ORDER — MELOXICAM 15 MG/1
15 TABLET ORAL DAILY
Qty: 14 TABLET | Refills: 0 | Status: SHIPPED | OUTPATIENT
Start: 2020-02-06 | End: 2020-02-20

## 2020-02-06 RX ORDER — PANTOPRAZOLE SODIUM 40 MG/1
40 TABLET, DELAYED RELEASE ORAL DAILY
Qty: 14 TABLET | Refills: 0 | Status: SHIPPED | OUTPATIENT
Start: 2020-02-06 | End: 2020-05-19

## 2020-02-06 RX ORDER — PSEUDOEPHEDRINE HCL 30 MG
100 TABLET ORAL 2 TIMES DAILY
Qty: 26 EACH | Refills: 0 | Status: SHIPPED | OUTPATIENT
Start: 2020-02-06 | End: 2020-02-19

## 2020-02-06 RX ORDER — HYDROCODONE BITARTRATE AND ACETAMINOPHEN 7.5; 325 MG/1; MG/1
2 TABLET ORAL EVERY 4 HOURS PRN
Qty: 60 TABLET | Refills: 0 | Status: SHIPPED | OUTPATIENT
Start: 2020-02-06 | End: 2020-02-13 | Stop reason: SDUPTHER

## 2020-02-06 RX ADMIN — HYDROCODONE BITARTRATE AND ACETAMINOPHEN 1 TABLET: 7.5; 325 TABLET ORAL at 02:46

## 2020-02-06 RX ADMIN — MUPIROCIN 1 APPLICATION: 20 OINTMENT TOPICAL at 07:34

## 2020-02-06 RX ADMIN — PANTOPRAZOLE SODIUM 40 MG: 40 TABLET, DELAYED RELEASE ORAL at 06:17

## 2020-02-06 RX ADMIN — MELOXICAM 15 MG: 15 TABLET ORAL at 07:34

## 2020-02-06 RX ADMIN — POLYETHYLENE GLYCOL 3350 17 G: 17 POWDER, FOR SOLUTION ORAL at 07:33

## 2020-02-06 RX ADMIN — HYDROCODONE BITARTRATE AND ACETAMINOPHEN 2 TABLET: 7.5; 325 TABLET ORAL at 12:01

## 2020-02-06 RX ADMIN — DOCUSATE SODIUM 100 MG: 100 CAPSULE, LIQUID FILLED ORAL at 07:34

## 2020-02-06 RX ADMIN — HYDROCODONE BITARTRATE AND ACETAMINOPHEN 1 TABLET: 7.5; 325 TABLET ORAL at 07:19

## 2020-02-06 RX ADMIN — ASPIRIN 325 MG: 325 TABLET, COATED ORAL at 07:34

## 2020-02-06 RX ADMIN — KETOROLAC TROMETHAMINE 30 MG: 30 INJECTION, SOLUTION INTRAMUSCULAR at 07:33

## 2020-02-06 RX ADMIN — KETOROLAC TROMETHAMINE 30 MG: 30 INJECTION, SOLUTION INTRAMUSCULAR at 00:45

## 2020-02-06 NOTE — THERAPY TREATMENT NOTE
Acute Care - Physical Therapy Treatment Note  [facility]     Patient Name: Liza Aaron  : 1955  MRN: [id number]  Today's Date: 2020  Onset of Illness/Injury or Date of Surgery: 20  Date of Referral to PT: 20       Admit Date: 2020    Visit Dx:    ICD-10-CM ICD-9-CM   1. Difficulty walking R26.2 719.7   2. Primary osteoarthritis of right hip M16.11 715.15   3. Status post hip surgery Z98.890 V45.89     Patient Active Problem List   Diagnosis   • HLD (hyperlipidemia)   • Incisional hernia, without obstruction or gangrene   • Osteoarthritis of multiple joints   • Class 2 obesity without serious comorbidity with body mass index (BMI) of 35.0 to 35.9 in adult   • Primary osteoarthritis of right hip   • Primary osteoarthritis of both hips       Therapy Treatment    Rehabilitation Treatment Summary     Row Name 20 0850             Treatment Time/Intention    Discipline  physical therapy assistant  -      Document Type  discharge treatment;therapy note (daily note)  -      Subjective Information  complains of;fatigue;pain;weakness;swelling  -      Mode of Treatment  group therapy;physical therapy  -      Care Plan Review  patient/other agree to care plan  -      Care Plan Review, Other Participant(s)  spouse  -      Existing Precautions/Restrictions  fall;hip, posterior;right  -      Treatment Considerations/Comments  no order from MD, lawrence educ on POST HIP PREC  -      Recorded by [JM] Bridgett Mendes, ANNABELLE 20 3977      Row Name 20 0850             Bed Mobility Assessment/Treatment    Supine-Sit [place] (Bed Mobility)  moderate assist (50% patient effort);verbal cues;nonverbal cues (demo/gesture)  -      Sit-Supine [place] (Bed Mobility)  moderate assist (50% patient effort);verbal cues;nonverbal cues (demo/gesture)  -      Bed Mobility, Safety Issues  decreased use of arms for pushing/pulling;decreased use of legs for bridging/pushing  -       Assistive Device (Bed Mobility)  bed rails  -      Comment (Bed Mobility)  husb present for educ, has bed rail at home tall bed so perf educ at B/S , ed on her step stool assist   -JM      Recorded by [JM] Bridgett Mendes, ANNABELLE 02/06/20 1752      Row Name 02/06/20 0850             Sit-Stand Transfer    Sit-Stand [place] (Transfers)  contact guard;stand by assist;verbal cues cues for hand and foot placement for safe tfers  -      Assistive Device (Sit-Stand Transfers)  walker, front-wheeled  -JM      Recorded by [JM] Bridgett Mendes PTA 02/06/20 1752      Row Name 02/06/20 0850             Stand-Sit Transfer    Stand-Sit [place] (Transfers)  supervision;verbal cues  -      Assistive Device (Stand-Sit Transfers)  walker, front-wheeled  -JM      Recorded by [JM] Bridgett Mendes PTA 02/06/20 1752      Row Name 02/06/20 0850             Gait/Stairs Assessment/Training    [place] Level (Gait)  contact guard  -      Assistive Device (Gait)  walker, front-wheeled  -      Distance in Feet (Gait)  65  -      Deviations/Abnormal Patterns (Gait)  antalgic;griselda decreased  -      Bilateral Gait Deviations  forward flexed posture corrected after a few steps forw  -JM      [place] Level (Stairs)  contact guard;verbal cues  -      Handrail Location (Stairs)  left side (ascending)  -      Number of Steps (Stairs)  4  -JM      Ascending Technique (Stairs)  step-to-step  -JM      Descending Technique (Stairs)  step-to-step  -JM      Stairs, Impairments  pain  -JM      Comment (Gait/Stairs)  husb present for educ  -JM      Recorded by [JM] Bridgett Mendes PTA 02/06/20 1752      Row Name 02/06/20 0850             Therapeutic Exercise    Comment (Therapeutic Exercise)  THR protocol x 15 reps  -JM      Recorded by [JM] Bridgett Mendes PTA 02/06/20 1752      Row Name 02/06/20 0850             Positioning and Restraints    Pre-Treatment Position  sitting in chair/recliner  -JM      In Chair   reclined;call light within reach;encouraged to call for assist;exit alarm on;with family/caregiver  -      Recorded by [JM] Bridgett Mendes PTA 02/06/20 6712      Row Name 02/06/20 0850             Pain Scale: Numbers Pre/Post-Treatment    Pain Scale: Numbers, Pretreatment  5/10  -JM      Pain Scale: Numbers, Post-Treatment  6/10  -      Pain Location - Side  Right  -      Pain Location  hip  -      Pain Intervention(s)  Medication (See MAR);Repositioned educ to call for ice packs  -      Recorded by [JM] Bridgett Mendes PTA 02/06/20 1752      Row Name                Wound 02/05/20 Right anterior greater trochanter Incision    Wound - Properties Group Date first assessed: 02/05/20 [RG] Side: Right [RG] Orientation: anterior [RG] Location: greater trochanter [RG] Primary Wound Type: Incision [RG] Recorded by:  [OMAR] Camelia Wilson RN 02/05/20 0744      User Key  (r) = Recorded By, (t) = Taken By, (c) = Cosigned By    Initials Name Effective Dates Discipline    RG Camelia Wilson RN 06/16/16 -  Nurse    Bridgett Bullard PTA 03/07/18 -  PT          Wound 02/05/20 Right anterior greater trochanter Incision (Active)   Dressing Appearance dry;intact;no drainage 2/6/2020  8:00 AM   Closure REJI 2/6/2020  8:00 AM   Base dressing in place, unable to visualize 2/6/2020  8:00 AM   Dressing Care, Wound dressing changed 2/5/2020  6:39 PM           Physical Therapy Education                 Title: PT OT SLP Therapies (Resolved)     Topic: Physical Therapy (Resolved)     Point: Mobility training (Resolved)     Description:   Instruct learner(s) on safety and technique for assisting patient out of bed, chair or wheelchair.  Instruct in the proper use of assistive devices, such as walker, crutches, cane or brace.              Patient Friendly Description:   It's important to get you on your feet again, but we need to do so in a way that is safe for you. Falling has serious consequences, and your personal  safety is the most important thing of all.        When it's time to get out of bed, one of us or a family member will sit next to you on the bed to give you support.     If your doctor or nurse tells you to use a walker, crutches, a cane, or a brace, be sure you use it every time you get out of bed, even if you think you don't need it.    Learning Progress Summary           Patient Acceptance, E, VU,NR by  at 2/5/2020 1417   Significant Other Acceptance, E, VU,NR by EF at 2/5/2020 1417                   Point: Home exercise program (Resolved)     Description:   Instruct learner(s) on appropriate technique for monitoring, assisting and/or progressing patient with therapeutic exercises and activities.              Learning Progress Summary           Patient Acceptance, E, VU,NR by  at 2/5/2020 1417   Significant Other Acceptance, E, VU,NR by EF at 2/5/2020 1417                   Point: Body mechanics (Resolved)     Description:   Instruct learner(s) on proper positioning and spine alignment for patient and/or caregiver during mobility tasks and/or exercises.              Learning Progress Summary           Patient Acceptance, E, VU,NR by EF at 2/5/2020 1417   Significant Other Acceptance, E, VU,NR by EF at 2/5/2020 1417                   Point: Precautions (Resolved)     Description:   Instruct learner(s) on prescribed precautions during mobility and gait tasks              Learning Progress Summary           Patient Acceptance, E, VU,NR by  at 2/5/2020 1417   Significant Other Acceptance, E, VU,NR by EF at 2/5/2020 1417                               User Key     Initials Effective Dates Name Provider Type Discipline     06/08/18 -  Elisa Watts PT Physical Therapist PT                PT Recommendation and Plan        Outcome Measures     Row Name 02/05/20 1400             How much help from another person do you currently need...    Turning from your back to your side while in flat bed without using  bedrails?  3  -EF      Moving from lying on back to sitting on the side of a flat bed without bedrails?  3  -EF      Moving to and from a bed to a chair (including a wheelchair)?  3  -EF      Standing up from a chair using your arms (e.g., wheelchair, bedside chair)?  3  -EF      Climbing 3-5 steps with a railing?  2  -EF      To walk in hospital room?  3  -EF      AM-PAC 6 Clicks Score (PT)  17  -EF         Functional Assessment    Outcome Measure Options  AM-PAC 6 Clicks Basic Mobility (PT)  -EF        User Key  (r) = Recorded By, (t) = Taken By, (c) = Cosigned By    Initials Name Provider Type    Elisa Cronin, HERMAN Physical Therapist         Time Calculation:   PT Charges     Row Name 02/06/20 1553 02/06/20 1352          Time Calculation    Start Time  1106  -  0848  -     Stop Time  1126  -  0920  -     Time Calculation (min)  20 min  -  32 min  -     PT Received On  02/06/20  -AMY  02/06/20  -AMY     PT - Next Appointment  --  02/06/20  -AMY        Time Calculation- PT    Total Timed Code Minutes- PT  20 minute(s)  -  --       User Key  (r) = Recorded By, (t) = Taken By, (c) = Cosigned By    Initials Name Provider Type    Bridgett Bullard PTA Physical Therapy Assistant        Therapy Charges for Today     Code Description Service Date Service Provider Modifiers Qty    98319827353 HC PT THER PROC EA 15 MIN 2/6/2020 Bridgett Mendes PTA GP 2    51521249520 HC PT THER PROC GROUP 2/6/2020 Bridgett Mendes PTA GP 1    11091421572 HC PT THER PROC EA 15 MIN 2/6/2020 Bridgett Mendes PTA GP 1          PT G-Codes  Outcome Measure Options: AM-PAC 6 Clicks Basic Mobility (PT)  AM-PAC 6 Clicks Score (PT): 17    Bridgett Mendes PTA  2/6/2020

## 2020-02-06 NOTE — PROGRESS NOTES
Continued Stay Note  [facility]     Patient Name: Liza Aaron  MRN: [id number]  Today's Date: 2/6/2020    Admit Date: 2/5/2020    Discharge Plan     Row Name 02/06/20 1524       Plan    Final Discharge Disposition Code  06 - home with home health care    Final Note  Pt d/c'ed home with [facility].        Discharge Codes    No documentation.       Expected Discharge Date and Time     Expected Discharge Date Expected Discharge Time    Feb 6, 2020             Loli Benedict RN

## 2020-02-06 NOTE — DISCHARGE SUMMARY
Patient Name: Liza Aaron  Patient YOB: 1955    Date of Admission:  2/5/2020  Date of Discharge:  2/6/2020  Discharge Diagnosis: TOTAL HIP ARTHROPLASTY  Presenting Problem/History of Present Illness: Primary osteoarthritis of right hip [M16.11]  Primary osteoarthritis of both hips [M16.0]  Admitting Physician: Dr Cesar Johnson  Consults:   Consults     No orders found for last 30 day(s).          DETAILS OF HOSPITAL STAY:  Patient is a 64 y.o. female was admitted to the floor following the above procedure and underwent an uncomplicated hospital stay.  Patient did well with physical therapy and was ambulating well without problems.  On the day of discharge the wound was clean, dry and intact and calf was soft and non tender and Homans sign was negative.  Patient was tolerating  without problems.  Patient will be discharged home.    Condition on Discharge:  Stable    Vital Signs  Temp:  [97.4 °F (36.3 °C)-99.4 °F (37.4 °C)] 99.4 °F (37.4 °C)  Heart Rate:  [58-89] 66  Resp:  [12-16] 16  BP: ()/(51-63) 95/57    LABS:   Admission on 02/05/2020   Component Date Value Ref Range Status   • ABO Type 02/05/2020 A   Final   • RH type 02/05/2020 Positive   Final   • Antibody Screen 02/05/2020 Negative   Final   • T&S Expiration Date 02/05/2020 2/8/2020 11:59:59 PM   Final   • Hemoglobin 02/06/2020 9.7* 12.0 - 15.9 g/dL Final   • Hematocrit 02/06/2020 28.8* 34.0 - 46.6 % Final       No results found.        Discharge Medications     Discharge Medications      New Medications      Instructions Start Date   aspirin 325 MG EC tablet   325 mg, Oral, Every 12 Hours Scheduled      docusate sodium 100 MG capsule   100 mg, Oral, 2 Times Daily      HYDROcodone-acetaminophen 7.5-325 MG per tablet  Commonly known as:  NORCO   2 tablets, Oral, Every 4 Hours PRN      meloxicam 15 MG tablet  Commonly known as:  MOBIC   15 mg, Oral, Daily      ondansetron 4 MG tablet  Commonly known as:  ZOFRAN   4 mg, Oral, Every 6 Hours  PRN      pantoprazole 40 MG EC tablet  Commonly known as:  PROTONIX   40 mg, Oral, Daily      polyethylene glycol pack packet  Commonly known as:  MIRALAX   17 g, Oral, 2 Times Daily         Changes to Medications      Instructions Start Date   rosuvastatin 10 MG tablet  Commonly known as:  CRESTOR  What changed:  when to take this   10 mg, Oral, Daily         Continue These Medications      Instructions Start Date   INTRAROSA 6.5 MG insert  Generic drug:  Prasterone   Vaginal, Daily PRN         Stop These Medications    CALCIUM 600 + D PO     Chlorhexidine Gluconate Cloth 2 % pads     ibuprofen 200 MG tablet  Commonly known as:  ADVIL,MOTRIN     melatonin 1 MG tablet     MULTI COMPLETE PO     mupirocin 2 % nasal ointment  Commonly known as:  BACTROBAN     Vitamin D 50 MCG (2000 UT) capsule            Activity at Discharge:     Discharge Instructions:   1)  Patient is to continue with physical therapy exercises daily and continue working with the physical therapist as ordered.  2)  Follow Posterior hip precautions.   3)  Patient may weight bear as tolerated.   4)  Apply ice regularly. You may ice for long periods of time as long as ice is not directly on the skin. Patient instructed on frequent calf pumping exercises.  Patient also instructed on incentive spirometer during hospitalization and encouraged to continue to use at home regularly.   5)  The dressing should be left in place. If waterproof dressing is intact the patient may shower immediately following discharge. If the dressing becomes disloged or saturated it should be changed. Please refer to the JAMIE information sheet if you have any questions about the dressing.  If you have a home health nurse or therapist they can be contacted to assist with dressing change or repair. You may also call the JAMIE dressing hotline for questions related to the dressing ([phone]). If there still other problems or questions related to the dressing despite these  measures then you can contact Nesha at our office [phone].   6)  Follow up appointment in 2 weeks - patient to call the office at [phone] to schedule. 7)  Patient will be discharged on aspirin 325mg BID x 2weeks, then daily x 4weeks    Complete Discharge Diagnosis:    Patient Active Problem List   Diagnosis   • HLD (hyperlipidemia)   • Incisional hernia, without obstruction or gangrene   • Osteoarthritis of multiple joints   • Class 2 obesity without serious comorbidity with body mass index (BMI) of 35.0 to 35.9 in adult   • Primary osteoarthritis of right hip   • Primary osteoarthritis of both hips           Follow-up Appointments  Future Appointments   Date Time Provider Department Center   2/20/2020  8:30 AM Raven Johnson MD [facility] None   3/27/2020  8:20 AM [facility] None   4/3/2020  8:00 AM Minnie Whaley MD [facility] None     Additional Instructions for the Follow-ups that You Need to Schedule     Ambulatory Referral to Home Health   As directed      Face to Face Visit Date:  2/6/2020    Follow-up provider for Plan of Care?:  I will be treating the patient on an ongoing basis.  Please send me the Plan of Care for signature.    Follow-up provider:  RAVEN JOHNSON [[id number]]    Reason/Clinical Findings:  edvin    Describe mobility limitations that make leaving home difficult:  postop    Nursing/Therapeutic Services Requested:  Physical Therapy    PT orders:  Total joint pathway    Frequency:  1 Week 1                    CONCETTA Mcnamara  02/06/20  8:54 AM

## 2020-02-06 NOTE — PLAN OF CARE
Problem: Patient Care Overview  Goal: Plan of Care Review  Outcome: Ongoing (interventions implemented as appropriate)  Flowsheets (Taken 2/6/2020 0058)  Progress: improving  Plan of Care Reviewed With: patient  Outcome Summary: patient ambulating with assistance to bathroom and in hallway, pain controlled at this time, having urinary retention at beginning of shift, I&O cath x1, dtv by 0545, no comorbidities to educate on

## 2020-02-13 DIAGNOSIS — Z98.890 STATUS POST HIP SURGERY: ICD-10-CM

## 2020-02-14 RX ORDER — HYDROCODONE BITARTRATE AND ACETAMINOPHEN 7.5; 325 MG/1; MG/1
2 TABLET ORAL EVERY 4 HOURS PRN
Qty: 60 TABLET | Refills: 0 | Status: SHIPPED | OUTPATIENT
Start: 2020-02-14 | End: 2020-02-23

## 2020-02-20 ENCOUNTER — OFFICE VISIT (OUTPATIENT)
Dept: ORTHOPEDIC SURGERY | Facility: CLINIC | Age: 65
End: 2020-02-20

## 2020-02-20 VITALS — HEIGHT: 67 IN | TEMPERATURE: 98.3 F | BODY MASS INDEX: 35.94 KG/M2 | WEIGHT: 229 LBS

## 2020-02-20 DIAGNOSIS — Z96.641 STATUS POST RIGHT HIP REPLACEMENT: ICD-10-CM

## 2020-02-20 DIAGNOSIS — Z98.890 STATUS POST HIP SURGERY: Primary | ICD-10-CM

## 2020-02-20 PROCEDURE — 73502 X-RAY EXAM HIP UNI 2-3 VIEWS: CPT | Performed by: ORTHOPAEDIC SURGERY

## 2020-02-20 PROCEDURE — 99024 POSTOP FOLLOW-UP VISIT: CPT | Performed by: ORTHOPAEDIC SURGERY

## 2020-02-20 NOTE — PROGRESS NOTES
Liza Aaron : 1955 MRN: [id number] DATE: 2020    DIAGNOSIS: 2 week follow up right total hip     SUBJECTIVE:Patient returns today for 2 week follow up of right total hip replacement. Patient reports doing well with no unusual complaints. Appears to be progressing appropriately.    OBJECTIVE:   Exam:. The incision is healing appropriately. No sign of infection. Range of motion is progressing as expected. The calf is soft and nontender with a negative Homans sign.    DIAGNOSTIC STUDIES  Xrays: 2 views of the right hip (AP pelvis and lateral right hip) were ordered and reviewed for evaluation of recent hip replacement. They demonstrate a well positioned, well aligned hip replacement without complicating factors noted. In comparison with previous films there has been interval implant placement.    ASSESSMENT: 2 week status post right hip replacement.    PLAN: 1) Staples removed and steri strips applied   2) PT exercises   3) Discontinue SANTIAGO hose   4) Continue ice PRN   5) WBAT   6) aspirin 325 mg orally every day for 1 month   7) Follow up in 6 weeks with repeat Xrays of right hip (2views)    Cesar Johnson MD  2020

## 2020-02-25 ENCOUNTER — TREATMENT (OUTPATIENT)
Dept: PHYSICAL THERAPY | Facility: CLINIC | Age: 65
End: 2020-02-25

## 2020-02-25 DIAGNOSIS — M25.651 HIP STIFFNESS, RIGHT: ICD-10-CM

## 2020-02-25 DIAGNOSIS — R29.898 WEAKNESS OF RIGHT HIP: ICD-10-CM

## 2020-02-25 DIAGNOSIS — Z96.641 STATUS POST TOTAL HIP REPLACEMENT, RIGHT: Primary | ICD-10-CM

## 2020-02-25 PROCEDURE — 97161 PT EVAL LOW COMPLEX 20 MIN: CPT | Performed by: PHYSICAL THERAPIST

## 2020-02-25 PROCEDURE — 97110 THERAPEUTIC EXERCISES: CPT | Performed by: PHYSICAL THERAPIST

## 2020-02-25 PROCEDURE — 97140 MANUAL THERAPY 1/> REGIONS: CPT | Performed by: PHYSICAL THERAPIST

## 2020-02-25 NOTE — PROGRESS NOTES
"Physical Therapy Initial Evaluation and Plan of Care      Patient: Liza Aaron   : 1955  Diagnosis/ICD-10 Code:  Status post total hip replacement, right [Z96.641]  Referring practitioner: Cesar Johnson MD  Date of Initial Visit: 2020  Today's Date: 2020          Subjective Evaluation    History of Present Illness  Date of onset: 2020  Mechanism of injury: Patient notes that she had her hip replaced on the . Notes that she is planning to have her other hip replaced later this year. She feels that she is healing well and having normal pain following her surgery. She notes she is limping and is using RW most of the time for ambulation.  Patient has not used stairs much since the surgery. Sleeping in a recliner at this time. Here for evaluation and treatment of her R hip.    Patient notes that her incisions ar in the back (posterior/lateral approach)     Pain  Current pain ratin  At worst pain ratin  Location: Lateral R hip  Quality: dull ache and tight (\"lump in her lateral hip\")  Relieving factors: ice, relaxation and rest  Aggravating factors: stairs, repetitive movement, ambulation, standing and squatting (lying flat)    Treatments  Previous treatment: medication  Patient Goals  Patient goals for therapy: decreased pain, increased motion, return to sport/leisure activities, independence with ADLs/IADLs, increased strength and improved balance (improve gait )         LEFS score 25/80   17-32 60-79% CL         Objective       Palpation     Right Tenderness of the adductor madi, gluteus leidy, gluteus medius and rectus femoris.     Tenderness     Left Hip   Tenderness in the ischial tuberosity.     Additional Tenderness Details  Incision at posterior lateral hip (akira incision tenderness)   Patient does have some bruising below the incision at inferior/ lateral glute; at this time this does not look like skin breakdown and is not sore to palpate; expect that this in " interior drainage. Will continue to monitor.     Lumbar Screen  Lumbar range of motion within normal limits with the following exceptions:Did not screen non this date due to ROM limitations/ balance from hip    Neurological Testing     Sensation     Hip   Left Hip   Intact: light touch    Right Hip   Intact: light touch    Active Range of Motion   Left Hip   Flexion: 85 degrees     Right Hip   Flexion: 60 degrees with pain  Extension: Right hip active extension: lacking 5 deg.   Abduction: 12 degrees with pain  External rotation (90/90): 12 degrees   Internal rotation (90/90): 6 degrees with pain    Additional Active Range of Motion Details  Significant stiffness at R hip     Joint Play     Right Hip     Hypomobile in the posterior hip capsule    Additional Joint Play Details  RYDER limited due to ST tightness akira hip    Strength/Myotome Testing     Left Hip   Planes of Motion   Flexion: 4  Extension: 4-    Right Hip   Planes of Motion   Flexion: 3  Extension: 3  Abduction: 3-  Adduction: 3+    Additional Strength Details  Ankle MMT WFL  Knee MMT limited at R knee   4/5 flexion/ extension pain due to OA    Tests     Additional Tests Details  Special testing deferred due to post op status    Ambulation     Observational Gait   Gait: antalgic and asymmetric   Decreased walking speed, stride length, left stance time, right swing time and right step length.          Assessment & Plan     Assessment  Impairments: abnormal coordination, abnormal gait, abnormal or restricted ROM, activity intolerance, impaired balance, impaired physical strength, lacks appropriate home exercise program, pain with function and safety issue  Assessment details: Patient presents post op R THR on 2/5/20. She has significant ROM restrictions in her (R) hip as well as L hip restrictions from OA. She has weakness/ mobility impairments, and abnormal gait. Currently using RW for AD.  Pt would benefit from skilled PT services in order to address  listed impairments and increase tolerance to normal daily activities including ADLs and recreational activities.     Prognosis: good  Prognosis details: Goals:   Short term: 2 weeks  1. Patient will be (I) with initial HEP for strength and mobility  2. Patient will have improved pain symptoms to < 4/10 with ADL's and HEP  3. Patient will have improved (R) hip IR to 20 deg without pain  4. Patient will stair ambulation with good safety and minimal pain.     Long term goals: 6 weeks    1. Patient will be (I) with long term progressive HEP for dynamic hip strengthening and mobility   2. Patient will have reduced pain symptoms to 2/10 with all ADL's and recreational activity.   3. Patient will have improved LEFS score to >34/80 showing a significant change.  4. Patient will have improved (R) single leg balance to > 30 seconds to show improved balance.   5. Patient will have normal gait (symmetrical and normal speed)     Functional Limitations: walking and standing  Plan  Therapy options: will be seen for skilled physical therapy services  Planned modality interventions: ultrasound, iontophoresis, TENS, thermotherapy (hydrocollator packs), high voltage pulsed current (pain management) and electrical stimulation/Russian stimulation  Planned therapy interventions: abdominal trunk stabilization, ADL retraining, balance/weight-bearing training, body mechanics training, joint mobilization, IADL retraining, gait training, home exercise program, functional ROM exercises, flexibility, transfer training, therapeutic activities, stretching, strengthening, spinal/joint mobilization, soft tissue mobilization, postural training, neuromuscular re-education, motor coordination training and manual therapy  Frequency: 2x week  Duration in visits: 14  Treatment plan discussed with: patient  Plan details: Initial HEP was given on this date.   2/3x per week         Manual Therapy:    12     mins  84598;  Therapeutic Exercise:    16     mins   76266;     Neuromuscular Fide:        mins  94643;    Therapeutic Activity:          mins  47939;     Gait Training:           mins  66304;     Ultrasound:         mins  94402;    Electrical Stimulation:         mins  79994 ( );  Dry Needling          mins self-pay  12 min ice post treatment     Timed Treatment:   28   mins   Total Treatment:     62   mins    PT SIGNATURE: Freddy Johnson PT DPT   [place] License # [id number]  DATE TREATMENT INITIATED: 2/25/2020    Initial Certification  Certification Period: 5/25/2020  I certify that the therapy services are furnished while this patient is under my care.  The services outlined above are required by this patient, and will be reviewed every 90 days.     PHYSICIAN: Cesar Johnson MD   ________________________________     DATE: ______________    Please sign and return via fax to [phone].. Thank you, [facility].

## 2020-02-25 NOTE — PATIENT INSTRUCTIONS
Access Code: [email or web address]   URL: [email or web address]   Date: 02/25/2020   Prepared by: Freddy Johnson     Exercises   Standing Hip Extension with Chair - 10 reps - 3 sets - 2x daily - 7x weekly   Sit to Stand with Arm Reach Toward Target - 10 reps - 3 sets - 2x daily - 7x weekly   Standing Knee Flexion Stretch on Step - 10 reps - 10 hold - 2x daily - 7x weekly   Standing Hip Flexor Stretch - 10 reps - 10 hold - 2x daily - 7x weekly   Supine March - 20 reps - 2 sets - 2x daily - 7x weekly   Bent Knee Fallouts - 20 reps - 2 sets - 1x daily - 7x weekly   Supine Hip Internal and External Rotation - 10 reps - 2 sets - 1x daily - 7x weekly

## 2020-02-27 ENCOUNTER — TREATMENT (OUTPATIENT)
Dept: PHYSICAL THERAPY | Facility: CLINIC | Age: 65
End: 2020-02-27

## 2020-02-27 DIAGNOSIS — M25.651 HIP STIFFNESS, RIGHT: ICD-10-CM

## 2020-02-27 DIAGNOSIS — Z96.641 STATUS POST TOTAL HIP REPLACEMENT, RIGHT: Primary | ICD-10-CM

## 2020-02-27 DIAGNOSIS — R29.898 WEAKNESS OF RIGHT HIP: ICD-10-CM

## 2020-02-27 PROCEDURE — 97116 GAIT TRAINING THERAPY: CPT | Performed by: PHYSICAL THERAPIST

## 2020-02-27 PROCEDURE — 97110 THERAPEUTIC EXERCISES: CPT | Performed by: PHYSICAL THERAPIST

## 2020-02-27 PROCEDURE — 97530 THERAPEUTIC ACTIVITIES: CPT | Performed by: PHYSICAL THERAPIST

## 2020-02-27 NOTE — PROGRESS NOTES
Physical Therapy Daily Progress Note  2 treatments  Subjective     Liza Aaron reports: patient notes that she tolerated her treatment/ eval well but had some UE soreness from the assisted sit to stands. She has been more active over the past 2-3 days but has tolerated it well.         Objective   See Exercise, Manual, and Modality Logs for complete treatment.       Assessment/Plan  Patient tolerated all treatment well and was able to tolerate progressions in therapeutic exercises with minimal discomfort. Patient continues to need skilled therapy to improve ROM, strength, and activity tolerance. Patient is progressing well at this time. Will continue to advance POC as tolerated.     Progress per Plan of Care and Progress strengthening /stabilization /functional activity           Manual Therapy:         mins  98399;  Therapeutic Exercise:    26     mins  40177;     Neuromuscular Fide:        mins  48807;    Therapeutic Activity:     20     mins  84722;     Gait Trainin     mins  43253;     Ultrasound:          mins  43994;    Electrical Stimulation:         mins  19033 ( );  Dry Needling          mins self-pay  12 min ice post treatment     Timed Treatment:   54   mins   Total Treatment:     62   mins    Freddy Johnson PT DPT  Physical Therapist  [place] License # [id number]

## 2020-02-28 ENCOUNTER — TREATMENT (OUTPATIENT)
Dept: PHYSICAL THERAPY | Facility: CLINIC | Age: 65
End: 2020-02-28

## 2020-02-28 DIAGNOSIS — R29.898 WEAKNESS OF RIGHT HIP: ICD-10-CM

## 2020-02-28 DIAGNOSIS — M25.651 HIP STIFFNESS, RIGHT: ICD-10-CM

## 2020-02-28 DIAGNOSIS — Z96.641 STATUS POST TOTAL HIP REPLACEMENT, RIGHT: Primary | ICD-10-CM

## 2020-02-28 PROCEDURE — 97110 THERAPEUTIC EXERCISES: CPT | Performed by: PHYSICAL THERAPIST

## 2020-02-28 PROCEDURE — 97530 THERAPEUTIC ACTIVITIES: CPT | Performed by: PHYSICAL THERAPIST

## 2020-02-28 PROCEDURE — 97140 MANUAL THERAPY 1/> REGIONS: CPT | Performed by: PHYSICAL THERAPIST

## 2020-02-28 PROCEDURE — 97116 GAIT TRAINING THERAPY: CPT | Performed by: PHYSICAL THERAPIST

## 2020-02-28 NOTE — PROGRESS NOTES
Physical Therapy Daily Progress Note  3 treatments  Subjective     Liza Aaron reports: She slipped in the shower today and did not fall but had pin at her anterior thigh after catching herself.         Objective   See Exercise, Manual, and Modality Logs for complete treatment.       Assessment/Plan  Patient tolerated all treatment well. Did not note any concerning findings during manual therapy treatment or TE/ TA that were new since her slip today. Patient is progressing and continues to require PT to work on deficits in ROM, gait, function, and pain.   Progress per Plan of Care and Progress strengthening /stabilization /functional activity           Manual Therapy:    12     mins  98135;  Therapeutic Exercise:    14     mins  93520;     Neuromuscular Fide:        mins  49288;    Therapeutic Activity:     8     mins  13570;     Gait Trainin      mins  34696;     Ultrasound:          mins  52799;    Electrical Stimulation:         mins  53044 ( );  Dry Needling          mins self-pay    Timed Treatment:  42   mins   Total Treatment:     52   mins  10 min ice post treatment     Freddy Johnson, PT DPT  Physical Therapist  [place] License # [id number]

## 2020-03-02 ENCOUNTER — TREATMENT (OUTPATIENT)
Dept: PHYSICAL THERAPY | Facility: CLINIC | Age: 65
End: 2020-03-02

## 2020-03-02 DIAGNOSIS — M25.651 HIP STIFFNESS, RIGHT: ICD-10-CM

## 2020-03-02 DIAGNOSIS — R29.898 WEAKNESS OF RIGHT HIP: ICD-10-CM

## 2020-03-02 DIAGNOSIS — Z96.641 STATUS POST TOTAL HIP REPLACEMENT, RIGHT: Primary | ICD-10-CM

## 2020-03-02 PROCEDURE — 97116 GAIT TRAINING THERAPY: CPT | Performed by: PHYSICAL THERAPIST

## 2020-03-02 PROCEDURE — 97110 THERAPEUTIC EXERCISES: CPT | Performed by: PHYSICAL THERAPIST

## 2020-03-02 PROCEDURE — 97530 THERAPEUTIC ACTIVITIES: CPT | Performed by: PHYSICAL THERAPIST

## 2020-03-04 ENCOUNTER — TREATMENT (OUTPATIENT)
Dept: PHYSICAL THERAPY | Facility: CLINIC | Age: 65
End: 2020-03-04

## 2020-03-04 DIAGNOSIS — Z96.641 STATUS POST TOTAL HIP REPLACEMENT, RIGHT: Primary | ICD-10-CM

## 2020-03-04 DIAGNOSIS — R29.898 WEAKNESS OF RIGHT HIP: ICD-10-CM

## 2020-03-04 DIAGNOSIS — M25.651 HIP STIFFNESS, RIGHT: ICD-10-CM

## 2020-03-04 PROCEDURE — 97110 THERAPEUTIC EXERCISES: CPT | Performed by: PHYSICAL THERAPIST

## 2020-03-04 PROCEDURE — 97116 GAIT TRAINING THERAPY: CPT | Performed by: PHYSICAL THERAPIST

## 2020-03-04 NOTE — PROGRESS NOTES
Physical Therapy Daily Progress Note      Liza Aaron reports: no new complaints right hip.    Subjective     Objective   See Exercise, Manual, and Modality Logs for complete treatment.   Added:  Seated hs curls, balance board fwd/bwd x 2 min    Ice application post exercise to right hip, seated x 12 min.    Assessment/Plan  Demonstrating increased tolerance/capability for exercises with lessened rest breaks needed.  Still fatigues easily with isolated mm activities.  Gait is improving as is foot positioning with ambulation activities.  Continues to need some UE support or assistive device use with step overs and long distance ambulation.    Progress strengthening /stabilization /functional activity           Manual Therapy:         mins  98282;  Therapeutic Exercise:      26   mins  79862;     Neuromuscular Fide:     6   mins  19149;    Therapeutic Activity:      2    mins  43426;     Gait Trainin     mins  53065;     Ultrasound:          mins  49226;    Electrical Stimulation:         mins  59979 ( );      Timed Treatment:    42 mins   Total Treatment:    54   mins    Osiel Burns PTA    Physical Therapist Assistant [place] 9356

## 2020-03-06 ENCOUNTER — TREATMENT (OUTPATIENT)
Dept: PHYSICAL THERAPY | Facility: CLINIC | Age: 65
End: 2020-03-06

## 2020-03-06 DIAGNOSIS — Z96.641 STATUS POST TOTAL HIP REPLACEMENT, RIGHT: Primary | ICD-10-CM

## 2020-03-06 DIAGNOSIS — R29.898 WEAKNESS OF RIGHT HIP: ICD-10-CM

## 2020-03-06 DIAGNOSIS — M25.651 HIP STIFFNESS, RIGHT: ICD-10-CM

## 2020-03-06 PROCEDURE — 97110 THERAPEUTIC EXERCISES: CPT | Performed by: PHYSICAL THERAPIST

## 2020-03-06 PROCEDURE — 97112 NEUROMUSCULAR REEDUCATION: CPT | Performed by: PHYSICAL THERAPIST

## 2020-03-06 NOTE — PROGRESS NOTES
Physical Therapy Daily Progress Note      Liza Aaron reports: no new complaints right hip.  States she bought an exercise ball to do exercises at home on.    Subjective some soreness from over the weekend, went out for birthday and may have over done it.    Objective   See Exercise, Manual, and Modality Logs for complete treatment.   Added yellow t band to BKFO, bridges with PPT    Assessment/Plan  Decreased subjective complaints of right hip discomfort, feels exercises are helping. Able to progress exercises without increased discomfort of right hip, just early fatigue of isolated musculature.  Benefits from verbal/tactile cues to ensure proper exercise performance, technique, positioning.  Should continue to improve with PT intervention.                   Manual Therapy:         mins  55301;  Therapeutic Exercise: 26        mins  46646;     Neuromuscular Fide:  8      mins  08010;    Therapeutic Activity:   4       mins  56150;     Gait Training:           mins  02677;     Ultrasound:          mins  65542;    Electrical Stimulation:         mins  60210 ( );      Timed Treatment:  38    mins   Total Treatment:    48    mins    Osiel Burns PTA    Physical Therapist Assistant [place] 6179

## 2020-03-10 ENCOUNTER — TREATMENT (OUTPATIENT)
Dept: PHYSICAL THERAPY | Facility: CLINIC | Age: 65
End: 2020-03-10

## 2020-03-10 DIAGNOSIS — M25.651 HIP STIFFNESS, RIGHT: ICD-10-CM

## 2020-03-10 DIAGNOSIS — R29.898 WEAKNESS OF RIGHT HIP: ICD-10-CM

## 2020-03-10 DIAGNOSIS — Z96.641 STATUS POST TOTAL HIP REPLACEMENT, RIGHT: Primary | ICD-10-CM

## 2020-03-10 PROCEDURE — 97116 GAIT TRAINING THERAPY: CPT | Performed by: PHYSICAL THERAPIST

## 2020-03-10 PROCEDURE — 97110 THERAPEUTIC EXERCISES: CPT | Performed by: PHYSICAL THERAPIST

## 2020-03-12 ENCOUNTER — TREATMENT (OUTPATIENT)
Dept: PHYSICAL THERAPY | Facility: CLINIC | Age: 65
End: 2020-03-12

## 2020-03-12 DIAGNOSIS — M25.651 HIP STIFFNESS, RIGHT: ICD-10-CM

## 2020-03-12 DIAGNOSIS — R29.898 WEAKNESS OF RIGHT HIP: ICD-10-CM

## 2020-03-12 DIAGNOSIS — Z96.641 STATUS POST TOTAL HIP REPLACEMENT, RIGHT: Primary | ICD-10-CM

## 2020-03-12 PROCEDURE — 97110 THERAPEUTIC EXERCISES: CPT | Performed by: PHYSICAL THERAPIST

## 2020-03-12 PROCEDURE — 97116 GAIT TRAINING THERAPY: CPT | Performed by: PHYSICAL THERAPIST

## 2020-03-12 PROCEDURE — 97112 NEUROMUSCULAR REEDUCATION: CPT | Performed by: PHYSICAL THERAPIST

## 2020-03-15 NOTE — PROGRESS NOTES
Physical Therapy Daily Progress Note    Liza Aaron reports: right hip is still stiff/uncomfortable but feel I can do more before fatiguing.     Subjective     Objective   -ambulates with straight cane in/out of clinic  -able to amb in gym are without cane but with noted decreased step/stride length as well as weakness of hip musculature.    See Exercise, Manual, and Modality Logs for complete treatment.   Added seated hs curls with t band, standing 4 way hip SLR with t band    Gait training x 16 min includes stair ascending/descending with one rail use and progression from step to to reciporical gait.  Ambulated on treadmill x 11 min with emphasis on gait symmetry(step/stride length, heel strike/toe off and foot clearance).    Assessment/Plan  Progressing well with exercise regimen and activity, though still with subjective complaints of right hip incisional discomfort. Objectively, she presents with improved gait but still noted R LE antalgia when fatigued that is most likely due to isolated hip musculature weakness.     Progress per Plan of Care toward all goals           Manual Therapy:         mins  01929;  Therapeutic Exercise:    24     mins  17758;     Neuromuscular Fide:    8    mins  13837;    Therapeutic Activity:   4    mins  97364;     Gait Trainin     mins  01924;     Ultrasound:         mins  84789;    Electrical Stimulation:         mins  68750 ( );      Timed Treatment:  52    mins   Total Treatment:    52    mins    Osiel Burns PTA    Physical Therapist Assistant [place] 1383

## 2020-03-16 RX ORDER — ROSUVASTATIN CALCIUM 10 MG/1
10 TABLET, COATED ORAL NIGHTLY
Qty: 90 TABLET | Refills: 0 | Status: SHIPPED | OUTPATIENT
Start: 2020-03-16 | End: 2020-05-19 | Stop reason: SDUPTHER

## 2020-03-17 ENCOUNTER — TREATMENT (OUTPATIENT)
Dept: PHYSICAL THERAPY | Facility: CLINIC | Age: 65
End: 2020-03-17

## 2020-03-17 DIAGNOSIS — R29.898 WEAKNESS OF RIGHT HIP: ICD-10-CM

## 2020-03-17 DIAGNOSIS — Z96.641 STATUS POST TOTAL HIP REPLACEMENT, RIGHT: ICD-10-CM

## 2020-03-17 DIAGNOSIS — M25.651 HIP STIFFNESS, RIGHT: Primary | ICD-10-CM

## 2020-03-17 PROCEDURE — 97530 THERAPEUTIC ACTIVITIES: CPT | Performed by: PHYSICAL THERAPIST

## 2020-03-17 PROCEDURE — 97110 THERAPEUTIC EXERCISES: CPT | Performed by: PHYSICAL THERAPIST

## 2020-03-17 PROCEDURE — 97140 MANUAL THERAPY 1/> REGIONS: CPT | Performed by: PHYSICAL THERAPIST

## 2020-03-17 NOTE — PROGRESS NOTES
Physical Therapy Daily Progress Note  8 treatments  Subjective     Liza Aaron reports: she is having more knee pain this week. Notes that she may have done too much last week and is having residual pain from that. Her R hip is feeling better and notes that she was able to get her shoe on today which she has not been able to do since before her surgery.         Objective   See Exercise, Manual, and Modality Logs for complete treatment.       Assessment/Plan   Patient was given SB exercises for home program to limit weight bearing on knee.   Patient tolerated all treatment well and was able to tolerate progressions in therapeutic exercises with mild discomfort. Patient continues to need skilled therapy to improve hip strength, mobility, gait, and ADL function. Patient is progressing well at this time. Will continue to advance POC as tolerated.     Progress per Plan of Care and Progress strengthening /stabilization /functional activity           Manual Therapy:    10     mins  19679;  Therapeutic Exercise:    15     mins  56759;     Neuromuscular Fide:        mins  78937;    Therapeutic Activity:     15     mins  89166;     Gait Training:           mins  86370;     Ultrasound:          mins  60422;    Electrical Stimulation:         mins  12198 ( );  Dry Needling          mins self-pay  8 min ice post TE  Timed Treatment:   40   mins   Total Treatment:     48   mins    Freddy Johnson PT DPT  Physical Therapist  [place] License # [id number]   The patient is calling because he received a letter in the mail in regards to scheduling a Colonoscopy.  He would like to get one done, but needs an order placed.    Please call him once that is done, so he can schedule.

## 2020-03-18 ENCOUNTER — TELEPHONE (OUTPATIENT)
Dept: ORTHOPEDIC SURGERY | Facility: CLINIC | Age: 65
End: 2020-03-18

## 2020-03-18 NOTE — TELEPHONE ENCOUNTER
Patient is calling asking if she can start taking ibuprofen again and if so how much? Please advise.cld Rt ALEX on 02/05/20

## 2020-03-19 ENCOUNTER — TREATMENT (OUTPATIENT)
Dept: PHYSICAL THERAPY | Facility: CLINIC | Age: 65
End: 2020-03-19

## 2020-03-19 DIAGNOSIS — M25.651 HIP STIFFNESS, RIGHT: Primary | ICD-10-CM

## 2020-03-19 DIAGNOSIS — R29.898 WEAKNESS OF RIGHT HIP: ICD-10-CM

## 2020-03-19 DIAGNOSIS — Z96.641 STATUS POST TOTAL HIP REPLACEMENT, RIGHT: ICD-10-CM

## 2020-03-19 PROCEDURE — 97140 MANUAL THERAPY 1/> REGIONS: CPT | Performed by: PHYSICAL THERAPIST

## 2020-03-19 PROCEDURE — 97530 THERAPEUTIC ACTIVITIES: CPT | Performed by: PHYSICAL THERAPIST

## 2020-03-19 PROCEDURE — 97110 THERAPEUTIC EXERCISES: CPT | Performed by: PHYSICAL THERAPIST

## 2020-03-19 NOTE — PROGRESS NOTES
Physical Therapy Daily Progress Note  10 treatments  Subjective     Liza Aaron reports: her knee pain has subsided some since the last visit. She notes that she will likely need to stay home after today due to COVID-19 risk.         Objective   See Exercise, Manual, and Modality Logs for complete treatment.       Assessment/Plan   Additional time was spent with patient on HEP instruction and TE/ TA guidelines.   Patient tolerated all treatment well and was able to tolerate progressions in therapeutic exercises with minimal discomfort. Patient continues to need skilled therapy to improve hip ROM, LE strength, and ADL function. Patient is progressing well at this time. Will continue to advance POC as tolerated.     Progress per Plan of Care and Progress strengthening /stabilization /functional activity           Manual Therapy:    10     mins  02241;  Therapeutic Exercise:    16     mins  80552;     Neuromuscular Fide:        mins  92531;    Therapeutic Activity:     14     mins  88720;     Gait Training:           mins  92376;     Ultrasound:          mins  07942;    Electrical Stimulation:         mins  64338 ( );  Dry Needling          mins self-pay  10 min ice post TE    Timed Treatment:   40   mins   Total Treatment:     50   mins    Freddy Johnson PT DPT  Physical Therapist  [place] License # [id number]

## 2020-03-20 ENCOUNTER — RESULTS ENCOUNTER (OUTPATIENT)
Dept: INTERNAL MEDICINE | Facility: CLINIC | Age: 65
End: 2020-03-20

## 2020-03-20 DIAGNOSIS — R73.9 HYPERGLYCEMIA: ICD-10-CM

## 2020-03-20 DIAGNOSIS — Z00.00 PHYSICAL EXAM, ANNUAL: ICD-10-CM

## 2020-03-31 NOTE — PROGRESS NOTES
Physical Therapy Daily Progress Note      Liza Aaron reports: right hip a little sore from exercises.    Subjective     Objective   -ambulates in/out of clinic with straight cane and noted antalgia R LE with decreased step/stride length. Ambulates with right foot in everted position.    See Exercise, Manual, and Modality Logs for complete treatment.   Exercise rationale/ pain free exercise performance  Anatomy and structure of affected musculature  Posture/Postural awareness  Lumbar support  Sleeping positions with pillows  Alternate exercise positions  Verbal/Tactile cues to ensure correct exercise performance/technique    Added: standing weight shift(side/side) and standing heel/toe raises      Modality application of ice post rx to right hip x 12 min, seated.    Assessment/Plan  Compliant/Cooperative with rehab efforts this date.  Subjectively reports no increased symptoms or discomfort with therapeutic exercise today.  Able to perform increased exercise/functional activity without increased right hip pain, only mm soreness.  Benefits from verbal/tactile cues to ensure proper exercise performance/technique and foot positioning with gait.      Progress strengthening /stabilization /functional activity as symptoms allow.           Manual Therapy:         mins  27925;  Therapeutic Exercise:    20     mins  04106;     Neuromuscular Fide:        mins  68395;    Therapeutic Activity:      12    mins  43210;     Gait Trainin     mins  11174;     Ultrasound:          mins  91590;    Electrical Stimulation:         mins  05405 ( );      Timed Treatment:  40    mins   Total Treatment:    52    mins    Osiel Burns PTA    Physical Therapist Assistant [place] 2910

## 2020-05-17 LAB
25(OH)D3+25(OH)D2 SERPL-MCNC: 47.5 NG/ML (ref 30–100)
ALBUMIN SERPL-MCNC: 4.4 G/DL (ref 3.5–5.2)
ALBUMIN/GLOB SERPL: 2.2 G/DL
ALP SERPL-CCNC: 75 U/L (ref 39–117)
ALT SERPL-CCNC: 10 U/L (ref 1–33)
AST SERPL-CCNC: 19 U/L (ref 1–32)
BILIRUB SERPL-MCNC: 0.4 MG/DL (ref 0.2–1.2)
BUN SERPL-MCNC: 12 MG/DL (ref 8–23)
BUN/CREAT SERPL: 16.9 (ref 7–25)
CALCIUM SERPL-MCNC: 9.6 MG/DL (ref 8.6–10.5)
CHLORIDE SERPL-SCNC: 103 MMOL/L (ref 98–107)
CHOLEST SERPL-MCNC: 131 MG/DL (ref 0–200)
CO2 SERPL-SCNC: 24.3 MMOL/L (ref 22–29)
CREAT SERPL-MCNC: 0.71 MG/DL (ref 0.57–1)
ERYTHROCYTE [DISTWIDTH] IN BLOOD BY AUTOMATED COUNT: 13.9 % (ref 12.3–15.4)
GLOBULIN SER CALC-MCNC: 2 GM/DL
GLUCOSE SERPL-MCNC: 100 MG/DL (ref 65–99)
HBA1C MFR BLD: 4.9 % (ref 4.8–5.6)
HCT VFR BLD AUTO: 37.2 % (ref 34–46.6)
HDLC SERPL-MCNC: 56 MG/DL (ref 40–60)
HGB BLD-MCNC: 12.8 G/DL (ref 12–15.9)
INTERPRETATION: NORMAL
LDLC SERPL CALC-MCNC: 57 MG/DL (ref 0–100)
LDLC/HDLC SERPL: 1.01 {RATIO}
MCH RBC QN AUTO: 29.5 PG (ref 26.6–33)
MCHC RBC AUTO-ENTMCNC: 34.4 G/DL (ref 31.5–35.7)
MCV RBC AUTO: 85.7 FL (ref 79–97)
PLATELET # BLD AUTO: 193 10*3/MM3 (ref 140–450)
POTASSIUM SERPL-SCNC: 4.2 MMOL/L (ref 3.5–5.2)
PROT SERPL-MCNC: 6.4 G/DL (ref 6–8.5)
RBC # BLD AUTO: 4.34 10*6/MM3 (ref 3.77–5.28)
SODIUM SERPL-SCNC: 140 MMOL/L (ref 136–145)
T4 FREE SERPL-MCNC: 1.3 NG/DL (ref 0.82–1.77)
THYROPEROXIDASE AB SERPL-ACNC: <9 IU/ML (ref 0–34)
TRIGL SERPL-MCNC: 91 MG/DL (ref 0–150)
TSH SERPL DL<=0.005 MIU/L-ACNC: 6.37 UIU/ML (ref 0.45–4.5)
VLDLC SERPL CALC-MCNC: 18.2 MG/DL
WBC # BLD AUTO: 3.65 10*3/MM3 (ref 3.4–10.8)

## 2020-05-19 ENCOUNTER — OFFICE VISIT (OUTPATIENT)
Dept: INTERNAL MEDICINE | Facility: CLINIC | Age: 65
End: 2020-05-19

## 2020-05-19 VITALS
BODY MASS INDEX: 33.74 KG/M2 | OXYGEN SATURATION: 98 % | HEART RATE: 71 BPM | TEMPERATURE: 98.6 F | DIASTOLIC BLOOD PRESSURE: 70 MMHG | HEIGHT: 67 IN | SYSTOLIC BLOOD PRESSURE: 110 MMHG | WEIGHT: 215 LBS

## 2020-05-19 DIAGNOSIS — E78.00 PURE HYPERCHOLESTEROLEMIA: ICD-10-CM

## 2020-05-19 DIAGNOSIS — R94.6 THYROID FUNCTION TEST ABNORMAL: ICD-10-CM

## 2020-05-19 DIAGNOSIS — Z00.00 PHYSICAL EXAM, ANNUAL: Primary | ICD-10-CM

## 2020-05-19 DIAGNOSIS — Z12.11 SCREENING FOR COLON CANCER: ICD-10-CM

## 2020-05-19 PROCEDURE — 99396 PREV VISIT EST AGE 40-64: CPT | Performed by: FAMILY MEDICINE

## 2020-05-19 RX ORDER — ROSUVASTATIN CALCIUM 10 MG/1
10 TABLET, COATED ORAL NIGHTLY
Qty: 90 TABLET | Refills: 1 | Status: SHIPPED | OUTPATIENT
Start: 2020-05-19 | End: 2020-11-20 | Stop reason: SDUPTHER

## 2020-05-19 NOTE — PROGRESS NOTES
Subjective   Liza Aaron is a 65 y.o. female.   Chief Complaint   Patient presents with   • Annual Exam       History of Present Illness     #1 CPE-patient is here for complete physical exam without GYN evaluation.  She has no complaints.  She had right hip replacement in February 2019 by Dr. Johnson.  She was able to complete some physical therapy prior to pandemic.  She continues with home exercise program.  She has significant improvement in pain.  She occasionally gets some discomfort in the right hip, but overall it is much better.  She is thinking about having left hip replaced in winter.  She did not have a hernia surgery yet.  It does not bother her.  It is not getting any bigger.  She is planning to follow-up with Dr. Crawford on it.  She takes Crestor 10 mg a day.  She has no side effects.  No muscle aches, no muscle cramps.  She take Intrarosa as needed.  It is managed by her GYN.  She takes no other prescription medications.  Over-the-counter she takes multivitamin, calcium and vitamin D.  No new allergies to medications.  She continues to follow-up with weight watchers.  She lost another 14 pounds from February.  It is a little harder than initially.  There is no change in medical or family history from last office visit.  She does not use tobacco products.  She drinks on average 1 glass of wine a week.  No drugs.  She walks daily for 20 to 40 minutes.  She misses pool. She enjoyed that and will restart it as soon as the pool is open.  She has dental appointments every 4 months.  Last eye exam was more than a year ago.  She follows up with dermatologist annually.  She saw her GYN less than a year ago.  She had breast exam, pelvic exam and rectal exam and everything was normal.  She had colonoscopy in winter 2015.  Next is due 5 years later.  She had tetanus vaccine in 2017.    TSH at 6.37.  Normal free T4 and TPA.    The following portions of the patient's history were reviewed and updated as  appropriate: allergies, current medications, past family history, past medical history, past social history, past surgical history and problem list.    Review of Systems   Constitutional: Negative.    HENT: Positive for rhinorrhea.    Respiratory: Negative for cough and shortness of breath.    Cardiovascular: Negative for chest pain.   Gastrointestinal: Negative for blood in stool.   Genitourinary: Negative for hematuria.   Psychiatric/Behavioral: Negative.          Objective   Wt Readings from Last 3 Encounters:   05/19/20 97.5 kg (215 lb)   02/20/20 104 kg (229 lb)   02/05/20 104 kg (229 lb 3.2 oz)      Vitals:    05/19/20 1126   BP: 110/70   Pulse: 71   Temp: 98.6 °F (37 °C)   SpO2: 98%     Temp Readings from Last 3 Encounters:   05/19/20 98.6 °F (37 °C)   02/20/20 98.3 °F (36.8 °C) (Temporal)   02/06/20 98.1 °F (36.7 °C) (Oral)     BP Readings from Last 3 Encounters:   05/19/20 110/70   02/06/20 107/62   01/16/20 108/71     Pulse Readings from Last 3 Encounters:   05/19/20 71   02/06/20 66   01/16/20 76     Body mass index is 33.67 kg/m².    Physical Exam   Constitutional: She is oriented to person, place, and time. She appears well-developed and well-nourished. No distress.   HENT:   Head: Normocephalic and atraumatic. Hair is normal.   Right Ear: Hearing, tympanic membrane, external ear and ear canal normal. No drainage. No decreased hearing is noted.   Left Ear: Hearing, tympanic membrane, external ear and ear canal normal. No decreased hearing is noted.   Nose: No nasal deformity.   Mouth/Throat: Oropharynx is clear and moist.   Eyes: Pupils are equal, round, and reactive to light. Conjunctivae, EOM and lids are normal. Right eye exhibits no discharge. Left eye exhibits no discharge.   Neck: Normal range of motion. Neck supple. No JVD present. No tracheal deviation present. No thyromegaly present.   Cardiovascular: Normal rate, regular rhythm, normal heart sounds, intact distal pulses and normal pulses. Exam  reveals no gallop and no friction rub.   No murmur heard.  Pulmonary/Chest: Effort normal and breath sounds normal. No respiratory distress. She has no wheezes. She has no rales. She exhibits no tenderness.   Abdominal: Soft. She exhibits no distension and no mass. There is no tenderness. There is no rebound and no guarding. A hernia is present.   Musculoskeletal: Normal range of motion. She exhibits no edema, tenderness or deformity.   Lymphadenopathy:     She has no cervical adenopathy.   Neurological: She is alert and oriented to person, place, and time. She has normal reflexes. She displays normal reflexes. No cranial nerve deficit. She exhibits normal muscle tone. Coordination normal.   Skin: Skin is warm and dry. No rash noted. She is not diaphoretic. No erythema.   Psychiatric: She has a normal mood and affect. Her behavior is normal. Judgment and thought content normal.   Vitals reviewed.      Assessment/Plan   Liza was seen today for annual exam.    Diagnoses and all orders for this visit:    Physical exam, annual    Screening for colon cancer  -     Ambulatory Referral to Gastroenterology    Thyroid function test abnormal  -     Thyroid Cascade Profile; Future    Pure hypercholesterolemia    Other orders  -     rosuvastatin (CRESTOR) 10 MG tablet; Take 1 tablet by mouth Every Night.        #1 CPE-preventive counseling provided.  Patient is doing very good job with weight watchers and she will continue to do it.  Exercise at least 30 minutes a day, 5 days a week.  She is due for Shingrix and is going to get it at the pharmacy.  She will be due for colonoscopy in December 2020.  Follow-up with GYN as scheduled by them.  Sun protection discussed.  Patient will schedule eye exam.  She is due.    Hyperlipidemia-continue current treatment.  Follow-up in 6 to 12 months.  Abnormal thyroid test-no treatment necessary at this time.  Recheck in 3 months.

## 2020-05-21 ENCOUNTER — OFFICE VISIT (OUTPATIENT)
Dept: ORTHOPEDIC SURGERY | Facility: CLINIC | Age: 65
End: 2020-05-21

## 2020-05-21 VITALS — WEIGHT: 215 LBS | BODY MASS INDEX: 33.74 KG/M2 | HEIGHT: 67 IN | TEMPERATURE: 98.1 F

## 2020-05-21 DIAGNOSIS — M70.62 TROCHANTERIC BURSITIS OF LEFT HIP: ICD-10-CM

## 2020-05-21 DIAGNOSIS — M25.551 HIP PAIN, ACUTE, RIGHT: Primary | ICD-10-CM

## 2020-05-21 PROCEDURE — 73501 X-RAY EXAM HIP UNI 1 VIEW: CPT | Performed by: NURSE PRACTITIONER

## 2020-05-21 PROCEDURE — 99213 OFFICE O/P EST LOW 20 MIN: CPT | Performed by: NURSE PRACTITIONER

## 2020-05-21 NOTE — PROGRESS NOTES
"Patient: Liza Aaron  YOB: 1955 65 y.o. female  Medical Record Number: [id number]    Chief Complaints:   Chief Complaint   Patient presents with   • Right Hip - Post-op       History of Present Illness:Liza Aaron is a 65 y.o. female who presents with complaints of left lateral hip pain and also recently had her right hip replaced.  Right hip is doing great denies any problems started with some left lateral hip pain worse at night when she lies on her side about 3 to 4 months ago.  Denies any recent injury.  Describes the lateral hip pain as a mild constant ache again worse at night when she lies on her side better with change in position    Allergies:   Allergies   Allergen Reactions   • Nickel Rash     Pt states cannot wear jewelry with Nickel.    • Penicillins Rash       Medications:   Current Outpatient Medications   Medication Sig Dispense Refill   • rosuvastatin (CRESTOR) 10 MG tablet Take 1 tablet by mouth Every Night. 90 tablet 1   • INTRAROSA 6.5 MG insert Insert  into the vagina Daily As Needed.       No current facility-administered medications for this visit.          The following portions of the patient's history were reviewed and updated as appropriate: allergies, current medications, past family history, past medical history, past social history, past surgical history and problem list.    Review of Systems:   A 14 point review of systems was performed. All systems negative except pertinent positives/negative listed in HPI above    Physical Exam:   Vitals:    05/21/20 0828   Temp: 98.1 °F (36.7 °C)   TempSrc: Temporal   Weight: 97.5 kg (215 lb)   Height: 170.2 cm (67\")   PainSc: 0-No pain   PainLoc: Hip       General: A and O x 3, ASA, NAD    SCLERA:    Normal    DENTITION:   Normal  Skin clear no unusual lesions noted  Right hip patient has excellent motion with no instability calf is soft nontender  Left hip patient is tender over left hip greater trochanteric bursa otherwise has " excellent range of motion neck essentially negative logroll calf soft and nontender    Radiology:  Xrays 2 views right hip ordered and reviewed today secondary to pain the patient is well-placed well-positioned right total hip replacement she does have significant arthritic changes noted of the left hip.  Compared to views are unchanged    Assessment/Plan:  Right hip ALEX stable  Left hip greater trochanteric bursitis    Patient discussed treatment options.  She will return to physical therapy for both hips in addition she will return the office in a month or 2 if she needs left hip bursa injection otherwise we will see her back for her one-year check

## 2020-05-26 ENCOUNTER — TELEPHONE (OUTPATIENT)
Dept: ORTHOPEDIC SURGERY | Facility: CLINIC | Age: 65
End: 2020-05-26

## 2020-05-26 RX ORDER — CLINDAMYCIN HYDROCHLORIDE 300 MG/1
CAPSULE ORAL
Qty: 2 CAPSULE | Refills: 2 | Status: SHIPPED | OUTPATIENT
Start: 2020-05-26 | End: 2020-08-28

## 2020-05-26 NOTE — TELEPHONE ENCOUNTER
S/P RIGHT ALEX BY RBB 2/5/20    Patient calling for dental premedication. Says to remind AMB she is allergic to PCN. Asking for refills. Cheikh in chart.

## 2020-05-26 NOTE — TELEPHONE ENCOUNTER
New prescription has been sent in to [facility] pharmacy at [phone] for clindamycin 300 mg, #2, directions are to take both capsules 1 hour prior to her procedure.  Refill x2 per RBB

## 2020-06-12 ENCOUNTER — TREATMENT (OUTPATIENT)
Dept: PHYSICAL THERAPY | Facility: CLINIC | Age: 65
End: 2020-06-12

## 2020-06-12 DIAGNOSIS — M25.559 PAIN, HIP: Primary | ICD-10-CM

## 2020-06-12 DIAGNOSIS — R29.898 LEG WEAKNESS, BILATERAL: ICD-10-CM

## 2020-06-12 PROCEDURE — 97110 THERAPEUTIC EXERCISES: CPT | Performed by: PHYSICAL THERAPIST

## 2020-06-12 PROCEDURE — 97161 PT EVAL LOW COMPLEX 20 MIN: CPT | Performed by: PHYSICAL THERAPIST

## 2020-06-12 NOTE — PATIENT INSTRUCTIONS
Access Code: [email or web address]   URL: [email or web address]   Date: 06/12/2020   Prepared by: Freddy Johnson     Exercises   Supine Hip Adduction Isometric with Ball - 10 reps - 1 sets - 10 hold - 2x daily - 7x weekly   Bridge with Arms at Sides and Feet on Swiss Ball - 10 reps - 2 sets - 1x daily - 7x weekly   Supine Hamstring Curl on Swiss Ball - 10 reps - 2 sets - 1x daily - 7x weekly   Hip Hiking on Step - 10 reps - 2 sets - 1x daily - 7x weekly

## 2020-06-12 NOTE — PROGRESS NOTES
Physical Therapy Initial Evaluation and Plan of Care      Patient: Liza Aaron   : 1955  Diagnosis/ICD-10 Code:  Pain, hip [M25.559]  Referring practitioner: CONCETTA Johnson  Date of Initial Visit: 2020  Today's Date: 2020          Subjective Evaluation    History of Present Illness  Mechanism of injury: Patient notes that she is here primarily for her L hip. She has begun having L hip pain over the past ~ 1 month. Patient has minimal pain at rest but notes having trouble sleeping at night due to pain. She cannot lay on her R side due to hip pain. Patient now cannot lay on her L side due to hip pain. Patient notes that she is also having pain with activity. Patient notes that she still gets tired doing stairs and many ADLs. Her walking ability has improved.     Radiology:  Xrays 2 views right hip ordered and reviewed today secondary to pain the patient is well-placed well-positioned right total hip replacement she does have significant arthritic changes noted of the left hip    Pain  Current pain rating: 3  At worst pain ratin  Location: L Lateral hip  Quality: dull ache and throbbing  Relieving factors: rest and relaxation  Aggravating factors: standing, stairs, movement, ambulation, squatting and repetitive movement    Diagnostic Tests  X-ray: abnormal (Radiology:  Xrays 2 views right hip ordered and reviewed today secondary to pain the patient is well-placed well-positioned right total hip replacement she does have significant arthritic changes noted of the left hip)    Patient Goals  Patient goals for therapy: decreased pain, improved balance, increased motion, increased strength, independence with ADLs/IADLs and return to sport/leisure activities       LEFS 57/80   49-64 20-39% CJ           Objective          Palpation   Left   Tenderness of the gluteus medius and TFL.     Tenderness     Additional Tenderness Details  Anterior hip joint TTP (L)    Lumbar Screen  Lumbar range of  motion within normal limits with the following exceptions:Patient stands with R hip in ER    Neurological Testing     Sensation     Hip   Left Hip   Intact: light touch    Right Hip   Intact: light touch    Active Range of Motion   Left Hip   Flexion: 105 degrees with pain  External rotation (90/90): 30 degrees   Internal rotation (90/90): 10 degrees with pain    Strength/Myotome Testing     Left Hip   Planes of Motion   Flexion: 4-  Extension: 3+  Abduction: 3-  External rotation: 4-    Right Hip   Planes of Motion   Flexion: 4  Extension: 3+  Abduction: 3  External rotation: 4    Tests     Left Hip   Positive CAMMY and FADIR.     Additional Tests Details  Patients lateral hip pain reproduced with FADIR test     Ambulation     Observational Gait   Gait: antalgic and asymmetric     Additional Observational Gait Details  Patient has significant hip lateral shift during gait due to hip weakness     Functional Assessment     Comments  SLS time < 10 seconds (B) in 3 trials  Significant shift toward stance limb (B) indicating hip weakness          Assessment & Plan     Assessment  Impairments: abnormal coordination, abnormal gait, abnormal or restricted ROM, activity intolerance, impaired balance, impaired physical strength, lacks appropriate home exercise program, pain with function and safety issue  Assessment details: Patient presents with L hip pain that has recently increased over the past month. Patient presents as hip OA. She has weakness, gait and balance deficits, and pain with ADL's/ exercise.  She is + for hip joint testing which did reproduce her pain.  Pt would benefit from skilled PT services in order to address listed impairments and increase tolerance to normal daily activities including ADLs and recreational activities.     Prognosis: good  Prognosis details: Goals:   Short term: 2 weeks  1. Patient will be (I) with initial HEP for strength and mobility  2. Patient will have improved pain symptoms to <  "4/10 with ADL's and HEP  3. Patient will have improved SLS test to > 20 sec (B)  4. Patient will perform STS from 20\" chair without UE assist    Long term goals: 6 weeks  1. Patient will be (I) with long term progressive HEP for dynamic hip strengthening and mobility   2. Patient will have reduced pain symptoms to 2/10 with all ADL's and recreational activity.   3. Patient will have improved LEFS score to >66/80 showing a significant change.  4. Patient will have improved (B) single leg balance to > 30 seconds to show improved balance.     Functional Limitations: walking and standing  Plan  Therapy options: will be seen for skilled physical therapy services  Planned modality interventions: ultrasound, iontophoresis, TENS, thermotherapy (hydrocollator packs), high voltage pulsed current (pain management) and electrical stimulation/Russian stimulation  Planned therapy interventions: abdominal trunk stabilization, ADL retraining, balance/weight-bearing training, body mechanics training, joint mobilization, IADL retraining, gait training, home exercise program, functional ROM exercises, flexibility, transfer training, therapeutic activities, stretching, strengthening, spinal/joint mobilization, soft tissue mobilization, postural training, neuromuscular re-education, motor coordination training and manual therapy  Frequency: 2x week  Duration in weeks: 10  Treatment plan discussed with: patient  Plan details: Initial HEP was given on this date.         Manual Therapy:    4     mins  44157;  Therapeutic Exercise:    15     mins  90799;     Neuromuscular Fide:        mins  42828;    Therapeutic Activity:          mins  80102;     Gait Training:           mins  88727;     Ultrasound:          mins  48332;    Electrical Stimulation:         mins  43184 ( );  Dry Needling          mins self-pay    Timed Treatment:   19   mins   Total Treatment:     55   mins    PT SIGNATURE: HERMAN Loera DPT License # " [id number]  DATE TREATMENT INITIATED: 6/12/2020    Initial Certification  Certification Period: 9/10/2020  I certify that the therapy services are furnished while this patient is under my care.  The services outlined above are required by this patient, and will be reviewed every 90 days.     PHYSICIAN: Angelica Montalvo APRN   ________________________________     DATE: ______________    Please sign and return via fax to [phone].. Thank you, [facility].

## 2020-06-16 ENCOUNTER — TREATMENT (OUTPATIENT)
Dept: PHYSICAL THERAPY | Facility: CLINIC | Age: 65
End: 2020-06-16

## 2020-06-16 DIAGNOSIS — M25.559 PAIN, HIP: Primary | ICD-10-CM

## 2020-06-16 DIAGNOSIS — M25.651 HIP STIFFNESS, RIGHT: ICD-10-CM

## 2020-06-16 DIAGNOSIS — R29.898 LEG WEAKNESS, BILATERAL: ICD-10-CM

## 2020-06-16 PROCEDURE — 97530 THERAPEUTIC ACTIVITIES: CPT | Performed by: PHYSICAL THERAPIST

## 2020-06-16 PROCEDURE — 97140 MANUAL THERAPY 1/> REGIONS: CPT | Performed by: PHYSICAL THERAPIST

## 2020-06-16 PROCEDURE — 97110 THERAPEUTIC EXERCISES: CPT | Performed by: PHYSICAL THERAPIST

## 2020-06-16 NOTE — PROGRESS NOTES
Physical Therapy Daily Progress Note  2 treatments  Subjective     Liza Aaron reports: she used the pool over the weekend and noted no additional pain. Notes that she has iced her hip as well over the weekend which seems to be improving her pain symptoms.         Objective   See Exercise, Manual, and Modality Logs for complete treatment.       Assessment/Plan  L hip continues to be very limited in mobility. Patient has guarding with gentle ROM.   Patient tolerated all treatment well. Mild increase in hip symptoms during TE. Ice used post treatment to reduce inflammation at lateral hip. Patient tolerated new exercises well. Continues to need skilled PT to work on mobility and function in L hip.         Progress per Plan of Care and Progress strengthening /stabilization /functional activity           Manual Therapy:    9     mins  28246;  Therapeutic Exercise:    23     mins  94427;     Neuromuscular Fide:        mins  73439;    Therapeutic Activity:     12     mins  16950;     Gait Training:           mins  68952;     Ultrasound:          mins  02382;    Electrical Stimulation:         mins  30705 ( );  Dry Needling          mins self-pay    Timed Treatment:   44   mins   Total Treatment:     54   mins    Freddy Johnson, PT DPT  Physical Therapist  [place] License # [id number]

## 2020-06-22 ENCOUNTER — TREATMENT (OUTPATIENT)
Dept: PHYSICAL THERAPY | Facility: CLINIC | Age: 65
End: 2020-06-22

## 2020-06-22 DIAGNOSIS — M25.651 HIP STIFFNESS, RIGHT: ICD-10-CM

## 2020-06-22 DIAGNOSIS — M25.559 PAIN, HIP: Primary | ICD-10-CM

## 2020-06-22 DIAGNOSIS — R29.898 LEG WEAKNESS, BILATERAL: ICD-10-CM

## 2020-06-22 PROCEDURE — 97140 MANUAL THERAPY 1/> REGIONS: CPT | Performed by: PHYSICAL THERAPIST

## 2020-06-22 PROCEDURE — 97110 THERAPEUTIC EXERCISES: CPT | Performed by: PHYSICAL THERAPIST

## 2020-06-22 NOTE — PROGRESS NOTES
Physical Therapy Daily Progress Note    Visit #3    Subjective     Liza Aaron reports: hip is feeling more mobile, pain is largely unchanged.       Objective   See Exercise, Manual, and Modality Logs for complete treatment.       Assessment/Plan  Progressed exercise, tolerated well. Pt gets relief with ice, provided info on where to obtain elastogel pack.   Progress per Plan of Care, add roll for control           Manual Therapy:    15     mins  60494;  Therapeutic Exercise:    25     mins  19628;     Neuromuscular Fide:    0    mins  32918;    Therapeutic Activity:     0     mins  41128;     Gait Trainin     mins  47804;     Ultrasound:     0     mins  06573;    Electrical Stimulation:    0     mins  91859 ( );    Timed Treatment:   40   mins   Total Treatment:     50   mins    Nara Smith PT, DPT  Physical Therapist  [place] License #[id number]

## 2020-06-25 ENCOUNTER — TREATMENT (OUTPATIENT)
Dept: PHYSICAL THERAPY | Facility: CLINIC | Age: 65
End: 2020-06-25

## 2020-06-25 DIAGNOSIS — M25.559 PAIN, HIP: Primary | ICD-10-CM

## 2020-06-25 DIAGNOSIS — R29.898 LEG WEAKNESS, BILATERAL: ICD-10-CM

## 2020-06-25 PROCEDURE — 97140 MANUAL THERAPY 1/> REGIONS: CPT | Performed by: PHYSICAL THERAPIST

## 2020-06-25 NOTE — PROGRESS NOTES
Physical Therapy Daily Progress Note    Visit #4    Subjective     Liza Aaron reports: manual therapy helps, is adherent with HEP.       Objective   See Exercise, Manual, and Modality Logs for complete treatment.       Assessment/Plan  Due to time constraints, focused on manual therapy today, pt will perform exercises at home. Resume exercise progression in clinic next visit.  Progress per Plan of Care           Manual Therapy:    25     mins  72355;  Therapeutic Exercise:    0     mins  83650;     Neuromuscular Fide:    0    mins  20819;    Therapeutic Activity:     0     mins  16028;     Gait Trainin     mins  40632;     Ultrasound:     0     mins  34214;    Electrical Stimulation:    0     mins  68477 ( );    Timed Treatment:   25   mins   Total Treatment:     25   mins    Nara Smith PT, DPT  Physical Therapist  [place] License #[id number]

## 2020-07-01 ENCOUNTER — TREATMENT (OUTPATIENT)
Dept: PHYSICAL THERAPY | Facility: CLINIC | Age: 65
End: 2020-07-01

## 2020-07-01 DIAGNOSIS — M25.559 PAIN, HIP: Primary | ICD-10-CM

## 2020-07-01 DIAGNOSIS — R29.898 LEG WEAKNESS, BILATERAL: ICD-10-CM

## 2020-07-01 PROCEDURE — 97112 NEUROMUSCULAR REEDUCATION: CPT | Performed by: PHYSICAL THERAPIST

## 2020-07-01 PROCEDURE — 97530 THERAPEUTIC ACTIVITIES: CPT | Performed by: PHYSICAL THERAPIST

## 2020-07-01 PROCEDURE — 97110 THERAPEUTIC EXERCISES: CPT | Performed by: PHYSICAL THERAPIST

## 2020-07-01 NOTE — PROGRESS NOTES
Physical Therapy Daily Progress Note    Liza Aaron reports: went to [employer] last night and did some exercises.  Felt good afterward and today without increased soreness.    Subjective pain in left hip has lessened since initial PT session.    Objective   See Exercise, Manual, and Modality Logs for complete treatment.   -discussed appropriate exercises/activities for L LE in pool.  Added:  Sit to stand, partial squats; rocker board activities    Assessment/Plan  Decreased left hip complaints reported subjectively, while demonstrating improved exercise/functional activity capability without increased symptoms/discomfort left hip.  Performed 45 min continuous activity, 35 min of it weightbearing.  Should continue to improve with continued PT efforts.      Plan: Progress left hip strengthening activities to include 4 way resistive hip exercise.  Progress toward all POC goals.             Manual Therapy:         mins  86170;  Therapeutic Exercise:    20     mins  31159;     Neuromuscular Fide:   15     mins  83200;    Therapeutic Activity:     12     mins  41282;     Gait Training:           mins  49153;     Ultrasound:          mins  25775;    Electrical Stimulation:         mins  94802 ( );      Timed Treatment:   47   mins   Total Treatment:    57    mins    Osiel Burns PTA    Physical Therapist Assistant [place] [id number]

## 2020-07-07 ENCOUNTER — TELEPHONE (OUTPATIENT)
Dept: ORTHOPEDIC SURGERY | Facility: CLINIC | Age: 65
End: 2020-07-07

## 2020-07-08 ENCOUNTER — TREATMENT (OUTPATIENT)
Dept: PHYSICAL THERAPY | Facility: CLINIC | Age: 65
End: 2020-07-08

## 2020-07-08 DIAGNOSIS — R29.898 LEG WEAKNESS, BILATERAL: ICD-10-CM

## 2020-07-08 DIAGNOSIS — M25.559 PAIN, HIP: Primary | ICD-10-CM

## 2020-07-08 PROCEDURE — 97110 THERAPEUTIC EXERCISES: CPT | Performed by: PHYSICAL THERAPIST

## 2020-07-08 PROCEDURE — 97112 NEUROMUSCULAR REEDUCATION: CPT | Performed by: PHYSICAL THERAPIST

## 2020-07-08 NOTE — PROGRESS NOTES
Physical Therapy Daily Progress Note      Liza Aaron reports: left knee a little sore.  Left continuing to feel better than initially.  Still gets sore, can't sleep on left side due to discomfort.  Doing pool therapy 1 x week and feels like that is helping as well.    Subjective     Objective   -tender along left ITB(proximal)  See Exercise, Manual, and Modality Logs for complete treatment.   Added:  ITB stretch with strap      Assessment/Plan  Compliant/Cooperative with rehab efforts.  Subjectively reports no increased symptoms or discomfort with therapeutic exercise today.  Able to perform increased exercise/functional activity without increased hip discomfort.  Pool therapy has been beneficial and gait has improved in regards to improved step/stride length and griselda.       Other  Recert next visit with primary PT.  Check goals, ROM, etc.  Update LEFS           Manual Therapy:         mins  12894;  Therapeutic Exercise:    20     mins  69353;     Neuromuscular Fide:  16      mins  43588;    Therapeutic Activity:     5     mins  69883;     Gait Training:           mins  99382;     Ultrasound:          mins  46316;    Electrical Stimulation:         mins  68594 ( );      Timed Treatment: 41     mins   Total Treatment:    41    mins    Osiel Burns PTA    Physical Therapist Assistant [place] [address]

## 2020-07-09 DIAGNOSIS — M16.12 PRIMARY OSTEOARTHRITIS OF LEFT HIP: Primary | ICD-10-CM

## 2020-07-09 RX ORDER — CEFAZOLIN SODIUM 2 G/100ML
2 INJECTION, SOLUTION INTRAVENOUS ONCE
Status: CANCELLED | OUTPATIENT
Start: 2020-09-21 | End: 2020-07-09

## 2020-07-09 RX ORDER — PREGABALIN 75 MG/1
150 CAPSULE ORAL ONCE
Status: CANCELLED | OUTPATIENT
Start: 2020-09-21 | End: 2020-07-09

## 2020-07-09 RX ORDER — MELOXICAM 15 MG/1
15 TABLET ORAL ONCE
Status: CANCELLED | OUTPATIENT
Start: 2020-09-21 | End: 2020-07-09

## 2020-07-10 ENCOUNTER — TREATMENT (OUTPATIENT)
Dept: PHYSICAL THERAPY | Facility: CLINIC | Age: 65
End: 2020-07-10

## 2020-07-10 DIAGNOSIS — R29.898 LEG WEAKNESS, BILATERAL: ICD-10-CM

## 2020-07-10 DIAGNOSIS — M25.559 PAIN, HIP: Primary | ICD-10-CM

## 2020-07-10 PROCEDURE — 97110 THERAPEUTIC EXERCISES: CPT | Performed by: PHYSICAL THERAPIST

## 2020-07-10 PROCEDURE — 97140 MANUAL THERAPY 1/> REGIONS: CPT | Performed by: PHYSICAL THERAPIST

## 2020-07-10 PROCEDURE — 97530 THERAPEUTIC ACTIVITIES: CPT | Performed by: PHYSICAL THERAPIST

## 2020-07-10 NOTE — PROGRESS NOTES
Re-Assessment / Discharge      Patient: Liza Aaron   : 1955  Diagnosis/ICD-10 Code:  Pain, hip [M25.559]  Referring practitioner: CONCETTA Johnson  Date of Initial Visit: 7/10/2020  Today's Date: 7/10/2020        Subjective:   Liza Aaron reports: she has been doing some pool therapy over the past 1-2 weeks and feels that it is helping. She notes that she did overdo it this past week and was sore in both hips for a couple of days. Continues to have pain at night and is unable to sleep in a bed due to her pain. Patient still plans to have her L hip replaced if possible later this year. Reports that she would like to try (I) progressions at this time.   Subjective Questionnaire: LEFS: 53/80 (prior score 57/80)  Clinical Progress: improved  Home Program Compliance: Yes  Treatment has included: therapeutic exercise, neuromuscular re-education, manual therapy and therapeutic activity    Subjective   Objective        Active Range of Motion   Left Hip   Flexion: 105 degrees with pain  External rotation (90/90): 35 degrees with pain  Internal rotation (90/90): 10 degrees with pain     Strength/Myotome Testing      Left Hip   Planes of Motion   Flexion: 4  Extension: 3+  Abduction: 3  External rotation: 4     Right Hip   Planes of Motion   Flexion: 4  Extension: 3+  Abduction: 3  External rotation: 4      Assessment/Plan   Patient has made minimal objective progress in terms of hip range of motion/ and MMT strength. Patient notes that she feels stronger and is able to walk farther (~ 1 mile) than she could have when starting PT and functionally appears stronger than at the onset of PT. Notes that she would like to pause formal PT at this time and will continue to work in the pool and (I) on her land based gravity exercises. Patient has made progress toward goals but most have not been reached at this time. Will D/C at this time to HEP (I).     Progress toward previous goals: Partially Met    Goals:   Short  "term: 2 weeks  1. Patient will be (I) with initial HEP for strength and mobility  2. Patient will have improved pain symptoms to < 4/10 with ADL's and HEP  3. Patient will have improved SLS test to > 20 sec (B)  4. Patient will perform STS from 20\" chair without UE assist  2/4 met      Long term goals: 6 weeks  1. Patient will be (I) with long term progressive HEP for dynamic hip strengthening and mobility   2. Patient will have reduced pain symptoms to 2/10 with all ADL's and recreational activity.   3. Patient will have improved LEFS score to >66/80 showing a significant change.  4. Patient will have improved (B) single leg balance to > 30 seconds to show improved balance.                 PT Signature: Freddy Johnson, PT T  [place] License # [id number]      Based upon review of the patient's progress and continued therapy plan, it is my medical opinion that Liza Aaron should continue physical therapy treatment at [facility]  [address] [place] [id number] [phone].    Signature: __________________________________  Angelica Montalvo APRN    Manual Therapy:    14     mins  16163;  Therapeutic Exercise:    12     mins  14182;     Neuromuscular Fide:        mins  34875;    Therapeutic Activity:     12     mins  57161;     Gait Training:           mins  26793;     Ultrasound:          mins  16688;    Electrical Stimulation:         mins  71965 ( );  Dry Needling          mins self-pay    Timed Treatment:   38   mins   Total Treatment:     38   mins    "

## 2020-07-16 PROBLEM — M16.12 PRIMARY OSTEOARTHRITIS OF LEFT HIP: Status: ACTIVE | Noted: 2020-07-16

## 2020-07-24 ENCOUNTER — APPOINTMENT (OUTPATIENT)
Dept: WOMENS IMAGING | Facility: HOSPITAL | Age: 65
End: 2020-07-24

## 2020-07-24 PROCEDURE — 77063 BREAST TOMOSYNTHESIS BI: CPT | Performed by: RADIOLOGY

## 2020-07-24 PROCEDURE — 77067 SCR MAMMO BI INCL CAD: CPT | Performed by: RADIOLOGY

## 2020-08-14 DIAGNOSIS — R94.6 THYROID FUNCTION TEST ABNORMAL: ICD-10-CM

## 2020-08-18 LAB — TSH SERPL DL<=0.005 MIU/L-ACNC: 3.83 UIU/ML (ref 0.45–4.5)

## 2020-08-28 ENCOUNTER — OFFICE VISIT (OUTPATIENT)
Dept: INTERNAL MEDICINE | Facility: CLINIC | Age: 65
End: 2020-08-28

## 2020-08-28 VITALS
TEMPERATURE: 98 F | HEIGHT: 67 IN | WEIGHT: 216 LBS | DIASTOLIC BLOOD PRESSURE: 72 MMHG | SYSTOLIC BLOOD PRESSURE: 110 MMHG | HEART RATE: 79 BPM | OXYGEN SATURATION: 99 % | BODY MASS INDEX: 33.9 KG/M2

## 2020-08-28 DIAGNOSIS — R31.9 HEMATURIA, UNSPECIFIED TYPE: Primary | ICD-10-CM

## 2020-08-28 LAB
BILIRUB BLD-MCNC: NEGATIVE MG/DL
CLARITY, POC: CLEAR
COLOR UR: YELLOW
GLUCOSE UR STRIP-MCNC: NEGATIVE MG/DL
KETONES UR QL: NEGATIVE
LEUKOCYTE EST, POC: ABNORMAL
NITRITE UR-MCNC: NEGATIVE MG/ML
PH UR: 5 [PH] (ref 5–8)
PROT UR STRIP-MCNC: NEGATIVE MG/DL
RBC # UR STRIP: ABNORMAL /UL
SP GR UR: 1.01 (ref 1–1.03)
UROBILINOGEN UR QL: NORMAL

## 2020-08-28 PROCEDURE — 99213 OFFICE O/P EST LOW 20 MIN: CPT | Performed by: NURSE PRACTITIONER

## 2020-08-28 PROCEDURE — 81003 URINALYSIS AUTO W/O SCOPE: CPT | Performed by: NURSE PRACTITIONER

## 2020-08-28 RX ORDER — NITROFURANTOIN 25; 75 MG/1; MG/1
100 CAPSULE ORAL 2 TIMES DAILY
Qty: 14 CAPSULE | Refills: 0 | Status: SHIPPED | OUTPATIENT
Start: 2020-08-28 | End: 2020-10-12

## 2020-08-28 NOTE — PROGRESS NOTES
Subjective   Liza Aaron is a 65 y.o. female. Patient is here today for   Chief Complaint   Patient presents with   • Urinary Tract Infection     Pt complains of having hematuria & dsyuria x1 day.    .    History of Present Illness   C/o blood in urine associated dysuria. Her symptoms started today. She also had similar symptoms last week that lasted one day.  Denies abdominal pain, back pain, or fever.  Denies any history of kidney stones.    The following portions of the patient's history were reviewed and updated as appropriate: allergies, current medications, past family history, past medical history, past social history, past surgical history and problem list.    Review of Systems   Constitutional: Negative.    Respiratory: Negative.    Cardiovascular: Negative.    Genitourinary: Positive for dysuria, hematuria and urgency.       Objective   Vitals:    08/28/20 1328   BP: 110/72   Pulse: 79   Temp: 98 °F (36.7 °C)   SpO2: 99%     Body mass index is 33.83 kg/m².  Physical Exam   Constitutional: She is oriented to person, place, and time. Vital signs are normal. She appears well-developed and well-nourished.   Cardiovascular: Normal rate, regular rhythm and normal heart sounds.   Pulmonary/Chest: Effort normal and breath sounds normal.   Abdominal: There is no CVA tenderness.   Neurological: She is alert and oriented to person, place, and time.   Skin: Skin is warm, dry and intact.   Psychiatric: She has a normal mood and affect. Her speech is normal and behavior is normal. Thought content normal.   Nursing note and vitals reviewed.    Results for orders placed or performed in visit on 08/28/20   POCT urinalysis dipstick, automated   Result Value Ref Range    Color Yellow Yellow, Straw, Dark Yellow, Yolanda    Clarity, UA Clear Clear    Specific Gravity  1.010 1.005 - 1.030    pH, Urine 5.0 5.0 - 8.0    Leukocytes Moderate (2+) (A) Negative    Nitrite, UA Negative Negative    Protein, POC Negative Negative mg/dL     Glucose, UA Negative Negative, 1000 mg/dL (3+) mg/dL    Ketones, UA Negative Negative    Urobilinogen, UA Normal Normal    Bilirubin Negative Negative    Blood, UA Moderate (A) Negative       Assessment/Plan   Liza was seen today for urinary tract infection.    Diagnoses and all orders for this visit:    Hematuria, unspecified type  -     nitrofurantoin, macrocrystal-monohydrate, (Macrobid) 100 MG capsule; Take 1 capsule by mouth 2 (Two) Times a Day.  -     POCT urinalysis dipstick, automated  -     Urine Culture - Urine, Urine, Clean Catch    Encouraged patient to increase her water intake

## 2020-08-30 LAB
BACTERIA UR CULT: NORMAL
BACTERIA UR CULT: NORMAL

## 2020-09-08 ENCOUNTER — TRANSCRIBE ORDERS (OUTPATIENT)
Dept: PREADMISSION TESTING | Facility: HOSPITAL | Age: 65
End: 2020-09-08

## 2020-09-08 DIAGNOSIS — Z01.818 OTHER SPECIFIED PRE-OPERATIVE EXAMINATION: Primary | ICD-10-CM

## 2020-09-10 ENCOUNTER — PREP FOR SURGERY (OUTPATIENT)
Dept: OTHER | Facility: HOSPITAL | Age: 65
End: 2020-09-10

## 2020-09-10 ENCOUNTER — APPOINTMENT (OUTPATIENT)
Dept: PREADMISSION TESTING | Facility: HOSPITAL | Age: 65
End: 2020-09-10

## 2020-09-10 VITALS
HEIGHT: 67 IN | OXYGEN SATURATION: 98 % | DIASTOLIC BLOOD PRESSURE: 65 MMHG | WEIGHT: 219 LBS | HEART RATE: 79 BPM | SYSTOLIC BLOOD PRESSURE: 108 MMHG | TEMPERATURE: 98.2 F | BODY MASS INDEX: 34.37 KG/M2 | RESPIRATION RATE: 20 BRPM

## 2020-09-10 DIAGNOSIS — M16.12 PRIMARY OSTEOARTHRITIS OF LEFT HIP: Primary | ICD-10-CM

## 2020-09-10 DIAGNOSIS — M16.12 PRIMARY OSTEOARTHRITIS OF LEFT HIP: ICD-10-CM

## 2020-09-10 LAB
ANION GAP SERPL CALCULATED.3IONS-SCNC: 9.7 MMOL/L (ref 5–15)
BACTERIA UR QL AUTO: ABNORMAL /HPF
BILIRUB UR QL STRIP: NEGATIVE
BUN SERPL-MCNC: 19 MG/DL (ref 8–23)
BUN/CREAT SERPL: 27.5 (ref 7–25)
CALCIUM SPEC-SCNC: 8.8 MG/DL (ref 8.6–10.5)
CHLORIDE SERPL-SCNC: 104 MMOL/L (ref 98–107)
CLARITY UR: CLEAR
CO2 SERPL-SCNC: 27.3 MMOL/L (ref 22–29)
COLOR UR: YELLOW
CREAT SERPL-MCNC: 0.69 MG/DL (ref 0.57–1)
DEPRECATED RDW RBC AUTO: 40.7 FL (ref 37–54)
ERYTHROCYTE [DISTWIDTH] IN BLOOD BY AUTOMATED COUNT: 12.8 % (ref 12.3–15.4)
GFR SERPL CREATININE-BSD FRML MDRD: 86 ML/MIN/1.73
GLUCOSE SERPL-MCNC: 87 MG/DL (ref 65–99)
GLUCOSE UR STRIP-MCNC: NEGATIVE MG/DL
HCT VFR BLD AUTO: 36.3 % (ref 34–46.6)
HGB BLD-MCNC: 12.3 G/DL (ref 12–15.9)
HGB UR QL STRIP.AUTO: NEGATIVE
HYALINE CASTS UR QL AUTO: ABNORMAL /LPF
KETONES UR QL STRIP: NEGATIVE
LEUKOCYTE ESTERASE UR QL STRIP.AUTO: ABNORMAL
MCH RBC QN AUTO: 29.5 PG (ref 26.6–33)
MCHC RBC AUTO-ENTMCNC: 33.9 G/DL (ref 31.5–35.7)
MCV RBC AUTO: 87.1 FL (ref 79–97)
NITRITE UR QL STRIP: NEGATIVE
PH UR STRIP.AUTO: <=5 [PH] (ref 5–8)
PLATELET # BLD AUTO: 200 10*3/MM3 (ref 140–450)
PMV BLD AUTO: 9.1 FL (ref 6–12)
POTASSIUM SERPL-SCNC: 3.7 MMOL/L (ref 3.5–5.2)
PROT UR QL STRIP: NEGATIVE
RBC # BLD AUTO: 4.17 10*6/MM3 (ref 3.77–5.28)
RBC # UR: ABNORMAL /HPF
REF LAB TEST METHOD: ABNORMAL
SODIUM SERPL-SCNC: 141 MMOL/L (ref 136–145)
SP GR UR STRIP: 1.02 (ref 1–1.03)
SQUAMOUS #/AREA URNS HPF: ABNORMAL /HPF
UROBILINOGEN UR QL STRIP: ABNORMAL
WBC # BLD AUTO: 4.58 10*3/MM3 (ref 3.4–10.8)
WBC UR QL AUTO: ABNORMAL /HPF

## 2020-09-10 PROCEDURE — 85027 COMPLETE CBC AUTOMATED: CPT | Performed by: ORTHOPAEDIC SURGERY

## 2020-09-10 PROCEDURE — 93005 ELECTROCARDIOGRAM TRACING: CPT

## 2020-09-10 PROCEDURE — 36415 COLL VENOUS BLD VENIPUNCTURE: CPT

## 2020-09-10 PROCEDURE — 81001 URINALYSIS AUTO W/SCOPE: CPT | Performed by: ORTHOPAEDIC SURGERY

## 2020-09-10 PROCEDURE — 93010 ELECTROCARDIOGRAM REPORT: CPT | Performed by: INTERNAL MEDICINE

## 2020-09-10 PROCEDURE — 80048 BASIC METABOLIC PNL TOTAL CA: CPT | Performed by: ORTHOPAEDIC SURGERY

## 2020-09-10 RX ORDER — VITAMIN B COMPLEX
1000 TABLET ORAL DAILY
COMMUNITY

## 2020-09-10 RX ORDER — PHENOL 1.4 %
600 AEROSOL, SPRAY (ML) MUCOUS MEMBRANE DAILY
COMMUNITY

## 2020-09-10 ASSESSMENT — HOOS JR
HOOS JR SCORE: 10
HOOS JR SCORE: 58.93

## 2020-09-10 NOTE — DISCHARGE INSTRUCTIONS
Take the following medications the morning of surgery:    NONE    ARRIVE TO MAIN SURGERY 12 NOON ON 9/21/2020      If you are on prescription narcotic pain medication to control your pain you may also take that medication the morning of surgery.    General Instructions:  • Do not eat solid food after midnight the night before surgery.  • You may drink clear liquids day of surgery but must stop at least one hour before your hospital arrival time.  • It is beneficial for you to have a clear drink that contains carbohydrates the day of surgery.  We suggest a 12 to 20 ounce bottle of Gatorade or Powerade for non-diabetic patients or a 12 to 20 ounce bottle of G2 or Powerade Zero for diabetic patients. (Pediatric patients, are not advised to drink a 12 to 20 ounce carbohydrate drink)    Clear liquids are liquids you can see through.  Nothing red in color.     Plain water                               Sports drinks  Sodas                                   Gelatin (Jell-O)  Fruit juices without pulp such as white grape juice and apple juice  Popsicles that contain no fruit or yogurt  Tea or coffee (no cream or milk added)  Gatorade / Powerade  G2 / Powerade Zero    • Infants may have breast milk up to four hours before surgery.  • Infants drinking formula may drink formula up to six hours before surgery.   • Patients who avoid smoking, chewing tobacco and alcohol for 4 weeks prior to surgery have a reduced risk of post-operative complications.  Quit smoking as many days before surgery as you can.  • Do not smoke, use chewing tobacco or drink alcohol the day of surgery.   • If applicable bring your C-PAP/ BI-PAP machine.  • Bring any papers given to you in the doctor’s office.  • Wear clean comfortable clothes.  • Do not wear contact lenses, false eyelashes or make-up.  Bring a case for your glasses.   • Bring crutches or walker if applicable.  • Remove all piercings.  Leave jewelry and any other valuables at home.  • Hair  extensions with metal clips must be removed prior to surgery.  • The Pre-Admission Testing nurse will instruct you to bring medications if unable to obtain an accurate list in Pre-Admission Testing.          Preventing a Surgical Site Infection:  • For 2 to 3 days before surgery, avoid shaving with a razor because the razor can irritate skin and make it easier to develop an infection.    • Any areas of open skin can increase the risk of a post-operative wound infection by allowing bacteria to enter and travel throughout the body.  Notify your surgeon if you have any skin wounds / rashes even if it is not near the expected surgical site.  The area will need assessed to determine if surgery should be delayed until it is healed.  • The night prior to surgery shower using a fresh bar of anti-bacterial soap (such as Dial) and clean washcloth.  Sleep in a clean bed with clean clothing.  Do not allow pets to sleep with you.  • Shower on the morning of surgery using a fresh bar of anti-bacterial soap (such as Dial) and clean washcloth.  Dry with a clean towel and dress in clean clothing.  • Ask your surgeon if you will be receiving antibiotics prior to surgery.  • Make sure you, your family, and all healthcare providers clean their hands with soap and water or an alcohol based hand  before caring for you or your wound.    Day of surgery:  Your arrival time is approximately two hours before your scheduled surgery time.  Upon arrival, a Pre-op nurse and Anesthesiologist will review your health history, obtain vital signs, and answer questions you may have.  The only belongings needed at this time will be a list of your home medications and if applicable your C-PAP/BI-PAP machine.  If you are staying overnight your family can leave the rest of your belongings in the car and bring them to your room later.  A Pre-op nurse will start an IV and you may receive medication in preparation for surgery, including something to  help you relax.  Your family will be able to see you in the Pre-op area.  Two visitors at a time will be allowed in the Pre-op room.  While you are in surgery your family should notify the waiting room  if they leave the waiting room area and provide a contact phone number.    Please be aware that surgery does come with discomfort.  We want to make every effort to control your discomfort so please discuss any uncontrolled symptoms with your nurse.   Your doctor will most likely have prescribed pain medications.      If you are going home after surgery you will receive individualized written care instructions before being discharged.  A responsible adult must drive you to and from the hospital on the day of your surgery and stay with you for 24 hours.    If you are staying overnight following surgery, you will be transported to your hospital room following the recovery period.  [facility] has all private rooms.    If you have any questions please call Pre-Admission Testing at [phone].  Deductibles and co-payments are collected on the day of service. Please be prepared to pay the required co-pay, deductible or deposit on the day of service as defined by your plan.    Patient Education for Self-Quarantine Process    Following your COVID testing, we strongly recommend that you do not leave your home after you have been tested for COVID except to get medical care. This includes not going to work, school or to public areas.  If this is not possible for you to do please limit your activities to only required outings.  Be sure to wear a mask when you are with other people, practice social distancing and wash your hands frequently.      The following items provide additional details to keep you safe.  • Wash your hands with soap and water frequently for at least 20 seconds.   • Avoid touching your eyes, nose and mouth with unwashed hands.  • Do not share anything - utensils, towels, food from  the same bowl.   • Have your own utensils, drinking glass, dishes, towels and bedding.   • Do not have visitors.   • Do use FaceTime to stay in touch with family and friends.  • You should stay in a specific room away from others if possible.   • Stay at least 6 feet away from others in the home if you cannot have a dedicated room to yourself.   • Do not snuggle with your pet. While the CDC says there is no evidence that pets can spread COVID-19 or be infected from humans, it is probably best to avoid “petting, snuggling, being kissed or licked and sharing food (during self-quarantine)”, according to the CDC.   • Sanitize household surfaces daily. Include all high touch areas (door handles, light switches, phones, countertops, etc.)  • Do not share a bathroom with others, if possible.   • Wear a mask around others in your home if you are unable to stay in a separate room or 6 feet apart. If  you are unable to wear a mask, have your family member wear a mask if they must be within 6 feet of you.   Call your surgeon immediately if you experience any of the following symptoms:  • Sore Throat  • Shortness of Breath or difficulty breathing  • Cough  • Chills  • Body soreness or muscle pain  • Headache  • Fever  • New loss of taste or smell  • Do not arrive for your surgery ill.  Your procedure will need to be rescheduled to another time.  You will need to call your physician before the day of surgery to avoid any unnecessary exposure to hospital staff as well as other patients.    CHLORHEXIDINE CLOTH INSTRUCTIONS  The morning of surgery follow these instructions using the Chlorhexidine cloths you've been given.  These steps reduce bacteria on the body.  Do not use the cloths near your eyes, ears mouth, genitalia or on open wounds.  Throw the cloths away after use but do not try to flush them down a toilet.      • Open and remove one cloth at a time from the package.    • Leave the cloth unfolded and begin the  bathing.  • Massage the skin with the cloths using gentle pressure to remove bacteria.  Do not scrub harshly.   • Follow the steps below with one 2% CHG cloth per area (6 total cloths).  • One cloth for neck, shoulders and chest.  • One cloth for both arms, hands, fingers and underarms (do underarms last).  • One cloth for the abdomen followed by groin.  • One cloth for right leg and foot including between the toes.  • One cloth for left leg and foot including between the toes.  • The last cloth is to be used for the back of the neck, back and buttocks.    Allow the CHG to air dry 3 minutes on the skin which will give it time to work and decrease the chance of irritation.  The skin may feel sticky until it is dry.  Do not rinse with water or any other liquid or you will lose the beneficial effects of the CHG.  If mild skin irritation occurs, do rinse the skin to remove the CHG.  Report this to the nurse at time of admission.  Do not apply lotions, creams, ointments, deodorants or perfumes after using the clothes. Dress in clean clothes before coming to the hospital.    BACTROBAN NASAL OINTMENT  There are many germs normally in your nose. Bactroban is an ointment that will help reduce these germs. Please follow these instructions for Bactroban use:      _1___The day before surgery in the morning  Date__9/20/20______  9/20/20  __2__The day before surgery in the evening              Date___9/20/20_____    _3___The day of surgery in the morning    Date___9/21/20_____    **Squirt ½ package of Bactroban Ointment onto a cotton applicator and apply to inside of 1st nostril.  Squirt the remaining Bactroban and apply to the inside of the other nostril.

## 2020-09-17 ENCOUNTER — OFFICE VISIT (OUTPATIENT)
Dept: ORTHOPEDIC SURGERY | Facility: CLINIC | Age: 65
End: 2020-09-17

## 2020-09-17 VITALS
SYSTOLIC BLOOD PRESSURE: 122 MMHG | DIASTOLIC BLOOD PRESSURE: 80 MMHG | WEIGHT: 217 LBS | BODY MASS INDEX: 34.06 KG/M2 | TEMPERATURE: 98 F | HEIGHT: 67 IN

## 2020-09-17 DIAGNOSIS — M25.552 LEFT HIP PAIN: Primary | ICD-10-CM

## 2020-09-17 PROCEDURE — 73502 X-RAY EXAM HIP UNI 2-3 VIEWS: CPT | Performed by: NURSE PRACTITIONER

## 2020-09-17 PROCEDURE — S0260 H&P FOR SURGERY: HCPCS | Performed by: NURSE PRACTITIONER

## 2020-09-17 NOTE — H&P
"Patient: Liza Aaron    Date of Admission: 9/21/2020    YOB: 1955    Medical Record Number: [id number]    Admitting Physician: Dr. Cesar Johnson    Reason for Admission: End Stage Left Hip OA    History of Present Illness: 65 y.o. female presents with severe end stage hip osteoarthritis which has not been responsive to the full compliment of conservative measures. Despite conservative attempts, there is still severe, constant activity limiting hip pain. Given the severity of the pain, the patient has elected to proceed with hip replacement.    Allergies:   Allergies   Allergen Reactions   • Nickel Rash     Pt states cannot wear jewelry with Nickel.    • Penicillins Rash         Current Medications:  Home Medications:    Current Outpatient Medications on File Prior to Visit   Medication Sig   • calcium carbonate (OS-EMMA) 600 MG tablet Take 600 mg by mouth Daily.   • Cholecalciferol (VITAMIN D) 25 MCG (1000 UT) tablet Take 1,000 Units by mouth Daily.   • INTRAROSA 6.5 MG insert Insert  into the vagina Daily As Needed.   • Multiple Vitamin (MULTI-VITAMIN DAILY PO) Take 1 tablet by mouth Daily.   • nitrofurantoin, macrocrystal-monohydrate, (Macrobid) 100 MG capsule Take 1 capsule by mouth 2 (Two) Times a Day.   • rosuvastatin (CRESTOR) 10 MG tablet Take 1 tablet by mouth Every Night.     No current facility-administered medications on file prior to visit.      PRN Meds:.    PMH:  Past Medical History:   Diagnosis Date   • Arthritis    • Diverticulosis    • Family history of coronary artery disease     SAW DR THOMPSON 4/2019   • Hip pain     BILATERAL   • Hyperglycemia    • Hyperlipidemia    • Irregular heart beat     OCCASIONAL \"SKIPPED BEAT\"   • Pure hypercholesterolemia    • Seizure (CMS/[facility])     WHEN IN COLLEGE, NONE SINCE   • Umbilical hernia    • UTI (urinary tract infection)     FINISHED ANTIBIOTIC   • Vitamin D deficiency         PSURGH:  Past Surgical History:   Procedure Laterality Date   • " "COLONOSCOPY N/A 2015    Diverticulosis in the entire examined colon, non-bleeding internal hemorrhodis, no specimens collected-Dr. Rosario Flores   • TOTAL ABDOMINAL HYSTERECTOMY WITH SALPINGO OOPHORECTOMY Bilateral 2004    Dr. Dominick Villarreal   • TOTAL HIP ARTHROPLASTY Right 2020    Procedure: TOTAL HIP ARTHROPLASTY;  Surgeon: Cesar Johnson MD;  Location: [facility];  Service: Orthopedics;  Laterality: Right;   • UMBILICAL HERNIA REPAIR N/A 2004    Dr. Dominick Villarreal       SocialHx:  Social History     Occupational History   • Occupation:      Employer: [employer]   Tobacco Use   • Smoking status: Former Smoker     Packs/day: 1.00     Years: 5.00     Pack years: 5.00     Types: Cigarettes     Quit date:      Years since quittin.7   • Smokeless tobacco: Never Used   • Tobacco comment: caffiene twice a week   Substance and Sexual Activity   • Alcohol use: Yes     Comment: 3 drinks per week   • Drug use: No   • Sexual activity: Defer      Social History     Social History Narrative   • Not on file       FamHx:  Family History   Problem Relation Age of Onset   • Breast cancer Mother    • Hypertension Mother    • Heart disease Father    • Cancer Maternal Grandmother    • Heart disease Brother    • Malig Hyperthermia Neg Hx          Review of Systems:   A 14 point review of systems was performed, pertinent positives discussed above, all other systems are negative    Physical Exam: 65 y.o. female  Vital Signs :   Vitals:    20 1442   BP: 122/80   Temp: 98 °F (36.7 °C)   Weight: 98.4 kg (217 lb)   Height: 170.2 cm (67\")   PainSc:   3     General: Alert and Oriented x 3, No acute distress.  Psych: mood and affect appropriate; recent and remote memory intact  Eyes: conjunctiva clear; pupils equally round and reactive, sclera nonicteric  CV: no peripheral edema  Resp: normal respiratory effort  Skin: no rashes or wounds; normal turgor  Musculosketetal; " pain with hip range of motion. Positive stinchfeld test. No trochanteric  Tenderness.  Vascular: palpable distal pulses    Labs:    Appointment on 09/10/2020   Component Date Value Ref Range Status   • Glucose 09/10/2020 87  65 - 99 mg/dL Final   • BUN 09/10/2020 19  8 - 23 mg/dL Final   • Creatinine 09/10/2020 0.69  0.57 - 1.00 mg/dL Final   • Sodium 09/10/2020 141  136 - 145 mmol/L Final   • Potassium 09/10/2020 3.7  3.5 - 5.2 mmol/L Final   • Chloride 09/10/2020 104  98 - 107 mmol/L Final   • CO2 09/10/2020 27.3  22.0 - 29.0 mmol/L Final   • Calcium 09/10/2020 8.8  8.6 - 10.5 mg/dL Final   • eGFR Non African Amer 09/10/2020 86  >60 mL/min/1.73 Final   • BUN/Creatinine Ratio 09/10/2020 27.5* 7.0 - 25.0 Final   • Anion Gap 09/10/2020 9.7  5.0 - 15.0 mmol/L Final   • WBC 09/10/2020 4.58  3.40 - 10.80 10*3/mm3 Final   • RBC 09/10/2020 4.17  3.77 - 5.28 10*6/mm3 Final   • Hemoglobin 09/10/2020 12.3  12.0 - 15.9 g/dL Final   • Hematocrit 09/10/2020 36.3  34.0 - 46.6 % Final   • MCV 09/10/2020 87.1  79.0 - 97.0 fL Final   • MCH 09/10/2020 29.5  26.6 - 33.0 pg Final   • MCHC 09/10/2020 33.9  31.5 - 35.7 g/dL Final   • RDW 09/10/2020 12.8  12.3 - 15.4 % Final   • RDW-SD 09/10/2020 40.7  37.0 - 54.0 fl Final   • MPV 09/10/2020 9.1  6.0 - 12.0 fL Final   • Platelets 09/10/2020 200  140 - 450 10*3/mm3 Final   • Color, UA 09/10/2020 Yellow  Yellow, Straw Final   • Appearance, UA 09/10/2020 Clear  Clear Final   • pH, UA 09/10/2020 <=5.0  5.0 - 8.0 Final   • Specific Gravity, UA 09/10/2020 1.017  1.005 - 1.030 Final   • Glucose, UA 09/10/2020 Negative  Negative Final   • Ketones, UA 09/10/2020 Negative  Negative Final   • Bilirubin, UA 09/10/2020 Negative  Negative Final   • Blood, UA 09/10/2020 Negative  Negative Final   • Protein, UA 09/10/2020 Negative  Negative Final   • Leuk Esterase, UA 09/10/2020 Trace* Negative Final   • Nitrite, UA 09/10/2020 Negative  Negative Final   • Urobilinogen, UA 09/10/2020 0.2 E.U./dL  0.2 -  1.0 E.U./dL Final   • RBC, UA 09/10/2020 3-5* None Seen, 0-2 /HPF Final   • WBC, UA 09/10/2020 0-2  None Seen, 0-2 /HPF Final   • Bacteria, UA 09/10/2020 None Seen  None Seen /HPF Final   • Squamous Epithelial Cells, UA 09/10/2020 0-2  None Seen, 0-2 /HPF Final   • Hyaline Casts, UA 09/10/2020 None Seen  None Seen /LPF Final   • Methodology 09/10/2020 Automated Microscopy   Final   Office Visit on 08/28/2020   Component Date Value Ref Range Status   • Color 08/28/2020 Yellow  Yellow, Straw, Dark Yellow, Yolanda Final   • Clarity, UA 08/28/2020 Clear  Clear Final   • Specific Gravity  08/28/2020 1.010  1.005 - 1.030 Final   • pH, Urine 08/28/2020 5.0  5.0 - 8.0 Final   • Leukocytes 08/28/2020 Moderate (2+)* Negative Final   • Nitrite, UA 08/28/2020 Negative  Negative Final   • Protein, POC 08/28/2020 Negative  Negative mg/dL Final   • Glucose, UA 08/28/2020 Negative  Negative, 1000 mg/dL (3+) mg/dL Final   • Ketones, UA 08/28/2020 Negative  Negative Final   • Urobilinogen, UA 08/28/2020 Normal  Normal Final   • Bilirubin 08/28/2020 Negative  Negative Final   • Blood, UA 08/28/2020 Moderate* Negative Final   • Urine Culture 08/28/2020 Final report   Final   • Result 1 08/28/2020 Comment   Final    Comment: Mixed urogenital zo  50,000-100,000 colony forming units per mL       Xrays:  Xrays AP pelvis and a lateral of the Left hip were ordered and reviewed demonstrating  End stage hip OA with bone on bone articulation, subchondral cysts and periarticular osteophytes.    Assessment:  End-stage Left hip osteoarthritis. Conservative measures have failed.      Plan:  The plan is to proceed with Left Total Hip Replacement. The patient voiced understanding of the risks, benefits, and alternative forms of treatment that were discussed with Dr Johnson at the time of scheduling. 23     Angelica Montalvo, APRN  9/17/2020

## 2020-09-17 NOTE — H&P (VIEW-ONLY)
"Patient: Liza Aaron    Date of Admission: 9/21/2020    YOB: 1955    Medical Record Number: [id number]    Admitting Physician: Dr. Cesar Johnson    Reason for Admission: End Stage Left Hip OA    History of Present Illness: 65 y.o. female presents with severe end stage hip osteoarthritis which has not been responsive to the full compliment of conservative measures. Despite conservative attempts, there is still severe, constant activity limiting hip pain. Given the severity of the pain, the patient has elected to proceed with hip replacement.    Allergies:   Allergies   Allergen Reactions   • Nickel Rash     Pt states cannot wear jewelry with Nickel.    • Penicillins Rash         Current Medications:  Home Medications:    Current Outpatient Medications on File Prior to Visit   Medication Sig   • calcium carbonate (OS-EMMA) 600 MG tablet Take 600 mg by mouth Daily.   • Cholecalciferol (VITAMIN D) 25 MCG (1000 UT) tablet Take 1,000 Units by mouth Daily.   • INTRAROSA 6.5 MG insert Insert  into the vagina Daily As Needed.   • Multiple Vitamin (MULTI-VITAMIN DAILY PO) Take 1 tablet by mouth Daily.   • nitrofurantoin, macrocrystal-monohydrate, (Macrobid) 100 MG capsule Take 1 capsule by mouth 2 (Two) Times a Day.   • rosuvastatin (CRESTOR) 10 MG tablet Take 1 tablet by mouth Every Night.     No current facility-administered medications on file prior to visit.      PRN Meds:.    PMH:  Past Medical History:   Diagnosis Date   • Arthritis    • Diverticulosis    • Family history of coronary artery disease     SAW DR THOMPSON 4/2019   • Hip pain     BILATERAL   • Hyperglycemia    • Hyperlipidemia    • Irregular heart beat     OCCASIONAL \"SKIPPED BEAT\"   • Pure hypercholesterolemia    • Seizure (CMS/[facility])     WHEN IN COLLEGE, NONE SINCE   • Umbilical hernia    • UTI (urinary tract infection)     FINISHED ANTIBIOTIC   • Vitamin D deficiency         PSURGH:  Past Surgical History:   Procedure Laterality Date   • " "COLONOSCOPY N/A 2015    Diverticulosis in the entire examined colon, non-bleeding internal hemorrhodis, no specimens collected-Dr. Rosario Flores   • TOTAL ABDOMINAL HYSTERECTOMY WITH SALPINGO OOPHORECTOMY Bilateral 2004    Dr. Dominick Villarreal   • TOTAL HIP ARTHROPLASTY Right 2020    Procedure: TOTAL HIP ARTHROPLASTY;  Surgeon: Cesar Johnson MD;  Location: [facility] OR;  Service: Orthopedics;  Laterality: Right;   • UMBILICAL HERNIA REPAIR N/A 2004    Dr. Dominick Villarreal       SocialHx:  Social History     Occupational History   • Occupation:      Employer: [employer]   Tobacco Use   • Smoking status: Former Smoker     Packs/day: 1.00     Years: 5.00     Pack years: 5.00     Types: Cigarettes     Quit date:      Years since quittin.7   • Smokeless tobacco: Never Used   • Tobacco comment: caffiene twice a week   Substance and Sexual Activity   • Alcohol use: Yes     Comment: 3 drinks per week   • Drug use: No   • Sexual activity: Defer      Social History     Social History Narrative   • Not on file       FamHx:  Family History   Problem Relation Age of Onset   • Breast cancer Mother    • Hypertension Mother    • Heart disease Father    • Cancer Maternal Grandmother    • Heart disease Brother    • Malig Hyperthermia Neg Hx          Review of Systems:   A 14 point review of systems was performed, pertinent positives discussed above, all other systems are negative    Physical Exam: 65 y.o. female  Vital Signs :   Vitals:    20 1442   BP: 122/80   Temp: 98 °F (36.7 °C)   Weight: 98.4 kg (217 lb)   Height: 170.2 cm (67\")   PainSc:   3     General: Alert and Oriented x 3, No acute distress.  Psych: mood and affect appropriate; recent and remote memory intact  Eyes: conjunctiva clear; pupils equally round and reactive, sclera nonicteric  CV: no peripheral edema  Resp: normal respiratory effort  Skin: no rashes or wounds; normal turgor  Musculosketetal; " pain with hip range of motion. Positive stinchfeld test. No trochanteric  Tenderness.  Vascular: palpable distal pulses    Labs:    Appointment on 09/10/2020   Component Date Value Ref Range Status   • Glucose 09/10/2020 87  65 - 99 mg/dL Final   • BUN 09/10/2020 19  8 - 23 mg/dL Final   • Creatinine 09/10/2020 0.69  0.57 - 1.00 mg/dL Final   • Sodium 09/10/2020 141  136 - 145 mmol/L Final   • Potassium 09/10/2020 3.7  3.5 - 5.2 mmol/L Final   • Chloride 09/10/2020 104  98 - 107 mmol/L Final   • CO2 09/10/2020 27.3  22.0 - 29.0 mmol/L Final   • Calcium 09/10/2020 8.8  8.6 - 10.5 mg/dL Final   • eGFR Non African Amer 09/10/2020 86  >60 mL/min/1.73 Final   • BUN/Creatinine Ratio 09/10/2020 27.5* 7.0 - 25.0 Final   • Anion Gap 09/10/2020 9.7  5.0 - 15.0 mmol/L Final   • WBC 09/10/2020 4.58  3.40 - 10.80 10*3/mm3 Final   • RBC 09/10/2020 4.17  3.77 - 5.28 10*6/mm3 Final   • Hemoglobin 09/10/2020 12.3  12.0 - 15.9 g/dL Final   • Hematocrit 09/10/2020 36.3  34.0 - 46.6 % Final   • MCV 09/10/2020 87.1  79.0 - 97.0 fL Final   • MCH 09/10/2020 29.5  26.6 - 33.0 pg Final   • MCHC 09/10/2020 33.9  31.5 - 35.7 g/dL Final   • RDW 09/10/2020 12.8  12.3 - 15.4 % Final   • RDW-SD 09/10/2020 40.7  37.0 - 54.0 fl Final   • MPV 09/10/2020 9.1  6.0 - 12.0 fL Final   • Platelets 09/10/2020 200  140 - 450 10*3/mm3 Final   • Color, UA 09/10/2020 Yellow  Yellow, Straw Final   • Appearance, UA 09/10/2020 Clear  Clear Final   • pH, UA 09/10/2020 <=5.0  5.0 - 8.0 Final   • Specific Gravity, UA 09/10/2020 1.017  1.005 - 1.030 Final   • Glucose, UA 09/10/2020 Negative  Negative Final   • Ketones, UA 09/10/2020 Negative  Negative Final   • Bilirubin, UA 09/10/2020 Negative  Negative Final   • Blood, UA 09/10/2020 Negative  Negative Final   • Protein, UA 09/10/2020 Negative  Negative Final   • Leuk Esterase, UA 09/10/2020 Trace* Negative Final   • Nitrite, UA 09/10/2020 Negative  Negative Final   • Urobilinogen, UA 09/10/2020 0.2 E.U./dL  0.2 -  1.0 E.U./dL Final   • RBC, UA 09/10/2020 3-5* None Seen, 0-2 /HPF Final   • WBC, UA 09/10/2020 0-2  None Seen, 0-2 /HPF Final   • Bacteria, UA 09/10/2020 None Seen  None Seen /HPF Final   • Squamous Epithelial Cells, UA 09/10/2020 0-2  None Seen, 0-2 /HPF Final   • Hyaline Casts, UA 09/10/2020 None Seen  None Seen /LPF Final   • Methodology 09/10/2020 Automated Microscopy   Final   Office Visit on 08/28/2020   Component Date Value Ref Range Status   • Color 08/28/2020 Yellow  Yellow, Straw, Dark Yellow, Yolanda Final   • Clarity, UA 08/28/2020 Clear  Clear Final   • Specific Gravity  08/28/2020 1.010  1.005 - 1.030 Final   • pH, Urine 08/28/2020 5.0  5.0 - 8.0 Final   • Leukocytes 08/28/2020 Moderate (2+)* Negative Final   • Nitrite, UA 08/28/2020 Negative  Negative Final   • Protein, POC 08/28/2020 Negative  Negative mg/dL Final   • Glucose, UA 08/28/2020 Negative  Negative, 1000 mg/dL (3+) mg/dL Final   • Ketones, UA 08/28/2020 Negative  Negative Final   • Urobilinogen, UA 08/28/2020 Normal  Normal Final   • Bilirubin 08/28/2020 Negative  Negative Final   • Blood, UA 08/28/2020 Moderate* Negative Final   • Urine Culture 08/28/2020 Final report   Final   • Result 1 08/28/2020 Comment   Final    Comment: Mixed urogenital zo  50,000-100,000 colony forming units per mL       Xrays:  Xrays AP pelvis and a lateral of the Left hip were ordered and reviewed demonstrating  End stage hip OA with bone on bone articulation, subchondral cysts and periarticular osteophytes.    Assessment:  End-stage Left hip osteoarthritis. Conservative measures have failed.      Plan:  The plan is to proceed with Left Total Hip Replacement. The patient voiced understanding of the risks, benefits, and alternative forms of treatment that were discussed with Dr Johnson at the time of scheduling. 23     Angelica Montalvo, APRN  9/17/2020

## 2020-09-18 ENCOUNTER — LAB (OUTPATIENT)
Dept: LAB | Facility: HOSPITAL | Age: 65
End: 2020-09-18

## 2020-09-18 DIAGNOSIS — Z01.818 OTHER SPECIFIED PRE-OPERATIVE EXAMINATION: ICD-10-CM

## 2020-09-18 PROCEDURE — C9803 HOPD COVID-19 SPEC COLLECT: HCPCS

## 2020-09-18 PROCEDURE — U0004 COV-19 TEST NON-CDC HGH THRU: HCPCS

## 2020-09-19 LAB — SARS-COV-2 RNA RESP QL NAA+PROBE: NOT DETECTED

## 2020-09-21 ENCOUNTER — APPOINTMENT (OUTPATIENT)
Dept: GENERAL RADIOLOGY | Facility: HOSPITAL | Age: 65
End: 2020-09-21

## 2020-09-21 ENCOUNTER — HOSPITAL ENCOUNTER (OUTPATIENT)
Facility: HOSPITAL | Age: 65
Discharge: HOME HEALTH | End: 2020-09-22
Attending: ORTHOPAEDIC SURGERY | Admitting: ORTHOPAEDIC SURGERY

## 2020-09-21 ENCOUNTER — ANESTHESIA (OUTPATIENT)
Dept: PERIOP | Facility: HOSPITAL | Age: 65
End: 2020-09-21

## 2020-09-21 ENCOUNTER — ANESTHESIA EVENT (OUTPATIENT)
Dept: PERIOP | Facility: HOSPITAL | Age: 65
End: 2020-09-21

## 2020-09-21 DIAGNOSIS — M16.12 PRIMARY OSTEOARTHRITIS OF LEFT HIP: ICD-10-CM

## 2020-09-21 PROBLEM — M16.9 OA (OSTEOARTHRITIS) OF HIP: Status: ACTIVE | Noted: 2020-09-21

## 2020-09-21 PROCEDURE — 73501 X-RAY EXAM HIP UNI 1 VIEW: CPT

## 2020-09-21 PROCEDURE — 25010000002 PROPOFOL 10 MG/ML EMULSION: Performed by: NURSE ANESTHETIST, CERTIFIED REGISTERED

## 2020-09-21 PROCEDURE — 25010000002 EPINEPHRINE 30 MG/30ML SOLUTION: Performed by: ORTHOPAEDIC SURGERY

## 2020-09-21 PROCEDURE — 25010000002 KETOROLAC TROMETHAMINE PER 15 MG: Performed by: ORTHOPAEDIC SURGERY

## 2020-09-21 PROCEDURE — 25010000002 FENTANYL CITRATE (PF) 100 MCG/2ML SOLUTION: Performed by: NURSE ANESTHETIST, CERTIFIED REGISTERED

## 2020-09-21 PROCEDURE — G0378 HOSPITAL OBSERVATION PER HR: HCPCS

## 2020-09-21 PROCEDURE — C1889 IMPLANT/INSERT DEVICE, NOC: HCPCS | Performed by: ORTHOPAEDIC SURGERY

## 2020-09-21 PROCEDURE — 25010000002 ROPIVACAINE PER 1 MG: Performed by: ORTHOPAEDIC SURGERY

## 2020-09-21 PROCEDURE — 0: Performed by: ORTHOPAEDIC SURGERY

## 2020-09-21 PROCEDURE — 25010000002 PHENYLEPHRINE PER 1 ML: Performed by: NURSE ANESTHETIST, CERTIFIED REGISTERED

## 2020-09-21 PROCEDURE — 25010000002 HYDROMORPHONE PER 4 MG: Performed by: NURSE ANESTHETIST, CERTIFIED REGISTERED

## 2020-09-21 PROCEDURE — C1776 JOINT DEVICE (IMPLANTABLE): HCPCS | Performed by: ORTHOPAEDIC SURGERY

## 2020-09-21 PROCEDURE — 25010000002 KETOROLAC TROMETHAMINE PER 15 MG: Performed by: NURSE PRACTITIONER

## 2020-09-21 PROCEDURE — 25010000003 CEFAZOLIN IN DEXTROSE 2-4 GM/100ML-% SOLUTION: Performed by: ORTHOPAEDIC SURGERY

## 2020-09-21 PROCEDURE — 27130 TOTAL HIP ARTHROPLASTY: CPT | Performed by: ORTHOPAEDIC SURGERY

## 2020-09-21 PROCEDURE — 27130 TOTAL HIP ARTHROPLASTY: CPT | Performed by: NURSE PRACTITIONER

## 2020-09-21 PROCEDURE — 25010000002 DEXAMETHASONE PER 1 MG: Performed by: NURSE ANESTHETIST, CERTIFIED REGISTERED

## 2020-09-21 PROCEDURE — 25010000002 ONDANSETRON PER 1 MG: Performed by: NURSE ANESTHETIST, CERTIFIED REGISTERED

## 2020-09-21 PROCEDURE — 25010000002 VANCOMYCIN 10 G RECONSTITUTED SOLUTION: Performed by: ORTHOPAEDIC SURGERY

## 2020-09-21 PROCEDURE — 25010000002 MORPHINE PER 10 MG: Performed by: ORTHOPAEDIC SURGERY

## 2020-09-21 PROCEDURE — 25010000002 VANCOMYCIN 10 G RECONSTITUTED SOLUTION: Performed by: NURSE PRACTITIONER

## 2020-09-21 DEVICE — R3 3 HOLE ACETABULAR SHELL 56MM
Type: IMPLANTABLE DEVICE | Site: ACETABULUM | Status: FUNCTIONAL
Brand: R3 ACETABULAR

## 2020-09-21 DEVICE — POLARSTEM STANDARD NON-CEMENTED                                    WITH TI/HA 3
Type: IMPLANTABLE DEVICE | Site: HIP | Status: FUNCTIONAL
Brand: POLARSTEM

## 2020-09-21 DEVICE — REFLECTION SPHERICAL HEAD SCREW 30MM
Type: IMPLANTABLE DEVICE | Site: ACETABULUM | Status: FUNCTIONAL
Brand: REFLECTION

## 2020-09-21 DEVICE — OXINIUM FEMORAL HEAD 12/14 TAPER                                    36 MM M/+4
Type: IMPLANTABLE DEVICE | Site: HIP | Status: FUNCTIONAL
Brand: OXINIUM

## 2020-09-21 DEVICE — IMPLANTABLE DEVICE: Type: IMPLANTABLE DEVICE | Site: ACETABULUM | Status: FUNCTIONAL

## 2020-09-21 DEVICE — DEV CONTRL TISS STRATAFIX SYMM PDS PLUS VIL CT-1 60CM: Type: IMPLANTABLE DEVICE | Site: HIP | Status: FUNCTIONAL

## 2020-09-21 DEVICE — R3 20 DEGREE XLPE ACETABULAR LINER                                    36MM ID X OD 56MM
Type: IMPLANTABLE DEVICE | Site: ACETABULUM | Status: FUNCTIONAL
Brand: R3

## 2020-09-21 DEVICE — DEV CONTRL TISS STRATAFIXSPIRALMNCRYL PLSPS2 REV3/0 45CM: Type: IMPLANTABLE DEVICE | Site: HIP | Status: FUNCTIONAL

## 2020-09-21 RX ORDER — HYDROCODONE BITARTRATE AND ACETAMINOPHEN 7.5; 325 MG/1; MG/1
1 TABLET ORAL ONCE AS NEEDED
Status: DISCONTINUED | OUTPATIENT
Start: 2020-09-21 | End: 2020-09-21 | Stop reason: HOSPADM

## 2020-09-21 RX ORDER — SODIUM CHLORIDE 0.9 % (FLUSH) 0.9 %
3-10 SYRINGE (ML) INJECTION AS NEEDED
Status: DISCONTINUED | OUTPATIENT
Start: 2020-09-21 | End: 2020-09-21 | Stop reason: HOSPADM

## 2020-09-21 RX ORDER — HYDROMORPHONE HYDROCHLORIDE 1 MG/ML
0.5 INJECTION, SOLUTION INTRAMUSCULAR; INTRAVENOUS; SUBCUTANEOUS
Status: DISCONTINUED | OUTPATIENT
Start: 2020-09-21 | End: 2020-09-21 | Stop reason: HOSPADM

## 2020-09-21 RX ORDER — CEFAZOLIN SODIUM 2 G/100ML
2 INJECTION, SOLUTION INTRAVENOUS ONCE
Status: COMPLETED | OUTPATIENT
Start: 2020-09-21 | End: 2020-09-21

## 2020-09-21 RX ORDER — PREGABALIN 75 MG/1
150 CAPSULE ORAL ONCE
Status: COMPLETED | OUTPATIENT
Start: 2020-09-21 | End: 2020-09-21

## 2020-09-21 RX ORDER — FENTANYL CITRATE 50 UG/ML
50 INJECTION, SOLUTION INTRAMUSCULAR; INTRAVENOUS
Status: DISCONTINUED | OUTPATIENT
Start: 2020-09-21 | End: 2020-09-21 | Stop reason: HOSPADM

## 2020-09-21 RX ORDER — UREA 10 %
3 LOTION (ML) TOPICAL NIGHTLY PRN
Status: DISCONTINUED | OUTPATIENT
Start: 2020-09-21 | End: 2020-09-22 | Stop reason: HOSPADM

## 2020-09-21 RX ORDER — HYDRALAZINE HYDROCHLORIDE 20 MG/ML
5 INJECTION INTRAMUSCULAR; INTRAVENOUS
Status: DISCONTINUED | OUTPATIENT
Start: 2020-09-21 | End: 2020-09-21 | Stop reason: HOSPADM

## 2020-09-21 RX ORDER — POLYETHYLENE GLYCOL 3350 17 G/17G
17 POWDER, FOR SOLUTION ORAL 2 TIMES DAILY
Status: DISCONTINUED | OUTPATIENT
Start: 2020-09-21 | End: 2020-09-22 | Stop reason: HOSPADM

## 2020-09-21 RX ORDER — PANTOPRAZOLE SODIUM 40 MG/1
40 TABLET, DELAYED RELEASE ORAL EVERY MORNING
Qty: 14 TABLET | Refills: 0 | Status: SHIPPED | OUTPATIENT
Start: 2020-09-22 | End: 2020-10-06

## 2020-09-21 RX ORDER — OXYCODONE AND ACETAMINOPHEN 7.5; 325 MG/1; MG/1
1 TABLET ORAL ONCE AS NEEDED
Status: DISCONTINUED | OUTPATIENT
Start: 2020-09-21 | End: 2020-09-21 | Stop reason: HOSPADM

## 2020-09-21 RX ORDER — SODIUM CHLORIDE 0.9 % (FLUSH) 0.9 %
3 SYRINGE (ML) INJECTION EVERY 12 HOURS SCHEDULED
Status: DISCONTINUED | OUTPATIENT
Start: 2020-09-21 | End: 2020-09-21 | Stop reason: HOSPADM

## 2020-09-21 RX ORDER — SODIUM CHLORIDE, SODIUM LACTATE, POTASSIUM CHLORIDE, CALCIUM CHLORIDE 600; 310; 30; 20 MG/100ML; MG/100ML; MG/100ML; MG/100ML
9 INJECTION, SOLUTION INTRAVENOUS CONTINUOUS
Status: DISCONTINUED | OUTPATIENT
Start: 2020-09-21 | End: 2020-09-21 | Stop reason: HOSPADM

## 2020-09-21 RX ORDER — HYDROMORPHONE HCL 110MG/55ML
PATIENT CONTROLLED ANALGESIA SYRINGE INTRAVENOUS AS NEEDED
Status: DISCONTINUED | OUTPATIENT
Start: 2020-09-21 | End: 2020-09-21 | Stop reason: SURG

## 2020-09-21 RX ORDER — WOUND DRESSING ADHESIVE - LIQUID
LIQUID MISCELLANEOUS AS NEEDED
Status: DISCONTINUED | OUTPATIENT
Start: 2020-09-21 | End: 2020-09-21 | Stop reason: HOSPADM

## 2020-09-21 RX ORDER — MELOXICAM 15 MG/1
15 TABLET ORAL DAILY
Qty: 15 TABLET | Refills: 0 | Status: SHIPPED | OUTPATIENT
Start: 2020-09-22 | End: 2020-10-07

## 2020-09-21 RX ORDER — HYDROCODONE BITARTRATE AND ACETAMINOPHEN 7.5; 325 MG/1; MG/1
1 TABLET ORAL EVERY 4 HOURS PRN
Status: DISCONTINUED | OUTPATIENT
Start: 2020-09-21 | End: 2020-09-22 | Stop reason: HOSPADM

## 2020-09-21 RX ORDER — ACETAMINOPHEN 10 MG/ML
1000 INJECTION, SOLUTION INTRAVENOUS ONCE
Status: DISCONTINUED | OUTPATIENT
Start: 2020-09-21 | End: 2020-09-21 | Stop reason: HOSPADM

## 2020-09-21 RX ORDER — ONDANSETRON 2 MG/ML
4 INJECTION INTRAMUSCULAR; INTRAVENOUS EVERY 6 HOURS PRN
Status: DISCONTINUED | OUTPATIENT
Start: 2020-09-21 | End: 2020-09-22 | Stop reason: HOSPADM

## 2020-09-21 RX ORDER — ONDANSETRON 4 MG/1
4 TABLET, FILM COATED ORAL EVERY 6 HOURS PRN
Status: DISCONTINUED | OUTPATIENT
Start: 2020-09-21 | End: 2020-09-22 | Stop reason: HOSPADM

## 2020-09-21 RX ORDER — MELOXICAM 15 MG/1
15 TABLET ORAL DAILY
Status: DISCONTINUED | OUTPATIENT
Start: 2020-09-22 | End: 2020-09-22 | Stop reason: HOSPADM

## 2020-09-21 RX ORDER — MELOXICAM 15 MG/1
15 TABLET ORAL ONCE
Status: COMPLETED | OUTPATIENT
Start: 2020-09-21 | End: 2020-09-21

## 2020-09-21 RX ORDER — HYDROCODONE BITARTRATE AND ACETAMINOPHEN 7.5; 325 MG/1; MG/1
1-2 TABLET ORAL EVERY 6 HOURS PRN
Qty: 42 TABLET | Refills: 0 | Status: SHIPPED | OUTPATIENT
Start: 2020-09-21 | End: 2020-10-12

## 2020-09-21 RX ORDER — ROCURONIUM BROMIDE 10 MG/ML
INJECTION, SOLUTION INTRAVENOUS AS NEEDED
Status: DISCONTINUED | OUTPATIENT
Start: 2020-09-21 | End: 2020-09-21 | Stop reason: SURG

## 2020-09-21 RX ORDER — EPHEDRINE SULFATE 50 MG/ML
5 INJECTION, SOLUTION INTRAVENOUS ONCE AS NEEDED
Status: DISCONTINUED | OUTPATIENT
Start: 2020-09-21 | End: 2020-09-21 | Stop reason: HOSPADM

## 2020-09-21 RX ORDER — KETOROLAC TROMETHAMINE 30 MG/ML
30 INJECTION, SOLUTION INTRAMUSCULAR; INTRAVENOUS EVERY 8 HOURS
Status: DISCONTINUED | OUTPATIENT
Start: 2020-09-21 | End: 2020-09-22 | Stop reason: HOSPADM

## 2020-09-21 RX ORDER — DEXAMETHASONE SODIUM PHOSPHATE 10 MG/ML
INJECTION INTRAMUSCULAR; INTRAVENOUS AS NEEDED
Status: DISCONTINUED | OUTPATIENT
Start: 2020-09-21 | End: 2020-09-21 | Stop reason: SURG

## 2020-09-21 RX ORDER — NALOXONE HCL 0.4 MG/ML
0.2 VIAL (ML) INJECTION AS NEEDED
Status: DISCONTINUED | OUTPATIENT
Start: 2020-09-21 | End: 2020-09-21 | Stop reason: HOSPADM

## 2020-09-21 RX ORDER — FLUMAZENIL 0.1 MG/ML
0.2 INJECTION INTRAVENOUS AS NEEDED
Status: DISCONTINUED | OUTPATIENT
Start: 2020-09-21 | End: 2020-09-21 | Stop reason: HOSPADM

## 2020-09-21 RX ORDER — ASPIRIN 81 MG/1
81 TABLET ORAL EVERY 12 HOURS SCHEDULED
Status: DISCONTINUED | OUTPATIENT
Start: 2020-09-22 | End: 2020-09-22 | Stop reason: HOSPADM

## 2020-09-21 RX ORDER — FAMOTIDINE 10 MG/ML
20 INJECTION, SOLUTION INTRAVENOUS ONCE
Status: DISCONTINUED | OUTPATIENT
Start: 2020-09-21 | End: 2020-09-21 | Stop reason: HOSPADM

## 2020-09-21 RX ORDER — DIPHENHYDRAMINE HCL 25 MG
25 CAPSULE ORAL
Status: DISCONTINUED | OUTPATIENT
Start: 2020-09-21 | End: 2020-09-21 | Stop reason: HOSPADM

## 2020-09-21 RX ORDER — HYDROCODONE BITARTRATE AND ACETAMINOPHEN 7.5; 325 MG/1; MG/1
2 TABLET ORAL EVERY 4 HOURS PRN
Status: DISCONTINUED | OUTPATIENT
Start: 2020-09-21 | End: 2020-09-22 | Stop reason: HOSPADM

## 2020-09-21 RX ORDER — LIDOCAINE HYDROCHLORIDE 20 MG/ML
INJECTION, SOLUTION INFILTRATION; PERINEURAL AS NEEDED
Status: DISCONTINUED | OUTPATIENT
Start: 2020-09-21 | End: 2020-09-21 | Stop reason: SURG

## 2020-09-21 RX ORDER — DIPHENHYDRAMINE HYDROCHLORIDE 50 MG/ML
12.5 INJECTION INTRAMUSCULAR; INTRAVENOUS
Status: DISCONTINUED | OUTPATIENT
Start: 2020-09-21 | End: 2020-09-21 | Stop reason: HOSPADM

## 2020-09-21 RX ORDER — POLYETHYLENE GLYCOL 3350 17 G/17G
17 POWDER, FOR SOLUTION ORAL 2 TIMES DAILY
Qty: 510 G | Refills: 0 | Status: SHIPPED | OUTPATIENT
Start: 2020-09-21 | End: 2020-10-06

## 2020-09-21 RX ORDER — MIDAZOLAM HYDROCHLORIDE 1 MG/ML
1 INJECTION INTRAMUSCULAR; INTRAVENOUS
Status: DISCONTINUED | OUTPATIENT
Start: 2020-09-21 | End: 2020-09-21 | Stop reason: HOSPADM

## 2020-09-21 RX ORDER — TRANEXAMIC ACID 100 MG/ML
INJECTION, SOLUTION INTRAVENOUS AS NEEDED
Status: DISCONTINUED | OUTPATIENT
Start: 2020-09-21 | End: 2020-09-21 | Stop reason: SURG

## 2020-09-21 RX ORDER — FENTANYL CITRATE 50 UG/ML
INJECTION, SOLUTION INTRAMUSCULAR; INTRAVENOUS AS NEEDED
Status: DISCONTINUED | OUTPATIENT
Start: 2020-09-21 | End: 2020-09-21 | Stop reason: SURG

## 2020-09-21 RX ORDER — LIDOCAINE HYDROCHLORIDE 10 MG/ML
0.5 INJECTION, SOLUTION EPIDURAL; INFILTRATION; INTRACAUDAL; PERINEURAL ONCE AS NEEDED
Status: DISCONTINUED | OUTPATIENT
Start: 2020-09-21 | End: 2020-09-21 | Stop reason: HOSPADM

## 2020-09-21 RX ORDER — PANTOPRAZOLE SODIUM 40 MG/1
40 TABLET, DELAYED RELEASE ORAL EVERY MORNING
Status: DISCONTINUED | OUTPATIENT
Start: 2020-09-22 | End: 2020-09-22 | Stop reason: HOSPADM

## 2020-09-21 RX ORDER — CHOLECALCIFEROL (VITAMIN D3) 125 MCG
5 CAPSULE ORAL NIGHTLY PRN
COMMUNITY

## 2020-09-21 RX ORDER — ONDANSETRON 2 MG/ML
4 INJECTION INTRAMUSCULAR; INTRAVENOUS ONCE AS NEEDED
Status: DISCONTINUED | OUTPATIENT
Start: 2020-09-21 | End: 2020-09-21 | Stop reason: HOSPADM

## 2020-09-21 RX ORDER — ONDANSETRON 4 MG/1
4 TABLET, FILM COATED ORAL EVERY 6 HOURS PRN
Qty: 10 TABLET | Refills: 0 | Status: SHIPPED | OUTPATIENT
Start: 2020-09-21 | End: 2020-10-12

## 2020-09-21 RX ORDER — ONDANSETRON 2 MG/ML
INJECTION INTRAMUSCULAR; INTRAVENOUS AS NEEDED
Status: DISCONTINUED | OUTPATIENT
Start: 2020-09-21 | End: 2020-09-21 | Stop reason: SURG

## 2020-09-21 RX ORDER — MAGNESIUM HYDROXIDE 1200 MG/15ML
LIQUID ORAL AS NEEDED
Status: DISCONTINUED | OUTPATIENT
Start: 2020-09-21 | End: 2020-09-21 | Stop reason: HOSPADM

## 2020-09-21 RX ORDER — FAMOTIDINE 10 MG/ML
20 INJECTION, SOLUTION INTRAVENOUS ONCE
Status: COMPLETED | OUTPATIENT
Start: 2020-09-21 | End: 2020-09-21

## 2020-09-21 RX ORDER — ASPIRIN 81 MG/1
81 TABLET ORAL EVERY 12 HOURS SCHEDULED
Qty: 60 TABLET | Refills: 0 | Status: SHIPPED | OUTPATIENT
Start: 2020-09-22 | End: 2020-10-22

## 2020-09-21 RX ORDER — PROPOFOL 10 MG/ML
VIAL (ML) INTRAVENOUS AS NEEDED
Status: DISCONTINUED | OUTPATIENT
Start: 2020-09-21 | End: 2020-09-21 | Stop reason: SURG

## 2020-09-21 RX ORDER — LABETALOL HYDROCHLORIDE 5 MG/ML
5 INJECTION, SOLUTION INTRAVENOUS
Status: DISCONTINUED | OUTPATIENT
Start: 2020-09-21 | End: 2020-09-21 | Stop reason: HOSPADM

## 2020-09-21 RX ORDER — ACETAMINOPHEN 10 MG/ML
1000 INJECTION, SOLUTION INTRAVENOUS ONCE
Status: COMPLETED | OUTPATIENT
Start: 2020-09-21 | End: 2020-09-21

## 2020-09-21 RX ADMIN — PHENYLEPHRINE HYDROCHLORIDE 100 MCG: 10 INJECTION INTRAVENOUS at 12:46

## 2020-09-21 RX ADMIN — FAMOTIDINE 20 MG: 10 INJECTION, SOLUTION INTRAVENOUS at 12:02

## 2020-09-21 RX ADMIN — VANCOMYCIN HYDROCHLORIDE 1500 MG: 10 INJECTION, POWDER, LYOPHILIZED, FOR SOLUTION INTRAVENOUS at 11:50

## 2020-09-21 RX ADMIN — MUPIROCIN 1 APPLICATION: 20 OINTMENT TOPICAL at 20:42

## 2020-09-21 RX ADMIN — PREGABALIN 150 MG: 75 CAPSULE ORAL at 11:50

## 2020-09-21 RX ADMIN — KETOROLAC TROMETHAMINE 30 MG: 30 INJECTION, SOLUTION INTRAMUSCULAR at 14:49

## 2020-09-21 RX ADMIN — FENTANYL CITRATE 50 MCG: 50 INJECTION, SOLUTION INTRAMUSCULAR; INTRAVENOUS at 14:18

## 2020-09-21 RX ADMIN — FENTANYL CITRATE 50 MCG: 50 INJECTION INTRAMUSCULAR; INTRAVENOUS at 13:40

## 2020-09-21 RX ADMIN — ROCURONIUM BROMIDE 40 MG: 10 INJECTION INTRAVENOUS at 12:17

## 2020-09-21 RX ADMIN — DEXAMETHASONE SODIUM PHOSPHATE 8 MG: 10 INJECTION INTRAMUSCULAR; INTRAVENOUS at 12:26

## 2020-09-21 RX ADMIN — HYDROMORPHONE HYDROCHLORIDE 0.5 MG: 1 INJECTION, SOLUTION INTRAMUSCULAR; INTRAVENOUS; SUBCUTANEOUS at 14:57

## 2020-09-21 RX ADMIN — FENTANYL CITRATE 50 MCG: 50 INJECTION INTRAMUSCULAR; INTRAVENOUS at 13:46

## 2020-09-21 RX ADMIN — TRANEXAMIC ACID 1000 MG: 100 INJECTION, SOLUTION INTRAVENOUS at 12:32

## 2020-09-21 RX ADMIN — PHENYLEPHRINE HYDROCHLORIDE 100 MCG: 10 INJECTION INTRAVENOUS at 11:22

## 2020-09-21 RX ADMIN — KETOROLAC TROMETHAMINE 30 MG: 30 INJECTION, SOLUTION INTRAMUSCULAR at 22:34

## 2020-09-21 RX ADMIN — HYDROMORPHONE HYDROCHLORIDE 0.5 MG: 1 INJECTION, SOLUTION INTRAMUSCULAR; INTRAVENOUS; SUBCUTANEOUS at 15:16

## 2020-09-21 RX ADMIN — PROPOFOL 200 MG: 10 INJECTION, EMULSION INTRAVENOUS at 12:17

## 2020-09-21 RX ADMIN — VANCOMYCIN HYDROCHLORIDE 1500 MG: 10 INJECTION, POWDER, LYOPHILIZED, FOR SOLUTION INTRAVENOUS at 22:34

## 2020-09-21 RX ADMIN — TRANEXAMIC ACID 1000 MG: 100 INJECTION, SOLUTION INTRAVENOUS at 13:23

## 2020-09-21 RX ADMIN — SODIUM CHLORIDE, POTASSIUM CHLORIDE, SODIUM LACTATE AND CALCIUM CHLORIDE 9 ML/HR: 600; 310; 30; 20 INJECTION, SOLUTION INTRAVENOUS at 11:31

## 2020-09-21 RX ADMIN — HYDROMORPHONE HYDROCHLORIDE 0.5 MG: 2 INJECTION, SOLUTION INTRAMUSCULAR; INTRAVENOUS; SUBCUTANEOUS at 13:53

## 2020-09-21 RX ADMIN — ACETAMINOPHEN 1000 MG: 10 INJECTION, SOLUTION INTRAVENOUS at 12:29

## 2020-09-21 RX ADMIN — ONDANSETRON HYDROCHLORIDE 4 MG: 2 SOLUTION INTRAMUSCULAR; INTRAVENOUS at 13:32

## 2020-09-21 RX ADMIN — LIDOCAINE HYDROCHLORIDE 60 MG: 20 INJECTION, SOLUTION INFILTRATION; PERINEURAL at 12:17

## 2020-09-21 RX ADMIN — FENTANYL CITRATE 50 MCG: 50 INJECTION INTRAMUSCULAR; INTRAVENOUS at 14:02

## 2020-09-21 RX ADMIN — HYDROMORPHONE HYDROCHLORIDE 0.5 MG: 2 INJECTION, SOLUTION INTRAMUSCULAR; INTRAVENOUS; SUBCUTANEOUS at 13:57

## 2020-09-21 RX ADMIN — FENTANYL CITRATE 50 MCG: 50 INJECTION INTRAMUSCULAR; INTRAVENOUS at 14:06

## 2020-09-21 RX ADMIN — PHENYLEPHRINE HYDROCHLORIDE 100 MCG: 10 INJECTION INTRAVENOUS at 11:31

## 2020-09-21 RX ADMIN — MELOXICAM 15 MG: 15 TABLET ORAL at 11:50

## 2020-09-21 RX ADMIN — HYDROMORPHONE HYDROCHLORIDE 0.5 MG: 1 INJECTION, SOLUTION INTRAMUSCULAR; INTRAVENOUS; SUBCUTANEOUS at 14:43

## 2020-09-21 RX ADMIN — HYDROCODONE BITARTRATE AND ACETAMINOPHEN 1 TABLET: 7.5; 325 TABLET ORAL at 18:41

## 2020-09-21 RX ADMIN — POLYETHYLENE GLYCOL 3350 17 G: 17 POWDER, FOR SOLUTION ORAL at 20:42

## 2020-09-21 RX ADMIN — FENTANYL CITRATE 50 MCG: 50 INJECTION, SOLUTION INTRAMUSCULAR; INTRAVENOUS at 14:23

## 2020-09-21 RX ADMIN — CEFAZOLIN SODIUM 2 G: 2 INJECTION, SOLUTION INTRAVENOUS at 12:10

## 2020-09-21 NOTE — ANESTHESIA POSTPROCEDURE EVALUATION
Patient: Liza Aaron    Procedure Summary     Date: 09/21/20 Room / Location: [facility] OR [address] [place] [address] MAIN OR    Anesthesia Start: 1209 Anesthesia Stop: 1406    Procedure: TOTAL HIP ARTHROPLASTY (Left Hip) Diagnosis:       Primary osteoarthritis of left hip      (Primary osteoarthritis of left hip [M16.12])    Surgeon: Cesar Johnson MD Provider: Gregorio Calderon MD    Anesthesia Type: general ASA Status: 3          Anesthesia Type: general    Vitals  Vitals Value Taken Time   /62 09/21/20 1430   Temp 36.6 °C (97.9 °F) 09/21/20 1400   Pulse 62 09/21/20 1432   Resp 20 09/21/20 1430   SpO2 88 % 09/21/20 1432   Vitals shown include unvalidated device data.        Post Anesthesia Care and Evaluation    Patient location during evaluation: PACU  Patient participation: complete - patient participated  Level of consciousness: awake and alert  Pain management: adequate  Airway patency: patent  Anesthetic complications: No anesthetic complications    Cardiovascular status: acceptable  Respiratory status: acceptable  Hydration status: acceptable    Comments: --------------------            09/21/20               1430     --------------------   BP:       104/62     Pulse:      63       Resp:       20       Temp:                SpO2:      100%     --------------------

## 2020-09-21 NOTE — ANESTHESIA PROCEDURE NOTES
Airway  Urgency: elective    Date/Time: 9/21/2020 12:20 PM  Airway not difficult    General Information and Staff    Patient location during procedure: OR  Anesthesiologist: Gregorio Calderon MD  CRNA: Rachael Olguin CRNA    Indications and Patient Condition  Indications for airway management: airway protection    Preoxygenated: yes  MILS maintained throughout  Mask difficulty assessment: 1 - vent by mask    Final Airway Details  Final airway type: endotracheal airway      Successful airway: ETT  Cuffed: yes   Successful intubation technique: direct laryngoscopy  Facilitating devices/methods: intubating stylet  Endotracheal tube insertion site: oral  Blade: Mendes  Blade size: 2  ETT size (mm): 7.0  Cormack-Lehane Classification: grade I - full view of glottis  Placement verified by: chest auscultation and capnometry   Cuff volume (mL): 8  Measured from: lips  ETT/EBT  to lips (cm): 21  Number of attempts at approach: 1  Assessment: lips, teeth, and gum same as pre-op and atraumatic intubation    Additional Comments  Pt preoxygenated, SIVI, bag mask vent, ATETI, dentition as before

## 2020-09-21 NOTE — PLAN OF CARE
Goal Outcome Evaluation:  Plan of Care Reviewed With: patient      S/P LTHA. VSS. JAMIE dressing c/d/I. Up to chair today. Voiding per BSC. Pt still needs to ambulate. Plans to d/c home with HH tomorrow.

## 2020-09-21 NOTE — OP NOTE
Name: Liza Aaron  YOB: 1955    DATE OF SURGERY: 9/21/2020    PREOPERATIVE DIAGNOSIS: Left hip end-stage osteoarthritis    POSTOPERATIVE DIAGNOSIS: Left hip end-stage osteoarthritis    PROCEDURE PERFORMED: Left  total hip replacement    SURGEON: Cesar Johnson M.D.    ASSISTANT: OLVIN LICONA    IMPLANTS:  Implant Name Type Inv. Item Serial No.  Lot No. LRB No. Used Action   SUT CONTRL TISS STRATAFIX SYMM PDS PLUS ZULAY CT-1 60CM - [id number] Implant SUT CONTRL TISS STRATAFIX SYMM PDS PLUS ZULAY CT-1 60CM  [employer]  DIV OF J AND J [id number] Left 1 Implanted   SUT CONTRL TISS STRATAFIXSPIRALMNCRYL PLSPS2 REV3/0 45CM - [id number] Implant SUT CONTRL TISS STRATAFIXSPIRALMNCRYL PLSPS2 REV3/0 45CM  ETH[employer]  DIV OF J AND J [id number] Left 1 Implanted   LINER ACET R3 XLPE 20D [id number] - [id number] Implant LINER ACET R3 XLPE 20D [id number]  JAMES AND NEPHEW [id number] Left 1 Implanted   SHLL ACET R3 3H STD 56MM - [id number] Implant SHLL ACET R3 3H STD 56MM  JAMES AND NEPHEW [id number] Left 1 Implanted   SCRW SPH HD REFLECTION 6.5X30MM - [id number] Implant SCRW SPH HD REFLECTION 6.5X30MM  JAMES AND NEPHEW [id number] Left 1 Implanted   SCRW SPH HD REFLECTION 6.5X30MM - [id number] Implant SCRW SPH HD REFLECTION 6.5X30MM  JAMES AND NEPHEW [id number] Left 1 Implanted   STEM POLARSTEM CMTLESS STD TI/HA 3 - [id number] Implant STEM POLARSTEM CMTLESS STD TI/HA 3  SMITH AND NEPHEW [id number] Left 1 Implanted   HD FEM/HIP OXINIUM TPR 12/14 36MM PLS4 - [id number] Implant HD FEM/HIP OXINIUM TPR 12/14 36MM PLS4  JAMES AND NEPHEW [id number] Left 1 Implanted       Estimated Blood Loss: 200cc  Specimens : none  Complications: none    DESCRIPTION OF PROCEDURE:    The patient was taken to the operating room and placed in the supine position. A sequential compression device was carefully placed on the non-operative leg. Preoperative antibiotics were administered. Surgical time out was performed. After adequate induction of anesthesia the  patient was then transferred onto the  table and positioned appropriately in the lateral decubitus position. The hip was then prepped and draped in the usual sterile fashion.    A posterior lateral surgical incision was then made.  The gluteus leidy fascia was then divided the gluteus leidy muscle was then bluntly dissected.  A Charnley self-retaining retractor was then placed.  A posterior capsulotomy was then performed.  The superior limb was divided in line with the piriformis tendon.  The hip was then dislocated.  There were end-stage arthritic findings.  The femoral neck osteotomy was performed according to the level dictated by the template.  The acetabulum was exposed with standard retractors.  The labrum and pulvinar were then excised.  The cup was then medialized with the starting acetabular reamer to the medial wall.  I then progressively reamed up to the appropriate size and 45° of abduction and 20° of anteversion. Line to line reaming was performed. At this point the bone was nicely prepared there was excellent bleeding bone. We then impacted the acetabular component in 45 of abduction and 20 of anteversion the cup was stable.  Per routine we augmented the fixation with 2 screws in the posterior and superior quadrant  The final liner was then placed and it locked in nicely.  At this point we injected the hip with anesthetic cocktail solution.  We then turned our attention to the femur.   Standard retractors were placed to expose the femur.  The box osteotome was used to create a starting point.  The canal finder was then used to sound the canal.  The lateralizing reamer was then used to lateralize into the greater trochanter.  We progressively reamed and broached until the broach was very solid to axial and rotational stresses.  At this point we placed the trial neck and head hip was very stable to flexion and internal rotation as well as extension and external rotation.  The leg lengths were  measured to be equal.  We then removed the trial components,copiously irrigated the hip, and then impacted the final stem.  At this point we then trialed and chose the final head. The head was placed on a clean dry taper.  The leg lengths were again measured to be equal.  At this point the hip was copiously irrigated with pulse lavage and the capsule was then reapproximated with #1 PDS suture, and the remainder of the hip was closed in multiple layers in standard fashion..  There was excellent hemostasis. We placed a one-eighth inch Hemovac drain.  Sterile dressing were applied. At the end of the case, the sponge and needle counts were reported as being correct. There were no known complications. The patient was then transported to the recovery room.      Cesar Johnson M.D.  9/21/2020

## 2020-09-21 NOTE — ANESTHESIA PREPROCEDURE EVALUATION
Anesthesia Evaluation     Patient summary reviewed and Nursing notes reviewed                Airway   Mallampati: II  TM distance: >3 FB  No difficulty expected  Dental      Pulmonary    (+) a smoker Former,   Cardiovascular     ECG reviewed  Rate: normal    (+) dysrhythmias PAC, hyperlipidemia,       Neuro/Psych  (+) seizures,     GI/Hepatic/Renal/Endo    (+) obesity,       Musculoskeletal     Abdominal    Substance History - negative use     OB/GYN negative ob/gyn ROS         Other   arthritis,                    Anesthesia Plan    ASA 3     general     intravenous induction     Anesthetic plan, all risks, benefits, and alternatives have been provided, discussed and informed consent has been obtained with: patient.

## 2020-09-21 NOTE — PROGRESS NOTES
Discharge Planning Assessment  [facility]     Patient Name: Liza Aaron  MRN: [id number]  Today's Date: 9/21/2020    Admit Date: 9/21/2020    Discharge Needs Assessment     Row Name 09/21/20 8518       Living Environment    Lives With  spouse    Name(s) of Who Lives With Patient  Handy lyoa ( [phone])    Current Living Arrangements  home/apartment/condo    Primary Care Provided by  self    Provides Primary Care For  no one    Family Caregiver if Needed  spouse    Family Caregiver Names  , Handy Aaron    Quality of Family Relationships  helpful;involved;supportive    Able to Return to Prior Arrangements  yes       Resource/Environmental Concerns    Resource/Environmental Concerns  none    Transportation Concerns  car, none       Transition Planning    Patient/Family Anticipates Transition to  home with help/services    Patient/Family Anticipated Services at Transition      Transportation Anticipated  family or friend will provide       Discharge Needs Assessment    Readmission Within the Last 30 Days  no previous admission in last 30 days    Equipment Currently Used at Home  none    Anticipated Changes Related to Illness  none        Discharge Plan     Row Name 09/21/20 7000       Plan    Plan  home with [facility], then to outpatient @ [place]    Plan Comments  Spoke with patient and husbnad Handy Aaron ( [phone]), at bedside.  Facesheet, PCP and pharmacy verified.  Patient lives in a multi-level home, but everything she needs is on the main floor.  There are 3 steps to enter through the garage and a handrail is present.  Patient has a cane, walker, and elevated commode from a previous surgery.  She has used [facility]  in the past and would like to have them follow again.  Referral sent to [facility] and they can accept.  After HH, she would like outpatient PT @ [facility].  Referral was sent last week.  CCP will follow. Nevin Cosme RN        Continued Care and  Services - Admitted Since 9/21/2020     Home Medical Care Coordination complete    Service Provider Request Status Selected Services Address Phone Fax    [facility]   Selected Home Health Services [address] [address] [phone]              Expected Discharge Date and Time     Expected Discharge Date Expected Discharge Time    Sep 22, 2020         Demographic Summary     Row Name 09/21/20 1717       General Information    Admission Type  observation    Arrived From  PACU/recovery room    Referral Source  admission list    Reason for Consult  discharge planning    Preferred Language  English        Functional Status     Row Name 09/21/20 1717       Functional Status    Usual Activity Tolerance  good    Current Activity Tolerance  moderate       Functional Status, IADL    Medications  independent    Meal Preparation  independent    Housekeeping  independent    Laundry  independent    Shopping  independent       Mental Status    General Appearance WDL  WDL       Mental Status Summary    Recent Changes in Mental Status/Cognitive Functioning  no changes        Psychosocial    No documentation.       Abuse/Neglect    No documentation.       Legal    No documentation.       Substance Abuse    No documentation.       Patient Forms    No documentation.           Nevin Cosme RN

## 2020-09-21 NOTE — DISCHARGE PLACEMENT REQUEST
"Liza Aaron (65 y.o. Female)     Date of Birth Social Security Number Address Home Phone MRN    1955  [address] [address] [phone] [id number]    Denominational Marital Status          Mu[employer]ism        Admission Date Admission Type Admitting Provider Attending Provider Department, Room/Bed    9/21/20 Elective Cesar Johnson MD Brown, Reid B, MD [address], P797/1    Discharge Date Discharge Disposition Discharge Destination                       Attending Provider: Cesar Johnson MD    Allergies: Nickel, Penicillins    Isolation: None   Infection: None   Code Status: CPR    Ht: 170.2 cm (67\")   Wt: 98.9 kg (218 lb 0.6 oz)    Admission Cmt: None   Principal Problem: Primary osteoarthritis of left hip [M16.12] More...                 Active Insurance as of 9/21/2020     Primary Coverage     Payor Plan Insurance Group Employer/Plan Group    [employer] [id number]     Payor Plan Address Payor Plan Phone Number Payor Plan Fax Number Effective Dates    [address] [phone]  1/1/2018 - None Entered    [place] [address]       Subscriber Name Subscriber Birth Date Member ID       LIZA AARON 1955 [id number]                 Emergency Contacts      (Rel.) Home Phone Work Phone Mobile Phone    Handy Aaron (Spouse) [phone] -- --                "

## 2020-09-22 ENCOUNTER — READMISSION MANAGEMENT (OUTPATIENT)
Dept: CALL CENTER | Facility: HOSPITAL | Age: 65
End: 2020-09-22

## 2020-09-22 VITALS
WEIGHT: 218.03 LBS | RESPIRATION RATE: 17 BRPM | SYSTOLIC BLOOD PRESSURE: 100 MMHG | OXYGEN SATURATION: 98 % | DIASTOLIC BLOOD PRESSURE: 63 MMHG | BODY MASS INDEX: 34.22 KG/M2 | TEMPERATURE: 98 F | HEART RATE: 60 BPM | HEIGHT: 67 IN

## 2020-09-22 LAB
HCT VFR BLD AUTO: 28.5 % (ref 34–46.6)
HGB BLD-MCNC: 10 G/DL (ref 12–15.9)

## 2020-09-22 PROCEDURE — 85018 HEMOGLOBIN: CPT | Performed by: NURSE PRACTITIONER

## 2020-09-22 PROCEDURE — 97161 PT EVAL LOW COMPLEX 20 MIN: CPT

## 2020-09-22 PROCEDURE — 85014 HEMATOCRIT: CPT | Performed by: NURSE PRACTITIONER

## 2020-09-22 PROCEDURE — 25010000002 KETOROLAC TROMETHAMINE PER 15 MG: Performed by: NURSE PRACTITIONER

## 2020-09-22 PROCEDURE — 97110 THERAPEUTIC EXERCISES: CPT

## 2020-09-22 PROCEDURE — G0378 HOSPITAL OBSERVATION PER HR: HCPCS

## 2020-09-22 RX ADMIN — POLYETHYLENE GLYCOL 3350 17 G: 17 POWDER, FOR SOLUTION ORAL at 08:48

## 2020-09-22 RX ADMIN — ASPIRIN 81 MG: 81 TABLET, COATED ORAL at 08:47

## 2020-09-22 RX ADMIN — MELOXICAM 15 MG: 15 TABLET ORAL at 08:48

## 2020-09-22 RX ADMIN — HYDROCODONE BITARTRATE AND ACETAMINOPHEN 1 TABLET: 7.5; 325 TABLET ORAL at 08:54

## 2020-09-22 RX ADMIN — PANTOPRAZOLE SODIUM 40 MG: 40 TABLET, DELAYED RELEASE ORAL at 06:29

## 2020-09-22 RX ADMIN — KETOROLAC TROMETHAMINE 30 MG: 30 INJECTION, SOLUTION INTRAMUSCULAR at 06:29

## 2020-09-22 RX ADMIN — MUPIROCIN 1 APPLICATION: 20 OINTMENT TOPICAL at 08:47

## 2020-09-22 NOTE — PLAN OF CARE
Problem: Adult Inpatient Plan of Care  Goal: Plan of Care Review  Outcome: Ongoing, Progressing  Flowsheets  Taken 9/22/2020 0880  Outcome Summary: Pt is a 64 yo F POD1 L ALEX-posterior precautions. Pt lives with spouse in multi level home with 3 steps and L HR to access. Pt owns all DME including FWW and BSC. Pt educated on mobility precautions, is CGA to ambulate 200 ft and Jose F to ascend/descend 4 steps L HR. Pt safe and appropriate for home DC today with HHPT to follow. No other acute PT needs at this time.  Taken 9/22/2020 0831  Plan of Care Reviewed With: patient    .Patient was intermittently wearing a face mask during this therapy encounter. Therapist used appropriate personal protective equipment including eye protection, mask, and gloves.  Mask used was standard procedure mask. Appropriate PPE was worn during the entire therapy session. Hand hygiene was completed before and after therapy session. Patient is not in enhanced droplet precautions.

## 2020-09-22 NOTE — PLAN OF CARE
Goal Outcome Evaluation:  Plan of Care Reviewed With: patient  Progress: improving  Outcome Summary: VSS. Pain controlled with PO pain med. JAMIE dressing c/d/i. UP to chair and worked with PT today. Ambulates with assist and walker. Voiding per BRP. Discussed bp med and monitoring r/t HTN. Discharging home with HH today.

## 2020-09-22 NOTE — THERAPY EVALUATION
"Patient Name: Liza Aaron  : 1955    MRN: [id number]                              Today's Date: 2020       Admit Date: 2020    Visit Dx:     ICD-10-CM ICD-9-CM   1. Primary osteoarthritis of left hip  M16.12 715.15     Patient Active Problem List   Diagnosis   • HLD (hyperlipidemia)   • Incisional hernia, without obstruction or gangrene   • Osteoarthritis of multiple joints   • Class 2 obesity without serious comorbidity with body mass index (BMI) of 35.0 to 35.9 in adult   • Primary osteoarthritis of right hip   • Primary osteoarthritis of both hips   • Primary osteoarthritis of left hip   • OA (osteoarthritis) of hip     Past Medical History:   Diagnosis Date   • Arthritis    • Diverticulosis    • Family history of coronary artery disease     SAW DR THOMPSON 2019   • Hip pain     BILATERAL   • Hyperglycemia    • Hyperlipidemia    • Irregular heart beat     OCCASIONAL \"SKIPPED BEAT\"   • Pure hypercholesterolemia    • Seizure (CMS/HCC)     WHEN IN COLLEGE, NONE SINCE   • Umbilical hernia    • UTI (urinary tract infection)     FINISHED ANTIBIOTIC   • Vitamin D deficiency      Past Surgical History:   Procedure Laterality Date   • COLONOSCOPY N/A 2015    Diverticulosis in the entire examined colon, non-bleeding internal hemorrhodis, no specimens collected-Dr. Rosario Flores   • TOTAL ABDOMINAL HYSTERECTOMY WITH SALPINGO OOPHORECTOMY Bilateral 2004    Dr. Dominick Villarreal   • TOTAL HIP ARTHROPLASTY Right 2020    Procedure: TOTAL HIP ARTHROPLASTY;  Surgeon: Cesar Johnson MD;  Location: [facility];  Service: Orthopedics;  Laterality: Right;   • UMBILICAL HERNIA REPAIR N/A 2004    Dr. Dominick Villarreal     General Information     Row Name 20 0827          Physical Therapy Time and Intention    Document Type  evaluation  -AE     Mode of Treatment  individual therapy;physical therapy  -AE     Row Name 20 0827          General Information    Patient Profile Reviewed  yes  " -AE     Prior Level of Function  independent:  -AE     Row Name 09/22/20 0827          Living Environment    Lives With  spouse  -AE     Row Name 09/22/20 0827          Home Main Entrance    Number of Stairs, Main Entrance  three  -AE     Stair Railings, Main Entrance  railings safe and in good condition;railing on left side (ascending)  -AE     Row Name 09/22/20 0827          Stairs Within Home, Primary    Number of Stairs, Within Home, Primary  none  -AE     Row Name 09/22/20 0827          Cognition    Orientation Status (Cognition)  oriented x 4  -AE     Row Name 09/22/20 0827          Safety Issues, Functional Mobility    Impairments Affecting Function (Mobility)  pain;strength  -AE       User Key  (r) = Recorded By, (t) = Taken By, (c) = Cosigned By    Initials Name Provider Type    AE Jeri Portillo, PT Physical Therapist        Mobility     Row Name 09/22/20 0829          Bed Mobility    Bed Mobility  bed mobility (all) activities  -AE     All Activities, [place] (Bed Mobility)  contact guard assist  -AE     Row Name 09/22/20 0829          Transfers    Comment (Transfers)  CGA/Jose F for all transfers with FWW  -AE     Row Name 09/22/20 0829          Bed-Chair Transfer    Bed-Chair [place] (Transfers)  contact guard  -AE     Assistive Device (Bed-Chair Transfers)  walker, front-wheeled  -AE     Row Name 09/22/20 0829          Sit-Stand Transfer    Sit-Stand [place] (Transfers)  contact guard;minimum assist (75% patient effort);1 person assist  -AE     Assistive Device (Sit-Stand Transfers)  walker, front-wheeled  -AE     Row Name 09/22/20 0829          Gait/Stairs (Locomotion)    [place] Level (Gait)  contact guard  -AE     Assistive Device (Gait)  walker, front-wheeled  -AE     Distance in Feet (Gait)  200ft  -AE     Deviations/Abnormal Patterns (Gait)  antalgic;griselda decreased;stride length decreased  -AE     Bilateral Gait Deviations  forward flexed posture;heel strike decreased  -AE      Left Sided Gait Deviations  weight shift ability decreased  -AE     [place] Level (Stairs)  contact guard  -AE     Handrail Location (Stairs)  left side (ascending)  -AE     Number of Steps (Stairs)  4  -AE     Ascending Technique (Stairs)  step-to-step  -AE     Descending Technique (Stairs)  step-to-step  -AE       User Key  (r) = Recorded By, (t) = Taken By, (c) = Cosigned By    Initials Name Provider Type    AE Jeri Portillo PT Physical Therapist        Obj/Interventions     Row Name 09/22/20 0830          Range of Motion Comprehensive    Comment, General Range of Motion  BLE WFL per surgical protocol  -AE     Row Name 09/22/20 0830          Strength Comprehensive (MMT)    Comment, General Manual Muscle Testing (MMT) Assessment  LLE weakness 2/2 pain  -AE     Row Name 09/22/20 0830          Motor Skills    Therapeutic Exercise  hip  -AE     Row Name 09/22/20 0830          Hip (Therapeutic Exercise)    Hip (Therapeutic Exercise)  AROM (active range of motion) L ALEX x 10  -AE       User Key  (r) = Recorded By, (t) = Taken By, (c) = Cosigned By    Initials Name Provider Type    AE Jeri Portillo, HERMAN Physical Therapist        Goals/Plan    No documentation.       Clinical Impression     Row Name 09/22/20 0831          Pain    Additional Documentation  Pain Scale: Numbers Pre/Post-Treatment (Group)  -AE     [facility] Name 09/22/20 0831          Pain Scale: Numbers Pre/Post-Treatment    Pretreatment Pain Rating  4/10  -AE     Posttreatment Pain Rating  4/10  -AE     Pain Location - Side  Left  -AE     Pain Location - Orientation  incisional  -AE     Pain Location  hip  -AE     Pain Intervention(s)  Repositioned;Ambulation/increased activity;Rest  -AE     Row Name 09/22/20 0831          Plan of Care Review    Plan of Care Reviewed With  patient  -AE     Row Name 09/22/20 0831          Therapy Assessment/Plan (PT)    Patient/Family Therapy Goals Statement (PT)  Pt plans to DC home with therapy to follow  -AE      Criteria for Skilled Interventions Met (PT)  no;other (see comments) no expected progress prior to DC this afternoon  -AE     Row Name 09/22/20 0831          Positioning and Restraints    Pre-Treatment Position  sitting in chair/recliner  -AE     Post Treatment Position  chair  -AE     In Chair  reclined;call light within reach;encouraged to call for assist;exit alarm on  -AE       User Key  (r) = Recorded By, (t) = Taken By, (c) = Cosigned By    Initials Name Provider Type    Jeri Rice PT Physical Therapist        Outcome Measures     Row Name 09/22/20 0833          How much help from another person do you currently need...    Turning from your back to your side while in flat bed without using bedrails?  3  -AE     Moving from lying on back to sitting on the side of a flat bed without bedrails?  3  -AE     Moving to and from a bed to a chair (including a wheelchair)?  3  -AE     Standing up from a chair using your arms (e.g., wheelchair, bedside chair)?  3  -AE     Climbing 3-5 steps with a railing?  3  -AE     To walk in hospital room?  4  -AE     AM-PAC 6 Clicks Score (PT)  19  -AE     Row Name 09/22/20 0833          Functional Assessment    Outcome Measure Options  AM-PAC 6 Clicks Basic Mobility (PT)  -AE       User Key  (r) = Recorded By, (t) = Taken By, (c) = Cosigned By    Initials Name Provider Type    Jeri Rice PT Physical Therapist        Physical Therapy Education                 Title: PT OT SLP Therapies (Done)     Topic: Physical Therapy (Done)     Point: Mobility training (Done)     Learning Progress Summary           Patient Acceptance, E,TB, VU,DU by AE at 9/22/2020 0835                   Point: Home exercise program (Done)     Learning Progress Summary           Patient Acceptance, E,TB, VU,DU by AE at 9/22/2020 0835                   Point: Body mechanics (Done)     Learning Progress Summary           Patient Acceptance, E,TB, VU,DU by AE at 9/22/2020 0835                    Point: Precautions (Done)     Learning Progress Summary           Patient Acceptance, E,TB, VU,DU by AE at 9/22/2020 0835                               User Key     Initials Effective Dates Name Provider Type Discipline    AE 09/04/19 -  Jeri Portillo PT Physical Therapist PT              PT Recommendation and Plan     Plan of Care Reviewed With: patient  Outcome Summary: Pt is a 64 yo F POD1 L ALEX-posterior precautions. Pt lives with spouse in multi level home with 3 steps and L HR to access. Pt owns all DME including FWW and BSC. Pt educated on mobility precautions, is CGA to ambulate 200 ft and Jose F to ascend/descend 4 steps L HR. Pt safe and appropriate for home DC today with HHPT to follow. No other acute PT needs at this time.     Time Calculation:   PT Charges     Row Name 09/22/20 0842             Time Calculation    Start Time  0810  -AE      Stop Time  0840  -AE      Time Calculation (min)  30 min  -AE      PT Received On  09/22/20  -AE         Time Calculation- PT    Total Timed Code Minutes- PT  20 minute(s)  -AE        User Key  (r) = Recorded By, (t) = Taken By, (c) = Cosigned By    Initials Name Provider Type    AE Jeri Portillo PT Physical Therapist        Therapy Charges for Today     Code Description Service Date Service Provider Modifiers Qty    90909867039 HC PT EVAL LOW COMPLEXITY 2 9/22/2020 Jeri Portillo, PT GP 1    82448587524 HC PT THER PROC EA 15 MIN 9/22/2020 Jeri Portillo PT GP 1          PT G-Codes  Outcome Measure Options: AM-PAC 6 Clicks Basic Mobility (PT)  AM-PAC 6 Clicks Score (PT): 19    Jeri Portillo PT  9/22/2020

## 2020-09-22 NOTE — DISCHARGE SUMMARY
Patient Name: Liza Aaron  Patient YOB: 1955    Date of Admission:  9/21/2020  Date of Discharge:  9/22/2020  Discharge Diagnosis: TOTAL HIP ARTHROPLASTY  Presenting Problem/History of Present Illness: Primary osteoarthritis of left hip [M16.12]  OA (osteoarthritis) of hip [M16.9]  Admitting Physician: Dr Cesar Johnson  Consults:   Consults     No orders found for last 30 day(s).          DETAILS OF HOSPITAL STAY:  Patient is a 65 y.o. female was admitted to the floor following the above procedure and underwent an uncomplicated hospital stay.  Patient did well with physical therapy and was ambulating well without problems.  On the day of discharge the wound was clean, dry and intact and calf was soft and non tender and Homans sign was negative.  Patient was tolerating  without problems.  Patient will be discharged home.    Condition on Discharge:  Stable    Vital Signs  Temp:  [96.9 °F (36.1 °C)-98 °F (36.7 °C)] 98 °F (36.7 °C)  Heart Rate:  [56-79] 60  Resp:  [16-20] 17  BP: ()/(57-80) 100/63    LABS:   Admission on 09/21/2020   Component Date Value Ref Range Status   • Hemoglobin 09/22/2020 10.0* 12.0 - 15.9 g/dL Final   • Hematocrit 09/22/2020 28.5* 34.0 - 46.6 % Final       No results found.        Discharge Medications     Discharge Medications      New Medications      Instructions Start Date   Aspirin Adult Low Strength 81 MG EC tablet  Generic drug: aspirin   81 mg, Oral, Every 12 Hours Scheduled      ClearLax 17 GM/SCOOP powder  Generic drug: polyethylene glycol   Mix 17 grams (1 capful in liquid) and take by mouth 2 (Two) Times a Day for 14 days.      HYDROcodone-acetaminophen 7.5-325 MG per tablet  Commonly known as: NORCO   Take 1-2 tablets by mouth Every 4-6 Hours As Needed for pain.      meloxicam 15 MG tablet  Commonly known as: MOBIC   15 mg, Oral, Daily      ondansetron 4 MG tablet  Commonly known as: ZOFRAN   4 mg, Oral, Every 6 Hours PRN      pantoprazole 40 MG EC  tablet  Commonly known as: PROTONIX   40 mg, Oral, Every Morning         Continue These Medications      Instructions Start Date   calcium carbonate 600 MG tablet  Commonly known as: OS-EMMA   600 mg, Oral, Daily      [place] 6.5 MG insert  Generic drug: Prasterone   Vaginal, Daily PRN      melatonin 5 MG tablet tablet   5 mg, Oral, Nightly PRN      nitrofurantoin (macrocrystal-monohydrate) 100 MG capsule  Commonly known as: Macrobid   100 mg, Oral, 2 Times Daily      rosuvastatin 10 MG tablet  Commonly known as: CRESTOR   10 mg, Oral, Nightly      Vitamin D 25 MCG (1000 UT) tablet   1,000 Units, Oral, Daily         Stop These Medications    MULTI-VITAMIN DAILY PO            Activity at Discharge:     Discharge Instructions:   1)  Patient is to continue with physical therapy exercises daily and continue working with the physical therapist as ordered.  2)  Follow NO hip precautions.   3)  Patient may weight bear as tolerated.   4)  Apply ice regularly. You may ice for long periods of time as long as ice is not directly on the skin. Patient instructed on frequent calf pumping exercises.  Patient also instructed on incentive spirometer during hospitalization and encouraged to continue to use at home regularly.   5)  The dressing should be left in place. If waterproof dressing is intact the patient may shower immediately following discharge. If the dressing becomes disloged or saturated it should be changed. Please refer to the JAMIE information sheet if you have any questions about the dressing.  If you have a home health nurse or therapist they can be contacted to assist with dressing change or repair. You may also call the [facility] dressing hotline for questions related to the dressing ([phone]). If there still other problems or questions related to the dressing despite these measures then you can contact Nesha at our office [phone].   6)  Follow up appointment in 2 weeks - patient to call the office at  [phone] to schedule. 7)  Patient will be discharged on aspirin 81mg BID x weeks, then daily x 4weeks    Complete Discharge Diagnosis:    Patient Active Problem List   Diagnosis   • HLD (hyperlipidemia)   • Incisional hernia, without obstruction or gangrene   • Osteoarthritis of multiple joints   • Class 2 obesity without serious comorbidity with body mass index (BMI) of 35.0 to 35.9 in adult   • Primary osteoarthritis of right hip   • Primary osteoarthritis of both hips   • Primary osteoarthritis of left hip   • OA (osteoarthritis) of hip           Follow-up Appointments  Future Appointments   Date Time Provider Department Center   10/6/2020 10:50 AM Raven Johnson MD [facility]   10/9/2020 10:45 AM Freddy Johnson, PT DPT [facility]   10/13/2020 11:00 AM Freddy Johnson, PT DPT [facility]   10/16/2020 11:00 AM Alex, Freddy, PT DPT [facility]   10/20/2020 11:00 AM Alex Freddy, PT DPT [facility]   10/23/2020 11:00 AM Brown, Freddy, PT DPT [facility]   10/27/2020 11:00 AM Brown, Freddy, PT DPT [facility]   10/30/2020 11:00 AM Alex, Freddy, PT DPT [facility]   11/3/2020 11:00 AM Freddy Johnson, PT DPT [facility]   11/6/2020 11:00 AM Freddy Johnson, PT DPT [facility]   11/10/2020 11:00 AM Freddy Johnson, PT DPT [facility]   11/20/2020  8:30 AM Minnie Whaley MD [facility] None   3/30/2021  8:30 AM Angelica Montalvo APRN [facility]     Additional Instructions for the Follow-ups that You Need to Schedule     Referral to home health   As directed      Face to Face Visit Date: 9/21/2020    Follow-up provider for Plan of Care?: I will be treating the patient on an ongoing basis.  Please send me the Plan of Care for signature.    Follow-up provider: RAVEN JOHNSON [[id number]]    Reason/Clinical Findings: post op total hip    Describe mobility limitations that make leaving home difficult: pain, swelling, decreased strength an mobility, gait abnormality, difficulty ambualting  without assistive devices    Nursing/Therapeutic Services Requested: Physicial Therapy                    Cesar Johnson MD  09/22/20  10:06 EDT

## 2020-09-22 NOTE — OUTREACH NOTE
Prep Survey      Responses   [facility] patient discharged from?  [place]   Is LACE score < 7 ?  Yes   Eligibility  [facility]   Date of Admission  09/21/20   Date of Discharge  09/22/20   Discharge Disposition  Home or Self Care   Discharge diagnosis   TOTAL HIP ARTHROPLASTY   COVID-19 Test Status  Negative   Does the patient have one of the following disease processes/diagnoses(primary or secondary)?  Total Joint Replacement   Does the patient have Home health ordered?  Yes   What is the Home health agency?   [facility]   Is there a DME ordered?  No   Prep survey completed?  Yes          Radha Lynn RN

## 2020-09-22 NOTE — PLAN OF CARE
Goal Outcome Evaluation:  Plan of Care Reviewed With: patient  Progress: improving  Outcome Summary: 65/F POD-0 L ALEX.  VSS.  Pt ambulates w/assist x1 and walker.  Voiding function is intact.  Dressing c/d/i.  D/C plan is home w/HH on 9/22/20.   OP Anticoagulation Service Note    Date: 12/29/2017  There were no vitals filed for this visit.    Anticoagulation Summary  As of 12/29/2017    INR goal:   2.0-3.0   TTR:   67.4 % (1.7 y)   Today's INR:   3.3!   Maintenance plan:   2.5 mg (5 mg x 0.5) on Mon, Wed, Fri; 5 mg (5 mg x 1) all other days   Weekly total:   27.5 mg   Plan last modified:   Loc Chan, PharmD (12/29/2017)   Next INR check:   1/26/2018   Target end date:   Indefinite    Indications    Atrial flutter (CMS-HCC) (Resolved) [I48.92]  Acute ischemic right PCA stroke-Hemorrhage transformation is noted within the infarct (Resolved) [I63.531]  Persistent atrial fibrillation (CMS-HCC) [I48.1]             Anticoagulation Episode Summary     INR check location:   [facility]    Preferred lab:       Send INR reminders to:       Comments:         Anticoagulation Care Providers     Provider Role Specialty Phone number    Bijan Sampson M.D. Referring Cardiology [phone]    [facility] Anticoagulation Services Responsible  [phone]        Anticoagulation Patient Findings      HPI:   Shani Love seen in clinic today, they are here today for a INR check on anticoagulation therapy with warfarin because they have a PMH/current  aflutter    The reason for today's visit is to prevent morbidity and mortality from a stroke and to reduce the risk of bleeding while on a anticoagulant.     Reason for today's visit (per our collaborative practice policy) is because their last INR was 1.8 on 12/8/2018.  Intervention at the last visit:increaed the dose    Additional education provided today regarding reducing bleed risk and dietary constraints: No    Any upcoming  procedures: none    Confirmed warfarin dosing regimen  Interval Changes with foods rich in vitamin K:No  Interval Changes in ETOH:  No  Interval Changes in smoking status: No  Interval Changes in medication: No   Cost restriction: No    S/S of bleeding or bruising:  No  Signs/symptoms   thrombosis since the last appt: No  Bleed risk is: moderate,     3 vitals included with today's appt:No  None today.     Assessment:   INR  therapeutic.     Possible reason(s) INR is not in range today:   Unknown etiology    Intervention required today to prevent:    Bleeding complications      Plan:  Continue weekly warfarin dose as noted      Follow up:  Follow up appointment in 4 week(s) because  :  They are at a increased risk of bleeding because INR above goal.  They have a TTR of 67.4 which is not at target (TTR target/goal is 100%) and requires close follow up to prevent a adverse event (the lower the TTR the higher risk of clots, strokes, or bleeding).       Other info:  Pt educated to contact our clinic with any changes in medications or s/s of bleeding or thrombosis    CHEST guidelines recommend frequent INR monitoring at regular intervals (a few days up to a max of 12 weeks) to ensure they are on the proper dose of warfarin and not having any complications from therapy.  INRs can dramatically change over a short time period due to diet, medications, and medical conditions.

## 2020-09-23 ENCOUNTER — TRANSITIONAL CARE MANAGEMENT TELEPHONE ENCOUNTER (OUTPATIENT)
Dept: CALL CENTER | Facility: HOSPITAL | Age: 65
End: 2020-09-23

## 2020-09-23 NOTE — OUTREACH NOTE
Call Center TCM Note      Responses   [facility] patient discharged from?  [place]   Does the patient have one of the following disease processes/diagnoses(primary or secondary)?  Total Joint Replacement   Joint surgery performed?  Hip   TCM attempt successful?  Yes   Call start time  1656   Call end time  1658   Has the patient been back in either the hospital or Emergency Department since discharge?  No   Discharge diagnosis   TOTAL HIP ARTHROPLASTY   Does the patient have all medications related to this admission filled (includes all antibiotics, pain medications, etc.)  Yes   Is the patient taking all medications as directed (includes completed medication regime)?  Yes   Is the patient able to teach back alternate methods of pain control?  Ice, Reposition, Correct alignment, Short, frequent activity   Does the patient have a follow up appointment with their surgeon?  Yes   Has the patient kept scheduled appointments due by today?  N/A   Comments  f/u with Dr. Johnson for 10/6   What is the Home health agency?   [facility]   Psychosocial issues?  No   Has the patient fallen since discharge?  No   Did the patient receive a copy of their discharge instructions?  Yes   Nursing interventions  Reviewed instructions with patient   What is the patient's perception of their functional status since discharge?  Improving   Is the patient able to teach back signs and symptoms of infection?  Temp >100.4 for 24h or longer, Incisional drainage, Blisters around incision, Increased swelling or redness around incision (not associated with surgical edema), Severe discomfort or pain, Changes in mobility, Shortness of breath or chest pain   Is the patient/caregiver able to teach back the hierarchy of who to call/visit for symptoms/problems? PCP, Specialist, Home health nurse, Urgent Care, ED, 911  Yes   TCM call completed?  Yes   Wrap up additional comments  Patient says she is doing well, no questions or concerns at this  time, she will be following up with her surgeon Dr. Johnson.          Nallely Johnson RN    9/23/2020, 16:58 EDT

## 2020-09-23 NOTE — PROGRESS NOTES
Case Management Discharge Note      Final Note: pt d/c home with outpatient         Selected Continued Care - Discharged on 9/22/2020 Admission date: 9/21/2020 - Discharge disposition: Home-Health Care Svc    Destination    No services have been selected for the patient.              Durable Medical Equipment    No services have been selected for the patient.              Dialysis/Infusion    No services have been selected for the patient.              Home Medical Care Coordination complete    Service Provider Selected Services Address Phone Fax    [facility]  Home Health Services [address] [address] [phone]          Therapy    No services have been selected for the patient.              Community Resources    No services have been selected for the patient.                       Final Discharge Disposition Code: 01 - home or self-care

## 2020-09-23 NOTE — PROGRESS NOTES
Case Management Discharge Note      Final Note: pt d/c home with [facility]; confirmed with Marsha/DIAZ         Selected Continued Care - Discharged on 9/22/2020 Admission date: 9/21/2020 - Discharge disposition: Home-Health Care c    Destination    No services have been selected for the patient.              Durable Medical Equipment    No services have been selected for the patient.              Dialysis/Infusion    No services have been selected for the patient.              Home Medical Care Coordination complete    Service Provider Selected Services Address Phone Fax    [facility] [address] [address] [phone]          Therapy    No services have been selected for the patient.              Community Resources    No services have been selected for the patient.                       Final Discharge Disposition Code: 01 - home or self-care

## 2020-10-06 ENCOUNTER — OFFICE VISIT (OUTPATIENT)
Dept: ORTHOPEDIC SURGERY | Facility: CLINIC | Age: 65
End: 2020-10-06

## 2020-10-06 VITALS — HEIGHT: 67 IN | WEIGHT: 215 LBS | TEMPERATURE: 95.9 F | BODY MASS INDEX: 33.74 KG/M2

## 2020-10-06 DIAGNOSIS — M25.552 LEFT HIP PAIN: Primary | ICD-10-CM

## 2020-10-06 DIAGNOSIS — Z96.642 STATUS POST LEFT HIP REPLACEMENT: ICD-10-CM

## 2020-10-06 PROCEDURE — 73502 X-RAY EXAM HIP UNI 2-3 VIEWS: CPT | Performed by: ORTHOPAEDIC SURGERY

## 2020-10-06 PROCEDURE — 99024 POSTOP FOLLOW-UP VISIT: CPT | Performed by: ORTHOPAEDIC SURGERY

## 2020-10-06 NOTE — PROGRESS NOTES
Liza Aaron : 1955 MRN: [id number] DATE: 10/6/2020    DIAGNOSIS: 2 week follow up left total hip     SUBJECTIVE:Patient returns today for 2 week follow up of left total hip replacement. Patient reports doing well with no unusual complaints. Appears to be progressing appropriately.    OBJECTIVE:   Exam:. The incision is healing appropriately. No sign of infection. Range of motion is progressing as expected. The calf is soft and nontender with a negative Homans sign.    DIAGNOSTIC STUDIES  Xrays: 2 views of the left hip (AP pelvis and lateral left hip) were ordered and reviewed for evaluation of recent hip replacement. They demonstrate a well positioned, well aligned hip replacement without complicating factors noted. In comparison with previous films there has been interval implant placement.    ASSESSMENT: 2 week status post left hip replacement.    PLAN: 1) Staples removed and steri strips applied   2) PT exercises   3) Discontinue SANTIAGO hose   4) Continue ice PRN   5) WBAT   6) aspirin 81 mg orally every day for 1 month   7) Follow up in 6 weeks with repeat Xrays of left hip (2views)    Cesar Johnson MD  10/6/2020

## 2020-10-09 ENCOUNTER — TREATMENT (OUTPATIENT)
Dept: PHYSICAL THERAPY | Facility: CLINIC | Age: 65
End: 2020-10-09

## 2020-10-09 DIAGNOSIS — M25.652 HIP STIFFNESS, LEFT: ICD-10-CM

## 2020-10-09 DIAGNOSIS — Z96.642 STATUS POST HIP REPLACEMENT, LEFT: Primary | ICD-10-CM

## 2020-10-09 DIAGNOSIS — R26.9 GAIT DISTURBANCE: ICD-10-CM

## 2020-10-09 DIAGNOSIS — R29.898 WEAKNESS OF LEFT HIP: ICD-10-CM

## 2020-10-09 PROCEDURE — 97161 PT EVAL LOW COMPLEX 20 MIN: CPT | Performed by: PHYSICAL THERAPIST

## 2020-10-09 PROCEDURE — 97140 MANUAL THERAPY 1/> REGIONS: CPT | Performed by: PHYSICAL THERAPIST

## 2020-10-09 PROCEDURE — 97110 THERAPEUTIC EXERCISES: CPT | Performed by: PHYSICAL THERAPIST

## 2020-10-09 NOTE — PATIENT INSTRUCTIONS
Access Code: [email or web address]   URL: [email or web address]   Date: 10/09/2020   Prepared by: Freddy Johnson     Exercises  Supine Knees to Chest with Swiss Ball - 30 reps - 1 sets - 3x daily                            - 7x weekly  Supine Lower Trunk Rotation with Swiss Ball - 30 reps - 1 sets - 3x daily - 7x weekly  Supine Pelvic Rocking - 30 reps - 1 sets - 3x daily - 7x weekly  Heel Raise on Step - 12 reps - 1 sets - 3x daily - 7x weekly  Standing Alternating Knee Flexion - 12 reps - 1 sets - 3x daily - 7x weekly  Seated Long Arc Quad - 12 reps - 1 sets - 3x daily - 7x weekly  Standing Hip Abduction AROM - 12 reps - 1 sets - 3x daily - 7x weekly  Prone Hip Extension on Table - 12 reps - 1 sets - 3x daily - 7x weekly  Standing Marching - 20 reps - 1 sets - 3x daily - 7x weekly

## 2020-10-09 NOTE — PROGRESS NOTES
Physical Therapy Initial Evaluation and Plan of Care      Patient: Liza Aaron   : 1955  Diagnosis/ICD-10 Code:  Status post hip replacement, left [Z96.642]  Referring practitioner: Cesar Johnson MD  Date of Initial Visit: 10/9/2020  Today's Date: 10/9/2020          Subjective Evaluation    History of Present Illness  Date of surgery: 2020  Mechanism of injury: Patient is S/P THR on . She had home health after her surgery. She reports that her recovery has gone well. She has been gradually walking more and has been able to drive. Notes that she can dress her self with some difficulty. Cannot put her shoes on (I) at this time. No longer on pain medicine. Patient using a S. Cane for gait. Patient reports that she is not able to lay in bend more than 1-3 hours. More comfortable in a chair.     Pain  Location: L hip   Quality: dull ache  Relieving factors: relaxation and rest    Treatments  Previous treatment: medication  Patient Goals  Patient goals for therapy: decreased pain, increased motion, improved balance, increased strength, independence with ADLs/IADLs and return to sport/leisure activities         LEFS 42/80    Objective          Palpation   Left   Tenderness of the gluteus leidy and gluteus medius.     Additional Palpation Details  Patient had some tenderness in her quads (L) and posterior lateral gastroc (L).     Tenderness     Additional Tenderness Details  Beba incision tenderness is most prominent at this time. At posterior and lateral thigh.     Neurological Testing     Sensation     Hip   Left Hip   Intact: light touch    Right Hip   Intact: light touch    Passive Range of Motion   Left Hip   Flexion: 90 degrees   Abduction: 20 degrees   External rotation (90/90): 30 degrees   Internal rotation (90/90): 18 degrees     Joint Play     Additional Joint Play Details  Post ALEX    Strength/Myotome Testing     Left Hip   Planes of Motion   Flexion: 4  Extension: 4-  Abduction:  3    Additional Strength Details  Continue strength testing as patient gets further out from surgery date.     Tests     Additional Tests Details  Special testing deferred due to post op status    - homans sign for DVT    Left Hip Flexibility Comments:   Restrictions noted in L quadriceps and hamstrings        Ambulation     Observational Gait   Gait: antalgic and asymmetric   Decreased walking speed, stride length, left stance time, right swing time and right step length.     Additional Observational Gait Details  (B) excessive ER noted at feet/ LEs during gait.           Assessment & Plan     Assessment  Impairments: abnormal coordination, abnormal gait, abnormal or restricted ROM, activity intolerance, impaired balance, impaired physical strength, lacks appropriate home exercise program, pain with function and safety issue  Assessment details: Patient presents s/p L hip replacement on 9/23/20. Presents with stiffness in her hip, muscle tightness, pain with normal activities, and weakness surrounding her L hip and LE.  Pt would benefit from skilled PT services in order to address listed impairments and increase tolerance to normal daily activities including ADLs and recreational activities.     Prognosis: good  Prognosis details: Goals:   Short term: 2 weeks  1. Patient will be (I) with initial HEP for strength and mobility  2. Patient will have improved pain symptoms to < 4/10 with ADL's and HEP  3. Patient will tolerate all aspects of dressing without pain  4. Patient will tolerate walking 1/4 mile without pain in L hip.     Long term goals: 6 weeks    1. Patient will be (I) with long term progressive HEP for dynamic hip strengthening and mobility   2. Patient will have reduced pain symptoms to 2/10 with all ADL's and recreational activity.   3. Patient will have improved LEFS score to >53/80 showing a significant change.  4. Patient will have improved (B) single leg balance to > 30 seconds to show improved  balance.     Functional Limitations: walking and standing  Plan  Therapy options: will be seen for skilled physical therapy services  Planned modality interventions: ultrasound, iontophoresis, TENS, thermotherapy (hydrocollator packs), high voltage pulsed current (pain management) and electrical stimulation/Russian stimulation  Planned therapy interventions: abdominal trunk stabilization, ADL retraining, balance/weight-bearing training, body mechanics training, joint mobilization, IADL retraining, gait training, home exercise program, functional ROM exercises, flexibility, transfer training, therapeutic activities, stretching, strengthening, spinal/joint mobilization, soft tissue mobilization, postural training, neuromuscular re-education, motor coordination training and manual therapy  Frequency: 2x week  Duration in visits: 10  Treatment plan discussed with: patient  Plan details: Initial HEP was given on this date.         Manual Therapy:    8     mins  11882;  Therapeutic Exercise:    15     mins  96554;     Neuromuscular Fide:        mins  49629;    Therapeutic Activity:          mins  73808;     Gait Training:           mins  91935;     Ultrasound:          mins  46523;    Electrical Stimulation:         mins  45612 ( );  Dry Needling          mins self-pay  10 min ice post treatment    Timed Treatment:   23   mins   Total Treatment:     58   mins    PT SIGNATURE: Freddy Johnson PT DPSCOTT   [place] License # [id number]  DATE TREATMENT INITIATED: 10/9/2020    Initial Certification  Certification Period: 1/7/2021  I certify that the therapy services are furnished while this patient is under my care.  The services outlined above are required by this patient, and will be reviewed every 90 days.     PHYSICIAN: Cesar Johnson MD   ________________________________     DATE: ______________    Please sign and return via fax to [phone].. Thank you, [facility].

## 2020-10-12 ENCOUNTER — OFFICE VISIT (OUTPATIENT)
Dept: INTERNAL MEDICINE | Facility: CLINIC | Age: 65
End: 2020-10-12

## 2020-10-12 VITALS
OXYGEN SATURATION: 97 % | TEMPERATURE: 97.5 F | WEIGHT: 215 LBS | BODY MASS INDEX: 33.74 KG/M2 | HEART RATE: 97 BPM | HEIGHT: 67 IN | DIASTOLIC BLOOD PRESSURE: 62 MMHG | SYSTOLIC BLOOD PRESSURE: 100 MMHG

## 2020-10-12 DIAGNOSIS — L02.91 ABSCESS: Primary | ICD-10-CM

## 2020-10-12 DIAGNOSIS — L98.9 SKIN LESION: ICD-10-CM

## 2020-10-12 PROCEDURE — 99213 OFFICE O/P EST LOW 20 MIN: CPT | Performed by: FAMILY MEDICINE

## 2020-10-12 RX ORDER — SULFAMETHOXAZOLE AND TRIMETHOPRIM 800; 160 MG/1; MG/1
1 TABLET ORAL 2 TIMES DAILY
Qty: 20 TABLET | Refills: 0 | Status: SHIPPED | OUTPATIENT
Start: 2020-10-12 | End: 2020-11-20

## 2020-10-12 NOTE — PROGRESS NOTES
Subjective   Liza Aaron is a 65 y.o. female.   Chief Complaint   Patient presents with   • skin lesion       History of Present Illness     #1  Nodule- hard nodule noticed between rectum and vagina.  It was noticed 5 days ago.  It got bigger over 2 days.  It is painful.  It started bleeding today.  Patient used hot shower and Polysporin.  Not sure if Polysporin helped.  She has no fever, no chills, no other symptoms associated.    The following portions of the patient's history were reviewed and updated as appropriate: allergies, current medications, past medical history, past social history, past surgical history and problem list.    Review of Systems   Constitutional: Negative for chills and fever.         Objective   Wt Readings from Last 3 Encounters:   10/12/20 97.5 kg (215 lb)   10/06/20 97.5 kg (215 lb)   09/21/20 98.9 kg (218 lb 0.6 oz)      Vitals:    10/12/20 1520   BP: 100/62   Pulse: 97   Temp: 97.5 °F (36.4 °C)   SpO2: 97%     Temp Readings from Last 3 Encounters:   10/12/20 97.5 °F (36.4 °C)   10/06/20 95.9 °F (35.5 °C) (Temporal)   09/22/20 98 °F (36.7 °C) (Skin)     BP Readings from Last 3 Encounters:   10/12/20 100/62   09/22/20 100/63   09/17/20 122/80     Pulse Readings from Last 3 Encounters:   10/12/20 97   09/22/20 60   09/10/20 79     Body mass index is 33.67 kg/m².    Physical Exam  Constitutional:       Appearance: Normal appearance.   Genitourinary:            Assessment/Plan   Liza was seen today for skin lesion.    Diagnoses and all orders for this visit:    Abscess    Skin lesion  -     Culture, Routine - Swab, Buttock    Other orders  -     sulfamethoxazole-trimethoprim (Bactrim DS) 800-160 MG per tablet; Take 1 tablet by mouth 2 (Two) Times a Day.        #1 abscess- we are sending culture.  We are starting Bactrim.  Patient will scheduled OV with her GYN Dr. Madera.

## 2020-10-13 ENCOUNTER — TREATMENT (OUTPATIENT)
Dept: PHYSICAL THERAPY | Facility: CLINIC | Age: 65
End: 2020-10-13

## 2020-10-13 DIAGNOSIS — M25.652 HIP STIFFNESS, LEFT: ICD-10-CM

## 2020-10-13 DIAGNOSIS — R29.898 WEAKNESS OF LEFT HIP: ICD-10-CM

## 2020-10-13 DIAGNOSIS — Z96.642 STATUS POST HIP REPLACEMENT, LEFT: Primary | ICD-10-CM

## 2020-10-13 PROCEDURE — 97140 MANUAL THERAPY 1/> REGIONS: CPT | Performed by: PHYSICAL THERAPIST

## 2020-10-13 PROCEDURE — 97110 THERAPEUTIC EXERCISES: CPT | Performed by: PHYSICAL THERAPIST

## 2020-10-13 PROCEDURE — 97530 THERAPEUTIC ACTIVITIES: CPT | Performed by: PHYSICAL THERAPIST

## 2020-10-13 NOTE — PROGRESS NOTES
Physical Therapy Daily Progress Note  2 treatments  Subjective     Liza Aaron reports: she was more active over the weekend and while she did get tired only had minimal hip pain (L). Feeling well today. Still using S. Cane for ambulation.         Objective   See Exercise, Manual, and Modality Logs for complete treatment.     Patient lacking L hip extension during gait.     Assessment/Plan  Patient had significant guarding during manual therapy attempts to improve hip extension. Tolerated TE/ TA for hip extension with more comfort. Stride stance rocking was added to HEP for improved hip extension control (L).   Progress per Plan of Care and Progress strengthening /stabilization /functional activity           Manual Therapy:    10     mins  47111;  Therapeutic Exercise:    25     mins  35955;     Neuromuscular Fide:        mins  09841;    Therapeutic Activity:     12     mins  43631;     Gait Training:           mins  49664;     Ultrasound:          mins  35803;    Electrical Stimulation:         mins  15052 ( );  Dry Needling          mins self-pay  10 min ice post TE    Timed Treatment:   47   mins   Total Treatment:     57   mins    Freddy Johnson PT DPT  Physical Therapist  [place] License # [id number]

## 2020-10-15 LAB
BACTERIA SPEC AEROBE CULT: NORMAL
BACTERIA SPEC CULT: NORMAL
Lab: NORMAL

## 2020-10-16 ENCOUNTER — TREATMENT (OUTPATIENT)
Dept: PHYSICAL THERAPY | Facility: CLINIC | Age: 65
End: 2020-10-16

## 2020-10-16 DIAGNOSIS — Z96.642 STATUS POST HIP REPLACEMENT, LEFT: Primary | ICD-10-CM

## 2020-10-16 DIAGNOSIS — M25.652 HIP STIFFNESS, LEFT: ICD-10-CM

## 2020-10-16 DIAGNOSIS — R29.898 WEAKNESS OF LEFT HIP: ICD-10-CM

## 2020-10-16 DIAGNOSIS — R26.9 GAIT DISTURBANCE: ICD-10-CM

## 2020-10-16 PROCEDURE — 97530 THERAPEUTIC ACTIVITIES: CPT | Performed by: PHYSICAL THERAPIST

## 2020-10-16 PROCEDURE — 97110 THERAPEUTIC EXERCISES: CPT | Performed by: PHYSICAL THERAPIST

## 2020-10-16 NOTE — PROGRESS NOTES
Physical Therapy Daily Progress Note  3 treatments  Subjective     Liza Aaron reports: Patient reports that she was a little sore after her last PT treatment but that it lasted ~ 8 hours and she has felt well since then. Still using S. Cane for gait.         Objective   See Exercise, Manual, and Modality Logs for complete treatment.       Assessment/Plan   Added 5 min recumbent bike to HEP  Patient tolerated all treatment well and was able to tolerate progressions in therapeutic exercises with minimal discomfort. Patient continues to need skilled therapy to improve hip ROM, LE strength, and activity tolerance following hip replacement surgery. Patient is progressing well at this time. Will continue to advance POC as tolerated.     Progress per Plan of Care and Progress strengthening /stabilization /functional activity           Manual Therapy:         mins  45666;  Therapeutic Exercise:    26     mins  51565;     Neuromuscular Fide:        mins  66042;    Therapeutic Activity:     16     mins  83565;     Gait Training:           mins  46923;     Ultrasound:          mins  35042;    Electrical Stimulation:         mins  47202 ( );  Dry Needling          mins self-pay  10 min ice post treatment     Timed Treatment:   42   mins   Total Treatment:     52   mins    Freddy Johnson PT DPT  Physical Therapist  [place] License # [id number]

## 2020-10-20 ENCOUNTER — TREATMENT (OUTPATIENT)
Dept: PHYSICAL THERAPY | Facility: CLINIC | Age: 65
End: 2020-10-20

## 2020-10-20 DIAGNOSIS — Z96.642 STATUS POST HIP REPLACEMENT, LEFT: Primary | ICD-10-CM

## 2020-10-20 DIAGNOSIS — R29.898 WEAKNESS OF LEFT HIP: ICD-10-CM

## 2020-10-20 DIAGNOSIS — R26.9 GAIT DISTURBANCE: ICD-10-CM

## 2020-10-20 DIAGNOSIS — M25.652 HIP STIFFNESS, LEFT: ICD-10-CM

## 2020-10-20 PROCEDURE — 97530 THERAPEUTIC ACTIVITIES: CPT | Performed by: PHYSICAL THERAPIST

## 2020-10-20 PROCEDURE — 97116 GAIT TRAINING THERAPY: CPT | Performed by: PHYSICAL THERAPIST

## 2020-10-20 PROCEDURE — 97110 THERAPEUTIC EXERCISES: CPT | Performed by: PHYSICAL THERAPIST

## 2020-10-20 NOTE — PROGRESS NOTES
Physical Therapy Daily Progress Note  4 treatments  Subjective     Liza Aaron reports: she has been able to use her home bike some over the weekend. Notes that she has had some anterior hip discomfort while walking but overall is beginning to feel stronger.         Objective   See Exercise, Manual, and Modality Logs for complete treatment.       Assessment/Plan   Gait training is primary focus at this time. Patient continues to have reduced stance time on L LE.  Patient displays knee dominant squat pattern and does need continued training for better posterior hip activation during squatting activities. Patient tolerated all treatment well and was able to tolerate progressions in therapeutic exercises with little discomfort. Patient continues to need skilled therapy to improve hip strength, gait pattern, functional activity tolerance, and pain symptoms. Patient is progressing well at this time. Will continue to advance POC as tolerated.     Progress per Plan of Care and Progress strengthening /stabilization /functional activity           Manual Therapy:         mins  45249;  Therapeutic Exercise:    20     mins  65694;     Neuromuscular Fide:        mins  58466;    Therapeutic Activity:     10     mins  52929;     Gait Trainin     mins  18641;     Ultrasound:          mins  21853;    Electrical Stimulation:         mins  39890 ( );  Dry Needling          mins self-pay  10 min ice post TE  Timed Treatment:   42   mins   Total Treatment:     52   mins    Freddy Johnson PT DPT  Physical Therapist  [place] License # [id number]

## 2020-10-23 ENCOUNTER — TREATMENT (OUTPATIENT)
Dept: PHYSICAL THERAPY | Facility: CLINIC | Age: 65
End: 2020-10-23

## 2020-10-23 DIAGNOSIS — R29.898 WEAKNESS OF LEFT HIP: ICD-10-CM

## 2020-10-23 DIAGNOSIS — M25.652 HIP STIFFNESS, LEFT: ICD-10-CM

## 2020-10-23 DIAGNOSIS — Z96.642 STATUS POST HIP REPLACEMENT, LEFT: Primary | ICD-10-CM

## 2020-10-23 PROCEDURE — 97530 THERAPEUTIC ACTIVITIES: CPT | Performed by: PHYSICAL THERAPIST

## 2020-10-23 PROCEDURE — 97110 THERAPEUTIC EXERCISES: CPT | Performed by: PHYSICAL THERAPIST

## 2020-10-23 NOTE — PROGRESS NOTES
Physical Therapy Daily Progress Note  5 treatments  Subjective     Liza Aaron reports: She is feeling well on this date. Notes that she has been able to gradually ramp up her activity tolerance using her treadmill and bike at home.         Objective   See Exercise, Manual, and Modality Logs for complete treatment.       Assessment/Plan       Patient tolerating all treatment well. Improving gait and loading of hip in flexion and extension patterns. Mild to minimal pain during TE/ TA on this date. She is going good progressing her HEP at home. No new HEP added on this date. Continue to consider knee pain when making exercise selections for hip strengthening.       Progress per Plan of Care and Progress strengthening /stabilization /functional activity           Manual Therapy:         mins  37463;  Therapeutic Exercise:    24     mins  51172;     Neuromuscular Fide:        mins  05179;    Therapeutic Activity:     16     mins  20366;     Gait Trainin     mins  99309;     Ultrasound:          mins  13498;    Electrical Stimulation:         mins  12375 ( );  Dry Needling          mins self-pay    Timed Treatment:   45   mins   Total Treatment:     55   mins    Freddy Johnson PT DPT  Physical Therapist  [place] License # [id number]

## 2020-10-27 ENCOUNTER — TREATMENT (OUTPATIENT)
Dept: PHYSICAL THERAPY | Facility: CLINIC | Age: 65
End: 2020-10-27

## 2020-10-27 DIAGNOSIS — R29.898 WEAKNESS OF LEFT HIP: ICD-10-CM

## 2020-10-27 DIAGNOSIS — Z96.642 STATUS POST HIP REPLACEMENT, LEFT: Primary | ICD-10-CM

## 2020-10-27 DIAGNOSIS — M25.652 HIP STIFFNESS, LEFT: ICD-10-CM

## 2020-10-27 PROCEDURE — 97116 GAIT TRAINING THERAPY: CPT | Performed by: PHYSICAL THERAPIST

## 2020-10-27 PROCEDURE — 97110 THERAPEUTIC EXERCISES: CPT | Performed by: PHYSICAL THERAPIST

## 2020-10-27 PROCEDURE — 97530 THERAPEUTIC ACTIVITIES: CPT | Performed by: PHYSICAL THERAPIST

## 2020-10-27 NOTE — PROGRESS NOTES
Physical Therapy Daily Progress Note  6 treatments  Subjective     Liza Aaron reports: Patient notes that she is continuing to walk better. Notes that she still has some pain in her hip at the beginning of the day and fatigue I her hip towards the end of the day bus notes that she has felt more improvement overall over the past week.         Objective   See Exercise, Manual, and Modality Logs for complete treatment.     Gait is improving but continues to be antalgic. (L)    Assessment/Plan     Patient tolerated all treatment well and was able to tolerate progressions in therapeutic exercises with low discomfort. Patient continues to need skilled therapy to improve giat, LE strength and mobility. Patient is progressing well at this time. Will continue to advance POC as tolerated.     Progress per Plan of Care and Progress strengthening /stabilization /functional activity           Manual Therapy:         mins  22746;  Therapeutic Exercise:    18     mins  55895;     Neuromuscular Fide:        mins  98923;    Therapeutic Activity:     12     mins  39329;     Gait Training:      10     mins  51270;     Ultrasound:          mins  64099;    Electrical Stimulation:         mins  28407 ( );  Dry Needling          mins self-pay    Timed Treatment:   40   mins   Total Treatment:     40   mins    Freddy Johnson PT DPT  Physical Therapist  [place] License # [id number]

## 2020-10-30 ENCOUNTER — TREATMENT (OUTPATIENT)
Dept: PHYSICAL THERAPY | Facility: CLINIC | Age: 65
End: 2020-10-30

## 2020-10-30 DIAGNOSIS — Z96.642 STATUS POST HIP REPLACEMENT, LEFT: Primary | ICD-10-CM

## 2020-10-30 DIAGNOSIS — R29.898 WEAKNESS OF LEFT HIP: ICD-10-CM

## 2020-10-30 DIAGNOSIS — M25.652 HIP STIFFNESS, LEFT: ICD-10-CM

## 2020-10-30 PROCEDURE — 97530 THERAPEUTIC ACTIVITIES: CPT | Performed by: PHYSICAL THERAPIST

## 2020-10-30 PROCEDURE — 97140 MANUAL THERAPY 1/> REGIONS: CPT | Performed by: PHYSICAL THERAPIST

## 2020-10-30 PROCEDURE — 97110 THERAPEUTIC EXERCISES: CPT | Performed by: PHYSICAL THERAPIST

## 2020-10-30 NOTE — PROGRESS NOTES
Physical Therapy Daily Progress Note  7 treatments  Subjective     Liza Aaron reports: she has continued to have improved activity tolerance, gait, and hip pain. Notes some discomfort and difficulty with going up/down stairs.         Objective   See Exercise, Manual, and Modality Logs for complete treatment.       Assessment/Plan   Continuing to work on rotator strength (IR/ ER) for (B) hips. Overall activity tolerance is progressing well.  Patient tolerated all treatment well and was able to tolerate progressions in therapeutic exercises with minimal discomfort. Patient continues to need skilled therapy to improve LE strength, activity tolerance, gait, and stair ambualtion. Patient is progressing well at this time. Will continue to advance POC as tolerated.     Progress per Plan of Care and Progress strengthening /stabilization /functional activity           Manual Therapy:    9     mins  72550;  Therapeutic Exercise:    19     mins  04985;     Neuromuscular Fide:        mins  14608;    Therapeutic Activity:     12     mins  98333;     Gait Training:           mins  88252;     Ultrasound:          mins  02717;    Electrical Stimulation:         mins  77374 ( );  Dry Needling          mins self-pay  10 min ice post treatment   Timed Treatment:   40   mins   Total Treatment:     50   mins    Freddy Johnson PT DPT  Physical Therapist  [place] License # [id number]

## 2020-11-06 ENCOUNTER — TREATMENT (OUTPATIENT)
Dept: PHYSICAL THERAPY | Facility: CLINIC | Age: 65
End: 2020-11-06

## 2020-11-06 DIAGNOSIS — R29.898 WEAKNESS OF LEFT HIP: Primary | ICD-10-CM

## 2020-11-06 DIAGNOSIS — Z96.642 STATUS POST HIP REPLACEMENT, LEFT: ICD-10-CM

## 2020-11-06 DIAGNOSIS — M25.652 HIP STIFFNESS, LEFT: ICD-10-CM

## 2020-11-06 PROCEDURE — 97110 THERAPEUTIC EXERCISES: CPT | Performed by: PHYSICAL THERAPIST

## 2020-11-06 PROCEDURE — 97116 GAIT TRAINING THERAPY: CPT | Performed by: PHYSICAL THERAPIST

## 2020-11-06 PROCEDURE — 97530 THERAPEUTIC ACTIVITIES: CPT | Performed by: PHYSICAL THERAPIST

## 2020-11-06 NOTE — PROGRESS NOTES
Physical Therapy Daily Progress Note  8 treatments  Subjective     Liza Aaron reports: she is feeling well. Notes that her legs are getting stronger. She has felt improved ease of walking and general activity over this past week.         Objective   See Exercise, Manual, and Modality Logs for complete treatment.       Assessment/Plan   Patient tolerated all treatment well and was able to tolerate progressions in therapeutic exercises with minimal discomfort. Worked more today with functional strengthening in standing.  Patient continues to need skilled therapy to improve functional mobility, activity tolerance, and gait. Patient is progressing well at this time. Will continue to advance POC as tolerated.     Progress per Plan of Care and Progress strengthening /stabilization /functional activity           Manual Therapy:         mins  94111;  Therapeutic Exercise:    20     mins  64698;     Neuromuscular Fide:        mins  83414;    Therapeutic Activity:     12     mins  32505;     Gait Trainin     mins  42164;     Ultrasound:          mins  20331;    Electrical Stimulation:        mins  26316 ( );  Dry Needling          mins self-pay    Timed Treatment:   40   mins   Total Treatment:     40   mins    Freddy Johnson PT DPT  Physical Therapist  [place] License # [id number]

## 2020-11-10 ENCOUNTER — TELEPHONE (OUTPATIENT)
Dept: PHYSICAL THERAPY | Facility: CLINIC | Age: 65
End: 2020-11-10

## 2020-11-17 ENCOUNTER — OFFICE VISIT (OUTPATIENT)
Dept: ORTHOPEDIC SURGERY | Facility: CLINIC | Age: 65
End: 2020-11-17

## 2020-11-17 VITALS — BODY MASS INDEX: 33.74 KG/M2 | HEIGHT: 67 IN | TEMPERATURE: 96 F | WEIGHT: 215 LBS

## 2020-11-17 DIAGNOSIS — Z96.642 STATUS POST LEFT HIP REPLACEMENT: Primary | ICD-10-CM

## 2020-11-17 PROCEDURE — 99024 POSTOP FOLLOW-UP VISIT: CPT | Performed by: ORTHOPAEDIC SURGERY

## 2020-11-17 PROCEDURE — 73502 X-RAY EXAM HIP UNI 2-3 VIEWS: CPT | Performed by: ORTHOPAEDIC SURGERY

## 2020-11-17 RX ORDER — CEPHALEXIN 500 MG/1
500 CAPSULE ORAL ONCE
Qty: 4 CAPSULE | Refills: 5 | Status: SHIPPED | OUTPATIENT
Start: 2020-11-17 | End: 2020-11-17

## 2020-11-17 NOTE — PROGRESS NOTES
Liza Aaron : 1955 MRN: [id number] DATE: 2020    DIAGNOSIS: 8 week follow up left total hip Anterior    SUBJECTIVE:Patient returns today for 8 week follow up of left total hip replacement. Patient reports doing well with no unusual complaints. Appears to be progressing appropriately and is off a cane    OBJECTIVE:   Exam:. The incision is healed. No sign of infection. Range of motion is progressing as expected. The calf is soft and nontender with a negative Homans sign. Strength progressing    DIAGNOSTIC STUDIES  Xrays: 2 views of the left hip (AP pelvis and lateral left hip) were ordered and reviewed for evaluation of recent hip replacement. They demonstrate a well positioned, well aligned hip replacement without complicating factors noted. In comparison with previous films there has been interval implant placement.    ASSESSMENT: 8 week status post left hip replacement.    PLAN: 1) Activity as tolerated   2) Continue hip strengthening exercises    3) Follow up 1 year post-op with repeat Xrays of left hip (2views AP Pelvis      and lateral left hip)    CONCETTA Carrasco  2020

## 2020-11-20 ENCOUNTER — OFFICE VISIT (OUTPATIENT)
Dept: INTERNAL MEDICINE | Facility: CLINIC | Age: 65
End: 2020-11-20

## 2020-11-20 VITALS
BODY MASS INDEX: 34.21 KG/M2 | OXYGEN SATURATION: 98 % | DIASTOLIC BLOOD PRESSURE: 80 MMHG | WEIGHT: 218 LBS | HEIGHT: 67 IN | TEMPERATURE: 96.4 F | SYSTOLIC BLOOD PRESSURE: 138 MMHG | HEART RATE: 78 BPM

## 2020-11-20 DIAGNOSIS — E78.00 PURE HYPERCHOLESTEROLEMIA: Primary | ICD-10-CM

## 2020-11-20 DIAGNOSIS — E66.9 OBESITY (BMI 30.0-34.9): ICD-10-CM

## 2020-11-20 LAB
BUN SERPL-MCNC: 10 MG/DL (ref 8–23)
BUN/CREAT SERPL: 16.1 (ref 7–25)
CALCIUM SERPL-MCNC: 9.4 MG/DL (ref 8.6–10.5)
CHLORIDE SERPL-SCNC: 103 MMOL/L (ref 98–107)
CHOLEST SERPL-MCNC: 145 MG/DL (ref 0–200)
CO2 SERPL-SCNC: 30.7 MMOL/L (ref 22–29)
CREAT SERPL-MCNC: 0.62 MG/DL (ref 0.57–1)
GLUCOSE SERPL-MCNC: 87 MG/DL (ref 65–99)
HDLC SERPL-MCNC: 70 MG/DL (ref 40–60)
LDLC SERPL CALC-MCNC: 59 MG/DL (ref 0–100)
LDLC/HDLC SERPL: 0.83 {RATIO}
POTASSIUM SERPL-SCNC: 4.7 MMOL/L (ref 3.5–5.2)
SODIUM SERPL-SCNC: 141 MMOL/L (ref 136–145)
TRIGL SERPL-MCNC: 85 MG/DL (ref 0–150)
VLDLC SERPL CALC-MCNC: 16 MG/DL (ref 5–40)

## 2020-11-20 PROCEDURE — 99213 OFFICE O/P EST LOW 20 MIN: CPT | Performed by: FAMILY MEDICINE

## 2020-11-20 RX ORDER — ROSUVASTATIN CALCIUM 10 MG/1
10 TABLET, COATED ORAL NIGHTLY
Qty: 90 TABLET | Refills: 1 | Status: SHIPPED | OUTPATIENT
Start: 2020-11-20 | End: 2021-06-24 | Stop reason: SDUPTHER

## 2020-11-20 NOTE — PROGRESS NOTES
Subjective   Liza Aaron is a 65 y.o. female.   Chief Complaint   Patient presents with   • Hyperlipidemia       History of Present Illness     #1  Hyperlipidemia-patient is on rosuvastatin 10 mg a day.  She takes it every day.  She has no side effects.  No mscle aches, no muscle cramps.  She completed physical therapy.  She is trying to exercise regularly.  Bike/treadmill.  She did very good job with losing weight.  Weight is stable from May.    The following portions of the patient's history were reviewed and updated as appropriate: allergies, current medications, past medical history, past social history and problem list.    Review of Systems   Constitutional: Negative for chills and fever.   Respiratory: Negative for cough and shortness of breath.    Cardiovascular: Negative for chest pain.   Musculoskeletal: Negative for myalgias.         Objective   Wt Readings from Last 3 Encounters:   11/20/20 98.9 kg (218 lb)   11/17/20 97.5 kg (215 lb)   11/10/20 97.5 kg (215 lb)      Vitals:    11/20/20 0841   BP: 138/80   Pulse: 78   Temp: 96.4 °F (35.8 °C)   SpO2: 98%     Temp Readings from Last 3 Encounters:   11/20/20 96.4 °F (35.8 °C)   11/17/20 96 °F (35.6 °C) (Temporal)   11/10/20 98.7 °F (37.1 °C) (Temporal)     BP Readings from Last 3 Encounters:   11/20/20 138/80   11/10/20 130/87   10/12/20 100/62     Pulse Readings from Last 3 Encounters:   11/20/20 78   11/10/20 75   10/12/20 97     Body mass index is 34.14 kg/m².    Physical Exam  Constitutional:       Appearance: Normal appearance. She is well-developed.   HENT:      Head: Normocephalic and atraumatic.   Neck:      Musculoskeletal: Neck supple.      Thyroid: No thyromegaly.      Vascular: No carotid bruit.   Cardiovascular:      Rate and Rhythm: Normal rate and regular rhythm.      Heart sounds: Normal heart sounds.   Pulmonary:      Effort: Pulmonary effort is normal.      Breath sounds: Normal breath sounds.   Skin:     General: Skin is warm and dry.    Neurological:      Mental Status: She is alert and oriented to person, place, and time.   Psychiatric:         Behavior: Behavior normal.         Assessment/Plan   Diagnoses and all orders for this visit:    1. Pure hypercholesterolemia (Primary)  -     Lipid Panel With LDL / HDL Ratio  -     Basic Metabolic Panel    2. Obesity (BMI 30.0-34.9)    Other orders  -     rosuvastatin (CRESTOR) 10 MG tablet; Take 1 tablet by mouth Every Night.  Dispense: 90 tablet; Refill: 1        #1 HLP-continue current treatment.  Check labs.  Follow-up in 6 months.  #2 Obesity-gradually increase amount of exercise to help to lose weight.    Information of DASH diet provided.

## 2020-11-20 NOTE — PATIENT INSTRUCTIONS
"DASH Eating Plan  DASH stands for \"Dietary Approaches to Stop Hypertension.\" The DASH eating plan is a healthy eating plan that has been shown to reduce high blood pressure (hypertension). It may also reduce your risk for type 2 diabetes, heart disease, and stroke. The DASH eating plan may also help with weight loss.  What are tips for following this plan?    General guidelines  · Avoid eating more than 2,300 mg (milligrams) of salt (sodium) a day. If you have hypertension, you may need to reduce your sodium intake to 1,500 mg a day.  · Limit alcohol intake to no more than 1 drink a day for nonpregnant women and 2 drinks a day for men. One drink equals 12 oz of beer, 5 oz of wine, or 1½ oz of hard liquor.  · Work with your health care provider to maintain a healthy body weight or to lose weight. Ask what an ideal weight is for you.  · Get at least 30 minutes of exercise that causes your heart to beat faster (aerobic exercise) most days of the week. Activities may include walking, swimming, or biking.  · Work with your health care provider or diet and nutrition specialist (dietitian) to adjust your eating plan to your individual calorie needs.  Reading food labels    · Check food labels for the amount of sodium per serving. Choose foods with less than 5 percent of the Daily Value of sodium. Generally, foods with less than 300 mg of sodium per serving fit into this eating plan.  · To find whole grains, look for the word \"whole\" as the first word in the ingredient list.  Shopping  · Buy products labeled as \"low-sodium\" or \"no salt added.\"  · Buy fresh foods. Avoid canned foods and premade or frozen meals.  Cooking  · Avoid adding salt when cooking. Use salt-free seasonings or herbs instead of table salt or sea salt. Check with your health care provider or pharmacist before using salt substitutes.  · Do not clancy foods. Cook foods using healthy methods such as baking, boiling, grilling, and broiling instead.  · Cook with " heart-healthy oils, such as olive, canola, soybean, or sunflower oil.  Meal planning  · Eat a balanced diet that includes:  ? 5 or more servings of fruits and vegetables each day. At each meal, try to fill half of your plate with fruits and vegetables.  ? Up to 6-8 servings of whole grains each day.  ? Less than 6 oz of lean meat, poultry, or fish each day. A 3-oz serving of meat is about the same size as a deck of cards. One egg equals 1 oz.  ? 2 servings of low-fat dairy each day.  ? A serving of nuts, seeds, or beans 5 times each week.  ? Heart-healthy fats. Healthy fats called Omega-3 fatty acids are found in foods such as flaxseeds and coldwater fish, like sardines, salmon, and mackerel.  · Limit how much you eat of the following:  ? Canned or prepackaged foods.  ? Food that is high in trans fat, such as fried foods.  ? Food that is high in saturated fat, such as fatty meat.  ? Sweets, desserts, sugary drinks, and other foods with added sugar.  ? Full-fat dairy products.  · Do not salt foods before eating.  · Try to eat at least 2 vegetarian meals each week.  · Eat more home-cooked food and less restaurant, buffet, and fast food.  · When eating at a restaurant, ask that your food be prepared with less salt or no salt, if possible.  What foods are recommended?  The items listed may not be a complete list. Talk with your dietitian about what dietary choices are best for you.  Grains  Whole-grain or whole-wheat bread. Whole-grain or whole-wheat pasta. Brown rice. Oatmeal. Quinoa. Bulgur. Whole-grain and low-sodium cereals. Sol bread. Low-fat, low-sodium crackers. Whole-wheat flour tortillas.  Vegetables  Fresh or frozen vegetables (raw, steamed, roasted, or grilled). Low-sodium or reduced-sodium tomato and vegetable juice. Low-sodium or reduced-sodium tomato sauce and tomato paste. Low-sodium or reduced-sodium canned vegetables.  Fruits  All fresh, dried, or frozen fruit. Canned fruit in natural juice (without  added sugar).  Meat and other protein foods  Skinless chicken or turkey. Ground chicken or turkey. Pork with fat trimmed off. Fish and seafood. Egg whites. Dried beans, peas, or lentils. Unsalted nuts, nut butters, and seeds. Unsalted canned beans. Lean cuts of beef with fat trimmed off. Low-sodium, lean deli meat.  Dairy  Low-fat (1%) or fat-free (skim) milk. Fat-free, low-fat, or reduced-fat cheeses. Nonfat, low-sodium ricotta or cottage cheese. Low-fat or nonfat yogurt. Low-fat, low-sodium cheese.  Fats and oils  Soft margarine without trans fats. Vegetable oil. Low-fat, reduced-fat, or light mayonnaise and salad dressings (reduced-sodium). Canola, safflower, olive, soybean, and sunflower oils. Avocado.  Seasoning and other foods  Herbs. Spices. Seasoning mixes without salt. Unsalted popcorn and pretzels. Fat-free sweets.  What foods are not recommended?  The items listed may not be a complete list. Talk with your dietitian about what dietary choices are best for you.  Grains  Baked goods made with fat, such as croissants, muffins, or some breads. Dry pasta or rice meal packs.  Vegetables  Creamed or fried vegetables. Vegetables in a cheese sauce. Regular canned vegetables (not low-sodium or reduced-sodium). Regular canned tomato sauce and paste (not low-sodium or reduced-sodium). Regular tomato and vegetable juice (not low-sodium or reduced-sodium). Pickles. Olives.  Fruits  Canned fruit in a light or heavy syrup. Fried fruit. Fruit in cream or butter sauce.  Meat and other protein foods  Fatty cuts of meat. Ribs. Fried meat. Schroeder. Sausage. Bologna and other processed lunch meats. Salami. Fatback. Hotdogs. Bratwurst. Salted nuts and seeds. Canned beans with added salt. Canned or smoked fish. Whole eggs or egg yolks. Chicken or turkey with skin.  Dairy  Whole or 2% milk, cream, and half-and-half. Whole or full-fat cream cheese. Whole-fat or sweetened yogurt. Full-fat cheese. Nondairy creamers. Whipped toppings.  Processed cheese and cheese spreads.  Fats and oils  Butter. Stick margarine. Lard. Shortening. Ghee. Schroeder fat. Tropical oils, such as coconut, palm kernel, or palm oil.  Seasoning and other foods  Salted popcorn and pretzels. Onion salt, garlic salt, seasoned salt, table salt, and sea salt. Worcestershire sauce. Tartar sauce. Barbecue sauce. Teriyaki sauce. Soy sauce, including reduced-sodium. Steak sauce. Canned and packaged gravies. Fish sauce. Oyster sauce. Cocktail sauce. Horseradish that you find on the shelf. Ketchup. Mustard. Meat flavorings and tenderizers. Bouillon cubes. Hot sauce and Tabasco sauce. Premade or packaged marinades. Premade or packaged taco seasonings. Relishes. Regular salad dressings.  Where to find more information:  · National Heart, Lung, and [employer] [place]: www.nhlbi.nih.gov  · American Heart Association: www.heart.org  Summary  · The DASH eating plan is a healthy eating plan that has been shown to reduce high blood pressure (hypertension). It may also reduce your risk for type 2 diabetes, heart disease, and stroke.  · With the DASH eating plan, you should limit salt (sodium) intake to 2,300 mg a day. If you have hypertension, you may need to reduce your sodium intake to 1,500 mg a day.  · When on the DASH eating plan, aim to eat more fresh fruits and vegetables, whole grains, lean proteins, low-fat dairy, and heart-healthy fats.  · Work with your health care provider or diet and nutrition specialist (dietitian) to adjust your eating plan to your individual calorie needs.  This information is not intended to replace advice given to you by your health care provider. Make sure you discuss any questions you have with your health care provider.  Document Released: 12/06/2012 Document Revised: 11/30/2018 Document Reviewed: 12/11/2017  Elsevier Patient Education © 2020 [employer]    Constipation, Adult  Constipation is when a person has fewer bowel movements in a week than normal, has  difficulty having a bowel movement, or has stools that are dry, hard, or larger than normal. Constipation may be caused by an underlying condition. It may become worse with age if a person takes certain medicines and does not take in enough fluids.  Follow these instructions at home:  Eating and drinking    · Eat foods that have a lot of fiber, such as fresh fruits and vegetables, whole grains, and beans.  · Limit foods that are high in fat, low in fiber, or overly processed, such as french fries, hamburgers, cookies, candies, and soda.  · Drink enough fluid to keep your urine clear or pale yellow.  General instructions  · Exercise regularly or as told by your health care provider.  · Go to the restroom when you have the urge to go. Do not hold it in.  · Take over-the-counter and prescription medicines only as told by your health care provider. These include any fiber supplements.  · Practice pelvic floor retraining exercises, such as deep breathing while relaxing the lower abdomen and pelvic floor relaxation during bowel movements.  · Watch your condition for any changes.  · Keep all follow-up visits as told by your health care provider. This is important.  Contact a health care provider if:  · You have pain that gets worse.  · You have a fever.  · You do not have a bowel movement after 4 days.  · You vomit.  · You are not hungry.  · You lose weight.  · You are bleeding from the anus.  · You have thin, pencil-like stools.  Get help right away if:  · You have a fever and your symptoms suddenly get worse.  · You leak stool or have blood in your stool.  · Your abdomen is bloated.  · You have severe pain in your abdomen.  · You feel dizzy or you faint.  This information is not intended to replace advice given to you by your health care provider. Make sure you discuss any questions you have with your health care provider.  Document Released: 09/15/2005 Document Revised: 11/30/2018 Document Reviewed: 06/07/2017  [employer]  Patient Education © 2020 [employer]

## 2020-11-24 ENCOUNTER — TELEPHONE (OUTPATIENT)
Dept: ORTHOPEDIC SURGERY | Facility: CLINIC | Age: 65
End: 2020-11-24

## 2020-11-24 RX ORDER — CLINDAMYCIN HYDROCHLORIDE 300 MG/1
CAPSULE ORAL
Qty: 2 CAPSULE | Refills: 2 | Status: SHIPPED | OUTPATIENT
Start: 2020-11-24 | End: 2020-12-11 | Stop reason: HOSPADM

## 2020-11-24 NOTE — TELEPHONE ENCOUNTER
New prescription was sent to [facility] pharmacy at [phone] for clindamycin 300 mg, #2, directions are to take both capsules 1 hour prior to her procedure.  Refill x2 per [facility] patient has been notified

## 2020-11-24 NOTE — TELEPHONE ENCOUNTER
Patient was prescribed Cephalexin for her dental appointment tomorrow 11/25, patient  Is allergic to Penicillin and would like to have Clindamycin sent to her pharmacy instead.

## 2020-12-09 ENCOUNTER — HOSPITAL ENCOUNTER (OUTPATIENT)
Facility: HOSPITAL | Age: 65
Setting detail: OBSERVATION
Discharge: HOME | End: 2020-12-11
Attending: EMERGENCY MEDICINE | Admitting: HOSPITALIST

## 2020-12-09 ENCOUNTER — APPOINTMENT (OUTPATIENT)
Dept: CT IMAGING | Facility: HOSPITAL | Age: 65
End: 2020-12-09

## 2020-12-09 DIAGNOSIS — R56.9 NEW ONSET SEIZURE (HCC): Primary | ICD-10-CM

## 2020-12-09 LAB
ALBUMIN SERPL-MCNC: 4.5 G/DL (ref 3.5–5.2)
ALBUMIN/GLOB SERPL: 1.7 G/DL
ALP SERPL-CCNC: 98 U/L (ref 39–117)
ALT SERPL W P-5'-P-CCNC: 11 U/L (ref 1–33)
AMPHET+METHAMPHET UR QL: NEGATIVE
ANION GAP SERPL CALCULATED.3IONS-SCNC: 12.1 MMOL/L (ref 5–15)
AST SERPL-CCNC: 24 U/L (ref 1–32)
B PARAPERT DNA SPEC QL NAA+PROBE: NOT DETECTED
B PERT DNA SPEC QL NAA+PROBE: NOT DETECTED
BACTERIA UR QL AUTO: NORMAL /HPF
BARBITURATES UR QL SCN: NEGATIVE
BASOPHILS # BLD AUTO: 0.03 10*3/MM3 (ref 0–0.2)
BASOPHILS NFR BLD AUTO: 0.8 % (ref 0–1.5)
BENZODIAZ UR QL SCN: NEGATIVE
BILIRUB SERPL-MCNC: 0.3 MG/DL (ref 0–1.2)
BILIRUB UR QL STRIP: NEGATIVE
BUN SERPL-MCNC: 13 MG/DL (ref 8–23)
BUN/CREAT SERPL: 18.8 (ref 7–25)
C PNEUM DNA NPH QL NAA+NON-PROBE: NOT DETECTED
CALCIUM SPEC-SCNC: 9.4 MG/DL (ref 8.6–10.5)
CANNABINOIDS SERPL QL: NEGATIVE
CHLORIDE SERPL-SCNC: 102 MMOL/L (ref 98–107)
CLARITY UR: CLEAR
CO2 SERPL-SCNC: 23.9 MMOL/L (ref 22–29)
COCAINE UR QL: NEGATIVE
COLOR UR: YELLOW
CREAT SERPL-MCNC: 0.69 MG/DL (ref 0.57–1)
DEPRECATED RDW RBC AUTO: 42.2 FL (ref 37–54)
EOSINOPHIL # BLD AUTO: 0.06 10*3/MM3 (ref 0–0.4)
EOSINOPHIL NFR BLD AUTO: 1.6 % (ref 0.3–6.2)
ERYTHROCYTE [DISTWIDTH] IN BLOOD BY AUTOMATED COUNT: 13.8 % (ref 12.3–15.4)
ETHANOL BLD-MCNC: <10 MG/DL (ref 0–10)
ETHANOL UR QL: <0.01 %
FLUAV SUBTYP SPEC NAA+PROBE: NOT DETECTED
FLUBV RNA ISLT QL NAA+PROBE: NOT DETECTED
GFR SERPL CREATININE-BSD FRML MDRD: 86 ML/MIN/1.73
GLOBULIN UR ELPH-MCNC: 2.7 GM/DL
GLUCOSE SERPL-MCNC: 89 MG/DL (ref 65–99)
GLUCOSE UR STRIP-MCNC: NEGATIVE MG/DL
HADV DNA SPEC NAA+PROBE: NOT DETECTED
HCOV 229E RNA SPEC QL NAA+PROBE: NOT DETECTED
HCOV HKU1 RNA SPEC QL NAA+PROBE: NOT DETECTED
HCOV NL63 RNA SPEC QL NAA+PROBE: NOT DETECTED
HCOV OC43 RNA SPEC QL NAA+PROBE: NOT DETECTED
HCT VFR BLD AUTO: 41 % (ref 34–46.6)
HGB BLD-MCNC: 13.5 G/DL (ref 12–15.9)
HGB UR QL STRIP.AUTO: NEGATIVE
HMPV RNA NPH QL NAA+NON-PROBE: NOT DETECTED
HPIV1 RNA SPEC QL NAA+PROBE: NOT DETECTED
HPIV2 RNA SPEC QL NAA+PROBE: NOT DETECTED
HPIV3 RNA NPH QL NAA+PROBE: NOT DETECTED
HPIV4 P GENE NPH QL NAA+PROBE: NOT DETECTED
HYALINE CASTS UR QL AUTO: NORMAL /LPF
IMM GRANULOCYTES # BLD AUTO: 0 10*3/MM3 (ref 0–0.05)
IMM GRANULOCYTES NFR BLD AUTO: 0 % (ref 0–0.5)
KETONES UR QL STRIP: NEGATIVE
LEUKOCYTE ESTERASE UR QL STRIP.AUTO: ABNORMAL
LYMPHOCYTES # BLD AUTO: 1.72 10*3/MM3 (ref 0.7–3.1)
LYMPHOCYTES NFR BLD AUTO: 46.2 % (ref 19.6–45.3)
M PNEUMO IGG SER IA-ACNC: NOT DETECTED
MCH RBC QN AUTO: 27.8 PG (ref 26.6–33)
MCHC RBC AUTO-ENTMCNC: 32.9 G/DL (ref 31.5–35.7)
MCV RBC AUTO: 84.5 FL (ref 79–97)
METHADONE UR QL SCN: NEGATIVE
MONOCYTES # BLD AUTO: 0.39 10*3/MM3 (ref 0.1–0.9)
MONOCYTES NFR BLD AUTO: 10.5 % (ref 5–12)
NEUTROPHILS NFR BLD AUTO: 1.52 10*3/MM3 (ref 1.7–7)
NEUTROPHILS NFR BLD AUTO: 40.9 % (ref 42.7–76)
NITRITE UR QL STRIP: NEGATIVE
NRBC BLD AUTO-RTO: 0 /100 WBC (ref 0–0.2)
OPIATES UR QL: NEGATIVE
OXYCODONE UR QL SCN: NEGATIVE
PH UR STRIP.AUTO: <=5 [PH] (ref 5–8)
PLATELET # BLD AUTO: 206 10*3/MM3 (ref 140–450)
PMV BLD AUTO: 9.1 FL (ref 6–12)
POTASSIUM SERPL-SCNC: 3.6 MMOL/L (ref 3.5–5.2)
PROT SERPL-MCNC: 7.2 G/DL (ref 6–8.5)
PROT UR QL STRIP: NEGATIVE
QT INTERVAL: 349 MS
RBC # BLD AUTO: 4.85 10*6/MM3 (ref 3.77–5.28)
RBC # UR: NORMAL /HPF
REF LAB TEST METHOD: NORMAL
RHINOVIRUS RNA SPEC NAA+PROBE: NOT DETECTED
RSV RNA NPH QL NAA+NON-PROBE: NOT DETECTED
SARS-COV-2 RNA NPH QL NAA+NON-PROBE: NOT DETECTED
SODIUM SERPL-SCNC: 138 MMOL/L (ref 136–145)
SP GR UR STRIP: 1.01 (ref 1–1.03)
SQUAMOUS #/AREA URNS HPF: NORMAL /HPF
UROBILINOGEN UR QL STRIP: ABNORMAL
WBC # BLD AUTO: 3.72 10*3/MM3 (ref 3.4–10.8)
WBC UR QL AUTO: NORMAL /HPF

## 2020-12-09 PROCEDURE — 80307 DRUG TEST PRSMV CHEM ANLYZR: CPT | Performed by: EMERGENCY MEDICINE

## 2020-12-09 PROCEDURE — 80053 COMPREHEN METABOLIC PANEL: CPT | Performed by: EMERGENCY MEDICINE

## 2020-12-09 PROCEDURE — G0378 HOSPITAL OBSERVATION PER HR: HCPCS

## 2020-12-09 PROCEDURE — 93005 ELECTROCARDIOGRAM TRACING: CPT | Performed by: EMERGENCY MEDICINE

## 2020-12-09 PROCEDURE — 81001 URINALYSIS AUTO W/SCOPE: CPT | Performed by: EMERGENCY MEDICINE

## 2020-12-09 PROCEDURE — 82550 ASSAY OF CK (CPK): CPT | Performed by: NURSE PRACTITIONER

## 2020-12-09 PROCEDURE — 70450 CT HEAD/BRAIN W/O DYE: CPT

## 2020-12-09 PROCEDURE — 0202U NFCT DS 22 TRGT SARS-COV-2: CPT | Performed by: EMERGENCY MEDICINE

## 2020-12-09 PROCEDURE — 93010 ELECTROCARDIOGRAM REPORT: CPT | Performed by: INTERNAL MEDICINE

## 2020-12-09 PROCEDURE — 99285 EMERGENCY DEPT VISIT HI MDM: CPT

## 2020-12-09 PROCEDURE — 85025 COMPLETE CBC W/AUTO DIFF WBC: CPT | Performed by: EMERGENCY MEDICINE

## 2020-12-09 RX ORDER — ACETAMINOPHEN 325 MG/1
650 TABLET ORAL EVERY 4 HOURS PRN
Status: DISCONTINUED | OUTPATIENT
Start: 2020-12-09 | End: 2020-12-11 | Stop reason: HOSPADM

## 2020-12-09 RX ORDER — L.ACID,PARA/B.BIFIDUM/S.THERM 8B CELL
1 CAPSULE ORAL DAILY
Status: DISCONTINUED | OUTPATIENT
Start: 2020-12-10 | End: 2020-12-10

## 2020-12-09 RX ORDER — MULTIPLE VITAMINS W/ MINERALS TAB 9MG-400MCG
1 TAB ORAL DAILY
Status: DISCONTINUED | OUTPATIENT
Start: 2020-12-10 | End: 2020-12-11 | Stop reason: HOSPADM

## 2020-12-09 RX ORDER — SODIUM CHLORIDE 0.9 % (FLUSH) 0.9 %
10 SYRINGE (ML) INJECTION AS NEEDED
Status: DISCONTINUED | OUTPATIENT
Start: 2020-12-09 | End: 2020-12-11 | Stop reason: HOSPADM

## 2020-12-09 RX ORDER — ACETAMINOPHEN 160 MG/5ML
650 SOLUTION ORAL EVERY 4 HOURS PRN
Status: DISCONTINUED | OUTPATIENT
Start: 2020-12-09 | End: 2020-12-11 | Stop reason: HOSPADM

## 2020-12-09 RX ORDER — SODIUM CHLORIDE 0.9 % (FLUSH) 0.9 %
10 SYRINGE (ML) INJECTION EVERY 12 HOURS SCHEDULED
Status: DISCONTINUED | OUTPATIENT
Start: 2020-12-10 | End: 2020-12-11 | Stop reason: HOSPADM

## 2020-12-09 RX ORDER — ONDANSETRON 2 MG/ML
4 INJECTION INTRAMUSCULAR; INTRAVENOUS EVERY 6 HOURS PRN
Status: DISCONTINUED | OUTPATIENT
Start: 2020-12-09 | End: 2020-12-11 | Stop reason: HOSPADM

## 2020-12-09 RX ORDER — SODIUM CHLORIDE 9 MG/ML
100 INJECTION, SOLUTION INTRAVENOUS CONTINUOUS
Status: DISCONTINUED | OUTPATIENT
Start: 2020-12-10 | End: 2020-12-10

## 2020-12-09 RX ORDER — MULTIPLE VITAMINS W/ MINERALS TAB 9MG-400MCG
1 TAB ORAL DAILY
COMMUNITY

## 2020-12-09 RX ORDER — ACETAMINOPHEN 650 MG/1
650 SUPPOSITORY RECTAL EVERY 4 HOURS PRN
Status: DISCONTINUED | OUTPATIENT
Start: 2020-12-09 | End: 2020-12-11 | Stop reason: HOSPADM

## 2020-12-09 RX ORDER — NITROGLYCERIN 0.4 MG/1
0.4 TABLET SUBLINGUAL
Status: DISCONTINUED | OUTPATIENT
Start: 2020-12-09 | End: 2020-12-11 | Stop reason: HOSPADM

## 2020-12-09 RX ORDER — CHOLECALCIFEROL (VITAMIN D3) 125 MCG
5 CAPSULE ORAL NIGHTLY PRN
Status: DISCONTINUED | OUTPATIENT
Start: 2020-12-09 | End: 2020-12-11 | Stop reason: HOSPADM

## 2020-12-09 RX ORDER — MELATONIN
1000 DAILY
Status: DISCONTINUED | OUTPATIENT
Start: 2020-12-10 | End: 2020-12-11 | Stop reason: HOSPADM

## 2020-12-09 RX ORDER — ROSUVASTATIN CALCIUM 10 MG/1
10 TABLET, COATED ORAL NIGHTLY
Status: DISCONTINUED | OUTPATIENT
Start: 2020-12-10 | End: 2020-12-11 | Stop reason: HOSPADM

## 2020-12-09 RX ORDER — CALCIUM CARBONATE 500(1250)
500 TABLET ORAL DAILY
Status: DISCONTINUED | OUTPATIENT
Start: 2020-12-10 | End: 2020-12-11 | Stop reason: HOSPADM

## 2020-12-09 RX ORDER — ACETAMINOPHEN 500 MG
1000 TABLET ORAL ONCE
Status: COMPLETED | OUTPATIENT
Start: 2020-12-09 | End: 2020-12-09

## 2020-12-09 RX ADMIN — ACETAMINOPHEN 1000 MG: 500 TABLET ORAL at 22:35

## 2020-12-10 ENCOUNTER — APPOINTMENT (OUTPATIENT)
Dept: CARDIOLOGY | Facility: HOSPITAL | Age: 65
End: 2020-12-10

## 2020-12-10 ENCOUNTER — APPOINTMENT (OUTPATIENT)
Dept: MRI IMAGING | Facility: HOSPITAL | Age: 65
End: 2020-12-10

## 2020-12-10 ENCOUNTER — EPISODE CHANGES (OUTPATIENT)
Dept: CASE MANAGEMENT | Facility: OTHER | Age: 65
End: 2020-12-10

## 2020-12-10 ENCOUNTER — APPOINTMENT (OUTPATIENT)
Dept: NEUROLOGY | Facility: HOSPITAL | Age: 65
End: 2020-12-10

## 2020-12-10 PROBLEM — E66.9 OBESITY (BMI 30-39.9): Status: ACTIVE | Noted: 2020-12-10

## 2020-12-10 LAB
ALBUMIN SERPL-MCNC: 4 G/DL (ref 3.5–5.2)
ALBUMIN/GLOB SERPL: 1.8 G/DL
ALP SERPL-CCNC: 82 U/L (ref 39–117)
ALT SERPL W P-5'-P-CCNC: 12 U/L (ref 1–33)
ANION GAP SERPL CALCULATED.3IONS-SCNC: 8.7 MMOL/L (ref 5–15)
AORTIC DIMENSIONLESS INDEX: 0.7 (DI)
AST SERPL-CCNC: 38 U/L (ref 1–32)
BASOPHILS # BLD AUTO: 0.03 10*3/MM3 (ref 0–0.2)
BASOPHILS NFR BLD AUTO: 0.6 % (ref 0–1.5)
BH CV ECHO MEAS - ACS: 2.3 CM
BH CV ECHO MEAS - AO MAX PG (FULL): 4.5 MMHG
BH CV ECHO MEAS - AO MAX PG: 9.4 MMHG
BH CV ECHO MEAS - AO MEAN PG (FULL): 2 MMHG
BH CV ECHO MEAS - AO MEAN PG: 5 MMHG
BH CV ECHO MEAS - AO ROOT AREA (BSA CORRECTED): 1.6
BH CV ECHO MEAS - AO ROOT AREA: 9.1 CM^2
BH CV ECHO MEAS - AO ROOT DIAM: 3.4 CM
BH CV ECHO MEAS - AO V2 MAX: 153 CM/SEC
BH CV ECHO MEAS - AO V2 MEAN: 102 CM/SEC
BH CV ECHO MEAS - AO V2 VTI: 31.8 CM
BH CV ECHO MEAS - AVA(I,A): 3 CM^2
BH CV ECHO MEAS - AVA(I,D): 3 CM^2
BH CV ECHO MEAS - AVA(V,A): 3 CM^2
BH CV ECHO MEAS - AVA(V,D): 3 CM^2
BH CV ECHO MEAS - BSA(HAYCOCK): 2.2 M^2
BH CV ECHO MEAS - BSA: 2.1 M^2
BH CV ECHO MEAS - BZI_BMI: 33.2 KILOGRAMS/M^2
BH CV ECHO MEAS - BZI_METRIC_HEIGHT: 170.2 CM
BH CV ECHO MEAS - BZI_METRIC_WEIGHT: 96.2 KG
BH CV ECHO MEAS - EDV(CUBED): 68.9 ML
BH CV ECHO MEAS - EDV(MOD-SP2): 81 ML
BH CV ECHO MEAS - EDV(MOD-SP4): 79 ML
BH CV ECHO MEAS - EDV(TEICH): 74.2 ML
BH CV ECHO MEAS - EF(CUBED): 68.1 %
BH CV ECHO MEAS - EF(MOD-BP): 56.5 %
BH CV ECHO MEAS - EF(MOD-SP2): 56.8 %
BH CV ECHO MEAS - EF(MOD-SP4): 57 %
BH CV ECHO MEAS - EF(TEICH): 60.2 %
BH CV ECHO MEAS - ESV(CUBED): 22 ML
BH CV ECHO MEAS - ESV(MOD-SP2): 35 ML
BH CV ECHO MEAS - ESV(MOD-SP4): 34 ML
BH CV ECHO MEAS - ESV(TEICH): 29.6 ML
BH CV ECHO MEAS - FS: 31.7 %
BH CV ECHO MEAS - IVS/LVPW: 0.89
BH CV ECHO MEAS - IVSD: 0.8 CM
BH CV ECHO MEAS - LAT PEAK E' VEL: 15 CM/SEC
BH CV ECHO MEAS - LV DIASTOLIC VOL/BSA (35-75): 38.1 ML/M^2
BH CV ECHO MEAS - LV MASS(C)D: 105.6 GRAMS
BH CV ECHO MEAS - LV MASS(C)DI: 50.9 GRAMS/M^2
BH CV ECHO MEAS - LV MAX PG: 4.8 MMHG
BH CV ECHO MEAS - LV MEAN PG: 3 MMHG
BH CV ECHO MEAS - LV SYSTOLIC VOL/BSA (12-30): 16.4 ML/M^2
BH CV ECHO MEAS - LV V1 MAX: 110 CM/SEC
BH CV ECHO MEAS - LV V1 MEAN: 78.2 CM/SEC
BH CV ECHO MEAS - LV V1 VTI: 22.8 CM
BH CV ECHO MEAS - LVIDD: 4.1 CM
BH CV ECHO MEAS - LVIDS: 2.8 CM
BH CV ECHO MEAS - LVLD AP2: 7.7 CM
BH CV ECHO MEAS - LVLD AP4: 7.2 CM
BH CV ECHO MEAS - LVLS AP2: 6.3 CM
BH CV ECHO MEAS - LVLS AP4: 5.8 CM
BH CV ECHO MEAS - LVOT AREA (M): 4.2 CM^2
BH CV ECHO MEAS - LVOT AREA: 4.2 CM^2
BH CV ECHO MEAS - LVOT DIAM: 2.3 CM
BH CV ECHO MEAS - LVPWD: 0.9 CM
BH CV ECHO MEAS - MED PEAK E' VEL: 8.8 CM/SEC
BH CV ECHO MEAS - MV A DUR: 0.17 SEC
BH CV ECHO MEAS - MV A MAX VEL: 99.4 CM/SEC
BH CV ECHO MEAS - MV DEC SLOPE: 438 CM/SEC^2
BH CV ECHO MEAS - MV DEC TIME: 248 SEC
BH CV ECHO MEAS - MV E MAX VEL: 99 CM/SEC
BH CV ECHO MEAS - MV E/A: 1
BH CV ECHO MEAS - MV MAX PG: 4.8 MMHG
BH CV ECHO MEAS - MV MEAN PG: 1 MMHG
BH CV ECHO MEAS - MV P1/2T MAX VEL: 105 CM/SEC
BH CV ECHO MEAS - MV P1/2T: 70.2 MSEC
BH CV ECHO MEAS - MV V2 MAX: 109 CM/SEC
BH CV ECHO MEAS - MV V2 MEAN: 53.4 CM/SEC
BH CV ECHO MEAS - MV V2 VTI: 26.2 CM
BH CV ECHO MEAS - MVA P1/2T LCG: 2.1 CM^2
BH CV ECHO MEAS - MVA(P1/2T): 3.1 CM^2
BH CV ECHO MEAS - MVA(VTI): 3.6 CM^2
BH CV ECHO MEAS - PA ACC TIME: 0.02 SEC
BH CV ECHO MEAS - PA MAX PG (FULL): 2.8 MMHG
BH CV ECHO MEAS - PA MAX PG: 4.6 MMHG
BH CV ECHO MEAS - PA PR(ACCEL): 68.2 MMHG
BH CV ECHO MEAS - PA V2 MAX: 107 CM/SEC
BH CV ECHO MEAS - PULM A REVS DUR: 0.11 SEC
BH CV ECHO MEAS - PULM A REVS VEL: 35.2 CM/SEC
BH CV ECHO MEAS - PULM DIAS VEL: 47.6 CM/SEC
BH CV ECHO MEAS - PULM S/D: 1.5
BH CV ECHO MEAS - PULM SYS VEL: 70.9 CM/SEC
BH CV ECHO MEAS - PVA(V,A): 2.3 CM^2
BH CV ECHO MEAS - PVA(V,D): 2.3 CM^2
BH CV ECHO MEAS - QP/QS: 0.53
BH CV ECHO MEAS - RAP SYSTOLE: 3 MMHG
BH CV ECHO MEAS - RV MAX PG: 1.7 MMHG
BH CV ECHO MEAS - RV MEAN PG: 1 MMHG
BH CV ECHO MEAS - RV V1 MAX: 65.9 CM/SEC
BH CV ECHO MEAS - RV V1 MEAN: 42.9 CM/SEC
BH CV ECHO MEAS - RV V1 VTI: 13.3 CM
BH CV ECHO MEAS - RVOT AREA: 3.8 CM^2
BH CV ECHO MEAS - RVOT DIAM: 2.2 CM
BH CV ECHO MEAS - RVSP: 32 MMHG
BH CV ECHO MEAS - SI(AO): 139.3 ML/M^2
BH CV ECHO MEAS - SI(CUBED): 22.7 ML/M^2
BH CV ECHO MEAS - SI(LVOT): 45.7 ML/M^2
BH CV ECHO MEAS - SI(MOD-SP2): 22.2 ML/M^2
BH CV ECHO MEAS - SI(MOD-SP4): 21.7 ML/M^2
BH CV ECHO MEAS - SI(TEICH): 21.6 ML/M^2
BH CV ECHO MEAS - SV(AO): 288.7 ML
BH CV ECHO MEAS - SV(CUBED): 47 ML
BH CV ECHO MEAS - SV(LVOT): 94.7 ML
BH CV ECHO MEAS - SV(MOD-SP2): 46 ML
BH CV ECHO MEAS - SV(MOD-SP4): 45 ML
BH CV ECHO MEAS - SV(RVOT): 50.6 ML
BH CV ECHO MEAS - SV(TEICH): 44.7 ML
BH CV ECHO MEAS - TAPSE (>1.6): 2.6 CM
BH CV ECHO MEAS - TR MAX PG: 29 MMHG
BH CV ECHO MEAS - TR MAX VEL: 267 CM/SEC
BH CV ECHO MEASUREMENTS AVERAGE E/E' RATIO: 8.32
BH CV XLRA - RV BASE: 3.9 CM
BH CV XLRA - RV LENGTH: 6.5 CM
BH CV XLRA - RV MID: 3.7 CM
BH CV XLRA - TDI S': 16.8 CM/SEC
BH CV XLRA MEAS LEFT DIST CCA EDV: 34.3 CM/SEC
BH CV XLRA MEAS LEFT DIST CCA PSV: 104.4 CM/SEC
BH CV XLRA MEAS LEFT DIST ICA EDV: -32.8 CM/SEC
BH CV XLRA MEAS LEFT DIST ICA PSV: -70.6 CM/SEC
BH CV XLRA MEAS LEFT MID ICA EDV: -22.4 CM/SEC
BH CV XLRA MEAS LEFT MID ICA PSV: -55.7 CM/SEC
BH CV XLRA MEAS LEFT PROX CCA EDV: 35 CM/SEC
BH CV XLRA MEAS LEFT PROX CCA PSV: 110.7 CM/SEC
BH CV XLRA MEAS LEFT PROX ECA EDV: -23.1 CM/SEC
BH CV XLRA MEAS LEFT PROX ECA PSV: -98.1 CM/SEC
BH CV XLRA MEAS LEFT PROX ICA EDV: -35 CM/SEC
BH CV XLRA MEAS LEFT PROX ICA PSV: -88.3 CM/SEC
BH CV XLRA MEAS LEFT PROX SCLA PSV: 124.8 CM/SEC
BH CV XLRA MEAS LEFT VERTEBRAL A EDV: 8.3 CM/SEC
BH CV XLRA MEAS LEFT VERTEBRAL A PSV: 38.6 CM/SEC
BH CV XLRA MEAS RIGHT DIST CCA EDV: -29.3 CM/SEC
BH CV XLRA MEAS RIGHT DIST CCA PSV: -97.3 CM/SEC
BH CV XLRA MEAS RIGHT DIST ICA EDV: -36.5 CM/SEC
BH CV XLRA MEAS RIGHT DIST ICA PSV: -86.6 CM/SEC
BH CV XLRA MEAS RIGHT MID ICA EDV: -44.5 CM/SEC
BH CV XLRA MEAS RIGHT MID ICA PSV: -97.9 CM/SEC
BH CV XLRA MEAS RIGHT PROX CCA EDV: 25.8 CM/SEC
BH CV XLRA MEAS RIGHT PROX CCA PSV: 90.3 CM/SEC
BH CV XLRA MEAS RIGHT PROX ECA EDV: -19.3 CM/SEC
BH CV XLRA MEAS RIGHT PROX ECA PSV: -96.1 CM/SEC
BH CV XLRA MEAS RIGHT PROX ICA EDV: -23.3 CM/SEC
BH CV XLRA MEAS RIGHT PROX ICA PSV: -59 CM/SEC
BH CV XLRA MEAS RIGHT PROX SCLA EDV: 12.2 CM/SEC
BH CV XLRA MEAS RIGHT PROX SCLA PSV: 164.6 CM/SEC
BH CV XLRA MEAS RIGHT VERTEBRAL A EDV: -17 CM/SEC
BH CV XLRA MEAS RIGHT VERTEBRAL A PSV: -49.9 CM/SEC
BILIRUB SERPL-MCNC: 0.4 MG/DL (ref 0–1.2)
BUN SERPL-MCNC: 11 MG/DL (ref 8–23)
BUN/CREAT SERPL: 18.3 (ref 7–25)
CALCIUM SPEC-SCNC: 8.5 MG/DL (ref 8.6–10.5)
CHLORIDE SERPL-SCNC: 102 MMOL/L (ref 98–107)
CK SERPL-CCNC: 125 U/L (ref 20–180)
CO2 SERPL-SCNC: 24.3 MMOL/L (ref 22–29)
CREAT SERPL-MCNC: 0.6 MG/DL (ref 0.57–1)
DEPRECATED RDW RBC AUTO: 43.2 FL (ref 37–54)
EOSINOPHIL # BLD AUTO: 0.07 10*3/MM3 (ref 0–0.4)
EOSINOPHIL NFR BLD AUTO: 1.5 % (ref 0.3–6.2)
ERYTHROCYTE [DISTWIDTH] IN BLOOD BY AUTOMATED COUNT: 14 % (ref 12.3–15.4)
GFR SERPL CREATININE-BSD FRML MDRD: 101 ML/MIN/1.73
GLOBULIN UR ELPH-MCNC: 2.2 GM/DL
GLUCOSE SERPL-MCNC: 71 MG/DL (ref 65–99)
HBA1C MFR BLD: 5.1 % (ref 4.8–5.6)
HCT VFR BLD AUTO: 34.5 % (ref 34–46.6)
HGB BLD-MCNC: 11.6 G/DL (ref 12–15.9)
IMM GRANULOCYTES # BLD AUTO: 0.01 10*3/MM3 (ref 0–0.05)
IMM GRANULOCYTES NFR BLD AUTO: 0.2 % (ref 0–0.5)
INR PPP: 0.97 (ref 0.9–1.1)
LEFT ARM BP: NORMAL MMHG
LEFT ATRIUM VOLUME INDEX: 24.1 ML/M2
LV EF 2D ECHO EST: 56 %
LYMPHOCYTES # BLD AUTO: 2 10*3/MM3 (ref 0.7–3.1)
LYMPHOCYTES NFR BLD AUTO: 42.4 % (ref 19.6–45.3)
MAGNESIUM SERPL-MCNC: 2.3 MG/DL (ref 1.6–2.4)
MAXIMAL PREDICTED HEART RATE: 156 BPM
MCH RBC QN AUTO: 28.7 PG (ref 26.6–33)
MCHC RBC AUTO-ENTMCNC: 33.6 G/DL (ref 31.5–35.7)
MCV RBC AUTO: 85.4 FL (ref 79–97)
MONOCYTES # BLD AUTO: 0.55 10*3/MM3 (ref 0.1–0.9)
MONOCYTES NFR BLD AUTO: 11.7 % (ref 5–12)
NEUTROPHILS NFR BLD AUTO: 2.06 10*3/MM3 (ref 1.7–7)
NEUTROPHILS NFR BLD AUTO: 43.6 % (ref 42.7–76)
NRBC BLD AUTO-RTO: 0 /100 WBC (ref 0–0.2)
PHOSPHATE SERPL-MCNC: 3.7 MG/DL (ref 2.5–4.5)
PLATELET # BLD AUTO: 188 10*3/MM3 (ref 140–450)
PMV BLD AUTO: 9.6 FL (ref 6–12)
POTASSIUM SERPL-SCNC: 3.6 MMOL/L (ref 3.5–5.2)
PROT SERPL-MCNC: 6.2 G/DL (ref 6–8.5)
PROTHROMBIN TIME: 12.7 SECONDS (ref 11.7–14.2)
RBC # BLD AUTO: 4.04 10*6/MM3 (ref 3.77–5.28)
RIGHT ARM BP: NORMAL MMHG
SODIUM SERPL-SCNC: 135 MMOL/L (ref 136–145)
STRESS TARGET HR: 133 BPM
TSH SERPL DL<=0.05 MIU/L-ACNC: 2.4 UIU/ML (ref 0.27–4.2)
VIT B12 BLD-MCNC: 603 PG/ML (ref 211–946)
WBC # BLD AUTO: 4.72 10*3/MM3 (ref 3.4–10.8)

## 2020-12-10 PROCEDURE — 70553 MRI BRAIN STEM W/O & W/DYE: CPT

## 2020-12-10 PROCEDURE — G0378 HOSPITAL OBSERVATION PER HR: HCPCS

## 2020-12-10 PROCEDURE — 85610 PROTHROMBIN TIME: CPT | Performed by: NURSE PRACTITIONER

## 2020-12-10 PROCEDURE — 83036 HEMOGLOBIN GLYCOSYLATED A1C: CPT | Performed by: NURSE PRACTITIONER

## 2020-12-10 PROCEDURE — 96361 HYDRATE IV INFUSION ADD-ON: CPT

## 2020-12-10 PROCEDURE — 96360 HYDRATION IV INFUSION INIT: CPT

## 2020-12-10 PROCEDURE — 0 GADOBENATE DIMEGLUMINE 529 MG/ML SOLUTION: Performed by: HOSPITALIST

## 2020-12-10 PROCEDURE — 95816 EEG AWAKE AND DROWSY: CPT | Performed by: PSYCHIATRY & NEUROLOGY

## 2020-12-10 PROCEDURE — 99243 OFF/OP CNSLTJ NEW/EST LOW 30: CPT | Performed by: PSYCHIATRY & NEUROLOGY

## 2020-12-10 PROCEDURE — 93306 TTE W/DOPPLER COMPLETE: CPT | Performed by: INTERNAL MEDICINE

## 2020-12-10 PROCEDURE — 85025 COMPLETE CBC W/AUTO DIFF WBC: CPT | Performed by: NURSE PRACTITIONER

## 2020-12-10 PROCEDURE — 95816 EEG AWAKE AND DROWSY: CPT

## 2020-12-10 PROCEDURE — 80053 COMPREHEN METABOLIC PANEL: CPT | Performed by: NURSE PRACTITIONER

## 2020-12-10 PROCEDURE — 84100 ASSAY OF PHOSPHORUS: CPT | Performed by: NURSE PRACTITIONER

## 2020-12-10 PROCEDURE — 82607 VITAMIN B-12: CPT | Performed by: NURSE PRACTITIONER

## 2020-12-10 PROCEDURE — 93306 TTE W/DOPPLER COMPLETE: CPT

## 2020-12-10 PROCEDURE — 84443 ASSAY THYROID STIM HORMONE: CPT | Performed by: NURSE PRACTITIONER

## 2020-12-10 PROCEDURE — 83735 ASSAY OF MAGNESIUM: CPT | Performed by: NURSE PRACTITIONER

## 2020-12-10 PROCEDURE — 93880 EXTRACRANIAL BILAT STUDY: CPT

## 2020-12-10 PROCEDURE — A9577 INJ MULTIHANCE: HCPCS | Performed by: HOSPITALIST

## 2020-12-10 RX ORDER — SENNA PLUS 8.6 MG/1
2 TABLET ORAL ONCE
Status: DISCONTINUED | OUTPATIENT
Start: 2020-12-10 | End: 2020-12-11 | Stop reason: HOSPADM

## 2020-12-10 RX ORDER — POLYETHYLENE GLYCOL 3350 17 G/17G
17 POWDER, FOR SOLUTION ORAL ONCE
Status: COMPLETED | OUTPATIENT
Start: 2020-12-10 | End: 2020-12-10

## 2020-12-10 RX ORDER — L.ACID,PARA/B.BIFIDUM/S.THERM 8B CELL
1 CAPSULE ORAL DAILY
Status: DISCONTINUED | OUTPATIENT
Start: 2020-12-10 | End: 2020-12-11 | Stop reason: HOSPADM

## 2020-12-10 RX ADMIN — SODIUM CHLORIDE 100 ML/HR: 9 INJECTION, SOLUTION INTRAVENOUS at 01:57

## 2020-12-10 RX ADMIN — ACETAMINOPHEN 650 MG: 325 TABLET, FILM COATED ORAL at 01:56

## 2020-12-10 RX ADMIN — CALCIUM 500 MG: 500 TABLET ORAL at 10:22

## 2020-12-10 RX ADMIN — Medication 1000 UNITS: at 10:22

## 2020-12-10 RX ADMIN — GADOBENATE DIMEGLUMINE 19 ML: 529 INJECTION, SOLUTION INTRAVENOUS at 21:45

## 2020-12-10 RX ADMIN — ROSUVASTATIN CALCIUM 10 MG: 10 TABLET, FILM COATED ORAL at 01:56

## 2020-12-10 RX ADMIN — Medication 1 CAPSULE: at 01:56

## 2020-12-10 RX ADMIN — MULTIPLE VITAMINS W/ MINERALS TAB 1 TABLET: TAB at 01:56

## 2020-12-10 RX ADMIN — SODIUM CHLORIDE, PRESERVATIVE FREE 10 ML: 5 INJECTION INTRAVENOUS at 01:57

## 2020-12-10 RX ADMIN — POLYETHYLENE GLYCOL 3350 17 G: 17 POWDER, FOR SOLUTION ORAL at 15:58

## 2020-12-10 RX ADMIN — ROSUVASTATIN CALCIUM 10 MG: 10 TABLET, FILM COATED ORAL at 20:02

## 2020-12-10 NOTE — H&P
Patient Name:  Liza Aaron  YOB: 1955  MRN:  [id number]  Admit Date:  12/9/2020  Patient Care Team:  Minnie Whaley MD as PCP - General (Family Medicine)  Minnie Whaley MD (Family Medicine)  Bridgett Julian MD as Consulting Physician (Cardiology)  Liza Crawford MD as Surgeon (General Surgery)      Subjective   History Present Illness     Chief Complaint   Patient presents with   • Seizures   History of Present Illness   Mrs. Aaron is a 65-year-old female with history of hypercholesterolemia, osteoarthritis of her hips with bilateral hip replacements who presented to the emergency room after seizure.  Patient states she was out shopping today and around 6 PM she was at the checkout line and began to feel little dizzy, lightheaded, nausea and the next thing she knew she woke up in the ambulance.  According to EMS and bystanders report patient did have tonic-clonic movement, she was lowered to the floor with assistance and did not hit her head on anything according to bystander report.  Patient did bite her tongue during this episode, she had no episodes of incontinence.  Patient does states she felt a little confused afterwards when she was in the ambulance.  Initially in the emergency room she had a mild headache, patient states her headache is gone at this time.  She states she did have 1 episode like this in college and another one in law school, but that was about 35 to 40 years ago and she has not had any issues since.  She denies any recent alcohol use, no changes in sleep pattern, no medication changes.  She has no family history of seizure.  She denies any recent illnesses, no recent falls or injuries.  In the emergency room EKG showed normal sinus rhythm.  CK was normal at 125, sodium 138, potassium 3.6, glucose 89, creatinine 0.69, BUN 13, white blood cell count 3.72, hemoglobin 13.5, hematocrit 41.  CT of the head shows no acute intracranial abnormality.  Respiratory viral  "panel is negative including COVID-19.    Review of Systems   Constitutional: Negative for appetite change and fever.   HENT: Negative for nosebleeds and trouble swallowing.    Eyes: Negative for photophobia, redness and visual disturbance.   Respiratory: Negative for cough, chest tightness, shortness of breath and wheezing.    Cardiovascular: Negative for chest pain, palpitations and leg swelling.   Gastrointestinal: Negative for abdominal distention, abdominal pain, nausea and vomiting.   Endocrine: Negative.    Genitourinary: Negative.    Musculoskeletal: Negative for gait problem and joint swelling.   Skin: Negative.    Neurological: Positive for dizziness and light-headedness (just prior to episode and woke up in the ambulance). Negative for seizures, speech difficulty and headaches.   Hematological: Negative.    Psychiatric/Behavioral: Negative for behavioral problems and confusion.        Personal History     Past Medical History:   Diagnosis Date   • Arthritis    • Diverticulosis    • Family history of coronary artery disease     SAW DR THOMPSON 4/2019   • Hip pain     BILATERAL   • Hyperglycemia    • Hyperlipidemia    • Irregular heart beat     OCCASIONAL \"SKIPPED BEAT\"   • Pure hypercholesterolemia    • Seizure (CMS/HCC)     WHEN IN COLLEGE, NONE SINCE   • Umbilical hernia    • UTI (urinary tract infection)     FINISHED ANTIBIOTIC   • Vitamin D deficiency      Past Surgical History:   Procedure Laterality Date   • COLONOSCOPY N/A 12/14/2015    Diverticulosis in the entire examined colon, non-bleeding internal hemorrhodis, no specimens collected-Dr. Rosario Flores   • TOTAL ABDOMINAL HYSTERECTOMY WITH SALPINGO OOPHORECTOMY Bilateral 12/16/2004    Dr. Dominick Villarreal   • TOTAL HIP ARTHROPLASTY Right 2/5/2020    Procedure: TOTAL HIP ARTHROPLASTY;  Surgeon: Cesar Johnson MD;  Location: [facility];  Service: Orthopedics;  Laterality: Right;   • TOTAL HIP ARTHROPLASTY Left 9/21/2020    Procedure: TOTAL HIP " ARTHROPLASTY;  Surgeon: Cesar Johnson MD;  Location: [facility] OR;  Service: Orthopedics;  Laterality: Left;   • UMBILICAL HERNIA REPAIR N/A 2004    Dr. Dominick Villarreal     Family History   Problem Relation Age of Onset   • Breast cancer Mother    • Hypertension Mother    • Heart disease Father    • Cancer Maternal Grandmother    • Heart disease Brother    • Malig Hyperthermia Neg Hx      Social History     Tobacco Use   • Smoking status: Former Smoker     Packs/day: 1.00     Years: 5.00     Pack years: 5.00     Types: Cigarettes     Quit date:      Years since quittin.9   • Smokeless tobacco: Never Used   • Tobacco comment: caffiene twice a week   Substance Use Topics   • Alcohol use: Yes     Comment: social   • Drug use: No     No current facility-administered medications on file prior to encounter.      Current Outpatient Medications on File Prior to Encounter   Medication Sig Dispense Refill   • calcium carbonate (OS-EMMA) 600 MG tablet Take 600 mg by mouth Daily.     • Cholecalciferol (VITAMIN D) 25 MCG (1000 UT) tablet Take 1,000 Units by mouth Daily.     • INTRAROSA 6.5 MG insert Insert  into the vagina Daily As Needed.     • Lactobacillus (PROBIOTIC ACIDOPHILUS PO) Take 1 tablet by mouth.     • melatonin 5 MG tablet tablet Take 5 mg by mouth At Night As Needed.     • multivitamin with minerals (MULTIVITAMIN ADULTS PO) Take 1 tablet by mouth Daily.     • rosuvastatin (CRESTOR) 10 MG tablet Take 1 tablet by mouth Every Night. 90 tablet 1   • clindamycin (Cleocin) 300 MG capsule Take both caps 1 hour prior to procedure 2 capsule 2     Allergies   Allergen Reactions   • Nickel Rash     Pt states cannot wear jewelry with Nickel.    • Penicillins Rash       Objective    Objective     Vital Signs  Temp:  [98 °F (36.7 °C)] 98 °F (36.7 °C)  Heart Rate:  [] 70  Resp:  [16-20] 18  BP: (119-151)/(73-86) 123/73  SpO2:  [94 %-100 %] 96 %  on  Flow (L/min):  [3] 3;   Device (Oxygen Therapy): room  air  Body mass index is 33.2 kg/m².    Physical Exam  Vitals signs and nursing note reviewed.   Constitutional:       General: She is not in acute distress.     Appearance: She is well-developed.   HENT:      Head: Normocephalic.   Neck:      Musculoskeletal: Normal range of motion.      Vascular: No JVD.   Cardiovascular:      Rate and Rhythm: Normal rate. Rhythm irregular.      Comments: Heart rate slightly irregular, normal sinus rhythm with occasional PACs on the monitor during my exam.  Pulmonary:      Effort: Pulmonary effort is normal.      Breath sounds: Normal breath sounds.      Comments: Lung sounds clear, sats 95% on room air  Abdominal:      General: There is no distension.      Palpations: Abdomen is soft.      Tenderness: There is no abdominal tenderness.   Musculoskeletal: Normal range of motion.      Right lower leg: No edema.      Left lower leg: No edema.   Skin:     General: Skin is warm and dry.      Capillary Refill: Capillary refill takes less than 2 seconds.   Neurological:      General: No focal deficit present.      Mental Status: She is alert and oriented to person, place, and time.      Comments: Patient alert and oriented, walking around the room.  Appears to have no focal neuro deficits.  Has no recollection of the episode, remembers feeling nauseous, dizzy and lightheaded and the next thing she knew she woke up in the ambulance.   Psychiatric:         Attention and Perception: Attention normal.         Mood and Affect: Mood normal.         Speech: Speech normal.         Behavior: Behavior normal.         Thought Content: Thought content normal.         Cognition and Memory: Cognition normal.         Judgment: Judgment normal.         Results Review:  I reviewed the patient's new clinical results.  I reviewed the patient's new imaging results and agree with the interpretation.  I reviewed the patient's other test results and agree with the interpretation  I personally viewed and  interpreted the patient's EKG/Telemetry data  Discussed with ED provider.    Lab Results (last 24 hours)     Procedure Component Value Units Date/Time    CBC & Differential [[id number]]  (Abnormal) Collected: 12/09/20 1955    Specimen: Blood Updated: 12/09/20 2016    Narrative:      The following orders were created for panel order CBC & Differential.  Procedure                               Abnormality         Status                     ---------                               -----------         ------                     CBC Auto Differential[[id number]]        Abnormal            Final result                 Please view results for these tests on the individual orders.    Comprehensive Metabolic Panel [[id number]] Collected: 12/09/20 1955    Specimen: Blood Updated: 12/09/20 2052     Glucose 89 mg/dL      BUN 13 mg/dL      Creatinine 0.69 mg/dL      Sodium 138 mmol/L      Potassium 3.6 mmol/L      Chloride 102 mmol/L      CO2 23.9 mmol/L      Calcium 9.4 mg/dL      Total Protein 7.2 g/dL      Albumin 4.50 g/dL      ALT (SGPT) 11 U/L      AST (SGOT) 24 U/L      Alkaline Phosphatase 98 U/L      Total Bilirubin 0.3 mg/dL      eGFR Non African Amer 86 mL/min/1.73      Globulin 2.7 gm/dL      A/G Ratio 1.7 g/dL      BUN/Creatinine Ratio 18.8     Anion Gap 12.1 mmol/L     Narrative:      GFR Normal >60  Chronic Kidney Disease <60  Kidney Failure <15      Ethanol [[id number]] Collected: 12/09/20 1955    Specimen: Blood Updated: 12/09/20 2052     Ethanol <10 mg/dL      Ethanol % <0.010 %     CBC Auto Differential [[id number]]  (Abnormal) Collected: 12/09/20 1955    Specimen: Blood Updated: 12/09/20 2016     WBC 3.72 10*3/mm3      RBC 4.85 10*6/mm3      Hemoglobin 13.5 g/dL      Hematocrit 41.0 %      MCV 84.5 fL      MCH 27.8 pg      MCHC 32.9 g/dL      RDW 13.8 %      RDW-SD 42.2 fl      MPV 9.1 fL      Platelets 206 10*3/mm3      Neutrophil % 40.9 %      Lymphocyte % 46.2 %      Monocyte % 10.5 %      Eosinophil % 1.6 %       Basophil % 0.8 %      Immature Grans % 0.0 %      Neutrophils, Absolute 1.52 10*3/mm3      Lymphocytes, Absolute 1.72 10*3/mm3      Monocytes, Absolute 0.39 10*3/mm3      Eosinophils, Absolute 0.06 10*3/mm3      Basophils, Absolute 0.03 10*3/mm3      Immature Grans, Absolute 0.00 10*3/mm3      nRBC 0.0 /100 WBC     CK [[id number]]  (Normal) Collected: 12/09/20 1955    Specimen: Blood Updated: 12/10/20 0028     Creatine Kinase 125 U/L     COVID PRE-OP / PRE-PROCEDURE SCREENING ORDER (NO ISOLATION) - Swab, Nasopharynx [[id number]]  (Normal) Collected: 12/1955    Specimen: Swab from Nasopharynx Updated: 12/09/20 2202    Narrative:      The following orders were created for panel order COVID PRE-OP / PRE-PROCEDURE SCREENING ORDER (NO ISOLATION) - Swab, Nasopharynx.  Procedure                               Abnormality         Status                     ---------                               -----------         ------                     Respiratory Panel PCR w/...[[id number]]  Normal              Final result                 Please view results for these tests on the individual orders.    Respiratory Panel PCR w/COVID-19(SARS-CoV-2) [facility] In-House, NP Swab in UTM/VTM, 3-4 HR TAT - Swab, Nasopharynx [[id number]]  (Normal) Collected: 12/1955    Specimen: Swab from Nasopharynx Updated: 12/09/20 2202     ADENOVIRUS, PCR Not Detected     Coronavirus 229E Not Detected     Coronavirus HKU1 Not Detected     Coronavirus NL63 Not Detected     Coronavirus OC43 Not Detected     COVID19 Not Detected     Human Metapneumovirus Not Detected     Human Rhinovirus/Enterovirus Not Detected     Influenza A PCR Not Detected     Influenza B PCR Not Detected     Parainfluenza Virus 1 Not Detected     Parainfluenza Virus 2 Not Detected     Parainfluenza Virus 3 Not Detected     Parainfluenza Virus 4 Not Detected     RSV, PCR Not Detected     Bordetella pertussis pcr Not Detected     Bordetella parapertussis PCR  Not Detected     Chlamydophila pneumoniae PCR Not Detected     Mycoplasma pneumo by PCR Not Detected    Narrative:      Fact sheet for providers: https://docs.[employer]/wp-content/uploads/FRU3927-8809-RV7.1-EUA-Provider-Fact-Sheet-3.pdf    Fact sheet for patients: https://docs.[employer]/wp-content/uploads/LNG0454-9249-RN7.1-EUA-Patient-Fact-Sheet-1.pdf    Test performed by PCR.    Urinalysis With Microscopic If Indicated (No Culture) - Urine, Clean Catch [[id number]]  (Abnormal) Collected: 12/09/20 2103    Specimen: Urine, Clean Catch Updated: 12/09/20 2121     Color, UA Yellow     Appearance, UA Clear     pH, UA <=5.0     Specific Gravity, UA 1.015     Glucose, UA Negative     Ketones, UA Negative     Bilirubin, UA Negative     Blood, UA Negative     Protein, UA Negative     Leuk Esterase, UA Trace     Nitrite, UA Negative     Urobilinogen, UA 0.2 E.U./dL    Urine Drug Screen - Urine, Clean Catch [[id number]]  (Normal) Collected: 12/09/20 2103    Specimen: Urine, Clean Catch Updated: 12/09/20 2132     Amphet/Methamphet, Screen Negative     Barbiturates Screen, Urine Negative     Benzodiazepine Screen, Urine Negative     Cocaine Screen, Urine Negative     Opiate Screen Negative     THC, Screen, Urine Negative     Methadone Screen, Urine Negative     Oxycodone Screen, Urine Negative    Narrative:      Negative Thresholds For Drugs Screened:     Amphetamines               500 ng/ml   Barbiturates               200 ng/ml   Benzodiazepines            100 ng/ml   Cocaine                    300 ng/ml   Methadone                  300 ng/ml   Opiates                    300 ng/ml   Oxycodone                  100 ng/ml   THC                        50 ng/ml    The Normal Value for all drugs tested is negative. This report includes final unconfirmed screening results to be used for medical treatment purposes only. Unconfirmed results must not be used for non-medical purposes such as employment or legal testing. Clinical  consideration should be applied to any drug of abuse test, particulary when unconfirmed results are used.    Urinalysis, Microscopic Only - Urine, Clean Catch [[id number]] Collected: 12/09/20 2103    Specimen: Urine, Clean Catch Updated: 12/09/20 2121     RBC, UA 0-2 /HPF      WBC, UA 0-2 /HPF      Bacteria, UA None Seen /HPF      Squamous Epithelial Cells, UA 0-2 /HPF      Hyaline Casts, UA None Seen /LPF      Methodology Automated Microscopy          Imaging Results (Last 24 Hours)     Procedure Component Value Units Date/Time    CT Head Without Contrast [[id number]] Collected: 12/09/20 2120     Updated: 12/09/20 2124    Narrative:      CT HEAD WITHOUT CONTRAST:      HISTORY:  New onset seizure.     TECHNIQUE:  Axial images were obtained through the brain without  contrast administration. Multiplanar reformatted images were  reconstructed from the helical source data. Radiation dose reduction  techniques were utilized, including automated exposure control and  exposure modulation based on body size.        COMPARISON: None.     FINDINGS:   The ventricles and sulci are normal in size and configuration.  No  hydrocephalus or midline shift.       Gray-white matter differentiation is preserved.  There is no evidence  of a large territorial infarction. No hemorrhage or extra-axial fluid  collection.      The orbits are unremarkable. The visualized paranasal sinuses and  mastoid air cells are clear.        The calvarium is intact. The scalp soft tissues are unremarkable.          Impression:      1.   No acute intracranial abnormality. Consider further evaluation with  MRI if symptoms persist.           This report was finalized on 12/9/2020 9:21 PM by Dr. Fly Rodríguez M.D.                  ECG 12 Lead   Final Result   HEART RATE= 91  bpm   RR Interval= 688  ms   ID Interval= 151  ms   P Horizontal Axis= 25  deg   P Front Axis= 22  deg   QRSD Interval= 91  ms   QT Interval= 349  ms   QRS Axis= -31  deg   T Wave Axis= 52   deg   - ABNORMAL ECG -   Sinus rhythm   Atrial premature complexes   Left axis deviation   Low voltage, precordial leads   NO SIGNIFICANT CHANGE FROM PREVIOUS ECG   Electronically Signed By: Andrew Trujillo ([facility]) 09-Dec-2020 20:37:01   Date and Time of Study: 2020-12-09 20:03:19           Assessment/Plan     Active Hospital Problems    Diagnosis POA   • **New onset seizure (CMS/HCC) [R56.9] Yes   • Obesity (BMI 30-39.9) [E66.9] Unknown   • Osteoarthritis of multiple joints [M15.9] Yes     Hips and knees.     • HLD (hyperlipidemia) [E78.5] Yes     Mrs. Aaron is a 65-year-old female with history of hypercholesterolemia, osteoarthritis of her hips with bilateral hip replacements who presented to the emergency room after seizure.    New onset seizure  -Consult neurology  -Seizure precautions  -Telemetry unit for monitoring  -Check B12 level in the a.m.  -For further testing to neurology, CT head negative  - on arrival to the emergency room.    Hyperlipidemia/obesity  -Continue Crestor    · I discussed the patient's findings and my recommendations with patient and ED provider.    VTE Prophylaxis - SCDs.  Code Status - Full code.       CONCETTA Johnston  [facility]  12/10/20  02:38 EST

## 2020-12-10 NOTE — ED NOTES
Nursing report ED to floor  Liza Aaron  65 y.o.  female    HPI (triage note):   Chief Complaint   Patient presents with   • Seizures       Admitting doctor:   Per Amaya MD    Admitting diagnosis:   The encounter diagnosis was New onset seizure (CMS/HCC).    Code status:   Current Code Status     Date Active Code Status Order ID Comments User Context       Prior    Advance Care Planning Activity          Allergies:   Nickel and Penicillins    Weight:       12/09/20 1940   Weight: 96.2 kg (212 lb)       Most recent vitals:   Vitals:    12/09/20 1942 12/09/20 2040 12/09/20 2042 12/09/20 2212   BP: 119/86 123/73  123/73   Pulse: 97 92  83   Resp: 17 16  18   Temp:       TempSrc:       SpO2: 96% 100% 99% 97%   Weight:       Height:           Active LDAs/IV Access:   Lines, Drains & Airways    Active LDAs     Name:   Placement date:   Placement time:   Site:   Days:    Peripheral IV 12/09/20 1928 Anterior;Left;Proximal Forearm   12/09/20 1928    Forearm   less than 1                Labs (abnormal labs have a star):   Labs Reviewed   URINALYSIS W/ MICROSCOPIC IF INDICATED (NO CULTURE) - Abnormal; Notable for the following components:       Result Value    Leuk Esterase, UA Trace (*)     All other components within normal limits   CBC WITH AUTO DIFFERENTIAL - Abnormal; Notable for the following components:    Neutrophil % 40.9 (*)     Lymphocyte % 46.2 (*)     Neutrophils, Absolute 1.52 (*)     All other components within normal limits   RESPIRATORY PANEL PCR W/ COVID-19 (SARS-COV-2) [place] IN-HOUSE, NP SWAB IN UTM/VTP, 3-4 HR TAT - Normal    Narrative:     Fact sheet for providers: https://docs.[employer]/wp-content/uploads/BTS6731-7830-FQ9.1-EUA-Provider-Fact-Sheet-3.pdf    Fact sheet for patients: https://docs.[employer]/wp-content/uploads/UIJ8894-0777-KK9.1-EUA-Patient-Fact-Sheet-1.pdf    Test performed by PCR.   URINE DRUG SCREEN - Normal    Narrative:     Negative  Thresholds For Drugs Screened:     Amphetamines               500 ng/ml   Barbiturates               200 ng/ml   Benzodiazepines            100 ng/ml   Cocaine                    300 ng/ml   Methadone                  300 ng/ml   Opiates                    300 ng/ml   Oxycodone                  100 ng/ml   THC                        50 ng/ml    The Normal Value for all drugs tested is negative. This report includes final unconfirmed screening results to be used for medical treatment purposes only. Unconfirmed results must not be used for non-medical purposes such as employment or legal testing. Clinical consideration should be applied to any drug of abuse test, particulary when unconfirmed results are used.   COVID PRE-OP / PRE-PROCEDURE SCREENING ORDER (NO ISOLATION)    Narrative:     The following orders were created for panel order COVID PRE-OP / PRE-PROCEDURE SCREENING ORDER (NO ISOLATION) - Swab, Nasopharynx.  Procedure                               Abnormality         Status                     ---------                               -----------         ------                     Respiratory Panel PCR w/...[[id number]]  Normal              Final result                 Please view results for these tests on the individual orders.   COMPREHENSIVE METABOLIC PANEL    Narrative:     GFR Normal >60  Chronic Kidney Disease <60  Kidney Failure <15     ETHANOL   URINALYSIS, MICROSCOPIC ONLY   CBC AND DIFFERENTIAL    Narrative:     The following orders were created for panel order CBC & Differential.  Procedure                               Abnormality         Status                     ---------                               -----------         ------                     CBC Auto Differential[[id number]]        Abnormal            Final result                 Please view results for these tests on the individual orders.       EKG:   ECG 12 Lead   Final Result   HEART RATE= 91  bpm   RR Interval= 688  ms   NE Interval=  151  ms   P Horizontal Axis= 25  deg   P Front Axis= 22  deg   QRSD Interval= 91  ms   QT Interval= 349  ms   QRS Axis= -31  deg   T Wave Axis= 52  deg   - ABNORMAL ECG -   Sinus rhythm   Atrial premature complexes   Left axis deviation   Low voltage, precordial leads   NO SIGNIFICANT CHANGE FROM PREVIOUS ECG   Electronically Signed By: [facility]) 09-Dec-2020 20:37:01   Date and Time of Study: 2020 20:03:19          Meds given in ED:   Medications   sodium chloride 0.9 % flush 10 mL (has no administration in time range)   acetaminophen (TYLENOL) tablet 1,000 mg (1,000 mg Oral Given 20 4825)       Imaging results:  Ct Head Without Contrast    Result Date: 2020  1.   No acute intracranial abnormality. Consider further evaluation with MRI if symptoms persist.    This report was finalized on 2020 9:21 PM by Dr. Fly Rodríguez M.D.        Ambulatory status:   - x 2 assist    Social issues:   Social History     Socioeconomic History   • Marital status:      Spouse name: EDDIE IVAN   • Number of children: 5   • Years of education: 17YEARS [employer]   • Highest education level: Not on file   Occupational History   • Occupation:      Employer: [employer]   Tobacco Use   • Smoking status: Former Smoker     Packs/day: 1.00     Years: 5.00     Pack years: 5.00     Types: Cigarettes     Quit date:      Years since quittin.9   • Smokeless tobacco: Never Used   • Tobacco comment: caffiene twice a week   Substance and Sexual Activity   • Alcohol use: Yes     Comment: 3 drinks per week   • Drug use: No   • Sexual activity: Defer    Nursing report ED to floor       Michelle Vernon RN  [phone]

## 2020-12-10 NOTE — ED PROVIDER NOTES
" EMERGENCY DEPARTMENT ENCOUNTER    Room Number:  35/35  Date of encounter:  12/9/2020  PCP: Minnie Whaley MD   History is limited secondary to patient being post ictal.    HPI:  Context: Liza Aaron is a 65 y.o. female who presents to the ED c/o chief complaint of witnessed \"seizure-like\" activity that occurred today while standing during shopping. She reports that prior to the episode she recalls having some dizziness. Pt denies any tunnel vision, abnormal smells, hearing changes. She did not have any diaphoresis or nausea. She reports that she then had return of consciousness in ambulance. Denies any bladder/bowel incontinence, but she does state some tongue pain. She had confusion after the episode. Currently, she reports having a mild HA and some thigh tenderness. She reports having 1 seizure previously multiple years ago (last seizure was in college) and does not take an anti-epileptic. Per EMS, the episode lasted less than 1 min and she had generalized clonus. Pt did not fall or have any injury. She denies any recent EtOH use, sleep pattern changes, medication changes, or any recent illness. No family hx of seizures.    MEDICAL HISTORY REVIEW  Reviewed in EPIC    PAST MEDICAL HISTORY  Active Ambulatory Problems     Diagnosis Date Noted   • HLD (hyperlipidemia) 03/09/2016   • Incisional hernia, without obstruction or gangrene 04/03/2017   • Osteoarthritis of multiple joints 09/04/2018   • Class 2 obesity without serious comorbidity with body mass index (BMI) of 35.0 to 35.9 in adult 09/04/2018   • Primary osteoarthritis of right hip 11/08/2019   • Primary osteoarthritis of both hips 02/05/2020   • Primary osteoarthritis of left hip 07/16/2020   • OA (osteoarthritis) of hip 09/21/2020     Resolved Ambulatory Problems     Diagnosis Date Noted   • Blood glucose elevated 03/09/2016   • Acute maxillary sinusitis 04/08/2016   • Healthcare maintenance 04/28/2016   • Bronchitis 10/27/2016     Past Medical " History:   Diagnosis Date   • Arthritis    • Diverticulosis    • Family history of coronary artery disease    • Hip pain    • Hyperglycemia    • Hyperlipidemia    • Irregular heart beat    • Pure hypercholesterolemia    • Seizure (CMS/HCC)    • Umbilical hernia    • UTI (urinary tract infection)    • Vitamin D deficiency        PAST SURGICAL HISTORY  Past Surgical History:   Procedure Laterality Date   • COLONOSCOPY N/A 2015    Diverticulosis in the entire examined colon, non-bleeding internal hemorrhodis, no specimens collected-Dr. Rosario Flores   • TOTAL ABDOMINAL HYSTERECTOMY WITH SALPINGO OOPHORECTOMY Bilateral 2004    Dr. Dominick Villarreal   • TOTAL HIP ARTHROPLASTY Right 2020    Procedure: TOTAL HIP ARTHROPLASTY;  Surgeon: Cesar Johnson MD;  Location: [facility] OR;  Service: Orthopedics;  Laterality: Right;   • TOTAL HIP ARTHROPLASTY Left 2020    Procedure: TOTAL HIP ARTHROPLASTY;  Surgeon: Cesar Johnson MD;  Location: [facility] OR;  Service: Orthopedics;  Laterality: Left;   • UMBILICAL HERNIA REPAIR N/A 2004    Dr. Dominick Villarreal       FAMILY HISTORY  Family History   Problem Relation Age of Onset   • Breast cancer Mother    • Hypertension Mother    • Heart disease Father    • Cancer Maternal Grandmother    • Heart disease Brother    • Malig Hyperthermia Neg Hx        SOCIAL HISTORY  Social History     Socioeconomic History   • Marital status:      Spouse name: EDDIE IVAN   • Number of children: 5   • Years of education: 17YEARS [employer]   • Highest education level: Not on file   Occupational History   • Occupation:      Employer: [employer]   Tobacco Use   • Smoking status: Former Smoker     Packs/day: 1.00     Years: 5.00     Pack years: 5.00     Types: Cigarettes     Quit date:      Years since quittin.9   • Smokeless tobacco: Never Used   • Tobacco comment: caffiene twice a week   Substance and Sexual Activity   • Alcohol  use: Yes     Comment: 3 drinks per week   • Drug use: No   • Sexual activity: Defer       ALLERGIES  Nickel and Penicillins    The patient's allergies have been reviewed    REVIEW OF SYSTEMS  All systems reviewed and negative except for those discussed in HPI.     PHYSICAL EXAM  I have reviewed the triage vital signs and nursing notes.  ED Triage Vitals [12/09/20 1928]   Temp Heart Rate Resp BP SpO2   98 °F (36.7 °C) 115 20 151/83 99 %      Temp src Heart Rate Source Patient Position BP Location FiO2 (%)   Oral -- -- -- --       GENERAL: No acute distress  HENT: NCAT, PERRL, Nares patent, R tongue abrasion  EYES: no scleral icterus  NECK: trachea midline, no ROM limitations, supple without lymphadenopathy  CV: regular rhythm, regular rate, no murmur  RESPIRATORY: normal effort, CTAB  ABDOMEN: soft, non-tender, nondistended with normal active bowel sounds  : deferred  MUSCULOSKELETAL: no deformity  NEURO: alert and orientated x3, but mildly post-ictal confusion, moves all extremities, follows commands  SKIN: warm, dry      LAB RESULTS  Recent Results (from the past 24 hour(s))   Comprehensive Metabolic Panel    Collection Time: 12/09/20  7:55 PM    Specimen: Blood   Result Value Ref Range    Glucose 89 65 - 99 mg/dL    BUN 13 8 - 23 mg/dL    Creatinine 0.69 0.57 - 1.00 mg/dL    Sodium 138 136 - 145 mmol/L    Potassium 3.6 3.5 - 5.2 mmol/L    Chloride 102 98 - 107 mmol/L    CO2 23.9 22.0 - 29.0 mmol/L    Calcium 9.4 8.6 - 10.5 mg/dL    Total Protein 7.2 6.0 - 8.5 g/dL    Albumin 4.50 3.50 - 5.20 g/dL    ALT (SGPT) 11 1 - 33 U/L    AST (SGOT) 24 1 - 32 U/L    Alkaline Phosphatase 98 39 - 117 U/L    Total Bilirubin 0.3 0.0 - 1.2 mg/dL    eGFR Non African Amer 86 >60 mL/min/1.73    Globulin 2.7 gm/dL    A/G Ratio 1.7 g/dL    BUN/Creatinine Ratio 18.8 7.0 - 25.0    Anion Gap 12.1 5.0 - 15.0 mmol/L   Ethanol    Collection Time: 12/09/20  7:55 PM    Specimen: Blood   Result Value Ref Range    Ethanol <10 0 - 10 mg/dL     Ethanol % <0.010 %   CBC Auto Differential    Collection Time: 12/09/20  7:55 PM    Specimen: Blood   Result Value Ref Range    WBC 3.72 3.40 - 10.80 10*3/mm3    RBC 4.85 3.77 - 5.28 10*6/mm3    Hemoglobin 13.5 12.0 - 15.9 g/dL    Hematocrit 41.0 34.0 - 46.6 %    MCV 84.5 79.0 - 97.0 fL    MCH 27.8 26.6 - 33.0 pg    MCHC 32.9 31.5 - 35.7 g/dL    RDW 13.8 12.3 - 15.4 %    RDW-SD 42.2 37.0 - 54.0 fl    MPV 9.1 6.0 - 12.0 fL    Platelets 206 140 - 450 10*3/mm3    Neutrophil % 40.9 (L) 42.7 - 76.0 %    Lymphocyte % 46.2 (H) 19.6 - 45.3 %    Monocyte % 10.5 5.0 - 12.0 %    Eosinophil % 1.6 0.3 - 6.2 %    Basophil % 0.8 0.0 - 1.5 %    Immature Grans % 0.0 0.0 - 0.5 %    Neutrophils, Absolute 1.52 (L) 1.70 - 7.00 10*3/mm3    Lymphocytes, Absolute 1.72 0.70 - 3.10 10*3/mm3    Monocytes, Absolute 0.39 0.10 - 0.90 10*3/mm3    Eosinophils, Absolute 0.06 0.00 - 0.40 10*3/mm3    Basophils, Absolute 0.03 0.00 - 0.20 10*3/mm3    Immature Grans, Absolute 0.00 0.00 - 0.05 10*3/mm3    nRBC 0.0 0.0 - 0.2 /100 WBC   Respiratory Panel PCR w/COVID-19(SARS-CoV-2) [place]/[facility]I/PAD/[facility]/MAD/HUBER In-House, NP Swab in [facility] 3-4 HR TAT - Swab, Nasopharynx    Collection Time: 12/09/20  7:56 PM    Specimen: Nasopharynx; Swab   Result Value Ref Range    ADENOVIRUS, PCR Not Detected Not Detected    Coronavirus 229E Not Detected Not Detected    Coronavirus HKU1 Not Detected Not Detected    Coronavirus NL63 Not Detected Not Detected    Coronavirus OC43 Not Detected Not Detected    COVID19 Not Detected Not Detected - Ref. Range    Human Metapneumovirus Not Detected Not Detected    Human Rhinovirus/Enterovirus Not Detected Not Detected    Influenza A PCR Not Detected Not Detected    Influenza B PCR Not Detected Not Detected    Parainfluenza Virus 1 Not Detected Not Detected    Parainfluenza Virus 2 Not Detected Not Detected    Parainfluenza Virus 3 Not Detected Not Detected    Parainfluenza Virus 4 Not Detected Not Detected    RSV, PCR Not Detected  Not Detected    Bordetella pertussis pcr Not Detected Not Detected    Bordetella parapertussis PCR Not Detected Not Detected    Chlamydophila pneumoniae PCR Not Detected Not Detected    Mycoplasma pneumo by PCR Not Detected Not Detected   ECG 12 Lead    Collection Time: 12/09/20  8:03 PM   Result Value Ref Range    QT Interval 349 ms   Urinalysis With Microscopic If Indicated (No Culture) - Urine, Clean Catch    Collection Time: 12/09/20  9:03 PM    Specimen: Urine, Clean Catch   Result Value Ref Range    Color, UA Yellow Yellow, Straw    Appearance, UA Clear Clear    pH, UA <=5.0 5.0 - 8.0    Specific Gravity, UA 1.015 1.005 - 1.030    Glucose, UA Negative Negative    Ketones, UA Negative Negative    Bilirubin, UA Negative Negative    Blood, UA Negative Negative    Protein, UA Negative Negative    Leuk Esterase, UA Trace (A) Negative    Nitrite, UA Negative Negative    Urobilinogen, UA 0.2 E.U./dL 0.2 - 1.0 E.U./dL   Urine Drug Screen - Urine, Clean Catch    Collection Time: 12/09/20  9:03 PM    Specimen: Urine, Clean Catch   Result Value Ref Range    Amphet/Methamphet, Screen Negative Negative    Barbiturates Screen, Urine Negative Negative    Benzodiazepine Screen, Urine Negative Negative    Cocaine Screen, Urine Negative Negative    Opiate Screen Negative Negative    THC, Screen, Urine Negative Negative    Methadone Screen, Urine Negative Negative    Oxycodone Screen, Urine Negative Negative   Urinalysis, Microscopic Only - Urine, Clean Catch    Collection Time: 12/09/20  9:03 PM    Specimen: Urine, Clean Catch   Result Value Ref Range    RBC, UA 0-2 None Seen, 0-2 /HPF    WBC, UA 0-2 None Seen, 0-2 /HPF    Bacteria, UA None Seen None Seen /HPF    Squamous Epithelial Cells, UA 0-2 None Seen, 0-2 /HPF    Hyaline Casts, UA None Seen None Seen /LPF    Methodology Automated Microscopy        I ordered the above labs and reviewed the results.    RADIOLOGY  Ct Head Without Contrast    Result Date: 12/9/2020  CT HEAD  WITHOUT CONTRAST:  HISTORY:  New onset seizure.  TECHNIQUE:  Axial images were obtained through the brain without contrast administration. Multiplanar reformatted images were reconstructed from the helical source data. Radiation dose reduction techniques were utilized, including automated exposure control and exposure modulation based on body size.   COMPARISON: None.  FINDINGS: The ventricles and sulci are normal in size and configuration.  No hydrocephalus or midline shift.   Gray-white matter differentiation is preserved.  There is no evidence of a large territorial infarction. No hemorrhage or extra-axial fluid collection.  The orbits are unremarkable. The visualized paranasal sinuses and mastoid air cells are clear.    The calvarium is intact. The scalp soft tissues are unremarkable.       1.   No acute intracranial abnormality. Consider further evaluation with MRI if symptoms persist.    This report was finalized on 12/9/2020 9:21 PM by Dr. Fly Rodríguez M.D.        I ordered the above noted radiological studies. I reviewed the images and results. I agree with the radiologist interpretation.    PROCEDURES  Procedures   EKG          EKG time: 2003  Rhythm/Rate: NSR, 91BPM   P waves and SC: occasional PAC  QRS, axis: LAD   ST and T waves: nonspecific ST/T wave changes     Interpreted Contemporaneously by me, independently viewed  unchanged compared to prior 9/10/20      MEDICATIONS GIVEN IN ER  Medications   sodium chloride 0.9 % flush 10 mL (has no administration in time range)   acetaminophen (TYLENOL) tablet 1,000 mg (1,000 mg Oral Given 12/9/20 2235)       PROGRESS, DATA ANALYSIS, CONSULTS, AND MEDICAL DECISION MAKING  A complete history and physical exam have been performed.  All available laboratory and imaging results have been reviewed by myself prior to disposition.  Patient was placed in face mask in first look. Patient was wearing facemask when I entered the room and throughout our encounter. I wore  full protective equipment throughout this patient encounter including a face mask, and gloves. Hand hygiene was performed before donning protective equipment and after removal when leaving the room.  MDM  After the initial H&P, I discussed pertinent information from history and physical exam with patient/family.  Discussed differential diagnosis.  Discussed plan for ED evaluation/work-up/treatment.  All questions answered.  Patient/family is agreeable with plan.  ED Course as of Dec 09 2244   Wed Dec 09, 2020   2223 COVID19: Not Detected [DE]   2223 Ethanol: <10 [DE]   2223 WBC: 3.72 [DE]   2232 Discussed the case with HARRIET Vernon for Dr. Amaya with [facility].  They are admit patient to the hospitalist telemetry bed, observation patient.  They agree that patient does not need an antiepileptic medication at this time.      [DE]      ED Course User Index  [DE] Lane Fox MD        19:54 EST  Ordered blood work, UA, EKG, and CT abd/pel.    AS OF 22:44 EST VITALS:    BP - 123/73  HR - 83  TEMP - 98 °F (36.7 °C) (Oral)  O2 SATS - 97%    DIAGNOSIS  Final diagnoses:   New onset seizure (CMS/HCC)         DISPOSITION  ADMISSION    Discussed treatment plan and reason for admission with pt/family and admitting physician.  Pt/family voiced understanding of the plan for admission for further testing/treatment as needed.       Documentation assistance provided by tom Becerra for Dr Fox.  Information recorded by the scribe was done at my direction and has been verified and validated by me.       Stephen Becerra  12/09/20 2116       Lane Fox MD  12/09/20 [id number]

## 2020-12-10 NOTE — ED NOTES
"Pt to EMS triage from  max with witnessed by multiple shoppers \"seizure like activity <1min per EMS.\" Pt was assisted to ground by shoppers without complications. Pt had one episode of previous seizure \"in collage.\" Pt A&Ox4 at this time but is a little disoriented at time. Pt in NAD at this time.       Patient was placed in face mask during first look triage.  Patient was wearing a face mask throughout encounter.  I wore personal protective equipment throughout the encounter.  Hand hygiene was performed before and after patient encounter.      Óscar Minaya, RN  12/09/20 1932    "

## 2020-12-10 NOTE — CONSULTS
"Neurology Consult Note    Referring Provider: Dr. Amaya  Reason for Consultation: seizure    History of present illness:    The patient is a 65 year old woman who blacked out while shopping yesterday around 6 pm. She recalls feeling lightheaded and nauseated, then woke up in ambulance. There was possibly some tonic clonic movement per bystanders report to EMS. She was lowered to ground and had no head injury.  She did bite her tongue.    Upon arrival to ED, she was slightly confused and had a mild headache. Headache is better but persists. No fever or chills.    Head CT was normal.    She has had 2 similar episodes in the remote past but both occurred while sleeping, in the early morning. There was seizure like activity witnessed by roommate.  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      Radha she reports n o issues with sleep and no recent medication changes.      Past Medical History  Past Medical History:   Diagnosis Date   • Arthritis    • Diverticulosis    • Family history of coronary artery disease     SAW DR THOMPSON 4/2019   • Hip pain     BILATERAL   • Hyperglycemia    • Hyperlipidemia    • Irregular heart beat     OCCASIONAL \"SKIPPED BEAT\"   • Pure hypercholesterolemia    • Seizure (CMS/HCC)     WHEN IN COLLEGE, NONE SINCE   • Umbilical hernia    • UTI (urinary tract infection)     FINISHED ANTIBIOTIC   • Vitamin D deficiency        Past Surgical History  Past Surgical History:   Procedure Laterality Date   • COLONOSCOPY N/A 12/14/2015    Diverticulosis in the entire examined colon, non-bleeding internal hemorrhodis, no specimens collected-Dr. Rosario Flores   • TOTAL ABDOMINAL HYSTERECTOMY WITH SALPINGO OOPHORECTOMY Bilateral 12/16/2004    Dr. Dominick Villarreal   • TOTAL HIP ARTHROPLASTY Right 2/5/2020    Procedure: TOTAL HIP ARTHROPLASTY;  Surgeon: Cesar Johnson MD;  Location: [facility] OR;  Service: Orthopedics;  Laterality: Right; "   • TOTAL HIP ARTHROPLASTY Left 9/21/2020    Procedure: TOTAL HIP ARTHROPLASTY;  Surgeon: Cesar Johnson MD;  Location: [facility] MAIN OR;  Service: Orthopedics;  Laterality: Left;   • UMBILICAL HERNIA REPAIR N/A 12/16/2004    Dr. Dominick Villarreal       Family History  Family History   Problem Relation Age of Onset   • Breast cancer Mother    • Hypertension Mother    • Heart disease Father    • Cancer Maternal Grandmother    • Heart disease Brother    • Malig Hyperthermia Neg Hx        Allergies   Allergen Reactions   • Nickel Rash     Pt states cannot wear jewelry with Nickel.    • Penicillins Rash       Social History  , former tobacco use    Review of Systems   Constitutional: Negative.    HENT: Negative.    Eyes: Negative.    Respiratory: Negative.    Cardiovascular: Negative.    Gastrointestinal: Negative.    Genitourinary: Negative.    Musculoskeletal:        Hip stiffness   Skin: Negative.    Neurological: Positive for dizziness.   Psychiatric/Behavioral: Negative.      Medications  Scheduled Meds:calcium carbonate (oyster shell), 500 mg, Oral, Daily  cholecalciferol, 1,000 Units, Oral, Daily  lactobacillus acidophilus, 1 capsule, Oral, Daily  multivitamin with minerals, 1 tablet, Oral, Daily  rosuvastatin, 10 mg, Oral, Nightly  sodium chloride, 10 mL, Intravenous, Q12H      Continuous Infusions:   PRN Meds:.•  acetaminophen **OR** acetaminophen **OR** acetaminophen  •  melatonin  •  nitroglycerin  •  ondansetron  •  [COMPLETED] Insert peripheral IV **AND** sodium chloride  •  sodium chloride    Vital Signs   Temp:  [98 °F (36.7 °C)-98.4 °F (36.9 °C)] 98.4 °F (36.9 °C)  Heart Rate:  [] 69  Resp:  [16-20] 18  BP: (115-151)/(62-86) 115/62    Examination:  Constitutional: Well-groomed, well-nourished  HENT:  normal  Eyes: Normal conjunctiva  CVS:  Regular rate and rhythm.  No murmurs.  Good peripheral perfusion.   Resp :   Non labored respirations  Musculoskeletal:  No signs of peripheral edema, normal  range, no deformities  Skin:  No rash, normal turgor  Neurologic:   Alert oriented and fluent   Normal concentration and recall   No dysarthria   EOMF   Pupils symmetric and equally reactive   Normal power (5/5) in all extremities proximally and distally   No tremor   Normal coordination   Reflexes symmetric and not hyperactive   Plantar responses flexor   Sensory exam grossly intact   Gait normal   Psychiatric: No anxiety, normal mood    Results Review:  Results from last 7 days   Lab Units 12/10/20  0608 12/09/20 1955   WBC 10*3/mm3 4.72 3.72   HEMOGLOBIN g/dL 11.6* 13.5   HEMATOCRIT % 34.5 41.0   PLATELETS 10*3/mm3 188 206        Results from last 7 days   Lab Units 12/10/20  0608 12/09/20 1955   SODIUM mmol/L 135* 138   POTASSIUM mmol/L 3.6 3.6   CHLORIDE mmol/L 102 102   CO2 mmol/L 24.3 23.9   BUN mg/dL 11 13   CREATININE mg/dL 0.60 0.69   CALCIUM mg/dL 8.5* 9.4   BILIRUBIN mg/dL 0.4 0.3   ALK PHOS U/L 82 98   ALT (SGPT) U/L 12 11   AST (SGOT) U/L 38* 24   GLUCOSE mg/dL 71 89   Magnesium 2.3     Lab Results   Component Value Date    [id number] 603 12/10/2020     Lab Results   Component Value Date    TSH 2.400 12/10/2020     UDS negative    Head CT shows no acute abnormalities   Images reviewed independently    Medical Decision Making and Recommendations  Seizure like activity  This episode would not be concerning for seizure because of prodromal dizziness, except for the prior 2 episodes that started during early morning sleep  This history raises concern frontal lobe seizures and warrants full investigation with brain MRI and EEG.    I discussed these findings and my recommendations with patient and nursing staff    I used full protective equipment while examining this patient.  This included N95 face mask, gloves and protective eyewear.  I washed my hands before entering the room and immediately upon leaving the room.  Patient was wearing a surgical mask.    Nupur Henderson MD  12/10/20  10:01 EST

## 2020-12-10 NOTE — ED NOTES
Pt noted to have mask on when this RN entered the room.  This RN wore appropriate PPE throughout our encounter. Hand hygiene performed upon entering and exiting room.       Michelle Vernon RN  12/09/20 [id number]

## 2020-12-10 NOTE — PLAN OF CARE
Goal Outcome Evaluation:  Plan of Care Reviewed With: patient  Progress: no change  Outcome Summary: new admit from ER, pt is alert and oriented, room air, no falls, seizure precautions, IV fluids, neuro consulted, labs ordered, continue to monitor

## 2020-12-11 ENCOUNTER — APPOINTMENT (OUTPATIENT)
Dept: GENERAL RADIOLOGY | Facility: HOSPITAL | Age: 65
End: 2020-12-11

## 2020-12-11 ENCOUNTER — APPOINTMENT (OUTPATIENT)
Dept: CARDIOLOGY | Facility: HOSPITAL | Age: 65
End: 2020-12-11

## 2020-12-11 ENCOUNTER — READMISSION MANAGEMENT (OUTPATIENT)
Dept: CALL CENTER | Facility: HOSPITAL | Age: 65
End: 2020-12-11

## 2020-12-11 VITALS
RESPIRATION RATE: 18 BRPM | HEART RATE: 78 BPM | SYSTOLIC BLOOD PRESSURE: 104 MMHG | OXYGEN SATURATION: 96 % | TEMPERATURE: 97.8 F | BODY MASS INDEX: 35.16 KG/M2 | WEIGHT: 223.99 LBS | HEIGHT: 67 IN | DIASTOLIC BLOOD PRESSURE: 68 MMHG

## 2020-12-11 PROCEDURE — 73502 X-RAY EXAM HIP UNI 2-3 VIEWS: CPT

## 2020-12-11 PROCEDURE — G0378 HOSPITAL OBSERVATION PER HR: HCPCS

## 2020-12-11 PROCEDURE — 99203 OFFICE O/P NEW LOW 30 MIN: CPT | Performed by: NURSE PRACTITIONER

## 2020-12-11 PROCEDURE — 99214 OFFICE O/P EST MOD 30 MIN: CPT | Performed by: NURSE PRACTITIONER

## 2020-12-11 PROCEDURE — 0296T HC EXT ECG > 48HR TO 21 DAY RCRD W/CONECT INTL RCRD: CPT

## 2020-12-11 RX ADMIN — CALCIUM 500 MG: 500 TABLET ORAL at 08:26

## 2020-12-11 RX ADMIN — SODIUM CHLORIDE, PRESERVATIVE FREE 10 ML: 5 INJECTION INTRAVENOUS at 08:28

## 2020-12-11 RX ADMIN — MULTIPLE VITAMINS W/ MINERALS TAB 1 TABLET: TAB at 08:26

## 2020-12-11 RX ADMIN — Medication 1 CAPSULE: at 08:26

## 2020-12-11 RX ADMIN — Medication 1000 UNITS: at 08:26

## 2020-12-11 RX ADMIN — ACETAMINOPHEN 650 MG: 325 TABLET, FILM COATED ORAL at 03:08

## 2020-12-11 NOTE — DISCHARGE SUMMARY
"                                                                   PHYSICIAN DISCHARGE SUMMARY                                                                        [facility]    Patient Identification:  Name: Liza Aaron  Age: 65 y.o.  Sex: female  :  1955  MRN: [id number]  Primary Care Physician: Minnie Whaley MD    Admit date: 2020  Discharge date and time:2020  Discharged Condition: good    Discharge Diagnoses:  Active Hospital Problems    Diagnosis  POA   • **New onset seizure (CMS/HCC) [R56.9]  Yes   • Obesity (BMI 30-39.9) [E66.9]  Unknown   • Osteoarthritis of multiple joints [M15.9]  Yes   • HLD (hyperlipidemia) [E78.5]  Yes      Resolved Hospital Problems   No resolved problems to display.          PMHX:   Past Medical History:   Diagnosis Date   • Arthritis    • Diverticulosis    • Family history of coronary artery disease     SAW DR THOMPSON 2019   • Hip pain     BILATERAL   • Hyperglycemia    • Hyperlipidemia    • Irregular heart beat     OCCASIONAL \"SKIPPED BEAT\"   • Pure hypercholesterolemia    • Seizure (CMS/HCC)     WHEN IN COLLEGE, NONE SINCE   • Umbilical hernia    • UTI (urinary tract infection)     FINISHED ANTIBIOTIC   • Vitamin D deficiency      PSHX:   Past Surgical History:   Procedure Laterality Date   • COLONOSCOPY N/A 2015    Diverticulosis in the entire examined colon, non-bleeding internal hemorrhodis, no specimens collected-Dr. Rosario Flores   • TOTAL ABDOMINAL HYSTERECTOMY WITH SALPINGO OOPHORECTOMY Bilateral 2004    Dr. Dominick Villarreal   • TOTAL HIP ARTHROPLASTY Right 2020    Procedure: TOTAL HIP ARTHROPLASTY;  Surgeon: Cesar Johnson MD;  Location: [facility] MAIN OR;  Service: Orthopedics;  Laterality: Right;   • TOTAL HIP ARTHROPLASTY Left 2020    Procedure: TOTAL HIP ARTHROPLASTY;  Surgeon: Cesar Johnson MD;  Location: [facility] MAIN OR;  Service: Orthopedics;  Laterality: Left;   • UMBILICAL HERNIA REPAIR N/A 2004    Dr." [facility] Course: Liza Aaron  is a 65-year-old female with history of hypercholesterolemia, osteoarthritis of her hips with bilateral hip replacements who presented to the emergency room after seizure.  Patient states she was out shopping today and around 6 PM she was at the checkout line and began to feel little dizzy, lightheaded, nausea and the next thing she knew she woke up in the ambulance.  According to EMS and bystanders report patient did have tonic-clonic movement, she was lowered to the floor with assistance and did not hit her head on anything according to bystander report.  Patient did bite her tongue during this episode, she had no episodes of incontinence.  Patient does states she felt a little confused afterwards when she was in the ambulance.  Initially in the emergency room she had a mild headache, patient states her headache is gone at this time.  She states she did have 1 episode like this in college and another one in law school, but that was about 35 to 40 years ago and she has not had any issues since.  She denies any recent alcohol use, no changes in sleep pattern, no medication changes.  She has no family history of seizure.  She denies any recent illnesses, no recent falls or injuries.  In the emergency room EKG showed normal sinus rhythm.  CK was normal at 125, sodium 138, potassium 3.6, glucose 89, creatinine 0.69, BUN 13, white blood cell count 3.72, hemoglobin 13.5, hematocrit 41.  CT of the head shows no acute intracranial abnormality.  Respiratory viral panel is negative including COVID-19.       The patient was admitted to the hospital and seen by cardiology and neurology.  EEG was negative and MRI did not show any significant findings of the brain.  It was not really sure whether patient had syncope or seizure.  The patient was feeling better and felt well enough to go home.  Cardiology wanted to get a cardiac monitor for 2 weeks as outpatient and will have  her follow-up with them after that is complete.  Patient was advised not to drive her car until 90 days seizure-free.  Neurology did not recommend any seizure medicine at this time.  Patient will have follow-up with neurology in about a month.  She will continue with her other usual medicines and follow-up with her primary care in 1 week.    Consults:     Consults     Date and Time Order Name Status Description    12/11/2020 1002 Inpatient Cardiology Consult Completed     12/9/2020 8447 Inpatient Neurology Consult General Completed     12/9/2020 3774 [facility] (on-call MD unless specified) Details Completed         Results from last 7 days   Lab Units 12/10/20  0608   WBC 10*3/mm3 4.72   HEMOGLOBIN g/dL 11.6*   HEMATOCRIT % 34.5   PLATELETS 10*3/mm3 188     Results from last 7 days   Lab Units 12/10/20  0608   SODIUM mmol/L 135*   POTASSIUM mmol/L 3.6   CHLORIDE mmol/L 102   CO2 mmol/L 24.3   BUN mg/dL 11   CREATININE mg/dL 0.60   GLUCOSE mg/dL 71   CALCIUM mg/dL 8.5*     Significant Diagnostic Studies:   WBC   Date Value Ref Range Status   12/10/2020 4.72 3.40 - 10.80 10*3/mm3 Final     Hemoglobin   Date Value Ref Range Status   12/10/2020 11.6 (L) 12.0 - 15.9 g/dL Final     Hematocrit   Date Value Ref Range Status   12/10/2020 34.5 34.0 - 46.6 % Final     Platelets   Date Value Ref Range Status   12/10/2020 188 140 - 450 10*3/mm3 Final     Sodium   Date Value Ref Range Status   12/10/2020 135 (L) 136 - 145 mmol/L Final     Potassium   Date Value Ref Range Status   12/10/2020 3.6 3.5 - 5.2 mmol/L Final     Comment:     Slight hemolysis detected by analyzer. Results may be affected.     Chloride   Date Value Ref Range Status   12/10/2020 102 98 - 107 mmol/L Final     CO2   Date Value Ref Range Status   12/10/2020 24.3 22.0 - 29.0 mmol/L Final     BUN   Date Value Ref Range Status   12/10/2020 11 8 - 23 mg/dL Final     Creatinine   Date Value Ref Range Status   12/10/2020 0.60 0.57 - 1.00 mg/dL Final     Glucose   Date  Value Ref Range Status   12/10/2020 71 65 - 99 mg/dL Final     Calcium   Date Value Ref Range Status   12/10/2020 8.5 (L) 8.6 - 10.5 mg/dL Final     Magnesium   Date Value Ref Range Status   12/10/2020 2.3 1.6 - 2.4 mg/dL Final     Phosphorus   Date Value Ref Range Status   12/10/2020 3.7 2.5 - 4.5 mg/dL Final     AST (SGOT)   Date Value Ref Range Status   12/10/2020 38 (H) 1 - 32 U/L Final     ALT (SGPT)   Date Value Ref Range Status   12/10/2020 12 1 - 33 U/L Final     Alkaline Phosphatase   Date Value Ref Range Status   12/10/2020 82 39 - 117 U/L Final     INR   Date Value Ref Range Status   12/10/2020 0.97 0.90 - 1.10 Final     Color, UA   Date Value Ref Range Status   12/09/2020 Yellow Yellow, Straw Final     Appearance, UA   Date Value Ref Range Status   12/09/2020 Clear Clear Final     pH, UA   Date Value Ref Range Status   12/09/2020 <=5.0 5.0 - 8.0 Final     Glucose, UA   Date Value Ref Range Status   12/09/2020 Negative Negative Final     Ketones, UA   Date Value Ref Range Status   12/09/2020 Negative Negative Final     Blood, UA   Date Value Ref Range Status   12/09/2020 Negative Negative Final     Leuk Esterase, UA   Date Value Ref Range Status   12/09/2020 Trace (A) Negative Final     Bilirubin, UA   Date Value Ref Range Status   12/09/2020 Negative Negative Final     Urobilinogen, UA   Date Value Ref Range Status   12/09/2020 0.2 E.U./dL 0.2 - 1.0 E.U./dL Final     RBC, UA   Date Value Ref Range Status   12/09/2020 0-2 None Seen, 0-2 /HPF Final     WBC, UA   Date Value Ref Range Status   12/09/2020 0-2 None Seen, 0-2 /HPF Final     Bacteria, UA   Date Value Ref Range Status   12/09/2020 None Seen None Seen /HPF Final     No results found for: TROPONINT, TROPONINI, BNP  No components found for: HGBA1C;2  No components found for: TSH;2  Imaging Results (All)     Procedure Component Value Units Date/Time    XR Hip With or Without Pelvis 2 - 3 View Right [[id number]] Collected: 12/11/20 0911     Updated:  12/11/20 0950    Narrative:      PELVIS AND RIGHT HIP X-RAYS     HISTORY: Hip pain after a fall. Seizure-like activity 2 days ago. Right  hip arthroplasty 02/05/2020 and left hip arthroplasty 09/21/2020.     TECHNIQUE: An AP view the pelvis, AP view of the left hip, and 2 images  of the right hip are provided and are correlated with postoperative hip  x-ray 09/21/2020, 02/05/2020, and preoperative bilateral hip x-rays  03/06/2019.     FINDINGS: There is noncemented total hip arthroplasty hardware which  appears intact and unchanged. No periprosthetic fracture is present. The  bones of the pelvis are intact. Some degenerative change at symphysis  pubis is observed and no different than on previous imaging.  Degenerative change in the lumbar spine is also partially demonstrated  and no different than before. Bowel gas pattern is normal. Soft tissue  contours appear normal.       Impression:      Bilateral total hip arthroplasty hardware appears as  expected. No abnormality identified.     This report was finalized on 12/11/2020 9:46 AM by Dr. Leo Arzate M.D.       MRI Brain With & Without Contrast [[id number]] Collected: 12/10/20 2222     Updated: 12/11/20 0246    Narrative:      MRI OF THE BRAIN WITHOUT AND WITH GADOLINIUM:     HISTORY:   Seizure      TECHNIQUE:   Axial pre and post contrast T1, FSE T2-weighted, FLAIR, MPGR and  diffusion weighted. Sagittal T1. Coronal postcontrast T1. MultiHance was  injected without adverse reaction.     COMPARISON:   CT head 12/09/2020     FINDINGS:   The ventricles are normal in size and configuration. No mass effect,  hydrocephalus or midline shift. Minimal scattered white matter foci of  T2/FLAIR hyperintensity. Brain parenchymal signal and morphology are  otherwise normal. No evidence of mesial temporal sclerosis. Diffusion  weighted images demonstrate no evidence of acute infarct. MPGR sequence  demonstrates no evidence of blood products. The cerebellar tonsils  extend 4  mm below the foramen magnum. No evidence of Chiari  malformation. The major intracranial flow-voids are maintained. No  abnormal intracranial enhancement.  No abnormal intracranial enhancement.  The orbits are unremarkable.  Mild mucosal thickening within the frontal and ethmoidal sinuses.The  remainingparanasal sinuses and mastoid air cells are clear.          Impression:      1.  No evidence of intracranial hemorrhage, infarction, mass effect or  pathologic enhancement.  2.  Cerebellar tonsillar ectopia measuring approximately 4 mm below the  foramen magnum. No Chiari malformation.  3.  Minimal white matter changes that are most likely secondary to  chronic microvascular ischemia.        This report was finalized on 12/11/2020 2:43 AM by Dr. Fly Rodríguez M.D.       CT Head Without Contrast [[id number]] Collected: 12/09/20 2120     Updated: 12/09/20 2124    Narrative:      CT HEAD WITHOUT CONTRAST:      HISTORY:  New onset seizure.     TECHNIQUE:  Axial images were obtained through the brain without  contrast administration. Multiplanar reformatted images were  reconstructed from the helical source data. Radiation dose reduction  techniques were utilized, including automated exposure control and  exposure modulation based on body size.        COMPARISON: None.     FINDINGS:   The ventricles and sulci are normal in size and configuration.  No  hydrocephalus or midline shift.       Gray-white matter differentiation is preserved.  There is no evidence  of a large territorial infarction. No hemorrhage or extra-axial fluid  collection.      The orbits are unremarkable. The visualized paranasal sinuses and  mastoid air cells are clear.        The calvarium is intact. The scalp soft tissues are unremarkable.          Impression:      1.   No acute intracranial abnormality. Consider further evaluation with  MRI if symptoms persist.           This report was finalized on 12/9/2020 9:21 PM by Dr. Fly Rodríguez M.D.            Lab Results (last 7 days)     Procedure Component Value Units Date/Time    Hemoglobin A1c [[id number]]  (Normal) Collected: 12/10/20 0608    Specimen: Blood Updated: 12/10/20 1007     Hemoglobin A1C 5.10 %     Narrative:      Hemoglobin A1C Ranges:    Increased Risk for Diabetes  5.7% to 6.4%  Diabetes                     >= 6.5%  Diabetic Goal                < 7.0%    TSH [[id number]]  (Normal) Collected: 12/10/20 0608    Specimen: Blood Updated: 12/10/20 0755     TSH 2.400 uIU/mL     Comprehensive Metabolic Panel [[id number]]  (Abnormal) Collected: 12/10/20 0608    Specimen: Blood Updated: 12/10/20 0745     Glucose 71 mg/dL      BUN 11 mg/dL      Creatinine 0.60 mg/dL      Sodium 135 mmol/L      Potassium 3.6 mmol/L      Comment: Slight hemolysis detected by analyzer. Results may be affected.        Chloride 102 mmol/L      CO2 24.3 mmol/L      Calcium 8.5 mg/dL      Total Protein 6.2 g/dL      Albumin 4.00 g/dL      ALT (SGPT) 12 U/L      AST (SGOT) 38 U/L      Alkaline Phosphatase 82 U/L      Total Bilirubin 0.4 mg/dL      eGFR Non African Amer 101 mL/min/1.73      Globulin 2.2 gm/dL      A/G Ratio 1.8 g/dL      BUN/Creatinine Ratio 18.3     Anion Gap 8.7 mmol/L     Narrative:      GFR Normal >60  Chronic Kidney Disease <60  Kidney Failure <15      Magnesium [[id number]]  (Normal) Collected: 12/10/20 0608    Specimen: Blood Updated: 12/10/20 0745     Magnesium 2.3 mg/dL     Phosphorus [[id number]]  (Normal) Collected: 12/10/20 0608    Specimen: Blood Updated: 12/10/20 0745     Phosphorus 3.7 mg/dL     Vitamin B12 [[id number]]  (Normal) Collected: 12/10/20 0608    Specimen: Blood Updated: 12/10/20 0739     Vitamin B-12 603 pg/mL     Narrative:      Results may be falsely increased if patient taking Biotin.      Protime-INR [[id number]]  (Normal) Collected: 12/10/20 0608    Specimen: Blood Updated: 12/10/20 0705     Protime 12.7 Seconds      INR 0.97    CBC Auto Differential [[id number]]  (Abnormal) Collected:  12/10/20 0608    Specimen: Blood Updated: 12/10/20 0655     WBC 4.72 10*3/mm3      RBC 4.04 10*6/mm3      Hemoglobin 11.6 g/dL      Hematocrit 34.5 %      MCV 85.4 fL      MCH 28.7 pg      MCHC 33.6 g/dL      RDW 14.0 %      RDW-SD 43.2 fl      MPV 9.6 fL      Platelets 188 10*3/mm3      Neutrophil % 43.6 %      Lymphocyte % 42.4 %      Monocyte % 11.7 %      Eosinophil % 1.5 %      Basophil % 0.6 %      Immature Grans % 0.2 %      Neutrophils, Absolute 2.06 10*3/mm3      Lymphocytes, Absolute 2.00 10*3/mm3      Monocytes, Absolute 0.55 10*3/mm3      Eosinophils, Absolute 0.07 10*3/mm3      Basophils, Absolute 0.03 10*3/mm3      Immature Grans, Absolute 0.01 10*3/mm3      nRBC 0.0 /100 WBC     CK [[id number]]  (Normal) Collected: 12/09/20 1955    Specimen: Blood Updated: 12/10/20 0028     Creatine Kinase 125 U/L     COVID PRE-OP / PRE-PROCEDURE SCREENING ORDER (NO ISOLATION) - Swab, Nasopharynx [[id number]]  (Normal) Collected: 12/1955    Specimen: Swab from Nasopharynx Updated: 12/09/20 2202    Narrative:      The following orders were created for panel order COVID PRE-OP / PRE-PROCEDURE SCREENING ORDER (NO ISOLATION) - Swab, Nasopharynx.  Procedure                               Abnormality         Status                     ---------                               -----------         ------                     Respiratory Panel PCR w/...[[id number]]  Normal              Final result                 Please view results for these tests on the individual orders.    Respiratory Panel PCR w/COVID-19(SARS-CoV-2) [place] In-House, NP Swab in [facility], 3-4 HR TAT - Swab, Nasopharynx [[id number]]  (Normal) Collected: 12/1955    Specimen: Swab from Nasopharynx Updated: 12/09/20 2202     ADENOVIRUS, PCR Not Detected     Coronavirus 229E Not Detected     Coronavirus HKU1 Not Detected     Coronavirus NL63 Not Detected     Coronavirus OC43 Not Detected     COVID19 Not Detected     Human Metapneumovirus  Not Detected     Human Rhinovirus/Enterovirus Not Detected     Influenza A PCR Not Detected     Influenza B PCR Not Detected     Parainfluenza Virus 1 Not Detected     Parainfluenza Virus 2 Not Detected     Parainfluenza Virus 3 Not Detected     Parainfluenza Virus 4 Not Detected     RSV, PCR Not Detected     Bordetella pertussis pcr Not Detected     Bordetella parapertussis PCR Not Detected     Chlamydophila pneumoniae PCR Not Detected     Mycoplasma pneumo by PCR Not Detected    Narrative:      Fact sheet for providers: https://docs.[employer]/wp-content/uploads/QWT6904-0707-XM2.1-EUA-Provider-Fact-Sheet-3.pdf    Fact sheet for patients: https://docs.[employer]/wp-content/uploads/TCX1719-8594-GS5.1-EUA-Patient-Fact-Sheet-1.pdf    Test performed by PCR.    Urine Drug Screen - Urine, Clean Catch [[id number]]  (Normal) Collected: 12/09/20 2103    Specimen: Urine, Clean Catch Updated: 12/09/20 2132     Amphet/Methamphet, Screen Negative     Barbiturates Screen, Urine Negative     Benzodiazepine Screen, Urine Negative     Cocaine Screen, Urine Negative     Opiate Screen Negative     THC, Screen, Urine Negative     Methadone Screen, Urine Negative     Oxycodone Screen, Urine Negative    Narrative:      Negative Thresholds For Drugs Screened:     Amphetamines               500 ng/ml   Barbiturates               200 ng/ml   Benzodiazepines            100 ng/ml   Cocaine                    300 ng/ml   Methadone                  300 ng/ml   Opiates                    300 ng/ml   Oxycodone                  100 ng/ml   THC                        50 ng/ml    The Normal Value for all drugs tested is negative. This report includes final unconfirmed screening results to be used for medical treatment purposes only. Unconfirmed results must not be used for non-medical purposes such as employment or legal testing. Clinical consideration should be applied to any drug of abuse test, particulary when unconfirmed results are used.     Urinalysis With Microscopic If Indicated (No Culture) - Urine, Clean Catch [[id number]]  (Abnormal) Collected: 12/09/20 2103    Specimen: Urine, Clean Catch Updated: 12/09/20 2121     Color, UA Yellow     Appearance, UA Clear     pH, UA <=5.0     Specific Gravity, UA 1.015     Glucose, UA Negative     Ketones, UA Negative     Bilirubin, UA Negative     Blood, UA Negative     Protein, UA Negative     Leuk Esterase, UA Trace     Nitrite, UA Negative     Urobilinogen, UA 0.2 E.U./dL    Urinalysis, Microscopic Only - Urine, Clean Catch [[id number]] Collected: 12/09/20 2103    Specimen: Urine, Clean Catch Updated: 12/09/20 2121     RBC, UA 0-2 /HPF      WBC, UA 0-2 /HPF      Bacteria, UA None Seen /HPF      Squamous Epithelial Cells, UA 0-2 /HPF      Hyaline Casts, UA None Seen /LPF      Methodology Automated Microscopy    Comprehensive Metabolic Panel [[id number]] Collected: 12/09/20 1955    Specimen: Blood Updated: 12/09/20 2052     Glucose 89 mg/dL      BUN 13 mg/dL      Creatinine 0.69 mg/dL      Sodium 138 mmol/L      Potassium 3.6 mmol/L      Chloride 102 mmol/L      CO2 23.9 mmol/L      Calcium 9.4 mg/dL      Total Protein 7.2 g/dL      Albumin 4.50 g/dL      ALT (SGPT) 11 U/L      AST (SGOT) 24 U/L      Alkaline Phosphatase 98 U/L      Total Bilirubin 0.3 mg/dL      eGFR Non African Amer 86 mL/min/1.73      Globulin 2.7 gm/dL      A/G Ratio 1.7 g/dL      BUN/Creatinine Ratio 18.8     Anion Gap 12.1 mmol/L     Narrative:      GFR Normal >60  Chronic Kidney Disease <60  Kidney Failure <15      Ethanol [[id number]] Collected: 12/09/20 1955    Specimen: Blood Updated: 12/09/20 2052     Ethanol <10 mg/dL      Ethanol % <0.010 %     CBC & Differential [[id number]]  (Abnormal) Collected: 12/09/20 1955    Specimen: Blood Updated: 12/09/20 2016    Narrative:      The following orders were created for panel order CBC & Differential.  Procedure                               Abnormality         Status                    "  ---------                               -----------         ------                     CBC Auto Differential[[id number]]        Abnormal            Final result                 Please view results for these tests on the individual orders.    CBC Auto Differential [[id number]]  (Abnormal) Collected: 12/09/20 1955    Specimen: Blood Updated: 12/09/20 2016     WBC 3.72 10*3/mm3      RBC 4.85 10*6/mm3      Hemoglobin 13.5 g/dL      Hematocrit 41.0 %      MCV 84.5 fL      MCH 27.8 pg      MCHC 32.9 g/dL      RDW 13.8 %      RDW-SD 42.2 fl      MPV 9.1 fL      Platelets 206 10*3/mm3      Neutrophil % 40.9 %      Lymphocyte % 46.2 %      Monocyte % 10.5 %      Eosinophil % 1.6 %      Basophil % 0.8 %      Immature Grans % 0.0 %      Neutrophils, Absolute 1.52 10*3/mm3      Lymphocytes, Absolute 1.72 10*3/mm3      Monocytes, Absolute 0.39 10*3/mm3      Eosinophils, Absolute 0.06 10*3/mm3      Basophils, Absolute 0.03 10*3/mm3      Immature Grans, Absolute 0.00 10*3/mm3      nRBC 0.0 /100 WBC         /69 (BP Location: Right arm, Patient Position: Sitting)   Pulse 82   Temp 97.6 °F (36.4 °C) (Oral)   Resp 18   Ht 170.2 cm (67\")   Wt 102 kg (223 lb 15.8 oz)   LMP  (LMP Unknown)   SpO2 96%   BMI 35.08 kg/m²     Discharge Exam:  General Appearance:    Alert, cooperative, no distress                          Head:    Normocephalic, without obvious abnormality, atraumatic                          Eyes:                            Throat:   Lips, tongue, gums normal                          Neck:   Supple, symmetrical, trachea midline, no JVD                        Lungs:     Clear to auscultation bilaterally, respirations unlabored                Chest Wall:    No tenderness or deformity                        Heart:    Regular rate and rhythm, S1 and S2 normal, no murmur,no  Rub or gallop                  Abdomen:     Soft, non-tender, bowel sounds active, no masses, no organomegaly                  Extremities:   " Extremities normal, atraumatic, no cyanosis or edema                             Skin:   Skin is warm and dry,  no rashes or palpable lesions                  Neurologic:   no focal deficits noted     Disposition:  Home    Patient Instructions:      Discharge Medications      Continue These Medications      Instructions Start Date   calcium carbonate 600 MG tablet  Commonly known as: OS-EMMA   600 mg, Oral, Daily      melatonin 5 MG tablet tablet   5 mg, Oral, Nightly PRN      multivitamin with minerals tablet tablet   1 tablet, Oral, Daily      PROBIOTIC ACIDOPHILUS PO   1 tablet, Oral      rosuvastatin 10 MG tablet  Commonly known as: CRESTOR   10 mg, Oral, Nightly      Vitamin D 25 MCG (1000 UT) tablet   1,000 Units, Oral, Daily         Stop These Medications    clindamycin 300 MG capsule  Commonly known as: Cleocin     Intrarosa 6.5 MG insert  Generic drug: Prasterone          Future Appointments   Date Time Provider Department Center   3/30/2021  8:30 AM Angelica Montalvo APRN [facility] [place]   5/18/2021  9:40 AM [facility] [place]   5/21/2021  9:00 AM Minnie Whaley MD [facility] [place]   9/16/2021  1:00 PM Cesar Johnson MD [facility] [place]     Follow-up Information     Cassidy Alexander MD Follow up in 1 month(s).    Specialties: Neurology, Sleep Medicine  Contact information:  [address]  [address]  [place] [id number] [phone]             Bridgett Julian MD Follow up in 3 week(s).    Specialty: Cardiology  Contact information:  [address]  [address]  [place] [id number] [phone]             Minnie Whaley MD Follow up in 1 week(s).    Specialty: Family Medicine  Contact information:  [address]  [address]  [place] [id number] [phone]                 Discharge Order (From admission, onward)     Start     Ordered    12/11/20 1527  Discharge patient  Once     Expected Discharge Date: 12/11/20    Discharge Disposition: Home or Self Care     Physician of Record for Attribution - Please select from Treatment Team: JUANJOSE BOSCH [[id number]]    Review needed by CMO to determine Physician of Record: No       Question Answer Comment   Physician of Record for Attribution - Please select from Treatment Team JUANJOSE BOSCH    Review needed by CMO to determine Physician of Record No        12/11/20 1527                Total time spent discharging patient including evaluation,post hospitalization follow up,  medication and post hospitalization instructions and education total time exceeds 30 minutes.    Signed:  Juanjose Bosch MD  12/11/2020  15:27 EST

## 2020-12-11 NOTE — DISCHARGE INSTRUCTIONS
SEIZURE INSTRUCTIONS:     Recommended to observe all seizure precautions, including, but not limited to no driving until seizure free for more than 3 months- as per State driving regulation / law;   Avoid all high-risk activity,   Take showers instead of baths,   Avoid swimming without observation,   Avoid open heat sources,   Avoid working at heights and   Avoid engaging in all potentially hazardous activities.   Patient expressed clear understanding.

## 2020-12-11 NOTE — CONSULTS
Patient Name: Liza Aaron  :1955  65 y.o.    Date of Admission: 2020  Date of Consultation:  20  Encounter Provider: CONCETTA Laughlin  Place of Service: [facility] CARDIOLOGY  Referring Provider: No ref. provider found  Patient Care Team:  Minnie Whaley MD as PCP - General (Family Medicine)  Minnie Whaley MD (Family Medicine)  Bridgett Julian MD as Consulting Physician (Cardiology)  Liza Crawford MD as Surgeon (General Surgery)      Chief complaint: Syncope    History of Present Illness:  This is a 65 year old female normally followed by Dr. Julian with a history of HLD. She saw Dr. Julian for an evaluation regarding her strong family history of coronary disease ( father had heart attack at 49 and younger brother had heart attack at 60). At this visit the patient had reported losing 35 lbs and it was recommended that she continue taking her crestor and get a calcium score for evaluation of coronaries with plans to add ASA if her score was high. It appears she had this done on 19 where her total score was 0, findings represented no identifiable coronary atherosclerotic burden.     She was admitted on 12/9/10 after she had been out shopping and had an syncopal episode. Patient had been at [facility] for quite some time. she had gone out for a conference call and then returned to shopping. She remembers feeling a little dizzy and nauseated prior to falling. The next thing she remembers is waking up in the ambulance. Bystanders had reported that there was possibly some tonic clonic movement, she did bite her tongue. She reportedly had rapid eye movements and was staring up. Upon arrival to ER sh was still confused with a mild headache. CT of head was negative for acute processes. She had reported to neurology that she had 2 similar episodes in the past in the early morning sleep, she was in law school, working, with sleep deprivation. Neurology felt  that the episode is not concerning for seizure due to prodromal dizziness, but did feel that due to early morning sleep episodes from the past that she needed a MRI of brain which found: No evidence of intracranial hemorrhage, infarction, mass effect or pathologic enhancement, cerebellar tonsillar ectopia measuring approximately 4 mm below the foramen magnum, no Chiari malformation, and Minimal white matter changes that are most likely secondary to chronic microvascular ischemia. EEG did not show evidence of seizure.     She did complain of right hip pain, x ray imaging did not show acute processes.     Patient denies any palpitations or rapid heart rate. She has not had any chest discomfort for shortness of breath. Her blood pressure is on the lower side of normal. No hypotension reported by EMS. BP was actually on the high side with SBP in 160's per EMS record.     Echocardiogram on 12/10/20-  Interpretation Summary    · Estimated left ventricular EF = 56% Left ventricular systolic function is normal.  · Left ventricular diastolic function was normal.  · There is mild, anterior mitral leaflet thickening present.  · Mild tricuspid valve regurgitation is present. Estimated right ventricular systolic pressure from tricuspid regurgitation is normal (<35 mmHg).     Duplex carotid artery on 12/10/20-  Interpretation Summary    · Right internal carotid artery is normal.  · Left internal carotid artery is normal.     MRI of brain on 12/10/20-  IMPRESSION:  1.  No evidence of intracranial hemorrhage, infarction, mass effect or  pathologic enhancement.  2.  Cerebellar tonsillar ectopia measuring approximately 4 mm below the  foramen magnum. No Chiari malformation.  3.  Minimal white matter changes that are most likely secondary to  chronic microvascular ischemia.    CT of head on 12/9/20-  IMPRESSION:  1.   No acute intracranial abnormality. Consider further evaluation with  MRI if symptoms persist.    Past Medical History:  "  Diagnosis Date   • Arthritis    • Diverticulosis    • Family history of coronary artery disease     SAW DR THOMPSON 4/2019   • Hip pain     BILATERAL   • Hyperglycemia    • Hyperlipidemia    • Irregular heart beat     OCCASIONAL \"SKIPPED BEAT\"   • Pure hypercholesterolemia    • Seizure (CMS/HCC)     WHEN IN COLLEGE, NONE SINCE   • Umbilical hernia    • UTI (urinary tract infection)     FINISHED ANTIBIOTIC   • Vitamin D deficiency        Past Surgical History:   Procedure Laterality Date   • COLONOSCOPY N/A 12/14/2015    Diverticulosis in the entire examined colon, non-bleeding internal hemorrhodis, no specimens collected-Dr. Rosario Flores   • TOTAL ABDOMINAL HYSTERECTOMY WITH SALPINGO OOPHORECTOMY Bilateral 12/16/2004    Dr. Dominick Villarreal   • TOTAL HIP ARTHROPLASTY Right 2/5/2020    Procedure: TOTAL HIP ARTHROPLASTY;  Surgeon: Cesar Johnson MD;  Location: [facility] MAIN OR;  Service: Orthopedics;  Laterality: Right;   • TOTAL HIP ARTHROPLASTY Left 9/21/2020    Procedure: TOTAL HIP ARTHROPLASTY;  Surgeon: Cesar Johnson MD;  Location: [facility] MAIN OR;  Service: Orthopedics;  Laterality: Left;   • UMBILICAL HERNIA REPAIR N/A 12/16/2004    Dr. Dominick Villarreal         Prior to Admission medications    Medication Sig Start Date End Date Taking? Authorizing Provider   calcium carbonate (OS-EMMA) 600 MG tablet Take 600 mg by mouth Daily.   Yes Cassidy Elizabeth MD   Cholecalciferol (VITAMIN D) 25 MCG (1000 UT) tablet Take 1,000 Units by mouth Daily.   Yes Cassiyd Elizabeth MD   INTRAROSA 6.5 MG insert Insert  into the vagina Daily As Needed. 1/20/20  Yes Cassidy Elizabeth MD   Lactobacillus (PROBIOTIC ACIDOPHILUS PO) Take 1 tablet by mouth.   Yes Cassidy Elizabeth MD   melatonin 5 MG tablet tablet Take 5 mg by mouth At Night As Needed.   Yes Cassidy Elizabeth MD   multivitamin with minerals (MULTIVITAMIN ADULTS PO) Take 1 tablet by mouth Daily.   Yes Cassidy Elizabeth MD   rosuvastatin (CRESTOR) 10 MG " tablet Take 1 tablet by mouth Every Night. 20  Yes Minnie Whaley MD   clindamycin (Cleocin) 300 MG capsule Take both caps 1 hour prior to procedure 20   Cesar Johnson MD       Allergies   Allergen Reactions   • Nickel Rash     Pt states cannot wear jewelry with Nickel.    • Penicillins Rash       Social History     Socioeconomic History   • Marital status:      Spouse name: EDDIE IVAN   • Number of children: 5   • Years of education: 17YEARS LAW SCHOOL   • Highest education level: Not on file   Occupational History   • Occupation:      Employer: [employer]   Tobacco Use   • Smoking status: Former Smoker     Packs/day: 1.00     Years: 5.00     Pack years: 5.00     Types: Cigarettes     Quit date:      Years since quittin.9   • Smokeless tobacco: Never Used   • Tobacco comment: caffiene twice a week   Substance and Sexual Activity   • Alcohol use: Yes     Comment: social   • Drug use: No   • Sexual activity: Defer       Family History   Problem Relation Age of Onset   • Breast cancer Mother    • Hypertension Mother    • Heart disease Father    • Cancer Maternal Grandmother    • Heart disease Brother    • Malig Hyperthermia Neg Hx        REVIEW OF SYSTEMS:   All systems reviewed.  Pertinent positives identified in HPI.  All other systems are negative.      Objective:     Vitals:    12/10/20 1955 12/10/20 2351 20 0530 20 0810   BP: 104/67 100/63  111/69   BP Location: Left arm Right arm  Right arm   Patient Position: Sitting Lying  Sitting   Pulse: 74 79  82   Resp: 16 16  18   Temp: 98.9 °F (37.2 °C) 98.4 °F (36.9 °C)  97.6 °F (36.4 °C)   TempSrc: Oral Oral  Oral   SpO2: 96% 97%  96%   Weight:   102 kg (223 lb 15.8 oz)    Height:         Body mass index is 35.08 kg/m².    General Appearance:    Alert, cooperative, in no acute distress   Head:    Normocephalic, without obvious abnormality, atraumatic   Eyes:            Lids and lashes  normal, conjunctivae and sclerae normal, no icterus, no pallor, corneas clear, PERRLA   Ears:    Ears appear intact with no abnormalities noted   Throat:   No oral lesions, no thrush, oral mucosa moist   Neck:   No adenopathy, supple, trachea midline, no thyromegaly, no carotid bruit, no JVD   Back:     No kyphosis present, no scoliosis present, no skin lesions, erythema or scars, no tenderness to percussion or palpation, range of motion normal   Lungs:     Clear to auscultation, respirations regular, even and unlabored    Heart:    Regular rhythm and normal rate, normal S1 and S2, no murmur, no gallop, no rub, no click   Chest Wall:    No abnormalities observed   Abdomen:     Normal bowel sounds, no masses, no organomegaly, soft, nontender, nondistended, no guarding, no rebound  tenderness   Extremities:   Moves all extremities well, no edema, no cyanosis, no redness   Pulses:   Pulses palpable and equal bilaterally. Normal radial, carotid, femoral, dorsalis pedis and posterior tibial pulses bilaterally. Normal abdominal aorta   Skin:  Psychiatric:   No bleeding, bruising or rash    Alert and oriented x 3, normal mood and affect   Lab Review:     Results from last 7 days   Lab Units 12/10/20  0608   SODIUM mmol/L 135*   POTASSIUM mmol/L 3.6   CHLORIDE mmol/L 102   CO2 mmol/L 24.3   BUN mg/dL 11   CREATININE mg/dL 0.60   CALCIUM mg/dL 8.5*   BILIRUBIN mg/dL 0.4   ALK PHOS U/L 82   ALT (SGPT) U/L 12   AST (SGOT) U/L 38*   GLUCOSE mg/dL 71     Results from last 7 days   Lab Units 12/09/20  1955   CK TOTAL U/L 125     Results from last 7 days   Lab Units 12/10/20  0608   WBC 10*3/mm3 4.72   HEMOGLOBIN g/dL 11.6*   HEMATOCRIT % 34.5   PLATELETS 10*3/mm3 188     Results from last 7 days   Lab Units 12/10/20  0608   INR  0.97     Results from last 7 days   Lab Units 12/10/20  0608   MAGNESIUM mg/dL 2.3                    EKG on 12/9/20-    I personally viewed and interpreted the patient's EKG/Telemetry  data.        Assessment and Plan:       1. Syncope - proceeded by dizziness and nausea. Concern for seizure. MRI and EEG unremarkable. No AED recommended.     Echocardiogram unremarkable. EKG with PACs and otherwise unremarkable. Will discharge with Zio.     See Dr. Julian or NP in 4 weeks.     2. Family history of CAD - had calcium score, normal. No further changes needed.     3. HLD - continue statin.    OK for discharge from cardiac standpoint.     Macy Kimble, CONCETTA  12/11/20  15:05 EST

## 2020-12-11 NOTE — PLAN OF CARE
Patient had MRI completed with no complications. Patient now complaining of very bad pain that kept her from sleeping in her R hip. Patient thinks that she may have injured this when she fell. XR ordered. Will continue to monitor     Goal Outcome Evaluation:  Plan of Care Reviewed With: patient  Progress: improving     Problem: Adult Inpatient Plan of Care  Goal: Plan of Care Review  Outcome: Ongoing, Progressing  Flowsheets  Taken 12/11/2020 0523 by Aure Toledo RN  Progress: improving  Taken 12/10/2020 0512 by Silva Ford RN  Plan of Care Reviewed With: patient  Goal: Patient-Specific Goal (Individualized)  Outcome: Ongoing, Progressing  Goal: Absence of Hospital-Acquired Illness or Injury  Outcome: Ongoing, Progressing  Intervention: Identify and Manage Fall Risk  Recent Flowsheet Documentation  Taken 12/11/2020 0400 by Aure Toledo RN  Safety Promotion/Fall Prevention:   safety round/check completed   room organization consistent   activity supervised   assistive device/personal items within reach   clutter free environment maintained  Taken 12/11/2020 0200 by Aure Toledo RN  Safety Promotion/Fall Prevention:   safety round/check completed   room organization consistent   activity supervised   assistive device/personal items within reach   clutter free environment maintained  Taken 12/10/2020 2002 by Aure Toledo RN  Safety Promotion/Fall Prevention:   safety round/check completed   room organization consistent   activity supervised   assistive device/personal items within reach   clutter free environment maintained  Intervention: Prevent Skin Injury  Recent Flowsheet Documentation  Taken 12/11/2020 0400 by Aure Toledo RN  Body Position: position changed independently  Taken 12/11/2020 0200 by Aure Toledo RN  Body Position: position changed independently  Taken 12/10/2020 2002 by Aure Toledo RN  Body Position: sitting up in bed  Intervention: Prevent and Manage VTE (venous thromboembolism)  Risk  Recent Flowsheet Documentation  Taken 12/10/2020 2002 by Aure Toledo RN  VTE Prevention/Management: sequential compression devices off  Goal: Optimal Comfort and Wellbeing  Outcome: Ongoing, Progressing  Intervention: Provide Person-Centered Care  Recent Flowsheet Documentation  Taken 12/10/2020 2002 by Aure Toledo RN  Trust Relationship/Rapport:   questions encouraged   care explained  Goal: Readiness for Transition of Care  Outcome: Ongoing, Progressing     Problem: Fall Injury Risk  Goal: Absence of Fall and Fall-Related Injury  Intervention: Promote Injury-Free Environment  Recent Flowsheet Documentation  Taken 12/11/2020 0400 by Aure Toledo RN  Safety Promotion/Fall Prevention:   safety round/check completed   room organization consistent   activity supervised   assistive device/personal items within reach   clutter free environment maintained  Taken 12/11/2020 0200 by Aure Toledo RN  Safety Promotion/Fall Prevention:   safety round/check completed   room organization consistent   activity supervised   assistive device/personal items within reach   clutter free environment maintained  Taken 12/10/2020 2002 by Aure Toledo RN  Safety Promotion/Fall Prevention:   safety round/check completed   room organization consistent   activity supervised   assistive device/personal items within reach   clutter free environment maintained     Problem: Seizure, Active Management  Goal: Absence of Seizure/Seizure-Related Injury  Outcome: Ongoing, Progressing

## 2020-12-11 NOTE — PROGRESS NOTES
"DOS: 2020  NAME: Liza Aaron   : 1955  PCP: Minnie Whaley MD  Chief Complaint   Patient presents with   • Seizures   Patient seen in follow-up today; new to me        Seizures    Subjective: No events overnight specifically no seizure-like activity.  Patient denies any complaints and/or concerns on my exam.    No family at bedside.    Objective:  Vital signs: /69 (BP Location: Right arm, Patient Position: Sitting)   Pulse 82   Temp 97.6 °F (36.4 °C) (Oral)   Resp 18   Ht 170.2 cm (67\")   Wt 102 kg (223 lb 15.8 oz)   LMP  (LMP Unknown)   SpO2 96%   BMI 35.08 kg/m²       HEENT: Normocephalic, atraumatic   COR: RRR  Resp: Even and unlabored  Extremities: Equal pulses, nondistal embolization    Neurological:   MS: AO. Language normal. No neglect. Higher integrative function normal  CN: II-XII normal  Motor: 5/5, normal tone  Reflexes: Toes downgoing   Sensory: Intact  Coordination: Normal    Laboratory results:  Lab Results   Component Value Date    GLUCOSE 71 12/10/2020    CALCIUM 8.5 (L) 12/10/2020     (L) 12/10/2020    K 3.6 12/10/2020    CO2 24.3 12/10/2020     12/10/2020    BUN 11 12/10/2020    CREATININE 0.60 12/10/2020    EGFRIFAFRI 117 2020    EGFRIFNONA 101 12/10/2020    BCR 18.3 12/10/2020    ANIONGAP 8.7 12/10/2020     Lab Results   Component Value Date    WBC 4.72 12/10/2020    HGB 11.6 (L) 12/10/2020    HCT 34.5 12/10/2020    MCV 85.4 12/10/2020     12/10/2020     No results found for: CHOL  Lab Results   Component Value Date    HDL 70 (H) 2020    HDL 56 05/15/2020    HDL 54 2019     Lab Results   Component Value Date    LDL 59 2020    LDL 57 05/15/2020    LDL 64 2019     Lab Results   Component Value Date    TRIG 85 2020    TRIG 91 05/15/2020    TRIG 115 2019     Results from last 7 days   Lab 12/10/20  0608   HEMOGLOBIN A1C 5.10      Review and interpretation of imaging:  Interpretation Summary    · Estimated " left ventricular EF = 56% Left ventricular systolic function is normal.  · Left ventricular diastolic function was normal.  · There is mild, anterior mitral leaflet thickening present.  · Mild tricuspid valve regurgitation is present. Estimated right ventricular systolic pressure from tricuspid regurgitation is normal (<35 mmHg).        MRI OF THE BRAIN WITHOUT AND WITH GADOLINIUM:     HISTORY:   Seizure      TECHNIQUE:   Axial pre and post contrast T1, FSE T2-weighted, FLAIR, MPGR and  diffusion weighted. Sagittal T1. Coronal postcontrast T1. MultiHance was  injected without adverse reaction.     COMPARISON:   CT head 12/09/2020     FINDINGS:   The ventricles are normal in size and configuration. No mass effect,  hydrocephalus or midline shift. Minimal scattered white matter foci of  T2/FLAIR hyperintensity. Brain parenchymal signal and morphology are  otherwise normal. No evidence of mesial temporal sclerosis. Diffusion  weighted images demonstrate no evidence of acute infarct. MPGR sequence  demonstrates no evidence of blood products. The cerebellar tonsils  extend 4 mm below the foramen magnum. No evidence of Chiari  malformation. The major intracranial flow-voids are maintained. No  abnormal intracranial enhancement.  No abnormal intracranial enhancement.  The orbits are unremarkable.  Mild mucosal thickening within the frontal and ethmoidal sinuses.The  remainingparanasal sinuses and mastoid air cells are clear.        IMPRESSION:  1.  No evidence of intracranial hemorrhage, infarction, mass effect or  pathologic enhancement.  2.  Cerebellar tonsillar ectopia measuring approximately 4 mm below the  foramen magnum. No Chiari malformation.  3.  Minimal white matter changes that are most likely secondary to  chronic microvascular ischemia.        This report was finalized on 12/11/2020 2:43 AM by Dr. Fly Rodríguez M.D.  CT HEAD WITHOUT CONTRAST:      HISTORY:  New onset seizure.     TECHNIQUE:  Axial images were  obtained through the brain without  contrast administration. Multiplanar reformatted images were  reconstructed from the helical source data. Radiation dose reduction  techniques were utilized, including automated exposure control and  exposure modulation based on body size.        COMPARISON: None.     FINDINGS:   The ventricles and sulci are normal in size and configuration.  No  hydrocephalus or midline shift.       Gray-white matter differentiation is preserved.  There is no evidence  of a large territorial infarction. No hemorrhage or extra-axial fluid  collection.      The orbits are unremarkable. The visualized paranasal sinuses and  mastoid air cells are clear.        The calvarium is intact. The scalp soft tissues are unremarkable.        IMPRESSION:  1.   No acute intracranial abnormality. Consider further evaluation with  MRI if symptoms persist.           This report was finalized on 12/9/2020 9:21 PM by Dr. Fly Rodríguez M.D.  Clinical Interpretation:  This routine EEG recording is normal in the awake and sleep states.  No potentially epileptogenic activity, seizure activity, or focal slowing is present.   MRI OF THE BRAIN WITHOUT AND WITH GADOLINIUM:     HISTORY:   Seizure      TECHNIQUE:   Axial pre and post contrast T1, FSE T2-weighted, FLAIR, MPGR and  diffusion weighted. Sagittal T1. Coronal postcontrast T1. MultiHance was  injected without adverse reaction.     COMPARISON:   CT head 12/09/2020     FINDINGS:   The ventricles are normal in size and configuration. No mass effect,  hydrocephalus or midline shift. Minimal scattered white matter foci of  T2/FLAIR hyperintensity. Brain parenchymal signal and morphology are  otherwise normal. No evidence of mesial temporal sclerosis. Diffusion  weighted images demonstrate no evidence of acute infarct. MPGR sequence  demonstrates no evidence of blood products. The cerebellar tonsils  extend 4 mm below the foramen magnum. No evidence of  Chiari  malformation. The major intracranial flow-voids are maintained. No  abnormal intracranial enhancement.  No abnormal intracranial enhancement.  The orbits are unremarkable.  Mild mucosal thickening within the frontal and ethmoidal sinuses.The  remainingparanasal sinuses and mastoid air cells are clear.        IMPRESSION:  1.  No evidence of intracranial hemorrhage, infarction, mass effect or  pathologic enhancement.  2.  Cerebellar tonsillar ectopia measuring approximately 4 mm below the  foramen magnum. No Chiari malformation.  3.  Minimal white matter changes that are most likely secondary to  chronic microvascular ischemia.        This report was finalized on 12/11/2020 2:43 AM by Dr. Fly Rodríguez M.D.  CT HEAD WITHOUT CONTRAST:      HISTORY:  New onset seizure.     TECHNIQUE:  Axial images were obtained through the brain without  contrast administration. Multiplanar reformatted images were  reconstructed from the helical source data. Radiation dose reduction  techniques were utilized, including automated exposure control and  exposure modulation based on body size.        COMPARISON: None.     FINDINGS:   The ventricles and sulci are normal in size and configuration.  No  hydrocephalus or midline shift.       Gray-white matter differentiation is preserved.  There is no evidence  of a large territorial infarction. No hemorrhage or extra-axial fluid  collection.      The orbits are unremarkable. The visualized paranasal sinuses and  mastoid air cells are clear.        The calvarium is intact. The scalp soft tissues are unremarkable.        IMPRESSION:  1.   No acute intracranial abnormality. Consider further evaluation with  MRI if symptoms persist.           This report was finalized on 12/9/2020 9:21 PM by Dr. Fly Rodríguez M.D.    Impression:  1.  Seizure-like activity; concerning for seizure due to prodromal dizziness and 2 prior similar episodes which started during early morning sleep.  History  concerning for frontal lobe seizure therefore MRI and EEG completed both of which were unremarkable.  Will have patient follow-up in outpatient setting with epileptologist.      Plan:  No AEDs indicated at this time; will have patient follow-up w/ Dr. Alexander to further evaluate   OP EEG 48 hours  No driving per [facility] law (placed in discharge instructions)  Seizure precautions (below)  Follow-up with Dr. Alexander in 1 month  No further neurology workup indicated. We will sign off and see again upon request.      Case reviewed w/ attending Dr. Henderson and she agrees w/ plan above.     Seizure Precautions: Patient is not to drive for at least 3 months until cleared by a physician, operate heavy machinery, take tub baths, swim unattended, climb heights, or perform any other activities that can bring harm to himself/herself or others during an episode of altered awareness.

## 2020-12-11 NOTE — PROGRESS NOTES
Discharge Planning Assessment  [facility]     Patient Name: Liza Aaron  MRN: [id number]  Today's Date: 12/11/2020    Admit Date: 12/9/2020    Discharge Needs Assessment     Row Name 12/11/20 1005       Living Environment    Lives With  spouse    Name(s) of Who Lives With Patient  spouse Handy Aaron [phone]    Current Living Arrangements  home/apartment/condo    Primary Care Provided by  self;spouse/significant other    Provides Primary Care For  no one, unable/limited ability to care for self    Family Caregiver if Needed  spouse    Family Caregiver Names  spouse Handy Aaron [phone]    Quality of Family Relationships  helpful;involved;supportive    Able to Return to Prior Arrangements  yes       Resource/Environmental Concerns    Resource/Environmental Concerns  home accessibility    Home Accessibility Concerns  stairs to enter home 3 steps with 1 handrail to enter her 2 story home where she can remain on the main level in the of her home       Transition Planning    Patient/Family Anticipates Transition to  home with family    Patient/Family Anticipated Services at Transition  none    Transportation Anticipated  family or friend will provide       Discharge Needs Assessment    Equipment Currently Used at Home  none    Concerns to be Addressed  denies needs/concerns at this time    Anticipated Changes Related to Illness  none    Equipment Needed After Discharge  none    Current Discharge Risk  physical impairment        Discharge Plan     Row Name 12/11/20 1004       Plan    Plan  Plans home; denies needs.    Provided Post Acute Provider List?  N/A    N/A Provider List Comment  The patient was not provided with a HH/SNF list nor a print out of the HH/nursing home compare list from Medicare.gov as the patient currently denies any d/c needs.    Provided Post Acute Provider Quality & Resource List?  N/A    N/A Quality & Resource List Comment  The patient was not provided with a HH/SNF list nor a print  out of the HH/nursing home compare list from Medicare.gov as the patient currently denies any d/c needs.    Patient/Family in Agreement with Plan  yes    Plan Comments  Met with the patient at bedside; explained role of CCP, verified facesheet and discussed discharge planning needs. The patient plans to return home upon d/c with assistance from her spouse Handy Aaron [phone].  The patient uses no DME, has 3 steps with 1 handrail to enter her 2 story home where she can remain on the main level in the of her home.  The patient’s PCP is Minnie Whaley, pharmacy is [facility] [address] and [address] and she denies any trouble remembering to take her medication or with affording her medication.  The patient has used [facility]  recently for HH after a hip replacement surgery.  The patient denies any SNF history, was encouraged to bring her POA documents, states that she drives herself to appointments and her spouse will transport her home upon d/c.  The patient was not provided with a HH/SNF list nor a print out of the HH/nursing home compare list from Medicare.gov as the patient currently denies any d/c needs. CCP will follow to assist with any d/c needs that may arise.  NILSON Lucia        Continued Care and Services - Admitted Since 12/9/2020    Coordination has not been started for this encounter.     Selected Continued Care - Prior Encounters Includes selections from prior encounters from 9/10/2020 to 12/11/2020    Discharged on 9/22/2020 Admission date: 9/21/2020 - Discharge disposition: Home-Health Care c    Home Medical Care     Service Provider Selected Services Address Phone Fax Patient Preferred    [facility] [address] [address] [phone] --                    Expected Discharge Date and Time     Expected Discharge Date Expected Discharge Time    Dec 11, 2020         Demographic Summary     Row Name  12/11/20 1005       General Information    Admission Type  observation    Arrived From  home    Referral Source  admission list    Reason for Consult  discharge planning    Preferred Language  English     Used During This Interaction  no        Functional Status     Row Name 12/11/20 1005       Functional Status    Usual Activity Tolerance  good    Current Activity Tolerance  moderate       Functional Status, IADL    Medications  independent    Meal Preparation  independent    Housekeeping  independent    Laundry  independent    Shopping  independent       Mental Status    General Appearance WDL  WDL       Mental Status Summary    Recent Changes in Mental Status/Cognitive Functioning  no changes        Psychosocial    No documentation.       Abuse/Neglect    No documentation.       Legal    No documentation.       Substance Abuse    No documentation.       Patient Forms    No documentation.           NILSON Best

## 2020-12-12 NOTE — OUTREACH NOTE
Prep Survey      Responses   [facility] patient discharged from?  [place]   Is LACE score < 7 ?  Yes   Eligibility  [facility]   Date of Admission  12/09/20   Date of Discharge  12/11/20   Discharge Disposition  Home or Self Care   Discharge diagnosis  new onset seizure   Does the patient have one of the following disease processes/diagnoses(primary or secondary)?  Other   Does the patient have Home health ordered?  No   Is there a DME ordered?  No   Prep survey completed?  Yes          Jessika Cochran RN

## 2020-12-14 ENCOUNTER — TELEPHONE (OUTPATIENT)
Dept: NEUROLOGY | Facility: CLINIC | Age: 65
End: 2020-12-14

## 2020-12-14 ENCOUNTER — TRANSITIONAL CARE MANAGEMENT TELEPHONE ENCOUNTER (OUTPATIENT)
Dept: CALL CENTER | Facility: HOSPITAL | Age: 65
End: 2020-12-14

## 2020-12-14 NOTE — TELEPHONE ENCOUNTER
Pt saw Cleo in Hosp and she wants her to F/U with Dr. Alexander in one month. I was able to find a sooner appt.

## 2020-12-14 NOTE — TELEPHONE ENCOUNTER
Pt saw dr piedra in the hospital when she was admitted for seizure on 12/09/2020 and in her discharge notes she was told to follow up in one month with dr piedra. I scheduled patient for the earliest appointment but it is too far out for her.     Please advise and call patient at cell phone [phone]

## 2020-12-14 NOTE — PROGRESS NOTES
Case Management Discharge Note      Final Note: DC'd home 12/11    Provided Post Acute Provider List?: N/A  N/A Provider List Comment: The patient was not provided with a HH/SNF list nor a print out of the HH/nursing home compare list from Medicare.gov as the patient currently denies any d/c needs.  Provided Post Acute Provider Quality & Resource List?: N/A  N/A Quality & Resource List Comment: The patient was not provided with a HH/SNF list nor a print out of the HH/nursing home compare list from Medicare.gov as the patient currently denies any d/c needs.          Selected Continued Care - Prior Encounters Includes selections from prior encounters from 9/10/2020 to 12/11/2020    Discharged on 9/22/2020 Admission date: 9/21/2020 - Discharge disposition: Home-Health Care [facility]    Home Medical Care     Service Provider Selected Services Address Phone Fax Patient Preferred    [facility] [address] [address] [phone] --                    Transportation Services  Private: Car    Final Discharge Disposition Code: 01 - home or self-care

## 2020-12-14 NOTE — OUTREACH NOTE
Call Center TCM Note      Responses   [facility] patient discharged from?  [place]   Does the patient have one of the following disease processes/diagnoses(primary or secondary)?  Other   TCM attempt successful?  Yes   Discharge diagnosis  new onset seizure   Meds reviewed with patient/caregiver?  Yes   Does the patient have all medications ordered at discharge?  N/A   Prescription comments  No new meds added    Is the patient taking all medications as directed (includes completed medication regime)?  N/A   Does the patient have a primary care provider?   Yes   Does the patient have an appointment with their PCP within 7 days of discharge?  Yes   Comments regarding PCP  Rema Whaley MD   Has the patient kept scheduled appointments due by today?  Yes   Has home health visited the patient within 72 hours of discharge?  N/A   Psychosocial issues?  No   Did the patient receive a copy of their discharge instructions?  Yes   Nursing interventions  Reviewed instructions with patient   What is the patient's perception of their health status since discharge?  Improving   Is the patient/caregiver able to teach back signs and symptoms related to disease process for when to call PCP?  Yes   Is the patient/caregiver able to teach back signs and symptoms related to disease process for when to call 911?  Yes   Is the patient/caregiver able to teach back the hierarchy of who to call/visit for symptoms/problems? PCP, Specialist, Home health nurse, Urgent Care, ED, 911  Yes   If the patient is a current smoker, are they able to teach back resources for cessation?  Not a smoker   TCM call completed?  Yes   Wrap up additional comments  Pt feels well, no further sycope/seizure episodes. No med changes. Pt will see PCP for TCM FWP on 12/21/2020, and also has fwp sched with Cardio and Neurologist. Pt is wearing ZIP patch for 2 weeks.           Farida Herr MA    12/14/2020, 16:07 EST

## 2020-12-21 ENCOUNTER — OFFICE VISIT (OUTPATIENT)
Dept: INTERNAL MEDICINE | Facility: CLINIC | Age: 65
End: 2020-12-21

## 2020-12-21 VITALS
HEIGHT: 67 IN | WEIGHT: 212 LBS | BODY MASS INDEX: 33.27 KG/M2 | SYSTOLIC BLOOD PRESSURE: 118 MMHG | OXYGEN SATURATION: 98 % | TEMPERATURE: 96.7 F | DIASTOLIC BLOOD PRESSURE: 66 MMHG | HEART RATE: 75 BPM

## 2020-12-21 DIAGNOSIS — Z12.11 SCREENING FOR COLON CANCER: ICD-10-CM

## 2020-12-21 DIAGNOSIS — D64.9 ANEMIA, UNSPECIFIED TYPE: ICD-10-CM

## 2020-12-21 DIAGNOSIS — R56.9 SEIZURE-LIKE ACTIVITY (HCC): Primary | ICD-10-CM

## 2020-12-21 PROCEDURE — 99213 OFFICE O/P EST LOW 20 MIN: CPT | Performed by: FAMILY MEDICINE

## 2020-12-21 NOTE — PROGRESS NOTES
Subjective   Liza Aaron is a 65 y.o. female.   Chief Complaint   Patient presents with   • hospital follow up     seizure like activity, anemia       History of Present Illness     #1 Seizure-like activity-patient was admitted to hospital from 12/9-12/11 for seizure-like activity. She was shopping  She did not eat or drink for a few hours. She fell suddenly as she was to faint, she started walking to check out and the next thing she remembers was when she woke up in EMS. According to bystanders who were walking behind her, she started falling backward. They were able to catch her and helped her to the ground. She stared up, then had rapid eye movements and seizure-like movements of the whole body. It lasted about 5 minutes. According to bystanders she was conscious and answering questions, but she did not know whom she was or where she was. She bit her tongue and felt little confused after. There was no incontinence.  She had EKG in ER which was negative. CT of the head and MRI of the head showed no acute abnormality. Respiratory panel was negative. Labs were unremarkable except of mildly decreased hemoglobin at 11.6. No blood in stool, no blood in urine. She was seen by neurology and cardiology. She is wearing 2 weeks monitor and is going to follow-up with cardiologist. She is going to follow-up with neurologist. She did not have any more episodes after discharge from hospital. She was advised not to drive for 90 days and she is not driving.  She had 2 seizure-like episodes, one in high school and 1 in low school.  She is due for colonoscopy.    The following portions of the patient's history were reviewed and updated as appropriate: allergies, current medications, past family history, past medical history, past social history and problem list.    Review of Systems   Constitutional: Negative.    Respiratory: Negative for shortness of breath.    Cardiovascular: Negative for chest pain and palpitations.    Gastrointestinal: Negative for blood in stool.   Genitourinary: Negative for hematuria.   Neurological: Positive for light-headedness.   Psychiatric/Behavioral: Negative.          Objective   Wt Readings from Last 3 Encounters:   12/21/20 96.2 kg (212 lb)   12/11/20 102 kg (223 lb 15.8 oz)   11/20/20 98.9 kg (218 lb)      Vitals:    12/21/20 1137   BP: 118/66   Pulse: 75   Temp: 96.7 °F (35.9 °C)   SpO2: 98%     Temp Readings from Last 3 Encounters:   12/21/20 96.7 °F (35.9 °C)   12/11/20 97.8 °F (36.6 °C) (Oral)   11/20/20 96.4 °F (35.8 °C)     BP Readings from Last 3 Encounters:   12/21/20 118/66   12/11/20 104/68   11/20/20 138/80     Pulse Readings from Last 3 Encounters:   12/21/20 75   12/11/20 78   11/20/20 78     Body mass index is 33.2 kg/m².    Physical Exam  Constitutional:       Appearance: Normal appearance. She is well-developed.   HENT:      Head: Normocephalic and atraumatic.   Neck:      Musculoskeletal: Neck supple.      Thyroid: No thyromegaly.      Vascular: No carotid bruit.   Cardiovascular:      Rate and Rhythm: Normal rate and regular rhythm.      Heart sounds: Normal heart sounds.   Pulmonary:      Effort: Pulmonary effort is normal.      Breath sounds: Normal breath sounds.   Skin:     General: Skin is warm and dry.   Neurological:      Mental Status: She is alert and oriented to person, place, and time.   Psychiatric:         Behavior: Behavior normal.         Assessment/Plan   Diagnoses and all orders for this visit:    1. Seizure-like activity (CMS/HCC) (Primary)    2. Anemia, unspecified type  -     CBC (No Diff)  -     Iron Profile  -     Folate    3. Screening for colon cancer  -     Ambulatory Referral to Gastroenterology        #1 seizure-like activity/#2 anemia-hospital records were reviewed. Medications are updated. Imaging test results and blood work results were reviewed. We are checking CBC, iron studies and folate today. Further recommendations depending on test results. She  will follow-up with cardiologist and neurologist as scheduled.  We are referring her for colonoscopy. She is due.    Current outpatient and discharge medications have been reconciled for the patient.  Reviewed by: Minnie Whaley MD

## 2020-12-22 ENCOUNTER — TELEPHONE (OUTPATIENT)
Dept: GASTROENTEROLOGY | Facility: CLINIC | Age: 65
End: 2020-12-22

## 2020-12-22 LAB
ERYTHROCYTE [DISTWIDTH] IN BLOOD BY AUTOMATED COUNT: 13.8 % (ref 12.3–15.4)
FOLATE SERPL-MCNC: >20 NG/ML (ref 4.78–24.2)
HCT VFR BLD AUTO: 38.4 % (ref 34–46.6)
HGB BLD-MCNC: 12.8 G/DL (ref 12–15.9)
IRON SATN MFR SERPL: 13 % (ref 20–50)
IRON SERPL-MCNC: 51 MCG/DL (ref 37–145)
MCH RBC QN AUTO: 28.3 PG (ref 26.6–33)
MCHC RBC AUTO-ENTMCNC: 33.3 G/DL (ref 31.5–35.7)
MCV RBC AUTO: 84.8 FL (ref 79–97)
PLATELET # BLD AUTO: 215 10*3/MM3 (ref 140–450)
RBC # BLD AUTO: 4.53 10*6/MM3 (ref 3.77–5.28)
TIBC SERPL-MCNC: 403 MCG/DL
UIBC SERPL-MCNC: 352 MCG/DL (ref 112–346)
WBC # BLD AUTO: 4.64 10*3/MM3 (ref 3.4–10.8)

## 2021-01-07 ENCOUNTER — TELEPHONE (OUTPATIENT)
Dept: NEUROLOGY | Facility: CLINIC | Age: 66
End: 2021-01-07

## 2021-01-07 ENCOUNTER — OFFICE VISIT (OUTPATIENT)
Dept: NEUROLOGY | Facility: CLINIC | Age: 66
End: 2021-01-07

## 2021-01-07 VITALS
SYSTOLIC BLOOD PRESSURE: 126 MMHG | HEART RATE: 84 BPM | HEIGHT: 67 IN | OXYGEN SATURATION: 97 % | BODY MASS INDEX: 34.37 KG/M2 | DIASTOLIC BLOOD PRESSURE: 86 MMHG | WEIGHT: 219 LBS

## 2021-01-07 DIAGNOSIS — G40.909 NONINTRACTABLE EPILEPSY WITHOUT STATUS EPILEPTICUS, UNSPECIFIED EPILEPSY TYPE (HCC): Primary | ICD-10-CM

## 2021-01-07 PROCEDURE — 99215 OFFICE O/P EST HI 40 MIN: CPT | Performed by: PSYCHIATRY & NEUROLOGY

## 2021-01-07 RX ORDER — ASCORBIC ACID 500 MG
500 TABLET ORAL DAILY
COMMUNITY
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
## 2021-01-07 NOTE — TELEPHONE ENCOUNTER
Patient stated at check out she was told by Dr. Alexander she can see her cardiologist and then contact her (Dr. Alexander) to either fit her in somewhere or set up a telephone call to discuss her medications. Please advise. Thank you.

## 2021-01-07 NOTE — PATIENT INSTRUCTIONS
[facility]  Cassidy Alexander MD  Neurology clinic  [phone]    With anti-seizure medications, you may initially notice side effects of fatigue, drowsiness, unsteadiness, and dizziness.  Other possible side effects include nausea, abdominal pain, headache, blurry or double vision, slurred speech and mood changes.  Generally, patients will noticed these symptoms when the medication is first started or with higher doses and will go away with time.    It is import to consistently take your medication every day.  Missing just one dose may put you at risk for a breakthrough seizure.  Consider using reminders on your phone or a pill box.    If you develop a rash, please call the neurology clinic immediately or notify another healthcare professional, as this may be potentially life-threatening.  If you are unable to reach a healthcare professional, go to the emergency room immediately for further evaluation.    If you develop thoughts of wanting to hurt yourself or others, please call the neurology clinic immediately to notify another healthcare professional.  If you are unable to reach a healthcare professional, go to the emergency room immediately for further evaluation.    Taking anti-seizure medications may increase the risk of birth defects.  If you are a female of child-bearing potential, it is recommended that you take folic acid (1-4 mg) daily.  This may reduce the risk of birth defects while pregnant and taking seizure medication.  If you become pregnant, contact our office immediately. You will need to be followed very closely (at least monthly appointments).  I also recommend contacting The North American Antiepileptic Drug Pregnancy Registry at www.aedpregnancyregistry.org or 1-456.180.7289.    It is the [place] state law that you cannot drive within 90 days of a seizure.    You should avoid certain activities that if you were to have a seizure, you could harm yourself or others. In  general, it is recommended that you avoid operating heavy machinery or power tools, swimming or taking baths by yourself (showers are ok), don't stand over open flames, don't get on high ladders or the roof.  I also recommend to avoid sleeping on your stomach.    For further information on epilepsy and resources available to patients and their families, please visit the [facility] at www.efky.org or call [phone].    **Check out the [facility]'s monthly Art Group Gathering.  They are [facility], [address] [place] [address].  Call Niesha Lyons at [phone] or email her at [email or web address] for the dates of future gatherings.**      **If you have having memory problems, consider HOBSCOTCH (Home-Based Self-management and Cognitive Training Changes lives).  It is an 8 week self-management program for adults with epilepsy and memory problems.  The program is free at the [facility].  Contact Nara Ken at [phone] or [email or web address].**    Check out My Epilepsy Story ([email or web address]).  Their goal is to foster the growth of young girls and women affected by epilepsy and encourage them not just to lives, but to THRIVE!  You can get involved by donating, sharing your story, or becoming an ambassador.  Services include educational resources and transportation.            **Consider Lamictal or keppra.**

## 2021-01-13 ENCOUNTER — TELEPHONE (OUTPATIENT)
Dept: INTERNAL MEDICINE | Facility: CLINIC | Age: 66
End: 2021-01-13

## 2021-01-13 ENCOUNTER — TELEPHONE (OUTPATIENT)
Dept: NEUROLOGY | Facility: CLINIC | Age: 66
End: 2021-01-13

## 2021-01-13 PROCEDURE — 93248 EXT ECG>7D<15D REV&INTERPJ: CPT | Performed by: INTERNAL MEDICINE

## 2021-01-13 NOTE — TELEPHONE ENCOUNTER
PATIENT REQUESTING MED ADVICE-RECENT LABS SHOWED LOW IRON AND ADVISED TO TAKE SUPPLEMENT    MULTIVITAMIN HAS 8 MG IRON-HOW MUCH MORE SHOULD SHE TAKE PER RECENT LABS    [phone]

## 2021-01-13 NOTE — TELEPHONE ENCOUNTER
Notified pt that per Dr. Alexander there are no contraindications to getting the vaccine.     Pt stated understanding and thanks.

## 2021-01-13 NOTE — TELEPHONE ENCOUNTER
PT CALLED IN TODAY REGARDING HER PREVIOUS APPT WITH DR. HORN. HER CARDIOLOGY APPT IS TOMORROW AND SHE PLANS TO MAKE A FOLLOW UP WITH THE OFFICE TO DISCUSS MEDICATION SOMETIME AFTER THAT. SHE STATES SHE THINKS SHE IS ON A LIST TO BE WORKED IN FOR A COVID VACCINE IF THERE IS EXTRA AVAILABILITY AT THE END OF THE DAY ON ANY GIVEN DAY BUT WANTED TO MAKE SURE THERE ARE NO RISKS INVOLVED IN GETTING THE VACCINE WITH HER SZ HISTORY. PT IS NOT CURRENTLY ON ANY SZ MEDICATION . HOWEVER KEPPRA AND LAMICTAL WERE DISCUSSED ON HER OV VISIT WITH DR. HORN PER LAST OV NOTE. PLEASE REVIEW AND ADVISE IF IT IS SAFE FOR PT TO TAKE VACCINE.        CALL BACK- [phone]

## 2021-01-13 NOTE — TELEPHONE ENCOUNTER
PATIENT IS REQUESTING A CALL BACK FROM DR VACA REGARDING TAKING IRON AND POSSIBLE CONNECTION TO SEIZURE EPISODE THAT SHE WAS HOSPITALIZED FOR    CALLBACK [phone]

## 2021-01-14 ENCOUNTER — IMMUNIZATION (OUTPATIENT)
Dept: VACCINE CLINIC | Facility: HOSPITAL | Age: 66
End: 2021-01-14

## 2021-01-14 PROCEDURE — 91300 HC SARSCOV02 VAC 30MCG/0.3ML IM: CPT | Performed by: INTERNAL MEDICINE

## 2021-01-14 PROCEDURE — 0001A: CPT | Performed by: INTERNAL MEDICINE

## 2021-01-14 PROCEDURE — 0002A: CPT | Performed by: INTERNAL MEDICINE

## 2021-01-15 ENCOUNTER — OFFICE VISIT (OUTPATIENT)
Dept: CARDIOLOGY | Facility: CLINIC | Age: 66
End: 2021-01-15

## 2021-01-15 VITALS
OXYGEN SATURATION: 97 % | DIASTOLIC BLOOD PRESSURE: 76 MMHG | WEIGHT: 218 LBS | BODY MASS INDEX: 34.21 KG/M2 | HEART RATE: 81 BPM | HEIGHT: 67 IN | SYSTOLIC BLOOD PRESSURE: 120 MMHG | TEMPERATURE: 96.9 F

## 2021-01-15 DIAGNOSIS — I47.1 PAROXYSMAL SVT (SUPRAVENTRICULAR TACHYCARDIA) (HCC): ICD-10-CM

## 2021-01-15 DIAGNOSIS — R55 SYNCOPE AND COLLAPSE: Primary | ICD-10-CM

## 2021-01-15 DIAGNOSIS — I49.3 PVC (PREMATURE VENTRICULAR CONTRACTION): ICD-10-CM

## 2021-01-15 PROBLEM — I47.10 PAROXYSMAL SVT (SUPRAVENTRICULAR TACHYCARDIA): Status: ACTIVE | Noted: 2021-01-15

## 2021-01-15 PROCEDURE — 93000 ELECTROCARDIOGRAM COMPLETE: CPT | Performed by: INTERNAL MEDICINE

## 2021-01-15 PROCEDURE — 99214 OFFICE O/P EST MOD 30 MIN: CPT | Performed by: INTERNAL MEDICINE

## 2021-01-15 NOTE — PROGRESS NOTES
PATIENTINFORMATION    Date of Office Visit: 01/15/21  Encounter Provider: Bridgett Julian MD  Place of Service: [facility] CARDIOLOGY  Patient Name: Liza Aaron  : 1955    Subjective:         Patient ID: Liza Aaron is a 65 y.o. female.      History of Present Illness    This is a lady I originally saw in 2019.  She has a family history of coronary artery disease.  Her father had his first heart attack at the age of 49.  Her brother had his first heart attack at the age of 60.  She has a history of hyper lipidemia.  She had a vascular screening in 2018 which was normal.  She had a syncopal episode in 2020.  Carotid Doppler was normal.  Transthoracic echocardiogram showed normal LV function with an ejection fraction of 56%.  There was mild anterior mitral valve leaflet thickening.  There was mild tricuspid regurgitation with a normal right ventricular systolic pressure.  She wore a Zio patch for almost 13 days.  There were two patient triggered events which correlated with premature atrial contractions.  She had short bursts of nonsustained supraventricular tachycardia.  The longest was 17.4 seconds.  Ventricular couplets, bigeminy and trigeminy were noted.    Her neurologist recommended that she go on medication for seizures but she is a little reluctant to do so and wanted to speak with me first.  She is not been having chest pains or shortness of breath.  She has not had any recurrent syncopal or seizure-like activity.  She said the people who were near her when she had the episode said that she was looking up and blinking and then lost consciousness.  She did bite her tongue.  She did not lose control of her bowel or bladder.  Cardiac work-up is as above.  She has been exercising and walking on a regular basis on her treadmill and has not had any exertional symptoms.    Review of Systems   Constitution: Negative for fever, malaise/fatigue,  "weight gain and weight loss.   HENT: Negative for ear pain, hearing loss, nosebleeds and sore throat.    Eyes: Negative for double vision, pain, vision loss in left eye and vision loss in right eye.   Cardiovascular:        See history of present illness.   Respiratory: Negative for cough, shortness of breath, sleep disturbances due to breathing, snoring and wheezing.    Endocrine: Negative for cold intolerance, heat intolerance and polyuria.   Skin: Negative for itching, poor wound healing and rash.   Musculoskeletal: Negative for joint pain, joint swelling and myalgias.   Gastrointestinal: Negative for abdominal pain, diarrhea, hematochezia, nausea and vomiting.   Genitourinary: Negative for hematuria and hesitancy.   Neurological: Positive for light-headedness, numbness and seizures. Negative for paresthesias.   Psychiatric/Behavioral: Negative for depression. The patient is not nervous/anxious.            ECG 12 Lead    Date/Time: 1/15/2021 12:01 PM  Performed by: Bridgett Julian MD  Authorized by: Bridgett Julian MD   Comparison: compared with previous ECG from 12/9/2020  Comparison to previous ECG: Premature ventricular contractions are new  Rhythm: sinus rhythm  BPM: 71  Conduction: conduction normal  ST Segments: ST segments normal  T Waves: T waves normal    Clinical impression: normal ECG               Objective:     /76 (BP Location: Left arm)   Pulse 81   Temp 96.9 °F (36.1 °C) (Infrared)   Ht 170.2 cm (67\")   Wt 98.9 kg (218 lb)   LMP  (LMP Unknown)   SpO2 97%   BMI 34.14 kg/m²  Body mass index is 34.14 kg/m².     Vitals signs reviewed.   Constitutional:       Appearance: Normal appearance. Well-developed.   Eyes:      General: Lids are normal. Lids are everted, no foreign bodies appreciated.      Conjunctiva/sclera: Conjunctivae normal.      Pupils: Pupils are equal, round, and reactive to light.   HENT:      Head: Normocephalic and atraumatic.   Neck:      Musculoskeletal: Normal " range of motion.      Thyroid: No thyroid mass.      Vascular: No carotid bruit or JVD.      Trachea: No tracheal deviation.   Pulmonary:      Effort: Pulmonary effort is normal.      Breath sounds: Normal breath sounds.   Cardiovascular:      Normal rate. Regular rhythm.   Pulses:     Dorsalis pedis: 2+ bilaterally.  Abdominal:      General: Bowel sounds are normal.   Musculoskeletal: Normal range of motion.   Skin:     General: Skin is warm and dry.   Neurological:      Mental Status: Alert.   Psychiatric:         Behavior: Behavior normal.             Assessment/Plan:       1.  Syncope.  Normal echo and carotid Doppler.  No significant arrhythmia on Zio patch.  I do not have a cardiac cause for her syncopal episode.  Her neurologist has recommended treatment for seizures and I encouraged her to follow her neurologist's advice.  2.  Nonsustained SVT on Zio patch the longest lasting 17 seconds.  3.  Premature atrial contractions.  Symptomatic.  4.  Premature ventricular contractions.  Normal left ventricular systolic function.  5.  Family history of premature coronary disease.    Her ectopy and nonsustained supraventricular tachycardia are all asymptomatic.  I do not recommend any treatment at this time.  She does not require any further testing.  I told her to call me if she is having any new symptoms such as chest pain, dyspnea on exertion or palpitations.    Orders Placed This Encounter   Procedures   • ECG 12 Lead     This order was created via procedure documentation        Discharge Medications          Accurate as of January 15, 2021 12:02 PM. If you have any questions, ask your nurse or doctor.            Continue These Medications      Instructions Start Date   ascorbic acid 500 MG tablet  Commonly known as: VITAMIN C   500 mg, Oral, Daily      calcium carbonate 600 MG tablet  Commonly known as: OS-EMMA   600 mg, Oral, Daily      High Potency Iron 65 MG tablet   1 tablet, Oral, Daily      melatonin 5 MG  tablet tablet   5 mg, Oral, Nightly PRN      multivitamin with minerals tablet tablet   1 tablet, Oral, Daily      PROBIOTIC ACIDOPHILUS PO   1 tablet, Oral      rosuvastatin 10 MG tablet  Commonly known as: CRESTOR   10 mg, Oral, Nightly      Vitamin D 25 MCG (1000 UT) tablet   1,000 Units, Oral, Daily      Zinc 50 MG capsule   Oral, Daily                    Bridgett Julian MD  01/15/21  12:02 EST

## 2021-01-26 ENCOUNTER — TELEPHONE (OUTPATIENT)
Dept: GASTROENTEROLOGY | Facility: CLINIC | Age: 66
End: 2021-01-26

## 2021-01-26 ENCOUNTER — PREP FOR SURGERY (OUTPATIENT)
Dept: OTHER | Facility: HOSPITAL | Age: 66
End: 2021-01-26

## 2021-01-26 DIAGNOSIS — Z12.11 ENCOUNTER FOR SCREENING FOR MALIGNANT NEOPLASM OF COLON: Primary | ICD-10-CM

## 2021-01-26 NOTE — TELEPHONE ENCOUNTER
Last scope 12/14/2015-- no personal hx of polyps-- grandmother hx of colon ca-- no ASA or blood thinners-- medications:    ascorbic acid (VITAMIN C) 500 MG tablet      calcium carbonate (OS-EMMA) 600 MG tablet     Cholecalciferol (VITAMIN D) 25 MCG (1000 UT) tablet     Ferrous Sulfate Dried (High Potency Iron) 65 MG tablet     Lactobacillus (PROBIOTIC ACIDOPHILUS PO)     multivitamin with minerals (MULTIVITAMIN ADULTS PO)     rosuvastatin (CRESTOR) 10 MG tablet     Zinc 50 MG capsule      OA form and last scope scanned into media

## 2021-02-04 ENCOUNTER — IMMUNIZATION (OUTPATIENT)
Dept: VACCINE CLINIC | Facility: HOSPITAL | Age: 66
End: 2021-02-04

## 2021-02-04 PROCEDURE — 91300 HC SARSCOV02 VAC 30MCG/0.3ML IM: CPT | Performed by: INTERNAL MEDICINE

## 2021-02-04 PROCEDURE — 0002A: CPT | Performed by: INTERNAL MEDICINE

## 2021-03-12 ENCOUNTER — TELEPHONE (OUTPATIENT)
Dept: GASTROENTEROLOGY | Facility: CLINIC | Age: 66
End: 2021-03-12

## 2021-03-12 PROBLEM — Z12.11 ENCOUNTER FOR SCREENING FOR MALIGNANT NEOPLASM OF COLON: Status: ACTIVE | Noted: 2021-03-12

## 2021-03-17 ENCOUNTER — OFFICE VISIT (OUTPATIENT)
Dept: NEUROLOGY | Facility: CLINIC | Age: 66
End: 2021-03-17

## 2021-03-17 VITALS
BODY MASS INDEX: 35 KG/M2 | HEART RATE: 86 BPM | HEIGHT: 67 IN | DIASTOLIC BLOOD PRESSURE: 86 MMHG | SYSTOLIC BLOOD PRESSURE: 122 MMHG | WEIGHT: 223 LBS | OXYGEN SATURATION: 99 %

## 2021-03-17 DIAGNOSIS — G40.909 NONINTRACTABLE EPILEPSY WITHOUT STATUS EPILEPTICUS, UNSPECIFIED EPILEPSY TYPE (HCC): Primary | ICD-10-CM

## 2021-03-17 PROCEDURE — 99213 OFFICE O/P EST LOW 20 MIN: CPT | Performed by: PSYCHIATRY & NEUROLOGY

## 2021-03-17 RX ORDER — LEVETIRACETAM 500 MG/1
1000 TABLET, EXTENDED RELEASE ORAL DAILY
Qty: 60 TABLET | Refills: 11 | Status: SHIPPED | OUTPATIENT
Start: 2021-03-17 | End: 2021-03-17

## 2021-03-17 RX ORDER — LEVETIRACETAM 500 MG/1
500 TABLET, EXTENDED RELEASE ORAL DAILY
Qty: 60 TABLET | Refills: 11 | Status: SHIPPED | OUTPATIENT
Start: 2021-03-17 | End: 2021-04-19 | Stop reason: SDUPTHER

## 2021-03-17 NOTE — PROGRESS NOTES
Subjective:     Patient ID: Liza Aaron is a 66 y.o. female.    Ms. Aaron is a 66-year-old right-handed female with a history of arthritis, hyperlipidemia, and seizures who presents to the neurology clinic today as an established patient for follow-up for seizures.  The patient was last seen on January 7, 2021.  For details regarding her history please refer that note.  The patient had a definitive seizure on December 9, 2020.  She may have had a seizure in high school and another one in college.  The patient's brain MRI done on December 10, 2020 did not show anything potentially epileptogenic.  Her EEG was also normal.  At that visit we discussed the diagnosis of epilepsy.  The patient would hold off on medications.  She denies any seizures since I last saw her and is not currently driving.    The following portions of the patient's history were reviewed and updated as appropriate: allergies, current medications, past family history, past medical history, past social history, past surgical history and problem list.    Review of Systems   Respiratory: Negative for cough, chest tightness and shortness of breath.    Cardiovascular: Negative for chest pain, palpitations and leg swelling.   Neurological: Negative for seizures.        Objective:    Neurologic Exam    Physical Exam    Assessment/Plan:    The patient is a 66-year-old right-handed female with a history of arthritis, hyperlipidemia, and seizures who presents today for follow-up.    1.  Epilepsy-we discussed that the patient is at high risk for seizure recurrence and therefore I would recommend initiation of antiseizure medication.  We discussed the pros and cons of Keppra and lamotrigine decided to start on Keppra.  She prefers once a day dosing and therefore we can try the extended release 1000 mg once a day.  Discussed side effects including drowsiness as well as mood changes.  The patient can resume driving.  We will check back in with her in 4 to 6  weeks to make sure she is not having side effects.    A total of 20 minutes of time was spent on this encounter today.  This included reviewing the patient's records, face-to-face time, and documentation.     Problems Addressed this Visit     None      Visit Diagnoses     Nonintractable epilepsy without status epilepticus, unspecified epilepsy type (CMS/HCC)    -  Primary    Relevant Medications    levETIRAcetam XR (KEPPRA XR) 500 MG 24 hr tablet      Diagnoses       Codes Comments    Nonintractable epilepsy without status epilepticus, unspecified epilepsy type (CMS/HCC)    -  Primary ICD-10-CM: G40.909  ICD-9-CM: 345.90

## 2021-03-17 NOTE — PATIENT INSTRUCTIONS
[facility]  Cassidy Alexander MD  Neurology clinic  [phone]    With anti-seizure medications, you may initially notice side effects of fatigue, drowsiness, unsteadiness, and dizziness.  Other possible side effects include nausea, abdominal pain, headache, blurry or double vision, slurred speech and mood changes.  Generally, patients will noticed these symptoms when the medication is first started or with higher doses and will go away with time.    It is import to consistently take your medication every day.  Missing just one dose may put you at risk for a breakthrough seizure.  Consider using reminders on your phone or a pill box.    If you develop a rash, please call the neurology clinic immediately or notify another healthcare professional, as this may be potentially life-threatening.  If you are unable to reach a healthcare professional, go to the emergency room immediately for further evaluation.    If you develop thoughts of wanting to hurt yourself or others, please call the neurology clinic immediately to notify another healthcare professional.  If you are unable to reach a healthcare professional, go to the emergency room immediately for further evaluation.    Taking anti-seizure medications may increase the risk of birth defects.  If you are a female of child-bearing potential, it is recommended that you take folic acid (1-4 mg) daily.  This may reduce the risk of birth defects while pregnant and taking seizure medication.  If you become pregnant, contact our office immediately. You will need to be followed very closely (at least monthly appointments).  I also recommend contacting The North American Antiepileptic Drug Pregnancy Registry at www.aedpregnancyregistry.org or 1-578.843.7816.    It is the [place] state law that you cannot drive within 90 days of a seizure.    You should avoid certain activities that if you were to have a seizure, you could harm yourself or others. In  general, it is recommended that you avoid operating heavy machinery or power tools, swimming or taking baths by yourself (showers are ok), don't stand over open flames, don't get on high ladders or the roof.  I also recommend to avoid sleeping on your stomach.    For further information on epilepsy and resources available to patients and their families, please visit the [facility] at www.efky.org or call [phone].    **Check out the [facility]'s monthly Art Group Gathering.  They are [facility] at [facility] [place], [address] [place] [address].  Call Niesha Lyons at [phone] or email her at [email or web address] for the dates of future gatherings.**      **If you have having memory problems, consider HOBSCOTCH (Home-Based Self-management and Cognitive Training Changes lives).  It is an 8 week self-management program for adults with epilepsy and memory problems.  The program is free at the [facility].  Contact Nara Ken at [phone] or [email or web address].**    Check out My Epilepsy Story ([email or web address]).  Their goal is to foster the growth of young girls and women affected by epilepsy and encourage them not just to lives, but to THRIVE!  You can get involved by donating, sharing your story, or becoming an ambassador.  Services include educational resources and transportation.

## 2021-04-06 ENCOUNTER — OFFICE VISIT (OUTPATIENT)
Dept: ORTHOPEDIC SURGERY | Facility: CLINIC | Age: 66
End: 2021-04-06

## 2021-04-06 VITALS — WEIGHT: 220 LBS | BODY MASS INDEX: 34.53 KG/M2 | HEIGHT: 67 IN | TEMPERATURE: 97.3 F

## 2021-04-06 DIAGNOSIS — Z96.641 STATUS POST RIGHT HIP REPLACEMENT: ICD-10-CM

## 2021-04-06 DIAGNOSIS — R52 PAIN: Primary | ICD-10-CM

## 2021-04-06 DIAGNOSIS — Z96.642 STATUS POST LEFT HIP REPLACEMENT: ICD-10-CM

## 2021-04-06 PROCEDURE — 99213 OFFICE O/P EST LOW 20 MIN: CPT | Performed by: NURSE PRACTITIONER

## 2021-04-06 PROCEDURE — 73502 X-RAY EXAM HIP UNI 2-3 VIEWS: CPT | Performed by: NURSE PRACTITIONER

## 2021-04-06 RX ORDER — CLINDAMYCIN HYDROCHLORIDE 300 MG/1
CAPSULE ORAL
COMMUNITY
Start: 2021-03-31 | End: 2021-04-19

## 2021-04-06 NOTE — PROGRESS NOTES
Patient: Liza Aaron  YOB: 1955 66 y.o. female  Medical Record Number: [id number]    Chief Complaints:   Chief Complaint   Patient presents with   • Right Hip - Post-op       History of Present Illness:Liza Aaron is a 66 y.o. female who presents with complaints of some left hip tightness.  She had right total hip replacement almost a year ago and left total hip replacement 6 months ago.  Overall she is doing great she did not finish physical therapy with her left hip and she thinks it might have contributed to some of the tightness.  She denies any injury, tightness is worse when she exercises, better with rest    Allergies:   Allergies   Allergen Reactions   • Nickel Rash     Pt states cannot wear jewelry with Nickel.    • Penicillins Rash       Medications:   Current Outpatient Medications   Medication Sig Dispense Refill   • ascorbic acid (VITAMIN C) 500 MG tablet Take 500 mg by mouth Daily.     • calcium carbonate (OS-EMMA) 600 MG tablet Take 600 mg by mouth Daily.     • Cholecalciferol (VITAMIN D) 25 MCG (1000 UT) tablet Take 1,000 Units by mouth Daily.     • clindamycin (CLEOCIN) 300 MG capsule      • Ferrous Sulfate Dried (High Potency Iron) 65 MG tablet Take 1 tablet by mouth Daily.     • Lactobacillus (PROBIOTIC ACIDOPHILUS PO) Take 1 tablet by mouth.     • levETIRAcetam XR (KEPPRA XR) 500 MG 24 hr tablet Take 1 tablet by mouth Daily. 60 tablet 11   • melatonin 5 MG tablet tablet Take 5 mg by mouth At Night As Needed.     • multivitamin with minerals (MULTIVITAMIN ADULTS PO) Take 1 tablet by mouth Daily.     • rosuvastatin (CRESTOR) 10 MG tablet Take 1 tablet by mouth Every Night. 90 tablet 1   • Zinc 50 MG capsule Take  by mouth As Needed.       No current facility-administered medications for this visit.         The following portions of the patient's history were reviewed and updated as appropriate: allergies, current medications, past family history, past medical history, past social  "history, past surgical history and problem list.    Review of Systems:   A 14 point review of systems was performed. All systems negative except pertinent positives/negative listed in HPI above    Physical Exam:   Vitals:    04/06/21 0856   Temp: 97.3 °F (36.3 °C)   TempSrc: Temporal   Weight: 99.8 kg (220 lb)   Height: 170.2 cm (67.01\")       General: A and O x 3, ASA, NAD    SCLERA:    Normal    DENTITION:   Normal  Skin clear no unusual lesions noted  Bilateral hips patient is nontender palpation she has excellent range of motion with no instability calf soft and nontender       Radiology:  Xrays 2 views of left hip ordered and reviewed today secondary to recent surgery and tightness and show bilateral total hip replacements in good position.  Compared to views are unchanged    Assessment/Plan: Status post bilateral ALEX with left hip tightness    I think that the majority of the patient's tightness is soft tissue in nature obviously the replacement looks great.  We will refer her back to some outpatient physical therapy, specifically aqua therapy, and we will see her back as needed      Angelica Montalvo, APRN  4/6/2021  "

## 2021-04-19 ENCOUNTER — OFFICE VISIT (OUTPATIENT)
Dept: NEUROLOGY | Facility: CLINIC | Age: 66
End: 2021-04-19

## 2021-04-19 VITALS
DIASTOLIC BLOOD PRESSURE: 82 MMHG | HEIGHT: 67 IN | HEART RATE: 74 BPM | OXYGEN SATURATION: 97 % | BODY MASS INDEX: 35.16 KG/M2 | WEIGHT: 224 LBS | SYSTOLIC BLOOD PRESSURE: 126 MMHG

## 2021-04-19 DIAGNOSIS — G40.909 NONINTRACTABLE EPILEPSY WITHOUT STATUS EPILEPTICUS, UNSPECIFIED EPILEPSY TYPE (HCC): Primary | ICD-10-CM

## 2021-04-19 PROCEDURE — 99213 OFFICE O/P EST LOW 20 MIN: CPT | Performed by: PSYCHIATRY & NEUROLOGY

## 2021-04-19 RX ORDER — VIT C/B6/B5/MAGNESIUM/HERB 173 50-5-6-5MG
CAPSULE ORAL DAILY
COMMUNITY

## 2021-04-19 RX ORDER — LEVETIRACETAM 500 MG/1
1000 TABLET, EXTENDED RELEASE ORAL DAILY
Qty: 180 TABLET | Refills: 3 | Status: SHIPPED | OUTPATIENT
Start: 2021-04-19 | End: 2022-05-03 | Stop reason: SDUPTHER

## 2021-04-19 NOTE — PROGRESS NOTES
Subjective:     Patient ID: Liza Aaron is a 66 y.o. female.    Ms. Aaron is a 66-year-old right-handed female with history of arthritis, hyperlipidemia, and seizures who presents to the neurology clinic today as an established patient for follow-up for seizures.  The patient was last seen on March 17, 2021.  For details regarding her history please refer to her note on January 7, 2021.  The patient had definitive seizure on December 9, 2020.  She may have had a seizure in high school and another one in college.  Her brain MRI in December of last year did not show anything potentially epileptogenic and her EEG was normal.  The patient was started on levetiracetam XR release 5 mg once a day.  She mainly takes it around 9 AM.  She denies any side effects of medication, did not denies any further seizures, and reports that the medication is affordable.    The following portions of the patient's history were reviewed and updated as appropriate: allergies, current medications, past family history, past medical history, past social history, past surgical history and problem list.    Review of Systems     Objective:    Neurologic Exam    Physical Exam    Assessment/Plan:    The patient is a 66-year-old right-handed female with a history of arthritis, hyperlipidemia, and seizures who presents today for follow-up.    1.  Epilepsy-the patient likely has unknown epilepsy.  It is difficult to conclude if she has focal onset or primary generalized seizures given her normal EEG; however given her history, they are likely focal in onset.  Fortunately she has done well on broad-spectrum levetiracetam.  I recommend increasing to a therapeutic dose of 1000 mg daily.  The patient was given 3-month supply.  We can see her back in 6 months or sooner if needed.  Her DMV paperwork was filled out.    A total of 20 minutes of time was spent on this encounter today.  This included reviewing the patient's records, face-to-face time, and  documentation.       Problems Addressed this Visit     None      Visit Diagnoses     Nonintractable epilepsy without status epilepticus, unspecified epilepsy type (CMS/HCC)    -  Primary    Relevant Medications    levETIRAcetam XR (KEPPRA XR) 500 MG 24 hr tablet      Diagnoses       Codes Comments    Nonintractable epilepsy without status epilepticus, unspecified epilepsy type (CMS/HCC)    -  Primary ICD-10-CM: G40.909  ICD-9-CM: 345.90

## 2021-04-19 NOTE — PATIENT INSTRUCTIONS
[facility]  Cassidy Alexander MD  Neurology clinic  [phone]    With anti-seizure medications, you may initially notice side effects of fatigue, drowsiness, unsteadiness, and dizziness.  Other possible side effects include nausea, abdominal pain, headache, blurry or double vision, slurred speech and mood changes.  Generally, patients will noticed these symptoms when the medication is first started or with higher doses and will go away with time.    It is import to consistently take your medication every day.  Missing just one dose may put you at risk for a breakthrough seizure.  Consider using reminders on your phone or a pill box.    If you develop a rash, please call the neurology clinic immediately or notify another healthcare professional, as this may be potentially life-threatening.  If you are unable to reach a healthcare professional, go to the emergency room immediately for further evaluation.    If you develop thoughts of wanting to hurt yourself or others, please call the neurology clinic immediately to notify another healthcare professional.  If you are unable to reach a healthcare professional, go to the emergency room immediately for further evaluation.    Taking anti-seizure medications may increase the risk of birth defects.  If you are a female of child-bearing potential, it is recommended that you take folic acid (1-4 mg) daily.  This may reduce the risk of birth defects while pregnant and taking seizure medication.  If you become pregnant, contact our office immediately. You will need to be followed very closely (at least monthly appointments).  I also recommend contacting The [place] American Antiepileptic Drug Pregnancy Registry at www.aedpregnancyregistry.org or 1-458.858.4162.    It is the [place] state law that you cannot drive within 90 days of a seizure.    You should avoid certain activities that if you were to have a seizure, you could harm yourself or others. In  general, it is recommended that you avoid operating heavy machinery or power tools, swimming or taking baths by yourself (showers are ok), don't stand over open flames, don't get on high ladders or the roof.  I also recommend to avoid sleeping on your stomach.    For further information on epilepsy and resources available to patients and their families, please visit the [facility] at www.efky.org or call [phone].    **Check out the [facility]'s monthly Art Group Gathering.  They are located at [facility] [place], [address] [place] [address].  Call Niesha Lyons at [phone] or email her at [email or web address] for the dates of future gatherings.**      **If you have having memory problems, consider HOBSCOTCH (Home-Based Self-management and Cognitive Training Changes lives).  It is an 8 week self-management program for adults with epilepsy and memory problems.  The program is free at the [facility].  Contact Nara Ken at [phone] or [email or web address].**    Check out My Epilepsy Story (MyEpilepsyStory.org).  Their goal is to foster the growth of young girls and women affected by epilepsy and encourage them not just to lives, but to THRIVE!  You can get involved by donating, sharing your story, or becoming an ambassador.  Services include educational resources and transportation.

## 2021-04-22 ENCOUNTER — TREATMENT (OUTPATIENT)
Dept: PHYSICAL THERAPY | Facility: CLINIC | Age: 66
End: 2021-04-22

## 2021-04-22 DIAGNOSIS — M25.559 PAIN, HIP: ICD-10-CM

## 2021-04-22 DIAGNOSIS — R26.9 GAIT DISTURBANCE: ICD-10-CM

## 2021-04-22 DIAGNOSIS — R29.898 WEAKNESS OF LEFT HIP: Primary | ICD-10-CM

## 2021-04-22 DIAGNOSIS — Z96.642 STATUS POST HIP REPLACEMENT, LEFT: ICD-10-CM

## 2021-04-22 DIAGNOSIS — M25.652 HIP STIFFNESS, LEFT: ICD-10-CM

## 2021-04-22 PROCEDURE — 97110 THERAPEUTIC EXERCISES: CPT | Performed by: PHYSICAL THERAPIST

## 2021-04-22 PROCEDURE — 97162 PT EVAL MOD COMPLEX 30 MIN: CPT | Performed by: PHYSICAL THERAPIST

## 2021-04-22 PROCEDURE — 97530 THERAPEUTIC ACTIVITIES: CPT | Performed by: PHYSICAL THERAPIST

## 2021-04-22 NOTE — PROGRESS NOTES
Physical Therapy Initial Evaluation and Plan of Care      Patient: Liza Aaron   : 1955  Diagnosis/ICD-10 Code:  Weakness of left hip [R29.898]  Referring practitioner: CONCETTA Johnson  Date of Initial Visit: 2021  Today's Date: 2021  Patient seen for 1 sessions           Subjective Evaluation    History of Present Illness  Date of onset: 2020  Date of surgery: 2020  Mechanism of injury: LEFT ALEX  posterior approach 2020  RIGHT ALEX  Posterior  2020  Prior therapy but didn't finish because of COVID  SLEEP on back for 6-6.5 hours   Can not lay on LEFT side   DENIES numbness or thingling   NO real pain just stiffenss  Treadmill and/or pool daily   Not doing machines or HEP as thoguth was finished     Subjective comment: L hip stiffness  Patient Occupation:   can change position at work but does sit alot Quality of life: excellent    Pain  Current pain ratin  At best pain ratin  At worst pain ratin  Quality: dull ache and pressure  Relieving factors: rest  Aggravating factors: ambulation, stairs and repetitive movement    Treatments  Previous treatment: physical therapy  Patient Goals  Patient goals for therapy: increased motion, increased strength and return to sport/leisure activities             Objective          Static Posture     Comments  POSTURE    bilaterally Knees flexed LEFT worse than RIGHT ; Hips flexed;  Increased weight on RIGHT;   Knee valgus RIGHT crests  lefvel  ROM  EXTENSION to neutral in supine but  tight ;  FLEXION  110;   ABD  45;    35  ER    MMT  LEFT 4/5 and SIDE LYING hip ABD 2+/5 with poor recruitment of gluts  RIGHT  WNL  TRANSFERS  Upper extremities assist   SINGLE LEG STANCE grossly 10+ sec bilaterally with significant trunk and knee compensation bilaterally   SLR  RIGHT  90  LEFT  75   CAMMY  LEFT decresed 50%  RIGHT WNL   GAIT  Poor stance mechanics bilaterally  With RIGHT knee lacking TERMINAL KNEE EXTENSION and LEFT hip  with poor weight shift and stabilization          MAT exercise and update HEP    Butterfly stretch x 30 sec progressing to 1-2 min  Figure 4 LEFT for increased ROM     POSTURE ed with verbal cueing for core activation and terminal knee extension  GAIT with stance phase mechanics focus on terminal knee extension and core on       Assessment & Plan     Assessment  Impairments: abnormal gait, abnormal or restricted ROM, activity intolerance, impaired physical strength, lacks appropriate home exercise program and pain with function  Assessment details: Liza Aaron is a 66 y.o. year-old female referred to physical therapy for S/P ALEX posterior approach (9/21/20). She presents with a evolving clinical presentation.  She has comorbidities RIGHT ALEX in 2020 and unable to full extend RIGHT knee and personal factors sitting job that may affect her progress in the plan of care.  Signs and symptoms are consistent with physical therapy diagnosis of S/P LEFT ALEX with stiffness weakness and compensation in stance mechanics affecting closed chain activities .   Prognosis: good  Functional Limitations: walking, uncomfortable because of pain, standing and unable to perform repetitive tasks  Goals  Plan Goals: STG: to be met by 6 weeks  1- Patient will report pain < 1 /10 with light household activities  2-Patient will increase hip PROM to  WFL   without compensation or exacerbation   3-Patient will be independent with HEP without compensation or exacerbation   LTG: to be met by 12 weeks  1-Pt will report pain < 1 /10 with recreational activities   2-Pt will increase knee AROM to full terminal knee extension during stance phase mechanics    3-Patient will increase strength from to 4 /5 to perform transfers and walking without compensation   4-Pt will be independent with comprehsive HEP     Plan  Therapy options: will be seen for skilled physical therapy services  Planned modality interventions: ultrasound  Planned therapy  interventions: transfer training, therapeutic activities, stretching, strengthening, postural training, neuromuscular re-education, home exercise program, gait training, body mechanics training and manual therapy  Other planned therapy interventions: Aquatic therapy  Frequency: 2x week  Duration in weeks: 12  Treatment plan discussed with: patient (Diagnosis and plan of care)  Plan details: DURATION in visits 36        Timed:  Manual Therapy:    0     mins  57909;  Therapeutic Exercise:    10     mins  69469;     Neuromuscular Fide:    0    mins  88371;    Therapeutic Activity:    10     mins  75913;     Gait Trainin     mins  98342;     Ultrasound:     0     mins  87035;    Electrical Stimulation:    0     mins  07286 ( );  Iontophoresis    0     mins 95013  Dry Needling   0     mins      Untimed:  Electrical Stimulation:    0     mins  18211 ( );  Mechanical Traction:    0     mins  13317;     Timed Treatment:   20   mins   Total Treatment:     45   mins    PT SIGNATURE: Kimber Fishman, PT   DATE TREATMENT INITIATED: 2021    Initial Certification  Certification Period: 2021  I certify that the therapy services are furnished while this patient is under my care.  The services outlined above are required by this patient, and will be reviewed every 90 days.     PHYSICIAN: Angelica Montalvo, APRN      DATE:     Please sign and return via fax to [phone] Thank you, [facility].

## 2021-04-26 ENCOUNTER — TRANSCRIBE ORDERS (OUTPATIENT)
Dept: SLEEP MEDICINE | Facility: HOSPITAL | Age: 66
End: 2021-04-26

## 2021-04-26 DIAGNOSIS — Z01.818 OTHER SPECIFIED PRE-OPERATIVE EXAMINATION: Primary | ICD-10-CM

## 2021-04-27 ENCOUNTER — TREATMENT (OUTPATIENT)
Dept: PHYSICAL THERAPY | Facility: CLINIC | Age: 66
End: 2021-04-27

## 2021-04-27 DIAGNOSIS — Z96.642 STATUS POST HIP REPLACEMENT, LEFT: ICD-10-CM

## 2021-04-27 DIAGNOSIS — R26.9 GAIT DISTURBANCE: ICD-10-CM

## 2021-04-27 DIAGNOSIS — M25.559 PAIN, HIP: ICD-10-CM

## 2021-04-27 DIAGNOSIS — R29.898 WEAKNESS OF LEFT HIP: Primary | ICD-10-CM

## 2021-04-27 DIAGNOSIS — M25.652 HIP STIFFNESS, LEFT: ICD-10-CM

## 2021-04-27 DIAGNOSIS — R29.898 LEG WEAKNESS, BILATERAL: ICD-10-CM

## 2021-04-27 PROCEDURE — 97112 NEUROMUSCULAR REEDUCATION: CPT | Performed by: PHYSICAL THERAPIST

## 2021-04-27 PROCEDURE — 97110 THERAPEUTIC EXERCISES: CPT | Performed by: PHYSICAL THERAPIST

## 2021-04-27 PROCEDURE — 97116 GAIT TRAINING THERAPY: CPT | Performed by: PHYSICAL THERAPIST

## 2021-04-27 NOTE — PROGRESS NOTES
"Physical Therapy Daily Progress Note    Patient: Liza Aaron   : 1955  Diagnosis/ICD-10 Code:  Weakness of left hip [R29.898]  Referring practitioner: CONCETTA Johnson  Date of Initial Visit: Type: THERAPY  Noted: 2021  Today's Date: 2021  Patient seen for 2 sessions           Subjective Feeling stiff today     Objective   WARM UP walk with verbal cueing for up tall with core/ pelvic floor and to be aware of hips   Noted shortened heavy hit on LEFT heel strike and poor RIGHT terminal knee extension with stance     HAMSTRING stretch with rope in 3 segments 10-20 sec each x 4 bilaterally   Piriformis stretch x 30 sec x 2 bilaterally   THERABAND sitting hip ABD pulsing at end range with verbal cueing for gluts  VC for all for up tall posture / hip hinge     Dena HIP ABD 20 # x 10 bilaterally  X 10 singles with verbal cueing for core and glut activation   LATERAL step up with verbal cueing for terminal knee extension bilaterally     Single limb stance at mirror with verbal cueing fo   FOOT balanace with equal weight on heel as ball of foot     blance LEFT to RIGHT and not stand on LEFT with RIGHT knee bent   KNEE TERMINAL KNEE EXTENSION pressuing thru back of heel     Side view for feedback   Hip up all with hip extension without lumbar hyper extension     Abs on    And lenghtening    EDUCATION on ground reaction forces    GAIT INTEGRATION integration  \"oscar oscar\" step    Work for terminal knee extension during stance phase thru to toe off bilaterally            Assessment/Plan  A: significant improvement with quality of motion after therex then integrationg back into walking  PLAN: progress therex and closed chain activities          Timed:    Manual Therapy:    0     mins  37863;  Therapeutic Exercise:    23     mins  82582;     Neuromuscular Fide:    10    mins  54309;    Therapeutic Activity:     0     mins  93777;     Gait Training:      10     mins  25850;     Ultrasound:     0     mins  " 04587;    Electrical Stimulation:    0     mins  20563 ( );  Iontophoresis    0     mins 97810;  Aquatic Therapy    0     mins 89005;  Dry Needling              0     mins    Untimed:  Electrical Stimulation:    0     mins  00756 ( );  Mechanical Traction:    0     mins  50838;     Timed Treatment:   43   mins   Total Treatment:     43   mins  Kimber Fishman, PT  Physical Therapist

## 2021-05-06 ENCOUNTER — TELEPHONE (OUTPATIENT)
Dept: GASTROENTEROLOGY | Facility: CLINIC | Age: 66
End: 2021-05-06

## 2021-05-06 NOTE — TELEPHONE ENCOUNTER
Called patient to go over questions regarding prepping instructions, I was unable to reach patient but did leave a detailed voicemail with my ext.

## 2021-05-10 ENCOUNTER — LAB (OUTPATIENT)
Dept: LAB | Facility: HOSPITAL | Age: 66
End: 2021-05-10

## 2021-05-10 DIAGNOSIS — Z01.818 OTHER SPECIFIED PRE-OPERATIVE EXAMINATION: ICD-10-CM

## 2021-05-10 PROCEDURE — U0004 COV-19 TEST NON-CDC HGH THRU: HCPCS

## 2021-05-10 PROCEDURE — C9803 HOPD COVID-19 SPEC COLLECT: HCPCS

## 2021-05-11 ENCOUNTER — TELEPHONE (OUTPATIENT)
Dept: PHYSICAL THERAPY | Facility: CLINIC | Age: 66
End: 2021-05-11

## 2021-05-11 LAB — SARS-COV-2 RNA RESP QL NAA+PROBE: NOT DETECTED

## 2021-05-12 ENCOUNTER — ANESTHESIA EVENT (OUTPATIENT)
Dept: GASTROENTEROLOGY | Facility: HOSPITAL | Age: 66
End: 2021-05-12

## 2021-05-12 ENCOUNTER — HOSPITAL ENCOUNTER (OUTPATIENT)
Facility: HOSPITAL | Age: 66
Setting detail: HOSPITAL OUTPATIENT SURGERY
Discharge: HOME | End: 2021-05-12
Attending: INTERNAL MEDICINE | Admitting: INTERNAL MEDICINE

## 2021-05-12 ENCOUNTER — ANESTHESIA (OUTPATIENT)
Dept: GASTROENTEROLOGY | Facility: HOSPITAL | Age: 66
End: 2021-05-12

## 2021-05-12 VITALS
OXYGEN SATURATION: 98 % | SYSTOLIC BLOOD PRESSURE: 114 MMHG | HEART RATE: 67 BPM | BODY MASS INDEX: 35.39 KG/M2 | WEIGHT: 226 LBS | DIASTOLIC BLOOD PRESSURE: 75 MMHG | RESPIRATION RATE: 16 BRPM

## 2021-05-12 DIAGNOSIS — Z12.11 ENCOUNTER FOR SCREENING FOR MALIGNANT NEOPLASM OF COLON: ICD-10-CM

## 2021-05-12 PROCEDURE — 88305 TISSUE EXAM BY PATHOLOGIST: CPT | Performed by: INTERNAL MEDICINE

## 2021-05-12 PROCEDURE — 25010000002 PROPOFOL 10 MG/ML EMULSION: Performed by: REGISTERED NURSE

## 2021-05-12 PROCEDURE — 45385 COLONOSCOPY W/LESION REMOVAL: CPT | Performed by: INTERNAL MEDICINE

## 2021-05-12 PROCEDURE — S0260 H&P FOR SURGERY: HCPCS | Performed by: INTERNAL MEDICINE

## 2021-05-12 PROCEDURE — 45380 COLONOSCOPY AND BIOPSY: CPT | Performed by: INTERNAL MEDICINE

## 2021-05-12 RX ORDER — PROPOFOL 10 MG/ML
VIAL (ML) INTRAVENOUS AS NEEDED
Status: DISCONTINUED | OUTPATIENT
Start: 2021-05-12 | End: 2021-05-12 | Stop reason: SURG

## 2021-05-12 RX ORDER — LIDOCAINE HYDROCHLORIDE 20 MG/ML
INJECTION, SOLUTION INFILTRATION; PERINEURAL AS NEEDED
Status: DISCONTINUED | OUTPATIENT
Start: 2021-05-12 | End: 2021-05-12 | Stop reason: SURG

## 2021-05-12 RX ORDER — PROPOFOL 10 MG/ML
VIAL (ML) INTRAVENOUS CONTINUOUS PRN
Status: DISCONTINUED | OUTPATIENT
Start: 2021-05-12 | End: 2021-05-12 | Stop reason: SURG

## 2021-05-12 RX ORDER — SODIUM CHLORIDE, SODIUM LACTATE, POTASSIUM CHLORIDE, CALCIUM CHLORIDE 600; 310; 30; 20 MG/100ML; MG/100ML; MG/100ML; MG/100ML
30 INJECTION, SOLUTION INTRAVENOUS CONTINUOUS PRN
Status: DISCONTINUED | OUTPATIENT
Start: 2021-05-12 | End: 2021-05-12 | Stop reason: HOSPADM

## 2021-05-12 RX ADMIN — LIDOCAINE HYDROCHLORIDE 60 MG: 20 INJECTION, SOLUTION INFILTRATION; PERINEURAL at 11:48

## 2021-05-12 RX ADMIN — PROPOFOL 160 MCG/KG/MIN: 10 INJECTION, EMULSION INTRAVENOUS at 11:48

## 2021-05-12 RX ADMIN — SODIUM CHLORIDE, POTASSIUM CHLORIDE, SODIUM LACTATE AND CALCIUM CHLORIDE: 600; 310; 30; 20 INJECTION, SOLUTION INTRAVENOUS at 11:45

## 2021-05-12 RX ADMIN — PROPOFOL 60 MG: 10 INJECTION, EMULSION INTRAVENOUS at 11:48

## 2021-05-12 NOTE — ANESTHESIA POSTPROCEDURE EVALUATION
Patient: Liza Aaron    Procedure Summary     Date: 05/12/21 Room / Location:  [facility] /  [facility]    Anesthesia Start: 1145 Anesthesia Stop: 1225    Procedure: COLONOSCOPY  into cecum with cold/hot snare and cold bx polypectomies (N/A ) Diagnosis:       Encounter for screening for malignant neoplasm of colon      (Encounter for screening for malignant neoplasm of colon [Z12.11])    Surgeons: Rosario Flores MD Provider: Gregorio Cuba MD    Anesthesia Type: MAC ASA Status: 3          Anesthesia Type: MAC    Vitals  Vitals Value Taken Time   /75 05/12/21 1255   Temp     Pulse 67 05/12/21 1255   Resp 16 05/12/21 1255   SpO2 98 % 05/12/21 1255           Post Anesthesia Care and Evaluation    Patient location during evaluation: bedside  Patient participation: complete - patient participated  Level of consciousness: sleepy but conscious  Pain score: 0  Pain management: adequate  Airway patency: patent  Anesthetic complications: No anesthetic complications    Cardiovascular status: acceptable  Respiratory status: acceptable  Hydration status: acceptable    Comments: /75   Pulse 67   Resp 16   Wt 103 kg (226 lb)   LMP  (LMP Unknown)   SpO2 98%   BMI 35.39 kg/m²

## 2021-05-12 NOTE — ANESTHESIA PREPROCEDURE EVALUATION
Anesthesia Evaluation     Patient summary reviewed and Nursing notes reviewed   NPO Solid Status: > 8 hours  NPO Liquid Status: < 2 hours           Airway   Mallampati: II  Neck ROM: full  No difficulty expected  Dental - normal exam     Pulmonary     breath sounds clear to auscultation  (+) a smoker Former,   Cardiovascular     Rhythm: regular    (+) dysrhythmias PVC, Tachycardia, hyperlipidemia,       Neuro/Psych  (+) seizures, syncope,     GI/Hepatic/Renal/Endo    (+) obesity,       Musculoskeletal     Abdominal   (+) obese,    Substance History      OB/GYN          Other   arthritis,                      Anesthesia Plan    ASA 3     MAC     intravenous induction     Anesthetic plan, all risks, benefits, and alternatives have been provided, discussed and informed consent has been obtained with: patient.

## 2021-05-13 ENCOUNTER — TREATMENT (OUTPATIENT)
Dept: PHYSICAL THERAPY | Facility: CLINIC | Age: 66
End: 2021-05-13

## 2021-05-13 DIAGNOSIS — R26.9 GAIT DISTURBANCE: ICD-10-CM

## 2021-05-13 DIAGNOSIS — R29.898 WEAKNESS OF LEFT HIP: Primary | ICD-10-CM

## 2021-05-13 DIAGNOSIS — M25.559 PAIN, HIP: ICD-10-CM

## 2021-05-13 DIAGNOSIS — Z96.642 STATUS POST HIP REPLACEMENT, LEFT: ICD-10-CM

## 2021-05-13 DIAGNOSIS — M25.652 HIP STIFFNESS, LEFT: ICD-10-CM

## 2021-05-13 LAB
CYTO UR: NORMAL
LAB AP CASE REPORT: NORMAL
PATH REPORT.FINAL DX SPEC: NORMAL
PATH REPORT.GROSS SPEC: NORMAL

## 2021-05-13 PROCEDURE — 97113 AQUATIC THERAPY/EXERCISES: CPT | Performed by: PHYSICAL THERAPIST

## 2021-05-13 NOTE — PROGRESS NOTES
Physical Therapy Daily Progress Note    Patient: Liza Aaron   : 1955  Diagnosis/ICD-10 Code:  Weakness of left hip [R29.898]  Referring practitioner: CONCETTA Johnson  Date of Initial Visit: Type: THERAPY  Noted: 2021  Today's Date: 2021  Patient seen for 3 sessions             Subjective   Patient reports her L hip feels tighter than her R.    Objective   See Exercise, Manual, and Modality Logs for complete treatment.     AQUATIC EXERCISES:  Water Walk: forward/side step. 2 laps each               Stretch 1: Hamstring hip sweeps, LN under ankle 15x      Standing hip flexion with hip IR/ER (windshield wiper) 20x B                                                     Hip Abd/Add: 15x B               Hip Flex/Ext : 15x B  Hip circles: 10/10              March in Place: 30x  Leg Press: LN 20x  March walk, 2 laps with emphasis on SLS           Mini Squat: 15x                                 Bicycle: suspended with UE on LN/Rail, 2-3 min                                   Clamshells: 20x, UE on LN/Rail      Assessment/Plan  First aquatic treatment session completed today. Provided verbal cues and demonstration for all exercises performed. Cued patient to avoid turning toes out with hip abduction kicks and to limit trunk lean. Also worked on SLS balance with walking march. Patient had a little more compensation in her L LE compared to the R.          Timed:  Aquatic Therapy    39     mins 38931;    Dee Salazar, PT  Physical Therapist

## 2021-05-17 DIAGNOSIS — Z00.00 PHYSICAL EXAM, ANNUAL: Primary | ICD-10-CM

## 2021-05-18 ENCOUNTER — TREATMENT (OUTPATIENT)
Dept: PHYSICAL THERAPY | Facility: CLINIC | Age: 66
End: 2021-05-18

## 2021-05-18 DIAGNOSIS — M25.652 HIP STIFFNESS, LEFT: ICD-10-CM

## 2021-05-18 DIAGNOSIS — R26.9 GAIT DISTURBANCE: ICD-10-CM

## 2021-05-18 DIAGNOSIS — M25.559 PAIN, HIP: ICD-10-CM

## 2021-05-18 DIAGNOSIS — R29.898 WEAKNESS OF LEFT HIP: Primary | ICD-10-CM

## 2021-05-18 DIAGNOSIS — Z96.642 STATUS POST HIP REPLACEMENT, LEFT: ICD-10-CM

## 2021-05-18 PROCEDURE — 97110 THERAPEUTIC EXERCISES: CPT | Performed by: PHYSICAL THERAPIST

## 2021-05-18 PROCEDURE — 97112 NEUROMUSCULAR REEDUCATION: CPT | Performed by: PHYSICAL THERAPIST

## 2021-05-18 NOTE — PROGRESS NOTES
Physical Therapy Daily Progress Note    Patient: Liza Aaron   : 1955  Diagnosis/ICD-10 Code:  Weakness of left hip [R29.898]  Referring practitioner: CONCETTA Johnson  Date of Initial Visit: Type: THERAPY  Noted: 2021  Today's Date: 2021  Patient seen for 4 sessions           Subjective Stiff today.  Maybe sitting too long at the computer     Objective   MAT EXERCISES  LTR in 2 planes   Butterfly stretch 3 min   CROSS LEG GENTLEMAN STYLE  a. Push knee down  b. LIFT opposite knee to chest    c. SIDELYING LEG LIFT   VC to keep Knee straight   Roll slightly towards your belly   Lift leg leading with heel and engage BUM      PROP R LEG so knee stretches into extension 5 min    SITTING  1- CROSS LEG GENTLEMAN STYLE  a. Push knee down  2- Sit up tall and lean belly towards thigh   3- 20 sec    2-3x    Closed chain activities   1-   Single leg balance verbal cueing to find neutral/ mid line and hold with core/ pelvic floor work bilaterally   2- Dynamic “ninja walk”  a. Step balance 3 sec  step balance 3 sec       Assessment/Plan  A: right knee lacking terminal knee extension and increased valgus affecting closed chain activities ;  bilaterally hip abd weakness affecting closed chain activities   PLAN compensation for closed chain activities improved with therex and verbal cueing          Timed:    Manual Therapy:    0     mins  40579;  Therapeutic Exercise:    23     mins  36946;     Neuromuscular Fide:    23    mins  99604;    Therapeutic Activity:     0     mins  30814;     Gait Trainin     mins  01780;     Ultrasound:     0     mins  88767;    Electrical Stimulation:    0     mins  96674 ( );  Iontophoresis    0     mins 53831;  Aquatic Therapy    0     mins 66481;  Dry Needling              0     mins    Untimed:  Electrical Stimulation:    0     mins  60213 ( );  Mechanical Traction:    0     mins  11444;     Timed Treatment:   46   mins   Total Treatment:     46    mins  Kimber Fishman, PT  Physical Therapist

## 2021-05-20 ENCOUNTER — TREATMENT (OUTPATIENT)
Dept: PHYSICAL THERAPY | Facility: CLINIC | Age: 66
End: 2021-05-20

## 2021-05-20 DIAGNOSIS — R29.898 WEAKNESS OF LEFT HIP: Primary | ICD-10-CM

## 2021-05-20 DIAGNOSIS — M25.652 HIP STIFFNESS, LEFT: ICD-10-CM

## 2021-05-20 DIAGNOSIS — Z96.642 STATUS POST HIP REPLACEMENT, LEFT: ICD-10-CM

## 2021-05-20 PROCEDURE — 97113 AQUATIC THERAPY/EXERCISES: CPT | Performed by: PHYSICAL THERAPIST

## 2021-05-20 NOTE — PROGRESS NOTES
Physical Therapy Daily Progress Note    Patient: Liza Aaron   : 1955  Diagnosis/ICD-10 Code:  Weakness of left hip [R29.898]  Referring practitioner: CONCETTA Johnson  Date of Initial Visit: Type: THERAPY  Noted: 2021  Today's Date: 2021  Patient seen for 5 sessions             Subjective   Patient reports she is trying to remember her exercises but she is mixing up what she does on land and in the water.    Objective   See Exercise, Manual, and Modality Logs for complete treatment.     AQUATIC EXERCISES:  Water Walk: forward/side step. 2 laps each               Stretch 1: Hamstring hip sweeps, LN under ankle 15x      Standing hip flexion with hip IR/ER (windshield wiper) 20x B                                                     Hip Abd/Add: 15x B               Hip Flex/Ext : 15x B  Hip circles: 10/10              March in Place: 30x  Leg Press: LN 20x  March walk, 2 laps with emphasis on SLS           Mini Squat: 15x                                 Bicycle: suspended with UE on LN; 2-3 min                                   Clamshells: 20x, UE on LN/Rail          Assessment/Plan  Patient performed hip abduction kicks with slight hip flexion therefore worked on trying to slightly extension hip while kicking. Able to progress bicycle kicks by no longer relying on railing for UE support. Printed HEP for patient to utilize when she comes to the pool on her own.         Timed:  Aquatic Therapy    39     mins 39537;    Dee Salazar, PT  Physical Therapist

## 2021-05-25 ENCOUNTER — TREATMENT (OUTPATIENT)
Dept: PHYSICAL THERAPY | Facility: CLINIC | Age: 66
End: 2021-05-25

## 2021-05-25 ENCOUNTER — TELEPHONE (OUTPATIENT)
Dept: GASTROENTEROLOGY | Facility: CLINIC | Age: 66
End: 2021-05-25

## 2021-05-25 DIAGNOSIS — R29.898 WEAKNESS OF LEFT HIP: Primary | ICD-10-CM

## 2021-05-25 DIAGNOSIS — R26.9 GAIT DISTURBANCE: ICD-10-CM

## 2021-05-25 DIAGNOSIS — Z96.642 STATUS POST HIP REPLACEMENT, LEFT: ICD-10-CM

## 2021-05-25 DIAGNOSIS — M25.652 HIP STIFFNESS, LEFT: ICD-10-CM

## 2021-05-25 PROCEDURE — 97112 NEUROMUSCULAR REEDUCATION: CPT | Performed by: PHYSICAL THERAPIST

## 2021-05-25 PROCEDURE — 97110 THERAPEUTIC EXERCISES: CPT | Performed by: PHYSICAL THERAPIST

## 2021-05-25 NOTE — PROGRESS NOTES
30-Day / 10-Visit Progress Note         Patient: Liza Aaron   : 1955  Diagnosis/ICD-10 Code:  Weakness of left hip [R29.898]  Referring practitioner: CONCETTA Johnson  Date of Initial Visit: Type: THERAPY  Noted: 2021  Today's Date: 2021  Patient seen for 6 sessions      Subjective:     Clinical Progress: unchanged  Home Program Compliance: Yes  Treatment has included:  therapeutic exercise, therapeutic activity, neuro-muscular retraining , gait training and patient education with home exercise program     Subjective I have been busy with moving/ sons wedding plans and then having to sit a lot for work   Objective   RIGHT MTP painful swollen and had bruise (per patient)  Noted bilaterally over pronoation and patient to bring orthotic next visit    RIGHT knee lacking terminal knee extension approximately 10 degrees with springy end feel   POSTURE with noted weight biased to toes but improved up tall with hips and spine     MAT work focus on   Hamstring stretch with rope in sitting 60 sec x 4   SLR with verbal and tactile cueing for terminal knee extension x 10   SIDE LYING HIP ABD with verbal and tactile cueing for terminal knee extension     Trial of THERABAND for bend and stretch to improve terminal knee extension   STANDING reassess terminal knee extension still lacking on RIGHT but improved closed chain mechanics and use of ground reaction forces    THERABAND + terminal knee extension x 10   2 sets       Assessment & Plan     Assessment  Impairments: abnormal gait, abnormal or restricted ROM, activity intolerance, impaired physical strength, lacks appropriate home exercise program and pain with function  Assessment details: Liza Aaron is a 66 y.o. year-old female referred to physical therapy for S/P ALEX posterior approach (20). She has been seen in PHYSICALTHERAPY for 6 sessions for progressive therex to improve LEFT hip pain and wekaness.  It has become more apparent that  RIGHT loss of terminal knee extension and bilaterally pronation in feet are also a significant contributing factor and she will benefit form continued  PHYSICALTHERAPY   Prognosis: good  Functional Limitations: walking, uncomfortable because of pain, standing and unable to perform repetitive tasks  Goals  Plan Goals: STG: to be met by 6 weeks  1- Patient will report pain < 1 /10 with light household activities       ONGOING   2-Patient will increase hip PROM to  WFL   without compensation or exacerbation        ONGOING   3-Patient will be independent with HEP without compensation or exacerbation        ONGOING   LTG: to be met by 12 weeks  1-Pt will report pain < 1 /10 with recreational activities   2-Pt will increase knee AROM to full terminal knee extension during stance phase mechanics    3-Patient will increase strength from to 4 /5 to perform transfers and walking without compensation   4-Pt will be independent with comprehsive HEP     Plan  Therapy options: will be seen for skilled physical therapy services  Planned modality interventions: ultrasound  Planned therapy interventions: transfer training, therapeutic activities, stretching, strengthening, postural training, neuromuscular re-education, home exercise program, gait training, body mechanics training and manual therapy  Other planned therapy interventions: Aquatic therapy  Frequency: 2x week  Duration in weeks: 12  Treatment plan discussed with: patient (Diagnosis and plan of care)  Plan details: DURATION in visits 36           Recommendations: Continue as planned  Timeframe: 2 months  Prognosis to achieve goals: good    PT Signature: Kimber Fishman, PT      Based upon review of the patient's progress and continued therapy plan, it is my medical opinion that Liza Aaron should continue physical therapy treatment at [facility]  [address] [place] [address]  [phone].    Signature:  __________________________________  Angelica Montalvo APRN    Timed:  Manual Therapy:    0     mins  12775;  Therapeutic Exercise:    23     mins  25308;     Neuromuscular Fide:    10    mins  56675;    Therapeutic Activity:     0     mins  73921;     Gait Trainin     mins  72486;     Ultrasound:     0     mins  52535;    Electrical Stimulation:    0     mins  06549 ( );  Iontophoresis    0     mins 01711;  Dry Needling              0     mins    Untimed:  Electrical Stimulation:    0     mins  22723 ( );  Mechanical Traction:    0     mins  67579;     Timed Treatment:   33   mins   Total Treatment:     33   mins

## 2021-05-25 NOTE — TELEPHONE ENCOUNTER
Called pt and advised of Dr Flores's note. Verb understanding.     C/s placed in recall and hm for 05/12/2024.

## 2021-05-25 NOTE — TELEPHONE ENCOUNTER
----- Message from Rosario Flores MD sent at 5/13/2021  1:38 PM EDT -----  The polyp(s) biopsies showed adenomatous change. This is not cancerous but is considered potentially precancerous. Follow-up colonoscopy in 3 years is advised.

## 2021-05-27 ENCOUNTER — TREATMENT (OUTPATIENT)
Dept: PHYSICAL THERAPY | Facility: CLINIC | Age: 66
End: 2021-05-27

## 2021-05-27 DIAGNOSIS — R29.898 WEAKNESS OF LEFT HIP: Primary | ICD-10-CM

## 2021-05-27 DIAGNOSIS — M25.652 HIP STIFFNESS, LEFT: ICD-10-CM

## 2021-05-27 DIAGNOSIS — Z96.642 STATUS POST HIP REPLACEMENT, LEFT: ICD-10-CM

## 2021-05-27 PROCEDURE — 97113 AQUATIC THERAPY/EXERCISES: CPT | Performed by: PHYSICAL THERAPIST

## 2021-05-27 NOTE — PROGRESS NOTES
Physical Therapy Daily Progress Note    Patient: Liza Aaron   : 1955  Diagnosis/ICD-10 Code:  Weakness of left hip [R29.898]  Referring practitioner: CONCETTA Johnson  Date of Initial Visit: Type: THERAPY  Noted: 2021  Today's Date: 2021  Patient seen for 7 sessions             Subjective   Patient reports that her hip has been doing good but her R knee has been the bigger problem.     Objective   See Exercise, Manual, and Modality Logs for complete treatment.     AQUATIC EXERCISES:  Water Walk: forward/side step. 2 laps each               Stretch 1: Hamstring hip sweeps, LN under ankle 15x      Standing hip flexion with hip IR/ER (windshield wiper) 20x B                                                     Hip Abd/Add: 15x B               Hip Flex/Ext : 15x B  Hip circles: 10/10              March in Place: 30x  Leg Press: LN 20x  March walk, 2 laps with emphasis on SLS           Mini Squat: 15x                                 Bicycle: suspended with UE on LN; 2-3 min                                   Clamshells: 20x, UE on LN/Rail      Assessment/Plan  Patient demonstrates improved hip mobility and less LE antalgia with water walking. She also is able to maintain better posture with standing hip exercises. Continued to utilize UE support on LN for walking marches to help increase stability with SLS.         Timed:  Aquatic Therapy    31     mins 43132; (LATE ARRIVAL)    Dee Salazar, PT  Physical Therapist

## 2021-06-22 ENCOUNTER — TREATMENT (OUTPATIENT)
Dept: PHYSICAL THERAPY | Facility: CLINIC | Age: 66
End: 2021-06-22

## 2021-06-22 DIAGNOSIS — R29.898 WEAKNESS OF LEFT HIP: Primary | ICD-10-CM

## 2021-06-22 DIAGNOSIS — Z96.642 STATUS POST HIP REPLACEMENT, LEFT: ICD-10-CM

## 2021-06-22 DIAGNOSIS — M25.559 PAIN, HIP: ICD-10-CM

## 2021-06-22 DIAGNOSIS — M25.652 HIP STIFFNESS, LEFT: ICD-10-CM

## 2021-06-22 DIAGNOSIS — R26.9 GAIT DISTURBANCE: ICD-10-CM

## 2021-06-22 PROCEDURE — 97112 NEUROMUSCULAR REEDUCATION: CPT | Performed by: PHYSICAL THERAPIST

## 2021-06-22 PROCEDURE — 97110 THERAPEUTIC EXERCISES: CPT | Performed by: PHYSICAL THERAPIST

## 2021-06-22 NOTE — PROGRESS NOTES
30-Day / 10-Visit Progress Note         Patient: Liza Aaron   : 1955  Diagnosis/ICD-10 Code:  Weakness of left hip [R29.898]  Referring practitioner: CONCETTA Johnson  Date of Initial Visit: Type: THERAPY  Noted: 2021  Today's Date: 2021  Patient seen for 8 sessions      Subjective:     Clinical Progress: improved  Home Program Compliance: Yes  Treatment has included:  therapeutic exercise, manual therapy, therapeutic activity, neuro-muscular retraining , gait training, aquatic therapy and patient education with home exercise program     Subjective  LEFT hIp feeling stronger    Able to sleep on LEFT side   Pain after being in bed 4-5 hours then then really stiff when try to get up in both RIGHT knee and LEFT hip   Oaub 1-2/10 more stiffness    Walking ok  stairs are better but still diffculty maybe lack of conditioning and diffilcty in the RIGHT     Sleeping with pillow under knees and recommonded NO pillow for RIGHT knee to get terminal knee extension   Objective     RIGHT  KNEE  EXTENSION  - 12 affecting all closed chain activities   LEFT hip       SLR  80   CAMMY decreased 40%  SIDE LYING hip abd strength 3-/5    MAT exercise  Butterfly stretch x 3 min  Piriformis stretch x 30 sec x 2 bilaterally   SIDE LYING  Hip abd with verbal and tactile cueing for proper technique x 10  2 sets      RIGHT knee propped terminal knee extension x 5 min  RIGHT knee prone hang with 2# and MH to hamstring x 3 min  Hamstring stretch with rope in 3 phase x 1 min x 4     STANDING   Posture work to up tall with RIGHT terminal knee extension and hip in neutral   SINGLE LEG STANCE work bilaterally with verbal cueing for proper posture and and core/ pelvic floor activation  DYNAMIC step balance work to focus on integraiton of knee with hip          Assessment & Plan     Assessment  Impairments: abnormal gait, abnormal or restricted ROM, activity intolerance, impaired physical strength, lacks appropriate home  exercise program and pain with function  Assessment details: Liza Aaron is a 66 y.o. year-old female referred to physical therapy for S/P ALEX posterior approach (9/21/20). She has been seen in PHYSICALTHERAPY for 8 sessions for progressive therex to improve LEFT hip pain and wekaness.  I RIGHT loss of terminal knee extension  Continues to drive dysfunction with closed chain activities   LEFT hip ROM is improving and strength in SIDE LYING is slowly emering but all closed chain activities / blalance/ gait compensated from poor weight transfer from RIGHT knee to LEFT hip  she will benefit form continued  PHYSICALTHERAPY   Prognosis: good  Functional Limitations: walking, uncomfortable because of pain, standing and unable to perform repetitive tasks  Goals  Plan Goals: STG: to be met by 6 weeks  1- Patient will report pain < 1 /10 with light household activities       MET   2-Patient will increase hip PROM to  WNL   without compensation or exacerbation        ONGOING  SLR and CAMMY improving but not WNL   3-Patient will be independent with HEP without compensation or exacerbation        ONGOING continuing to progress therex   LTG: to be met by 12 weeks  1-Pt will report pain < 1 /10 with recreational activities   2-Pt will increase knee AROM to full terminal knee extension during stance phase mechanics    3-Patient will increase strength from to 4 /5 to perform transfers and walking without compensation   4-Pt will be independent with comprehsive HEP     Plan  Therapy options: will be seen for skilled physical therapy services  Planned modality interventions: ultrasound  Planned therapy interventions: transfer training, therapeutic activities, stretching, strengthening, postural training, neuromuscular re-education, home exercise program, gait training, body mechanics training and manual therapy  Other planned therapy interventions: Aquatic therapy  Frequency: 2x week  Duration in weeks: 12  Treatment plan  discussed with: patient (Diagnosis and plan of care)  Plan details: DURATION in visits 36           Recommendations: Continue as planned  Timeframe: 2 months  Prognosis to achieve goals: good    PT Signature: Kimber Fishman, PT      Based upon review of the patient's progress and continued therapy plan, it is my medical opinion that Liza Aaron should continue physical therapy treatment at [facility]  [address]   [place] [address]  [phone].    Signature: __________________________________  Angelica Montalvo, CONCETTA    Timed:  Manual Therapy:    0     mins  39843;  Therapeutic Exercise:    25     mins  10321;     Neuromuscular Fide:    15    mins  80335;    Therapeutic Activity:     0     mins  94698;     Gait Trainin     mins  21713;     Ultrasound:     0     mins  05154;    Electrical Stimulation:    0     mins  25596 ( );  Iontophoresis    0     mins 08209;  Dry Needling              0     mins    Untimed:  Electrical Stimulation:    0     mins  72724 ( );  Mechanical Traction:    0     mins  83071;     Timed Treatment:   35   mins   Total Treatment:     35   mins

## 2021-06-24 RX ORDER — ROSUVASTATIN CALCIUM 10 MG/1
10 TABLET, COATED ORAL NIGHTLY
Qty: 90 TABLET | Refills: 1 | Status: CANCELLED | OUTPATIENT
Start: 2021-06-24

## 2021-06-25 RX ORDER — ROSUVASTATIN CALCIUM 10 MG/1
10 TABLET, COATED ORAL NIGHTLY
Qty: 90 TABLET | Refills: 1 | Status: SHIPPED | OUTPATIENT
Start: 2021-06-25 | End: 2021-12-18 | Stop reason: SDUPTHER

## 2021-07-01 ENCOUNTER — TREATMENT (OUTPATIENT)
Dept: PHYSICAL THERAPY | Facility: CLINIC | Age: 66
End: 2021-07-01

## 2021-07-01 DIAGNOSIS — M25.559 PAIN, HIP: ICD-10-CM

## 2021-07-01 DIAGNOSIS — Z96.642 STATUS POST HIP REPLACEMENT, LEFT: ICD-10-CM

## 2021-07-01 DIAGNOSIS — R29.898 WEAKNESS OF LEFT HIP: Primary | ICD-10-CM

## 2021-07-01 DIAGNOSIS — M25.652 HIP STIFFNESS, LEFT: ICD-10-CM

## 2021-07-01 DIAGNOSIS — R26.9 GAIT DISTURBANCE: ICD-10-CM

## 2021-07-01 PROCEDURE — 97140 MANUAL THERAPY 1/> REGIONS: CPT | Performed by: PHYSICAL THERAPIST

## 2021-07-01 PROCEDURE — 97110 THERAPEUTIC EXERCISES: CPT | Performed by: PHYSICAL THERAPIST

## 2021-07-01 PROCEDURE — 97112 NEUROMUSCULAR REEDUCATION: CPT | Performed by: PHYSICAL THERAPIST

## 2021-07-01 NOTE — PROGRESS NOTES
Physical Therapy Daily Progress Note    Patient: Liza Aaron   : 1955  Diagnosis/ICD-10 Code:  Weakness of left hip [R29.898]  Referring practitioner: CONCETTA Johnson  Date of Initial Visit: Type: THERAPY  Noted: 2021  Today's Date: 2021  Patient seen for 9 sessions           Subjective Able to do more with more energy but still feeling stiff all the time   Able to wash bottom of foot in shower/ stand on 1 leg with better ability     Objective     Butterfly stretch x 3 min  CAMMY LEFT decreased 50%  RIGHT decreased 25%   FIGURE 4 position and TFM to anterior thigh/ hip   Improved CAMMY to 50%  SIDE LYING hip ABD x 10 bilaterally with improved control     ELLIPTICAL x 3 min with verbal cueing for proper technique / stabilization     Hip flexion stretch on stair but did not feel too much so progressed to    Foot on seat of chair + posterior pelvic tilt with glut squeeze x 30 sec x 3 bilaterally with verbal cueing for proper technique     Dena hip ABD 25# x 10 bilaterally  X 10 single    Verbal cueing for core stabilization and glut activation     Standing posture work on RIGHT knee terminal knee extension ; bilaterally hip extension with upright spine then core activation starting with pelvic floor     Assessment/Plan    A: progressing with ROM for hip in CAMMY but RIGHT knee > LEFT continues to stay flexed and influence closed chain activities   PLAN progress stretching and strengthening and integration into closed chain activities        Timed:    Manual Therapy:    10     mins  09353;  Therapeutic Exercise:    25     mins  19740;     Neuromuscular Fide:    10    mins  69362;    Therapeutic Activity:     0     mins  59513;     Gait Trainin     mins  52633;     Ultrasound:     0     mins  95292;    Electrical Stimulation:    0     mins  31986 ( );  Iontophoresis    0     mins 38035;  Aquatic Therapy    0     mins 85195;  Dry Needling              0      mins    Untimed:  Electrical Stimulation:    00     mins  90553 ( );  Mechanical Traction:    0     mins  90782;     Timed Treatment:   45   mins   Total Treatment:     45   mins  Kimber Fishman, PT  Physical Therapist

## 2021-07-06 ENCOUNTER — TREATMENT (OUTPATIENT)
Dept: PHYSICAL THERAPY | Facility: CLINIC | Age: 66
End: 2021-07-06

## 2021-07-06 DIAGNOSIS — M25.559 PAIN, HIP: ICD-10-CM

## 2021-07-06 DIAGNOSIS — M25.652 HIP STIFFNESS, LEFT: ICD-10-CM

## 2021-07-06 DIAGNOSIS — R29.898 WEAKNESS OF LEFT HIP: Primary | ICD-10-CM

## 2021-07-06 DIAGNOSIS — R26.9 GAIT DISTURBANCE: ICD-10-CM

## 2021-07-06 DIAGNOSIS — Z96.642 STATUS POST HIP REPLACEMENT, LEFT: ICD-10-CM

## 2021-07-06 PROCEDURE — 97110 THERAPEUTIC EXERCISES: CPT | Performed by: PHYSICAL THERAPIST

## 2021-07-06 PROCEDURE — 97112 NEUROMUSCULAR REEDUCATION: CPT | Performed by: PHYSICAL THERAPIST

## 2021-07-06 NOTE — PROGRESS NOTES
Physical Therapy Daily Progress Note    Patient: Liza Aaron   : 1955  Diagnosis/ICD-10 Code:  Weakness of left hip [R29.898]  Referring practitioner: CONCETTA Johnson  Date of Initial Visit: Type: THERAPY  Noted: 2021  Today's Date: 2021  Patient seen for 10 sessions           Subjective  Feel more loose   PAIN 2/10 more in RIGHT knee     Objective   ellipitcal  X 2 min  EDUCATION for proper technique and to increase time and vigor    Butterfly stretch 3 min with verbal cueing for progression moving feet closer   Piriformis stretch    A push knee dwn   30 sec x 2 bilaterally    bilaterally pull knee up 30 sec x 2 bilaterally     SIDE LYING ABD with verbal and tactile cueing for proper activation x 10 2 sets bilaterally        difficulty recruiting gluts and going thru approximately 50% ROM   STANDING HIP ABD x 10 with verbal cueing for glut activation and terminal knee extension /core stabilization     POSTURE work in netural with weight bearing bearing thru heel = toes to avoid falling into flexion bias patter     SINGLE LEG STANCE starting with 1 hand support to find neutral posture and time to active core then let go    SIGNIFICANT verbal cueing for strategies to avoid compensation     integraiton integration into walking         Assessment/Plan           Timed:    Manual Therapy:    0     mins  34385;  Therapeutic Exercise:    23     mins  40019;     Neuromuscular Fide:    23    mins  92239;    Therapeutic Activity:     0     mins  77358;     Gait Trainin     mins  17788;     Ultrasound:     0     mins  12309;    Electrical Stimulation:    0     mins  08260 ( );  Iontophoresis    0     mins 29601;  Aquatic Therapy    0     mins 28829;  Dry Needling              0     mins    Untimed:  Electrical Stimulation:    0     mins  37347 ( );  Mechanical Traction:    0     mins  40158;     Timed Treatment:   46   mins   Total Treatment:     46   mins  Kimber Fishman,  PT  Physical Therapist

## 2021-07-15 ENCOUNTER — TREATMENT (OUTPATIENT)
Dept: PHYSICAL THERAPY | Facility: CLINIC | Age: 66
End: 2021-07-15

## 2021-07-15 DIAGNOSIS — R26.9 GAIT DISTURBANCE: ICD-10-CM

## 2021-07-15 DIAGNOSIS — R29.898 WEAKNESS OF LEFT HIP: Primary | ICD-10-CM

## 2021-07-15 DIAGNOSIS — Z96.642 STATUS POST HIP REPLACEMENT, LEFT: ICD-10-CM

## 2021-07-15 DIAGNOSIS — M25.559 PAIN, HIP: ICD-10-CM

## 2021-07-15 DIAGNOSIS — M25.652 HIP STIFFNESS, LEFT: ICD-10-CM

## 2021-07-15 LAB
25(OH)D3+25(OH)D2 SERPL-MCNC: 51.6 NG/ML (ref 30–100)
ALBUMIN SERPL-MCNC: 4.6 G/DL (ref 3.5–5.2)
ALBUMIN/GLOB SERPL: 2.4 G/DL
ALP SERPL-CCNC: 81 U/L (ref 39–117)
ALT SERPL-CCNC: 13 U/L (ref 1–33)
AST SERPL-CCNC: 27 U/L (ref 1–32)
BILIRUB SERPL-MCNC: 0.4 MG/DL (ref 0–1.2)
BUN SERPL-MCNC: 14 MG/DL (ref 8–23)
BUN/CREAT SERPL: 20.3 (ref 7–25)
CALCIUM SERPL-MCNC: 9.2 MG/DL (ref 8.6–10.5)
CHLORIDE SERPL-SCNC: 104 MMOL/L (ref 98–107)
CHOLEST SERPL-MCNC: 153 MG/DL (ref 0–200)
CO2 SERPL-SCNC: 24.2 MMOL/L (ref 22–29)
CREAT SERPL-MCNC: 0.69 MG/DL (ref 0.57–1)
ERYTHROCYTE [DISTWIDTH] IN BLOOD BY AUTOMATED COUNT: 13.1 % (ref 12.3–15.4)
GLOBULIN SER CALC-MCNC: 1.9 GM/DL
GLUCOSE SERPL-MCNC: 92 MG/DL (ref 65–99)
HCT VFR BLD AUTO: 40.5 % (ref 34–46.6)
HDLC SERPL-MCNC: 59 MG/DL (ref 40–60)
HGB BLD-MCNC: 13.6 G/DL (ref 12–15.9)
LDLC SERPL CALC-MCNC: 73 MG/DL (ref 0–100)
LDLC/HDLC SERPL: 1.19 {RATIO}
MCH RBC QN AUTO: 30.6 PG (ref 26.6–33)
MCHC RBC AUTO-ENTMCNC: 33.6 G/DL (ref 31.5–35.7)
MCV RBC AUTO: 91.2 FL (ref 79–97)
PLATELET # BLD AUTO: 197 10*3/MM3 (ref 140–450)
POTASSIUM SERPL-SCNC: 4.1 MMOL/L (ref 3.5–5.2)
PROT SERPL-MCNC: 6.5 G/DL (ref 6–8.5)
RBC # BLD AUTO: 4.44 10*6/MM3 (ref 3.77–5.28)
SODIUM SERPL-SCNC: 137 MMOL/L (ref 136–145)
TRIGL SERPL-MCNC: 118 MG/DL (ref 0–150)
VLDLC SERPL CALC-MCNC: 21 MG/DL (ref 5–40)
WBC # BLD AUTO: 3.33 10*3/MM3 (ref 3.4–10.8)

## 2021-07-15 PROCEDURE — 97112 NEUROMUSCULAR REEDUCATION: CPT | Performed by: PHYSICAL THERAPIST

## 2021-07-15 NOTE — PROGRESS NOTES
Physical Therapy Daily Progress Note    Patient: Liza Aaron   : 1955  Diagnosis/ICD-10 Code:  Weakness of left hip [R29.898]  Referring practitioner: CONCETTA Johnson  Date of Initial Visit: Type: THERAPY  Noted: 2021  Today's Date: 7/15/2021  Patient seen for 11 sessions           Subjective no pain in LEFT  hip    Objective   LEFT  ROM 5- 125  CAMMY WNL    SIDE LYING hip ABD  4/5      LEFT single limb stance  5+ sec with noted hip rotation and arch compensation   RIGHT  ROM 10- 114  CAMMY WNL SIDE LYING hip ABD 4/5      SINGLE LEG STANCE  increaed arch wiggle trunk sway     RIGHT knee terminal knee extension lacking 10 degrees  LEFT lacking 5 terminal knee extension     SINGLE LEG STANCE work for core activation   Integration in to dynamic step balance   Posture ed for weight bearing thru feet equally then terminal knee extension       Assessment/Plan    A: LEFT hip improving in all areas   Functional compensation appears to be most affecting by RIGHT knee lacking 10 degree terminal knee extension  PLAN rpogres closed chain activities and patient has appt to see ortho for RIGHT knee        Timed:    Manual Therapy:    0     mins  84074;  Therapeutic Exercise:    0     mins  08566;     Neuromuscular Fide:    40    mins  15571;    Therapeutic Activity:     0     mins  26177;     Gait Trainin     mins  43915;     Ultrasound:     0     mins  10371;    Electrical Stimulation:    0     mins  78116 ( );  Iontophoresis    0     mins 82481;  Aquatic Therapy    0     mins 83453;  Dry Needling              0     mins    Untimed:  Electrical Stimulation:    0     mins  48785 ( );  Mechanical Traction:    0     mins  14754;     Timed Treatment:   40   mins   Total Treatment:     40   mins  Kimber Fishman PT  Physical Therapist

## 2021-07-21 ENCOUNTER — OFFICE VISIT (OUTPATIENT)
Dept: INTERNAL MEDICINE | Facility: CLINIC | Age: 66
End: 2021-07-21

## 2021-07-21 VITALS
HEIGHT: 67 IN | OXYGEN SATURATION: 95 % | DIASTOLIC BLOOD PRESSURE: 80 MMHG | WEIGHT: 230 LBS | TEMPERATURE: 97 F | BODY MASS INDEX: 36.1 KG/M2 | HEART RATE: 77 BPM | SYSTOLIC BLOOD PRESSURE: 110 MMHG

## 2021-07-21 DIAGNOSIS — Z00.00 PHYSICAL EXAM, ANNUAL: Primary | ICD-10-CM

## 2021-07-21 PROCEDURE — 99397 PER PM REEVAL EST PAT 65+ YR: CPT | Performed by: FAMILY MEDICINE

## 2021-07-21 PROCEDURE — 90471 IMMUNIZATION ADMIN: CPT | Performed by: FAMILY MEDICINE

## 2021-07-21 PROCEDURE — 90732 PPSV23 VACC 2 YRS+ SUBQ/IM: CPT | Performed by: FAMILY MEDICINE

## 2021-07-21 RX ORDER — PRASTERONE 6.5 MG/1
INSERT VAGINAL
COMMUNITY
Start: 2021-04-30 | End: 2022-06-15

## 2021-07-21 NOTE — PROGRESS NOTES
Subjective   Liza Aaron is a 66 y.o. female.   Chief Complaint   Patient presents with   • Annual Exam       History of Present Illness     Patient is here for complete physical exam without GYN evaluation and to follow-up on hyperlipidemia.    #1 CPE-she has no complaints.    She was started on Keppra for epilepsy.  She takes it every day.  She reports no side effects.  No seizures since it was started.  She gained 10 pounds in 3 months.  She does not follow closely with weight watchers.  She has a very busy time at work.  She exercises 3-4 times a week for an hour.    Over-the-counter she takes  vitamin D, vitamin C, multivitamin, calcium, sometimes she takes turmeric and zinc.  No new allergies to medications.    She does not use tobacco products.  She is on average 2 drinks a week.  No drugs.    She has dental appointments every 4 months.  Last eye exam was less than a year ago.  She saw her GYN about 2 months ago.  She had normal Pap smear.  She is going to have mammogram soon.  She had colonoscopy in May 2021.  Next was recommended 3 years later due to adenomatous polyps.    She had both Covid vaccinations.  Tetanus vaccine in 2017.    Hyperlipidemia-on rosuvastatin at 10 mg a day.  She takes it every day.  She has no side effects.  No muscle aches, no muscle cramps.  LDL 73, HDL 59.  LFTs normal.      The following portions of the patient's history were reviewed and updated as appropriate: allergies, current medications, past family history, past medical history, past social history, past surgical history and problem list.    Review of Systems   Respiratory: Negative for shortness of breath.    Cardiovascular: Negative for chest pain.   Gastrointestinal: Negative for blood in stool.   Genitourinary: Negative for hematuria.   Musculoskeletal: Negative for myalgias.   Neurological: Negative for light-headedness.   Psychiatric/Behavioral: Negative.          Objective   Wt Readings from Last 3 Encounters:    07/21/21 104 kg (230 lb)   05/12/21 103 kg (226 lb)   04/19/21 102 kg (224 lb)      Vitals:    07/21/21 1132   BP: 110/80   Pulse: 77   Temp: 97 °F (36.1 °C)   SpO2: 95%     Temp Readings from Last 3 Encounters:   07/21/21 97 °F (36.1 °C)   04/06/21 97.3 °F (36.3 °C) (Temporal)   01/15/21 96.9 °F (36.1 °C) (Infrared)     BP Readings from Last 3 Encounters:   07/21/21 110/80   05/12/21 114/75   04/19/21 126/82     Pulse Readings from Last 3 Encounters:   07/21/21 77   05/12/21 67   04/19/21 74     Body mass index is 36.01 kg/m².    Physical Exam  Vitals reviewed.   Constitutional:       General: She is not in acute distress.     Appearance: She is well-developed. She is obese. She is not diaphoretic.   HENT:      Head: Normocephalic and atraumatic. Hair is normal.      Right Ear: Hearing, tympanic membrane, ear canal and external ear normal. No decreased hearing noted. No drainage.      Left Ear: Hearing, tympanic membrane, ear canal and external ear normal. No decreased hearing noted.   Eyes:      General: Lids are normal.         Right eye: No discharge.         Left eye: No discharge.      Conjunctiva/sclera: Conjunctivae normal.      Pupils: Pupils are equal, round, and reactive to light.   Neck:      Thyroid: No thyromegaly.      Vascular: No JVD.      Trachea: No tracheal deviation.   Cardiovascular:      Rate and Rhythm: Normal rate and regular rhythm.      Pulses: Normal pulses.      Heart sounds: Normal heart sounds. No murmur heard.   No friction rub. No gallop.    Pulmonary:      Effort: Pulmonary effort is normal. No respiratory distress.      Breath sounds: Normal breath sounds. No wheezing or rales.   Chest:      Chest wall: No tenderness.   Abdominal:      General: Bowel sounds are normal. There is no distension.      Palpations: Abdomen is soft. There is no mass.      Tenderness: There is no abdominal tenderness. There is no guarding or rebound.      Hernia: A hernia is present.      Comments: Small  umbilical hernia.   Musculoskeletal:         General: No tenderness or deformity. Normal range of motion.      Cervical back: Normal range of motion and neck supple.   Lymphadenopathy:      Cervical: No cervical adenopathy.      Upper Body:      Right upper body: No supraclavicular adenopathy.      Left upper body: No supraclavicular adenopathy.   Skin:     General: Skin is warm and dry.      Findings: No erythema or rash.   Neurological:      Mental Status: She is alert and oriented to person, place, and time.      Cranial Nerves: No cranial nerve deficit.      Motor: No abnormal muscle tone.      Coordination: Coordination normal.      Deep Tendon Reflexes: Reflexes are normal and symmetric. Reflexes normal.   Psychiatric:         Behavior: Behavior normal.         Thought Content: Thought content normal.         Judgment: Judgment normal.         Assessment/Plan   Diagnoses and all orders for this visit:    1. Physical exam, annual (Primary)        #1 CPE-preventing counseling provided.  She is advised to return to close follow-up with weight watchers.  Weight loss would be beneficial.  She is advised to continue regular exercise.  Yoga can help with stress reduction and balance.  She is up-to-date with cancer screening.  She is getting Pneumovax today.  She advised to get Shingrix.  Continue regular dental appointments at least every 6 months.  Sun protection discussed and recommended.    Hyperlipidemia-continue current treatment.  Follow-up in 6 months.

## 2021-08-10 ENCOUNTER — DOCUMENTATION (OUTPATIENT)
Dept: PHYSICAL THERAPY | Facility: CLINIC | Age: 66
End: 2021-08-10

## 2021-08-10 DIAGNOSIS — Z96.642 STATUS POST HIP REPLACEMENT, LEFT: ICD-10-CM

## 2021-08-10 DIAGNOSIS — R29.898 WEAKNESS OF LEFT HIP: Primary | ICD-10-CM

## 2021-08-10 DIAGNOSIS — M25.652 HIP STIFFNESS, LEFT: ICD-10-CM

## 2021-08-16 ENCOUNTER — OFFICE VISIT (OUTPATIENT)
Dept: INTERNAL MEDICINE | Facility: CLINIC | Age: 66
End: 2021-08-16

## 2021-08-16 VITALS
BODY MASS INDEX: 36.57 KG/M2 | HEART RATE: 96 BPM | OXYGEN SATURATION: 96 % | WEIGHT: 233 LBS | DIASTOLIC BLOOD PRESSURE: 70 MMHG | TEMPERATURE: 98.9 F | SYSTOLIC BLOOD PRESSURE: 120 MMHG | HEIGHT: 67 IN
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
## 2021-08-16 DIAGNOSIS — R52 BODY ACHES: ICD-10-CM

## 2021-08-16 DIAGNOSIS — J06.9 ACUTE URI: Primary | ICD-10-CM

## 2021-08-16 LAB
EXPIRATION DATE: NORMAL
FLUAV AG NPH QL: NEGATIVE
FLUBV AG NPH QL: NEGATIVE
INTERNAL CONTROL: NORMAL
Lab: 2780

## 2021-08-16 PROCEDURE — 87804 INFLUENZA ASSAY W/OPTIC: CPT | Performed by: FAMILY MEDICINE

## 2021-08-16 PROCEDURE — 99213 OFFICE O/P EST LOW 20 MIN: CPT | Performed by: FAMILY MEDICINE

## 2021-08-16 NOTE — PROGRESS NOTES
Subjective   Liza Aaron is a 66 y.o. female.   Chief Complaint   Patient presents with   • Headache   • Generalized Body Aches   • Fatigue   • Nasal Congestion   • Chills   • Rash       History of Present Illness     #1  Fatigue, congestion, dry cough, body aches and chills.  Headache.  Today is day #3 of symptoms.  Patient has no shortness of breath, no wheezing.  She cannot smell anything.  She still has some taste.  She has no diarrhea.  She was at a wedding 2 weeks ago.  There were people there who were diagnosed with Covid.  She had both Covid vaccinations.    2.  2 small red spots on right side area.  It was noticed about a week ago.  It was itching.  Hydrocortisone helped some with the itching.    The following portions of the patient's history were reviewed and updated as appropriate: allergies, current medications, past medical history, past social history and problem list.    Review of Systems   Constitutional: Positive for chills. Negative for fever.   HENT: Positive for congestion.    Respiratory: Positive for cough. Negative for shortness of breath and wheezing.    Gastrointestinal: Negative for diarrhea.   Neurological: Positive for headaches.         Objective   Wt Readings from Last 3 Encounters:   08/16/21 106 kg (233 lb)   07/21/21 104 kg (230 lb)   05/12/21 103 kg (226 lb)      Vitals:    08/16/21 1525   BP: 120/70   Pulse: 96   Temp: 98.9 °F (37.2 °C)   SpO2: 96%     Temp Readings from Last 3 Encounters:   08/16/21 98.9 °F (37.2 °C)   07/21/21 97 °F (36.1 °C)   04/06/21 97.3 °F (36.3 °C) (Temporal)     BP Readings from Last 3 Encounters:   08/16/21 120/70   07/21/21 110/80   05/12/21 114/75     Pulse Readings from Last 3 Encounters:   08/16/21 96   07/21/21 77   05/12/21 67     Body mass index is 36.48 kg/m².    Physical Exam  Constitutional:       Appearance: Normal appearance. She is well-developed.   HENT:      Head: Normocephalic and atraumatic.   Neck:      Thyroid: No thyromegaly.       Vascular: No carotid bruit.   Cardiovascular:      Rate and Rhythm: Normal rate and regular rhythm.      Heart sounds: Normal heart sounds.   Pulmonary:      Effort: Pulmonary effort is normal. No respiratory distress.      Breath sounds: Normal breath sounds. No wheezing or rhonchi.   Musculoskeletal:      Cervical back: Neck supple.   Skin:     General: Skin is warm and dry.      Comments: 2 small areas of rash, no drainage, no blisters right side.   Neurological:      Mental Status: She is alert and oriented to person, place, and time.   Psychiatric:         Behavior: Behavior normal.         Assessment/Plan   Diagnoses and all orders for this visit:    1. Acute URI (Primary)    2. Body aches  -     COVID-19,[facility] ROUTINE, NP/OP SWAB IN TRANSPORT MEDIA OR ESWAB 72 HR TAT - Swab, Oropharynx  -     POCT Influenza A/B    3. Bug bite, initial encounter        #1 URI-we are testing patient for Covid and flu.  She is advised to quarantine to complete 10 days from the onset of symptoms.  Symptomatic treatment.  If shortness of breath or wheezing, she will go to ER.  Follow-up as needed.    2.  Bug bites-patient will use over-the-counter antibiotic cream.  Follow-up as needed.

## 2021-08-16 NOTE — PATIENT INSTRUCTIONS

## 2021-08-17 LAB
LABCORP SARS-COV-2, NAA 2 DAY TAT: NORMAL
SARS-COV-2 RNA RESP QL NAA+PROBE: DETECTED

## 2021-08-18 ENCOUNTER — TELEPHONE (OUTPATIENT)
Dept: INTERNAL MEDICINE | Facility: CLINIC | Age: 66
End: 2021-08-18

## 2021-08-18 NOTE — TELEPHONE ENCOUNTER
Caller: Liza Aaron    Relationship to patient: Self    Best call back number: [phone]    Date of exposure:     Date of positive COVID19 test: 8/17/21    Date if possible COVID19 exposure:     COVID19 symptoms: COUGH/HEAD CONGESTION/CHILLS    Date of initial quarantine:     Additional information or concerns:     What is the patients preferred pharmacy: [facility] [address] - [address] [place] [address] [phone] [facility] [phone]   [phone]

## 2021-09-16 ENCOUNTER — OFFICE VISIT (OUTPATIENT)
Dept: ORTHOPEDIC SURGERY | Facility: CLINIC | Age: 66
End: 2021-09-16

## 2021-09-16 VITALS — BODY MASS INDEX: 36.73 KG/M2 | HEIGHT: 67 IN | WEIGHT: 234 LBS | TEMPERATURE: 97.3 F

## 2021-09-16 DIAGNOSIS — M17.11 PRIMARY OSTEOARTHRITIS OF RIGHT KNEE: ICD-10-CM

## 2021-09-16 DIAGNOSIS — Z96.642 HISTORY OF TOTAL LEFT HIP ARTHROPLASTY: Primary | ICD-10-CM

## 2021-09-16 PROCEDURE — 99213 OFFICE O/P EST LOW 20 MIN: CPT | Performed by: ORTHOPAEDIC SURGERY

## 2021-09-16 PROCEDURE — 73502 X-RAY EXAM HIP UNI 2-3 VIEWS: CPT | Performed by: ORTHOPAEDIC SURGERY

## 2021-09-16 NOTE — PROGRESS NOTES
"Liza Aaron : 1955 MRN: [id number] DATE: 2021    Chief Complaint:  Follow up right total hip      SUBJECTIVE:Patient returns today for  1 year follow up of right total hip replacement. Patient reports doing well with no unusual complaints. Denies any limitations due to the hip. C/o right knee pain and stiffness inability to straightnen    OBJECTIVE:    Temp 97.3 °F (36.3 °C)   Ht 170.2 cm (67\")   Wt 106 kg (234 lb)   LMP  (LMP Unknown)   BMI 36.65 kg/m²   Family History   Problem Relation Age of Onset   • Breast cancer Mother    • Hypertension Mother    • Dementia Mother    • Stroke Mother    • Heart disease Father    • Cancer Maternal Grandmother    • Heart disease Brother    • Malig Hyperthermia Neg Hx      Past Medical History:   Diagnosis Date   • Arthritis    • Diverticulosis    • Family history of coronary artery disease     SAW DR THOMPSON 2019   • Hip pain     BILATERAL   • Hyperglycemia    • Hyperlipidemia    • Irregular heart beat     OCCASIONAL \"SKIPPED BEAT\"   • Pure hypercholesterolemia    • Seizure (CMS/HCC)     WHEN IN COLLEGE, NONE SINCE   • Umbilical hernia    • UTI (urinary tract infection)     FINISHED ANTIBIOTIC   • Vitamin D deficiency      Past Surgical History:   Procedure Laterality Date   • COLONOSCOPY N/A 2015    Diverticulosis in the entire examined colon, non-bleeding internal hemorrhodis, no specimens collected-Dr. Rosario Flores   • COLONOSCOPY N/A 2021    Procedure: COLONOSCOPY  into cecum with cold/hot snare and cold bx polypectomies;  Surgeon: Rosario Flores MD;  Location: [facility] ENDOSCOPY;  Service: Gastroenterology;  Laterality: N/A;  pre: screen  post: diverticulosis, polyps, hemorrhoids    • TOTAL ABDOMINAL HYSTERECTOMY WITH SALPINGO OOPHORECTOMY Bilateral 2004    Dr. Dominick Villarreal   • TOTAL HIP ARTHROPLASTY Right 2020    Procedure: TOTAL HIP ARTHROPLASTY;  Surgeon: Cesar Johnson MD;  Location: [facility] OR;  Service: Orthopedics;  " Laterality: Right;   • TOTAL HIP ARTHROPLASTY Left 2020    Procedure: TOTAL HIP ARTHROPLASTY;  Surgeon: Cesar Johnson MD;  Location: [facility] OR;  Service: Orthopedics;  Laterality: Left;   • UMBILICAL HERNIA REPAIR N/A 2004    Dr. Dominick Villarreal     Social History     Socioeconomic History   • Marital status:      Spouse name: EDDIE IVAN   • Number of children: 5   • Years of education: 17YEARS [employer]   • Highest education level: Not on file   Tobacco Use   • Smoking status: Former Smoker     Packs/day: 1.00     Years: 5.00     Pack years: 5.00     Types: Cigarettes     Quit date:      Years since quittin.7   • Smokeless tobacco: Never Used   • Tobacco comment: caffiene twice a week   Vaping Use   • Vaping Use: Never used   Substance and Sexual Activity   • Alcohol use: Yes     Comment: social   • Drug use: No   • Sexual activity: Defer       Review of Systems: 14 point review of systems performed pertinent positives and negatives discussed above, all other systems are negative    Exam:. The incision is well healed. Range of motion is good without irritability. The calf is soft and nontender with a negative Homans sign. Alignment is neutral. Leg lengths are equal. Good hip flexion and abduction strength.Walks with nonantalgic gait. Intact to light touch with palpable distal pulses.     DIAGNOSTIC STUDIES  Xrays:Xrays 2 view right hip AP and lateral were ordered and reviewed for evaluation of total hip which demonstrate a well positioned ALEX without complicating factors.  In comparison to previous films are no changes.    ASSESSMENT:    Follow up right hip replacement. doing well  Right knee oa -  Will need injection relatively soon -  Will call to schedule       PLAN:   Continue activities as tolerated  Follow up PRN    Cesar Johnson MD  2021

## 2021-10-27 ENCOUNTER — OFFICE VISIT (OUTPATIENT)
Dept: NEUROLOGY | Facility: CLINIC | Age: 66
End: 2021-10-27

## 2021-10-27 VITALS
HEIGHT: 67 IN | OXYGEN SATURATION: 98 % | SYSTOLIC BLOOD PRESSURE: 132 MMHG | BODY MASS INDEX: 36.95 KG/M2 | HEART RATE: 74 BPM | DIASTOLIC BLOOD PRESSURE: 74 MMHG | WEIGHT: 235.4 LBS

## 2021-10-27 DIAGNOSIS — G40.909 NONINTRACTABLE EPILEPSY WITHOUT STATUS EPILEPTICUS, UNSPECIFIED EPILEPSY TYPE (HCC): Primary | ICD-10-CM

## 2021-10-27 PROCEDURE — 99213 OFFICE O/P EST LOW 20 MIN: CPT | Performed by: PSYCHIATRY & NEUROLOGY

## 2021-10-27 RX ORDER — SODIUM PHOSPHATE,MONO-DIBASIC 19G-7G/118
ENEMA (ML) RECTAL
COMMUNITY

## 2021-10-27 NOTE — PROGRESS NOTES
Subjective:     Patient ID: Liza Aaron is a 66 y.o. female.    Ms. Aaron is a 66-year-old right-handed female with history of arthritis, hyperlipidemia, and seizures who presents to the neurology clinic today as an established patient for follow-up for seizures.  The patient was last seen on April 19 of 2021.  She was a new patient on January 7, 2021.  For details regarding her history, please refer to that note.  The patient had 1 definitive seizure on December 9 of 2020.  She may have had a seizure in high school and another one in college.  Her brain MRI last December did not show anything potentially epileptogenic and her EEG was normal.  The patient was started on levetiracetam extended release 500 mg once a day.  At her last visit we increased to 1000 mg.  She takes at night without any side effects.  Her  reports that she might be a little grouchy year and she has had a couple episodes of dizziness but nothing significant.  She denies any seizures and reports that the medication is affordable.    The following portions of the patient's history were reviewed and updated as appropriate: allergies, current medications, past family history, past medical history, past social history, past surgical history and problem list.    Review of Systems     Objective:    Neurologic Exam    Physical Exam    Assessment/Plan:    The patient is a 66-year-old female with history of arthritis, hyperlipidemia and seizures who presents today for follow-up.    1.  Unknown epilepsy-fortunately the patient has done well on levetiracetam monotherapy without any side effects.  I recommend continuing on the current dose with no changes.  We can see the patient back in 1 year.  At that time we can have a discussion regarding the risks and benefits of potentially coming off of the medication.  I filled out paperwork for her today.    A total of 20 minutes of time was spent on this encounter today.  This includes reviewing the  patient's records, face-to-face time, and documentation.       Problems Addressed this Visit     None      Visit Diagnoses     Nonintractable epilepsy without status epilepticus, unspecified epilepsy type (HCC)    -  Primary      Diagnoses       Codes Comments    Nonintractable epilepsy without status epilepticus, unspecified epilepsy type (HCC)    -  Primary ICD-10-CM: G40.909  ICD-9-CM: 345.90

## 2021-10-27 NOTE — PATIENT INSTRUCTIONS
[facility]  Cassidy Alexander MD  Neurology clinic  [phone]    With anti-seizure medications, you may initially notice side effects of fatigue, drowsiness, unsteadiness, and dizziness.  Other possible side effects include nausea, abdominal pain, headache, blurry or double vision, slurred speech and mood changes.  Generally, patients will noticed these symptoms when the medication is first started or with higher doses and will go away with time.    It is import to consistently take your medication every day.  Missing just one dose may put you at risk for a breakthrough seizure.  Consider using reminders on your phone or a pill box.    If you develop a rash, please call the neurology clinic immediately or notify another healthcare professional, as this may be potentially life-threatening.  If you are unable to reach a healthcare professional, go to the emergency room immediately for further evaluation.    If you develop thoughts of wanting to hurt yourself or others, please call the neurology clinic immediately to notify another healthcare professional.  If you are unable to reach a healthcare professional, go to the emergency room immediately for further evaluation.    Taking anti-seizure medications may increase the risk of birth defects.  If you are a female of child-bearing potential, it is recommended that you take folic acid (1-4 mg) daily.  This may reduce the risk of birth defects while pregnant and taking seizure medication.  If you become pregnant, contact our office immediately. You will need to be followed very closely (at least monthly appointments).  I also recommend contacting The North American Antiepileptic Drug Pregnancy Registry at www.aedpregnancyregistry.org or 1-496.409.5251.    It is the [place] state law that you cannot drive within 90 days of a seizure.    You should avoid certain activities that if you were to have a seizure, you could harm yourself or others. In  general, it is recommended that you avoid operating heavy machinery or power tools, swimming or taking baths by yourself (showers are ok), don't stand over open flames, don't get on high ladders or the roof.  I also recommend to avoid sleeping on your stomach.    For further information on epilepsy and resources available to patients and their families, please visit the [facility] at www.efky.org or call [phone].    **Check out the [facility]ilepsy [facility]'s monthly Art Group Gathering.  They are located at [facility] [place], [address] [place] [address].  Call Niesha Lyons at [phone] or email her at [email or web address] for the dates of future gatherings.**      **If you have having memory problems, consider HOBSCOTCH (Home-Based Self-management and Cognitive Training Changes lives).  It is an 8 week self-management program for adults with epilepsy and memory problems.  The program is free at the Epilepsy [facility].  Contact Nara Ken at [phone] or [email or web address].**    Check out My Epilepsy Story ([email or web address]).  Their goal is to foster the growth of young girls and women affected by epilepsy and encourage them not just to lives, but to THRIVE!  You can get involved by donating, sharing your story, or becoming an ambassador.  Services include educational resources and transportation.

## 2021-11-08 ENCOUNTER — TELEPHONE (OUTPATIENT)
Dept: INTERNAL MEDICINE | Facility: CLINIC | Age: 66
End: 2021-11-08

## 2021-11-08 NOTE — TELEPHONE ENCOUNTER
Caller: Liza Aaron    Relationship: Self    Best call back number: [phone]    What was the call regarding:     PATIENT IS WANTING TO KNOW WHEN TO GET THE COVID BOOSTER.  SHE IS ALSO NEEDING TO KNOW WHEN TO GET THE SHINGLES VACCINE AND PNEUMOVAX.    SHE HAD THE 2ND PFIZER DOSE ON 02/04    SHE HAD COVID BREAK THROUGH ON 08/07 AND HAD HER FLU SHOT ON 11/3     PLEASE ADVISE .    Do you require a callback: YES

## 2021-11-19 ENCOUNTER — IMMUNIZATION (OUTPATIENT)
Dept: VACCINE CLINIC | Facility: HOSPITAL | Age: 66
End: 2021-11-19

## 2021-11-19 PROCEDURE — 0004A ADM SARSCOV2 30MCG/0.3ML BOOSTER: CPT | Performed by: INTERNAL MEDICINE

## 2021-11-19 PROCEDURE — 91300 HC SARSCOV02 VAC 30MCG/0.3ML IM: CPT | Performed by: INTERNAL MEDICINE

## 2021-11-30 ENCOUNTER — APPOINTMENT (OUTPATIENT)
Dept: WOMENS IMAGING | Facility: HOSPITAL | Age: 66
End: 2021-11-30

## 2021-11-30 PROCEDURE — 77067 SCR MAMMO BI INCL CAD: CPT | Performed by: RADIOLOGY

## 2021-11-30 PROCEDURE — 77063 BREAST TOMOSYNTHESIS BI: CPT | Performed by: RADIOLOGY

## 2021-12-18 RX ORDER — ROSUVASTATIN CALCIUM 10 MG/1
10 TABLET, COATED ORAL NIGHTLY
Qty: 90 TABLET | Refills: 1 | Status: CANCELLED | OUTPATIENT
Start: 2021-12-18

## 2021-12-20 RX ORDER — ROSUVASTATIN CALCIUM 10 MG/1
10 TABLET, COATED ORAL NIGHTLY
Qty: 90 TABLET | Refills: 1 | Status: SHIPPED | OUTPATIENT
Start: 2021-12-20 | End: 2022-06-13 | Stop reason: SDUPTHER

## 2022-01-18 DIAGNOSIS — E78.00 PURE HYPERCHOLESTEROLEMIA: Primary | ICD-10-CM

## 2022-01-31 RX ORDER — CLINDAMYCIN HYDROCHLORIDE 300 MG/1
CAPSULE ORAL
Qty: 2 CAPSULE | Refills: 5 | Status: SHIPPED | OUTPATIENT
Start: 2022-01-31

## 2022-05-03 RX ORDER — LEVETIRACETAM 500 MG/1
1000 TABLET, EXTENDED RELEASE ORAL DAILY
Qty: 180 TABLET | Refills: 3 | Status: SHIPPED | OUTPATIENT
Start: 2022-05-03

## 2022-05-17 ENCOUNTER — IMMUNIZATION (OUTPATIENT)
Dept: VACCINE CLINIC | Facility: HOSPITAL | Age: 67
End: 2022-05-17

## 2022-05-17 DIAGNOSIS — Z23 NEED FOR VACCINATION: Primary | ICD-10-CM

## 2022-05-17 PROCEDURE — 91305 HC SARSCOV2 VAC 30 MCG TRS-SUCR PFIZER: CPT | Performed by: INTERNAL MEDICINE

## 2022-05-17 PROCEDURE — 0054A HC ADM SARSCV2 30MCG TRS-SUCR BOOSTER: CPT | Performed by: INTERNAL MEDICINE

## 2022-06-13 RX ORDER — ROSUVASTATIN CALCIUM 10 MG/1
10 TABLET, COATED ORAL NIGHTLY
Qty: 30 TABLET | Refills: 0 | Status: SHIPPED | OUTPATIENT
Start: 2022-06-13 | End: 2022-06-28 | Stop reason: SDUPTHER

## 2022-06-15 ENCOUNTER — OFFICE VISIT (OUTPATIENT)
Dept: INTERNAL MEDICINE | Facility: CLINIC | Age: 67
End: 2022-06-15

## 2022-06-15 VITALS
DIASTOLIC BLOOD PRESSURE: 82 MMHG | HEART RATE: 66 BPM | HEIGHT: 67 IN | TEMPERATURE: 97.3 F | OXYGEN SATURATION: 98 % | WEIGHT: 238.9 LBS | BODY MASS INDEX: 37.49 KG/M2 | SYSTOLIC BLOOD PRESSURE: 128 MMHG

## 2022-06-15 DIAGNOSIS — L30.9 DERMATITIS: Primary | ICD-10-CM

## 2022-06-15 PROCEDURE — 99213 OFFICE O/P EST LOW 20 MIN: CPT | Performed by: INTERNAL MEDICINE

## 2022-06-15 RX ORDER — CLOBETASOL PROPIONATE 0.5 MG/G
1 CREAM TOPICAL 2 TIMES DAILY
Qty: 30 G | Refills: 0 | Status: SHIPPED | OUTPATIENT
Start: 2022-06-15 | End: 2023-01-06

## 2022-06-15 RX ORDER — CHLORAL HYDRATE 500 MG
CAPSULE ORAL
COMMUNITY

## 2022-06-15 NOTE — PROGRESS NOTES
Subjective   Liza Aaron is a 67 y.o. female here today for a rash under the chin and right side abdomen x 2 days      History of Present Illness   She did wokr on the yard last weekend and she started having a rash on chin      The following portions of the patient's history were reviewed and updated as appropriate: allergies, current medications, past medical history, past social history and problem list.    Review of Systems    Objective   Physical Exam  Vitals reviewed.   Constitutional:       Appearance: She is well-developed.   HENT:      Head: Normocephalic and atraumatic.      Right Ear: External ear normal.      Left Ear: External ear normal.   Eyes:      Conjunctiva/sclera: Conjunctivae normal.      Pupils: Pupils are equal, round, and reactive to light.   Neck:      Thyroid: No thyromegaly.      Trachea: No tracheal deviation.   Cardiovascular:      Rate and Rhythm: Normal rate and regular rhythm.      Heart sounds: Normal heart sounds.   Pulmonary:      Effort: Pulmonary effort is normal.      Breath sounds: Normal breath sounds.   Abdominal:      General: Bowel sounds are normal. There is no distension.      Palpations: Abdomen is soft.      Tenderness: There is no abdominal tenderness.   Musculoskeletal:         General: No deformity. Normal range of motion.      Cervical back: Normal range of motion.   Skin:     General: Skin is warm and dry.      Comments: Petechial rash in the right groin area especially in the skin folds.  Under the chin more of a papular excoriated rash   Neurological:      Mental Status: She is alert and oriented to person, place, and time.   Psychiatric:         Behavior: Behavior normal.         Thought Content: Thought content normal.         Judgment: Judgment normal.         Vitals:    06/15/22 1154   BP: 128/82   Pulse: 66   Temp: 97.3 °F (36.3 °C)   SpO2: 98%     Body mass index is 37.41 kg/m².       Current Outpatient Medications:   •  ascorbic acid (VITAMIN C) 500 MG  tablet, Take 500 mg by mouth Daily., Disp: , Rfl:   •  calcium carbonate (OS-EMMA) 600 MG tablet, Take 600 mg by mouth Daily., Disp: , Rfl:   •  Cholecalciferol (VITAMIN D) 25 MCG (1000 UT) tablet, Take 1,000 Units by mouth Daily., Disp: , Rfl:   •  Ferrous Sulfate Dried (High Potency Iron) 65 MG tablet, Take 1 tablet by mouth Daily., Disp: , Rfl:   •  glucosamine-chondroitin 500-400 MG capsule capsule, Take  by mouth 3 (Three) Times a Day With Meals., Disp: , Rfl:   •  Lactobacillus (PROBIOTIC ACIDOPHILUS PO), Take 1 tablet by mouth., Disp: , Rfl:   •  levETIRAcetam XR (KEPPRA XR) 500 MG 24 hr tablet, Take 2 tablets by mouth Daily., Disp: 180 tablet, Rfl: 3  •  melatonin 5 MG tablet tablet, Take 5 mg by mouth At Night As Needed., Disp: , Rfl:   •  multivitamin with minerals tablet tablet, Take 1 tablet by mouth Daily., Disp: , Rfl:   •  Omega-3 Fatty Acids (fish oil) 1000 MG capsule capsule, Take  by mouth Daily With Breakfast., Disp: , Rfl:   •  Prasterone (Intrarosa) 6.5 MG insert, Insert 6.5 mg into the vagina every night at bedtime., Disp: 28 each, Rfl: 12  •  rosuvastatin (CRESTOR) 10 MG tablet, Take 1 tablet by mouth Every Night., Disp: 30 tablet, Rfl: 0  •  Turmeric 500 MG capsule, Take  by mouth Daily., Disp: , Rfl:   •  Zinc 50 MG capsule, Take  by mouth As Needed., Disp: , Rfl:   •  clindamycin (Cleocin) 300 MG capsule, Take 2 capsules by mouth 1 hour prior to procedure, Disp: 2 capsule, Rfl: 5  •  clobetasol (TEMOVATE) 0.05 % cream, Apply 1 application topically to the appropriate area as directed 2 (Two) Times a Day., Disp: 30 g, Rfl: 0  •  TobraDex 0.3-0.1 % ophthalmic ointment, , Disp: , Rfl:       Assessment & Plan   Diagnoses and all orders for this visit:    1. Dermatitis (Primary)    Other orders  -     clobetasol (TEMOVATE) 0.05 % cream; Apply 1 application topically to the appropriate area as directed 2 (Two) Times a Day.  Dispense: 30 g; Refill: 0        1.  Dermatitis: I suspect that the chin  is more of a delayed type hypersensitivity reaction though I currently see no vesicles I believe there may have been some at one time.  She is going to try clobetasol twice a day and see if this improves.  I think the rash in the right groin is probably more likely heat rash.  She says she was extremely hot working in the yard the other day.  I have recommended that she try some type of a powder or drying agent in that area and trying to keep the area as dry and cool as possible.

## 2022-06-22 LAB
ALBUMIN SERPL-MCNC: 4.5 G/DL (ref 3.8–4.8)
ALBUMIN/GLOB SERPL: 2 {RATIO} (ref 1.2–2.2)
ALP SERPL-CCNC: 72 IU/L (ref 44–121)
ALT SERPL-CCNC: 14 IU/L (ref 0–32)
AST SERPL-CCNC: 31 IU/L (ref 0–40)
BASOPHILS # BLD AUTO: 0 X10E3/UL (ref 0–0.2)
BASOPHILS NFR BLD AUTO: 1 %
BILIRUB SERPL-MCNC: 0.6 MG/DL (ref 0–1.2)
BUN SERPL-MCNC: 10 MG/DL (ref 8–27)
BUN/CREAT SERPL: 16 (ref 12–28)
CALCIUM SERPL-MCNC: 9 MG/DL (ref 8.7–10.3)
CHLORIDE SERPL-SCNC: 102 MMOL/L (ref 96–106)
CHOLEST SERPL-MCNC: 153 MG/DL (ref 100–199)
CHOLEST/HDLC SERPL: 3.2 RATIO (ref 0–4.4)
CO2 SERPL-SCNC: 22 MMOL/L (ref 20–29)
CREAT SERPL-MCNC: 0.64 MG/DL (ref 0.57–1)
EGFRCR SERPLBLD CKD-EPI 2021: 97 ML/MIN/1.73
EOSINOPHIL # BLD AUTO: 0.1 X10E3/UL (ref 0–0.4)
EOSINOPHIL NFR BLD AUTO: 3 %
ERYTHROCYTE [DISTWIDTH] IN BLOOD BY AUTOMATED COUNT: 12.9 % (ref 11.7–15.4)
GLOBULIN SER CALC-MCNC: 2.3 G/DL (ref 1.5–4.5)
GLUCOSE SERPL-MCNC: 102 MG/DL (ref 65–99)
HCT VFR BLD AUTO: 38.8 % (ref 34–46.6)
HDLC SERPL-MCNC: 48 MG/DL
HGB BLD-MCNC: 13 G/DL (ref 11.1–15.9)
IMM GRANULOCYTES # BLD AUTO: 0 X10E3/UL (ref 0–0.1)
IMM GRANULOCYTES NFR BLD AUTO: 0 %
LDLC SERPL CALC-MCNC: 80 MG/DL (ref 0–99)
LYMPHOCYTES # BLD AUTO: 1.6 X10E3/UL (ref 0.7–3.1)
LYMPHOCYTES NFR BLD AUTO: 53 %
MCH RBC QN AUTO: 30.1 PG (ref 26.6–33)
MCHC RBC AUTO-ENTMCNC: 33.5 G/DL (ref 31.5–35.7)
MCV RBC AUTO: 90 FL (ref 79–97)
MONOCYTES # BLD AUTO: 0.3 X10E3/UL (ref 0.1–0.9)
MONOCYTES NFR BLD AUTO: 10 %
MORPHOLOGY BLD-IMP: ABNORMAL
NEUTROPHILS # BLD AUTO: 1 X10E3/UL (ref 1.4–7)
NEUTROPHILS NFR BLD AUTO: 33 %
PLATELET # BLD AUTO: 190 X10E3/UL (ref 150–450)
POTASSIUM SERPL-SCNC: 4.2 MMOL/L (ref 3.5–5.2)
PROT SERPL-MCNC: 6.8 G/DL (ref 6–8.5)
RBC # BLD AUTO: 4.32 X10E6/UL (ref 3.77–5.28)
SODIUM SERPL-SCNC: 138 MMOL/L (ref 134–144)
TRIGL SERPL-MCNC: 144 MG/DL (ref 0–149)
VLDLC SERPL CALC-MCNC: 25 MG/DL (ref 5–40)
WBC # BLD AUTO: 3 X10E3/UL (ref 3.4–10.8)

## 2022-06-28 ENCOUNTER — OFFICE VISIT (OUTPATIENT)
Dept: INTERNAL MEDICINE | Facility: CLINIC | Age: 67
End: 2022-06-28

## 2022-06-28 VITALS
TEMPERATURE: 97.3 F | DIASTOLIC BLOOD PRESSURE: 70 MMHG | BODY MASS INDEX: 37.35 KG/M2 | OXYGEN SATURATION: 96 % | SYSTOLIC BLOOD PRESSURE: 110 MMHG | WEIGHT: 238 LBS | HEIGHT: 67 IN | HEART RATE: 91 BPM

## 2022-06-28 DIAGNOSIS — D64.9 ANEMIA, UNSPECIFIED TYPE: ICD-10-CM

## 2022-06-28 DIAGNOSIS — E55.9 VITAMIN D DEFICIENCY: ICD-10-CM

## 2022-06-28 DIAGNOSIS — E78.00 PURE HYPERCHOLESTEROLEMIA: Primary | ICD-10-CM

## 2022-06-28 PROCEDURE — 99213 OFFICE O/P EST LOW 20 MIN: CPT | Performed by: FAMILY MEDICINE

## 2022-06-28 RX ORDER — ROSUVASTATIN CALCIUM 10 MG/1
10 TABLET, COATED ORAL NIGHTLY
Qty: 90 TABLET | Refills: 1 | Status: SHIPPED | OUTPATIENT
Start: 2022-06-28 | End: 2023-01-02 | Stop reason: SDUPTHER

## 2022-06-28 NOTE — PROGRESS NOTES
Subjective   Liza Aaron is a 67 y.o. female.   Chief Complaint   Patient presents with   • Hyperlipidemia       History of Present Illness     1.  Hyperlipidemia-on rosuvastatin 10 mg a day.  She takes it every day.  She has no side effects.  LDL 80, HDL 48.  No side effects.  LFTs normal.  No chest pain, no lightheadedness.  She exercises regularly.  4 days a week for 60 minutes.    The following portions of the patient's history were reviewed and updated as appropriate: allergies, current medications, past family history, past medical history, past social history, past surgical history and problem list.    Review of Systems   Constitutional: Negative.    Respiratory: Negative.    Cardiovascular: Negative.          Objective   Wt Readings from Last 3 Encounters:   06/28/22 108 kg (238 lb)   06/15/22 108 kg (238 lb 14.4 oz)   10/27/21 107 kg (235 lb 6.4 oz)      Vitals:    06/28/22 1421   BP: 110/70   Pulse: 91   Temp: 97.3 °F (36.3 °C)   SpO2: 96%     Temp Readings from Last 3 Encounters:   06/28/22 97.3 °F (36.3 °C)   06/15/22 97.3 °F (36.3 °C) (Infrared)   09/16/21 97.3 °F (36.3 °C)     BP Readings from Last 3 Encounters:   06/28/22 110/70   06/15/22 128/82   10/27/21 132/74     Pulse Readings from Last 3 Encounters:   06/28/22 91   06/15/22 66   10/27/21 74     Body mass index is 37.27 kg/m².    Physical Exam  Constitutional:       Appearance: She is well-developed.   HENT:      Head: Normocephalic and atraumatic.   Neck:      Thyroid: No thyromegaly.      Vascular: No carotid bruit.   Cardiovascular:      Rate and Rhythm: Normal rate and regular rhythm.      Heart sounds: Normal heart sounds.   Pulmonary:      Effort: Pulmonary effort is normal.      Breath sounds: Normal breath sounds.   Musculoskeletal:      Cervical back: Neck supple.   Skin:     General: Skin is warm and dry.   Neurological:      Mental Status: She is alert and oriented to person, place, and time.   Psychiatric:         Behavior:  Behavior normal.         Assessment & Plan   Diagnoses and all orders for this visit:    1. Pure hypercholesterolemia (Primary)  -     Comprehensive Metabolic Panel; Future  -     Lipid Panel With LDL / HDL Ratio; Future  -     Thyroid Cascade Profile; Future    2. Anemia, unspecified type  -     CBC (No Diff); Future    3. Vitamin D deficiency  -     Vitamin D 25 Hydroxy; Future    Other orders  -     rosuvastatin (CRESTOR) 10 MG tablet; Take 1 tablet by mouth Every Night.  Dispense: 90 tablet; Refill: 1        1.  HLP-continue current treatment.  Add cardio exercise.  Follow-up in 6 months.

## 2022-06-29 ENCOUNTER — TELEPHONE (OUTPATIENT)
Dept: NEUROLOGY | Facility: CLINIC | Age: 67
End: 2022-06-29

## 2022-06-29 NOTE — TELEPHONE ENCOUNTER
I'm pretty sure the form says that the patient has to be seen within 3 months.  Can she come in later today?  I have openings.

## 2022-06-29 NOTE — TELEPHONE ENCOUNTER
Spoke with patient and informed her we could see her today and she declined- had other acitivities planned    Scheduled for next available 8.24.22- pt said she will call DMV and let them know so she can get an extension

## 2022-06-29 NOTE — TELEPHONE ENCOUNTER
Patient needs her driving form from [place] filled out by today, she states that she was notified in April she needed this and forgot    She was last seen in October- pt wants to know if we can fill out this form today if she faxes to us.  Her last seizure was in Dec 2020, but she does not recall the exact date    Please advise

## 2022-08-24 ENCOUNTER — TELEPHONE (OUTPATIENT)
Dept: NEUROLOGY | Facility: CLINIC | Age: 67
End: 2022-08-24

## 2022-08-24 ENCOUNTER — OFFICE VISIT (OUTPATIENT)
Dept: NEUROLOGY | Facility: CLINIC | Age: 67
End: 2022-08-24

## 2022-08-24 VITALS
OXYGEN SATURATION: 98 % | SYSTOLIC BLOOD PRESSURE: 134 MMHG | DIASTOLIC BLOOD PRESSURE: 74 MMHG | BODY MASS INDEX: 37.32 KG/M2 | HEART RATE: 77 BPM | WEIGHT: 232.2 LBS | HEIGHT: 66 IN

## 2022-08-24 DIAGNOSIS — G40.909 NONINTRACTABLE EPILEPSY WITHOUT STATUS EPILEPTICUS, UNSPECIFIED EPILEPSY TYPE: Primary | ICD-10-CM

## 2022-08-24 PROCEDURE — 99213 OFFICE O/P EST LOW 20 MIN: CPT | Performed by: PSYCHIATRY & NEUROLOGY

## 2022-08-24 NOTE — PROGRESS NOTES
Subjective:     Patient ID: Liza Aaron is a 67 y.o. female.    Ms. Aaron is a 67-year-old right-handed female with history of arthritis, hyperlipidemia, and seizures who presents to the neurology clinic today as established patient for follow-up for seizures.  The patient was last seen October 27, 2021.  She is a new patient on January 7, 2021.  For details regarding her history, please refer to that note.  The patient had 1 definitive seizure on December 9, 2020.  She may have had a seizure in high school and another 1 in college.  Her brain MRI in December 2020 did not show anything potentially epileptogenic and her EEG was normal.  The patient was started on levetiracetam extended release 500 mg once a day.  At her visit in April 2021 we increased 2000 mg.  She takes it at night without any major side effects.  She reports that the medication is affordable.  Fortunately she has not had a seizure since her last visit and brings in DMV paperwork to be filled out.    The following portions of the patient's history were reviewed and updated as appropriate: allergies, current medications, past family history, past medical history, past social history, past surgical history and problem list.    Review of Systems     Objective:    Neurological Exam    Physical Exam    Assessment/Plan:    The patient is a 67-year-old right-handed female with history of arthritis, hyperlipidemia, and seizures who presents today for follow-up.    1.  Unknown epilepsy-fortunately the patient has done well on levetiracetam ER monotherapy without any side effects.  I recommend continue on the current dose with no changes.  We discussed that we could have a conversation about potentially coming off the medications if she was seizure-free for 2 years.  The patient would like to come back next year and potentially have that conversation and then.  She is concerned because she does watch some grandchildren and also needs to drive for  work.    A total of 20 minutes of time was spent on this encounter today.  This includes reviewing patient's records, face-to-face time, and documentation.       Problems Addressed this Visit    None     Visit Diagnoses     Nonintractable epilepsy without status epilepticus, unspecified epilepsy type (HCC)    -  Primary      Diagnoses       Codes Comments    Nonintractable epilepsy without status epilepticus, unspecified epilepsy type (HCC)    -  Primary ICD-10-CM: G40.909  ICD-9-CM: 345.90               **Please note that the patient is only taking 1,000 mg daily of LEV ER.  Not 2,000 mg daily.**

## 2022-08-24 NOTE — PATIENT INSTRUCTIONS
[facility]  Cassidy Alexander MD  Neurology clinic  [phone]    With anti-seizure medications, you may initially notice side effects of fatigue, drowsiness, unsteadiness, and dizziness.  Other possible side effects include nausea, abdominal pain, headache, blurry or double vision, slurred speech and mood changes.  Generally, patients will noticed these symptoms when the medication is first started or with higher doses and will go away with time.    It is import to consistently take your medication every day.  Missing just one dose may put you at risk for a breakthrough seizure.  Consider using reminders on your phone or a pill box.    If you develop a rash, please call the neurology clinic immediately or notify another healthcare professional, as this may be potentially life-threatening.  If you are unable to reach a healthcare professional, go to the emergency room immediately for further evaluation.    If you develop thoughts of wanting to hurt yourself or others, please call the neurology clinic immediately to notify another healthcare professional.  If you are unable to reach a healthcare professional, go to the emergency room immediately for further evaluation.    Taking anti-seizure medications may increase the risk of birth defects.  If you are a female of child-bearing potential, it is recommended that you take folic acid (1-4 mg) daily.  This may reduce the risk of birth defects while pregnant and taking seizure medication.  If you become pregnant, contact our office immediately. You will need to be followed very closely (at least monthly appointments).  I also recommend contacting The North American Antiepileptic Drug Pregnancy Registry at www.aedpregnancyregistry.org or 1-419.870.9718.    It is the [place] state law that you cannot drive within 90 days of a seizure.    You should avoid certain activities that if you were to have a seizure, you could harm yourself or others. In  general, it is recommended that you avoid operating heavy machinery or power tools, swimming or taking baths by yourself (showers are ok), don't stand over open flames, don't get on high ladders or the roof.  I also recommend to avoid sleeping on your stomach.    For further information on epilepsy and resources available to patients and their families, please visit the [facility] at www.efky.org or call [phone].    **Check out the [facility]psy [facility]'s monthly Art Group Gathering.  They are [facility] at [facility], [address] [place] [address].  Call Niesha Lyons at [phone] or email her at [email or web address] for the dates of future gatherings.**      **If you have having memory problems, consider HOBSCOTCH (Home-Based Self-management and Cognitive Training Changes lives).  It is an 8 week self-management program for adults with epilepsy and memory problems.  The program is free at the Epilepsy [facility].  Contact Nara Ken at [phone] or [email or web address].**    Check out My Epilepsy Story ([email or web address]).  Their goal is to foster the growth of young girls and women affected by epilepsy and encourage them not just to lives, but to THRIVE!  You can get involved by donating, sharing your story, or becoming an ambassador.  Services include educational resources and transportation.

## 2022-09-06 ENCOUNTER — TELEPHONE (OUTPATIENT)
Dept: NEUROLOGY | Facility: CLINIC | Age: 67
End: 2022-09-06

## 2022-09-06 NOTE — TELEPHONE ENCOUNTER
Caller: LAVERN IVAN     Best call back number: [phone]      What was the call regarding: PT SAW IN MY CHART MISTAKE IN PROGRESS NOTE/ IT STATED  RX. LEVETIRACETAM  SHE TAKES 2000 MG PER DAY PT TAKES 1000 MG PER DAY ( 2 X 500)  TAB EVENING.     Do you require a callback: YES TO LET PT KNOW WHEN CORRECTED.       PLEASE ADVISE

## 2022-09-06 NOTE — TELEPHONE ENCOUNTER
PER PATIENT SHE IS TAKING A TOTAL OF 1000 MG OF KEPPRA DAILY, HOWEVER, YOUR NOTE SHE IS TAKING 2000 MG DAILY    SHE IS WANTING TO KNOW IF YOU CAN EITHER CHANGE THE NOTE OR MAKE AN ADDENDUM TO THE NOTE

## 2022-09-07 NOTE — TELEPHONE ENCOUNTER
"That is unfortunately a common error with dictation when I say that we switched \"to\", it will dictate the number 2.  I will correct.    "

## 2023-01-02 RX ORDER — ROSUVASTATIN CALCIUM 10 MG/1
10 TABLET, COATED ORAL NIGHTLY
Qty: 90 TABLET | Refills: 1 | Status: SHIPPED | OUTPATIENT
Start: 2023-01-02 | End: 2023-01-06 | Stop reason: SDUPTHER

## 2023-01-06 ENCOUNTER — OFFICE VISIT (OUTPATIENT)
Dept: INTERNAL MEDICINE | Facility: CLINIC | Age: 68
End: 2023-01-06
Payer: COMMERCIAL

## 2023-01-06 VITALS
WEIGHT: 250 LBS | TEMPERATURE: 96.4 F | DIASTOLIC BLOOD PRESSURE: 74 MMHG | HEIGHT: 66 IN | OXYGEN SATURATION: 96 % | SYSTOLIC BLOOD PRESSURE: 124 MMHG | BODY MASS INDEX: 40.18 KG/M2 | HEART RATE: 86 BPM

## 2023-01-06 DIAGNOSIS — G56.01 CARPAL TUNNEL SYNDROME OF RIGHT WRIST: ICD-10-CM

## 2023-01-06 DIAGNOSIS — E78.00 PURE HYPERCHOLESTEROLEMIA: Primary | ICD-10-CM

## 2023-01-06 PROCEDURE — 99213 OFFICE O/P EST LOW 20 MIN: CPT | Performed by: FAMILY MEDICINE

## 2023-01-06 PROCEDURE — 90677 PCV20 VACCINE IM: CPT | Performed by: FAMILY MEDICINE

## 2023-01-06 PROCEDURE — 90471 IMMUNIZATION ADMIN: CPT | Performed by: FAMILY MEDICINE

## 2023-01-06 RX ORDER — ROSUVASTATIN CALCIUM 10 MG/1
10 TABLET, COATED ORAL NIGHTLY
Qty: 90 TABLET | Refills: 1 | Status: SHIPPED | OUTPATIENT
Start: 2023-01-06 | End: 2023-01-06 | Stop reason: SDUPTHER

## 2023-01-06 RX ORDER — ROSUVASTATIN CALCIUM 10 MG/1
10 TABLET, COATED ORAL NIGHTLY
Qty: 90 TABLET | Refills: 1 | Status: SHIPPED | OUTPATIENT
Start: 2023-01-06

## 2023-01-06 NOTE — PROGRESS NOTES
Subjective   Liza Aaron is a 67 y.o. female.   Chief Complaint   Patient presents with   • Hyperlipidemia   • Right hand tingling       History of Present Illness     1.  Hyperlipidemia-on rosuvastatin 10 mg a day.  She takes it every day.  She has no side effects.   from 80, HDL 67.  No side effects.  LFTs normal.  No chest pain, no shortness of breath, no palpitations. She gained 18 pounds from last office visit.  She was less physically active in December, but started to exercise a week ago.  She  is committed to improve her diet. She was successful in the past with weight watchers and is going to use it.    2.  Right hand numbness and tingling- started years ago.  Getting worse in the last few months.  It is on and off.   When she shakes her hand it helps.  There is no pain in the hand.    The following portions of the patient's history were reviewed and updated as appropriate: allergies, current medications, past family history, past medical history, past social history, past surgical history and problem list.    Review of Systems   Respiratory: Negative for shortness of breath.    Cardiovascular: Negative for chest pain and palpitations.   Musculoskeletal: Negative for myalgias.         Objective   Wt Readings from Last 3 Encounters:   01/06/23 113 kg (250 lb)   08/24/22 105 kg (232 lb 3.2 oz)   07/02/22 107 kg (236 lb)      Vitals:    01/06/23 0847   BP: 124/74   Pulse: 86   Temp: 96.4 °F (35.8 °C)   SpO2: 96%     Temp Readings from Last 3 Encounters:   01/06/23 96.4 °F (35.8 °C)   07/02/22 97.3 °F (36.3 °C) (Temporal)   06/28/22 97.3 °F (36.3 °C)     BP Readings from Last 3 Encounters:   01/06/23 124/74   08/24/22 134/74   07/02/22 122/84     Pulse Readings from Last 3 Encounters:   01/06/23 86   08/24/22 77   07/02/22 73     Body mass index is 40.37 kg/m².    Physical Exam  Constitutional:       Appearance: She is well-developed.   Neck:      Thyroid: No thyromegaly.      Vascular: No carotid  bruit.   Cardiovascular:      Rate and Rhythm: Normal rate and regular rhythm.      Heart sounds: Normal heart sounds.   Pulmonary:      Effort: Pulmonary effort is normal.      Breath sounds: Normal breath sounds.   Musculoskeletal:      Cervical back: Neck supple.      Comments: Mm strength 5/5 BL x UE.  Tinel and Phalen negative bilaterally.   Skin:     General: Skin is warm and dry.   Neurological:      Mental Status: She is alert and oriented to person, place, and time.   Psychiatric:         Behavior: Behavior normal.         Assessment & Plan   Diagnoses and all orders for this visit:    1. Pure hypercholesterolemia (Primary)    2. Carpal tunnel syndrome of right wrist  -     Ambulatory Referral to Physical Therapy    Other orders  -     Discontinue: rosuvastatin (CRESTOR) 10 MG tablet; Take 1 tablet by mouth Every Night.  Dispense: 90 tablet; Refill: 1  -     rosuvastatin (CRESTOR) 10 MG tablet; Take 1 tablet by mouth Every Night.  Dispense: 90 tablet; Refill: 1        1.  Hyperlipidemia- we will continue current treatment.  He will work on improving diet.  Follow-up in 6 months.  2.  Carpal tunnel syndrome - modification off work environment, splint for carpal tunnel can be helpful, will refer to PT.  If no improvement will refer to hand surgeon.

## 2023-01-18 ENCOUNTER — PHARMACOGENOMICS (OUTPATIENT)
Dept: PHARMACY | Facility: HOSPITAL | Age: 68
End: 2023-01-18
Payer: COMMERCIAL

## 2023-01-18 ENCOUNTER — TELEPHONE (OUTPATIENT)
Dept: PHARMACY | Facility: HOSPITAL | Age: 68
End: 2023-01-18
Payer: COMMERCIAL

## 2023-01-20 ENCOUNTER — TELEPHONE (OUTPATIENT)
Dept: PHARMACY | Facility: HOSPITAL | Age: 68
End: 2023-01-20
Payer: COMMERCIAL

## 2023-01-20 NOTE — TELEPHONE ENCOUNTER
Have made 2 attempts to contact patient to discuss pharmacogenomics results. Left message for patient to call back.

## 2023-01-20 NOTE — TELEPHONE ENCOUNTER
Discussed pharmacogenomics results with patient. Patient verbalized understanding and all questions addressed.

## 2023-01-30 ENCOUNTER — TREATMENT (OUTPATIENT)
Dept: PHYSICAL THERAPY | Facility: CLINIC | Age: 68
End: 2023-01-30
Payer: COMMERCIAL

## 2023-01-30 DIAGNOSIS — G56.01 CARPAL TUNNEL SYNDROME OF RIGHT WRIST: Primary | ICD-10-CM

## 2023-01-30 PROCEDURE — 97161 PT EVAL LOW COMPLEX 20 MIN: CPT | Performed by: PHYSICAL THERAPIST

## 2023-01-30 PROCEDURE — 97530 THERAPEUTIC ACTIVITIES: CPT | Performed by: PHYSICAL THERAPIST

## 2023-02-08 ENCOUNTER — TREATMENT (OUTPATIENT)
Dept: PHYSICAL THERAPY | Facility: CLINIC | Age: 68
End: 2023-02-08
Payer: COMMERCIAL

## 2023-02-08 DIAGNOSIS — G56.01 CARPAL TUNNEL SYNDROME OF RIGHT WRIST: Primary | ICD-10-CM

## 2023-02-08 PROCEDURE — 97110 THERAPEUTIC EXERCISES: CPT | Performed by: PHYSICAL THERAPIST

## 2023-02-08 PROCEDURE — 97140 MANUAL THERAPY 1/> REGIONS: CPT | Performed by: PHYSICAL THERAPIST

## 2023-02-08 NOTE — PROGRESS NOTES
Physical Therapy Daily Treatment Note      Patient: Liza Aaron   : 1955  Referring practitioner: Minnie Whaley MD  Date of Initial Visit: Type: THERAPY  Noted: 2023  Today's Date: 2023  Patient seen for 2 sessions       Visit Diagnoses:    ICD-10-CM ICD-9-CM   1. Carpal tunnel syndrome of right wrist  G56.01 354.0       Subjective   Pt states she switched from two pillows to one pillow at night, and symptoms were reduced. Had some nights that she didn't wake with symptoms at all.     Objective   See Exercise, Manual, and Modality Logs for complete treatment.       Assessment/Plan    Trial of cervical traction, tolerated well. Will assess effect at next visit.         Timed:         Manual Therapy:    23     mins  62046;     Therapeutic Exercise:    10     mins  17741;     Neuromuscular Fide:    0    mins  29546;    Therapeutic Activity:     0     mins  44509;     Gait Trainin     mins  87730;     Ultrasound:     0     mins  31625;    Ionto                               0    mins   15063  Self Care                       0     mins   76264  Canalith Repos    0     mins 90610      Un-Timed:  Electrical Stimulation:    0     mins  86753 ( );  Dry Needling     0     mins self-pay  Traction     0     mins 24665      Timed Treatment:   33   mins   Total Treatment:     43   mins    Nara Smith, PT  [place] License: [id number]

## 2023-02-13 ENCOUNTER — TELEPHONE (OUTPATIENT)
Dept: INTERNAL MEDICINE | Facility: CLINIC | Age: 68
End: 2023-02-13
Payer: COMMERCIAL

## 2023-02-13 ENCOUNTER — TELEPHONE (OUTPATIENT)
Dept: GASTROENTEROLOGY | Facility: CLINIC | Age: 68
End: 2023-02-13
Payer: COMMERCIAL

## 2023-02-13 NOTE — TELEPHONE ENCOUNTER
Caller: Liza Aaron    Relationship: Self    Best call back number: [phone]    What orders are you requesting (i.e. lab or imaging): COLONOSCOPY    In what timeframe would the patient need to come in: BY 2024    Where will you receive your lab/imaging services: DR SALES    Additional notes: PATIENT CALLING TO GET IN AS DR SALES IS HARD TO GET AN APPT AND IT MAY BE QUITE A WHILE OUT BEFORE SHE CAN BE SEEN.

## 2023-02-13 NOTE — TELEPHONE ENCOUNTER
Caller: Liza Aaron    Relationship to patient: Self    Best call back number: [phone]    Patient is needing: PT CALLED TO SEE WHEN HER RECALL FOR COLONOSCOPY WAS. INFORMED HER THAT IT IS FOR 5/12/2024. PT WOULD LIKE TO SCHEDULE SOONER IF POSSIBLE. PT STATED WHILE LOOKING THROUGH HER CHART SHE NOTICED THAT DURING HER COLONOSCOPY ON  5/12/2021 STATED THAT SHE HAD 4 POLYPS REMOVED AND PATHOLOGY REPORT ONLY HAS 3 ON THERE. WOULD LIKE TO DISCUSS RESULTS AND SCHEDULING. CALL BACK ANYTIME AND OK TO [facility].

## 2023-02-14 ENCOUNTER — PREP FOR SURGERY (OUTPATIENT)
Dept: OTHER | Facility: HOSPITAL | Age: 68
End: 2023-02-14
Payer: COMMERCIAL

## 2023-02-14 DIAGNOSIS — Z86.010 PERSONAL HISTORY OF COLONIC POLYPS: Primary | ICD-10-CM

## 2023-02-14 NOTE — TELEPHONE ENCOUNTER
Ok to do sooner but pls reassure her that her family history does not significantly increase her risk of colon cancer.  I have entered the case request.  Brijesh - can you please assist with scheduling at [facility]

## 2023-02-14 NOTE — TELEPHONE ENCOUNTER
Called pt and advised per path results from 05/12/2021 that it was recommended she have next c/s in 3 yrs  (05/12/2024). Pt verb understanding but states she is concerned about cancer.  States she thinks her grandmother had colon cancer and her mother had breast cancer.  Pt would like to get c/s sooner.  Pt denies any current gi symptoms. Advised will send message to Dr Flores.

## 2023-02-17 ENCOUNTER — TELEPHONE (OUTPATIENT)
Dept: PHYSICAL THERAPY | Facility: CLINIC | Age: 68
End: 2023-02-17

## 2023-02-22 ENCOUNTER — APPOINTMENT (OUTPATIENT)
Dept: WOMENS IMAGING | Facility: HOSPITAL | Age: 68
End: 2023-02-22
Payer: COMMERCIAL

## 2023-02-22 ENCOUNTER — TREATMENT (OUTPATIENT)
Dept: PHYSICAL THERAPY | Facility: CLINIC | Age: 68
End: 2023-02-22
Payer: COMMERCIAL

## 2023-02-22 DIAGNOSIS — G56.01 CARPAL TUNNEL SYNDROME OF RIGHT WRIST: Primary | ICD-10-CM

## 2023-02-22 PROCEDURE — 77067 SCR MAMMO BI INCL CAD: CPT | Performed by: RADIOLOGY

## 2023-02-22 PROCEDURE — 77063 BREAST TOMOSYNTHESIS BI: CPT | Performed by: RADIOLOGY

## 2023-02-22 PROCEDURE — 97140 MANUAL THERAPY 1/> REGIONS: CPT | Performed by: PHYSICAL THERAPIST

## 2023-02-22 PROCEDURE — 97530 THERAPEUTIC ACTIVITIES: CPT | Performed by: PHYSICAL THERAPIST

## 2023-02-22 PROCEDURE — 97110 THERAPEUTIC EXERCISES: CPT | Performed by: PHYSICAL THERAPIST

## 2023-02-22 NOTE — PROGRESS NOTES
Physical Therapy Daily Treatment Note      Patient: Liza Aaron   : 1955  Referring practitioner: Minnie Whaley MD  Date of Initial Visit: Type: THERAPY  Noted: 2023  Today's Date: 2023  Patient seen for 3 sessions       Visit Diagnoses:    ICD-10-CM ICD-9-CM   1. Carpal tunnel syndrome of right wrist  G56.01 354.0       Subjective   Pt states that symptoms are much less frequent.     Objective   See Exercise, Manual, and Modality Logs for complete treatment.       Assessment/Plan    Subjectively, pt reports no increase of pain or discomfort with interventions performed today. Performed well with continued functional strengthening interventions. Continues to demonstrate good tolerance to exercise progressions. Continues to benefit from verbal/tactile cues to ensure proper form and technique for exercise performance.        Timed:         Manual Therapy:    10     mins  74821;     Therapeutic Exercise:    10     mins  28924;     Neuromuscular Fide:    0    mins  20269;    Therapeutic Activity:     12     mins  27853;     Gait Trainin     mins  30980;     Ultrasound:     0     mins  54018;    Ionto                               0    mins   28485  Self Care                       0     mins   42324  Canalith Repos    0     mins 03458      Un-Timed:  Electrical Stimulation:    0     mins  08589 ( );  Dry Needling     0     mins self-pay  Traction     0     mins 41578      Timed Treatment:   32   mins   Total Treatment:     42   mins    Nara Smith PT  [place] License: [id number]

## 2023-02-27 ENCOUNTER — TELEPHONE (OUTPATIENT)
Dept: ORTHOPEDIC SURGERY | Facility: CLINIC | Age: 68
End: 2023-02-27
Payer: COMMERCIAL

## 2023-02-27 NOTE — TELEPHONE ENCOUNTER
Caller: Liza Aaron     Relationship: SELF    Best call back number: [phone]    What is your medical concern? PT IS SCHD FOR DENTIST APPT 03/01/23 AND SHE IS NEEDING ANTIBIOTIC TO BE SENT TO [facility]  [phone]. PLEASE CALL PT WHEN DONE PER PT REQUEST

## 2023-02-27 NOTE — TELEPHONE ENCOUNTER
Left message for patient she will not need antibiotics as she is more than 3 years post op on a joint replacement

## 2023-03-01 ENCOUNTER — TREATMENT (OUTPATIENT)
Dept: PHYSICAL THERAPY | Facility: CLINIC | Age: 68
End: 2023-03-01
Payer: COMMERCIAL

## 2023-03-01 DIAGNOSIS — G56.01 CARPAL TUNNEL SYNDROME OF RIGHT WRIST: Primary | ICD-10-CM

## 2023-03-01 PROCEDURE — 97140 MANUAL THERAPY 1/> REGIONS: CPT | Performed by: PHYSICAL THERAPIST

## 2023-03-01 PROCEDURE — 97110 THERAPEUTIC EXERCISES: CPT | Performed by: PHYSICAL THERAPIST

## 2023-03-01 PROCEDURE — 97530 THERAPEUTIC ACTIVITIES: CPT | Performed by: PHYSICAL THERAPIST

## 2023-03-01 NOTE — PROGRESS NOTES
Physical Therapy Progress Note  Patient: Liza Aaron   : 1955  Diagnosis/ICD-10 Code:  Carpal tunnel syndrome of right wrist [G56.01]  Referring practitioner: Minnie Whaley MD  Date of Initial Visit: Type: THERAPY  Noted: 2023  Today's Date: 3/1/2023  Patient seen for 4 sessions         Visit Diagnoses:    ICD-10-CM ICD-9-CM   1. Carpal tunnel syndrome of right wrist  G56.01 354.0         Liza Aaron reports: symptoms are improved 75%.   Subjective Questionnaire: QuickDASH: 15.91% limitation (improved from 22.5% at initial evaluation)  Clinical Progress: improved  Home Program Compliance: Yes  Treatment has included: therapeutic exercise, neuromuscular re-education, manual therapy, therapeutic activity and moist heat      Subjective       Objective          Strength/Myotome Testing     Left Shoulder     Isolated Muscles   Lower trapezius: 4   Middle trapezius: 4   Serratus anterior: 4     Right Shoulder     Isolated Muscles   Lower trapezius: 4   Middle trapezius: 4   Serratus anterior: 4           Assessment/Plan    Short Term Goals: 6 weeks. Patient will:  1. Be independent with initial HEP (MET)  2. Be instructed in posture and body mechanics. (MET)  3. Report 50% improvement in paresthesia symptoms. (MET)    Long Term Goals: 12 weeks. Pt will:  1. Demonstrate improved Right UE MMT of >/= 4+/5 to allow for performance of ADL's/household management/recreational activities. (PROGRESSING)  2. Pt able to reach overhead and lift 10# to allow for return to doing home/yard/recreational activities with min to no pain. (MET)  3. Report perceived disability </=10% based on QuickDASH (PROGRESSING)  4. Report 90% improvement in frequency of paresthesia symptoms. (PROGRESSING)     Recommendations: Continue as needed over the next couple of months.   Timeframe: 1 month  Prognosis to achieve goals: good      Timed:         Manual Therapy:    10     mins  10779;     Therapeutic Exercise:    10     mins   33935;     Neuromuscular Fide:    0    mins  19597;    Therapeutic Activity:     10     mins  29488;     Gait Trainin     mins  17456;     Ultrasound:     0     mins  35899;    Ionto                               0    mins   71292  Self Care                       0     mins   52920  Canalith Repos    0     mins 55492      Un-Timed:  Electrical Stimulation:    0     mins  85146 ( );  Dry Needling     0     mins self-pay  Traction     0     mins 90156  Re-Eval                           0    mins  53348      Timed Treatment:   30   mins   Total Treatment:     40   mins          PT: Nara Smith, PT     [place] License:  [id number]    Electronically signed by Nara Smith, PT, 23, 11:23 AM EST

## 2023-03-23 ENCOUNTER — TELEPHONE (OUTPATIENT)
Dept: GASTROENTEROLOGY | Facility: CLINIC | Age: 68
End: 2023-03-23
Payer: COMMERCIAL

## 2023-03-23 NOTE — TELEPHONE ENCOUNTER
"    Caller: Liza Aaron \"Liza Aaron\"    Relationship to patient: Self    Best call back number: [phone]    Patient is needing: PATIENT IS RETURNING CALL FROM [facility] TO SCHEDULE COLONOSCOPY. CALL BACK ANYTIME AND OK TO [facility].           "

## 2023-03-24 ENCOUNTER — TELEPHONE (OUTPATIENT)
Dept: GASTROENTEROLOGY | Facility: CLINIC | Age: 68
End: 2023-03-24
Payer: COMMERCIAL

## 2023-03-24 NOTE — TELEPHONE ENCOUNTER
"UNABLE TO WARM TRANSFER CALL    Caller: Liza Aaron \"Liza Aaron\"    Relationship to patient: Self    Best call back number: [phone]    Patient is needing: PATIENT CALLING TO SCHEDULE COLONOSCOPY WITH DR. SALES. PLEASE CALL PATIENT. THANK YOU          "

## 2023-04-10 DIAGNOSIS — R79.89 ABNORMAL THYROID BLOOD TEST: Primary | ICD-10-CM

## 2023-04-11 LAB — TSH SERPL DL<=0.005 MIU/L-ACNC: 4.23 UIU/ML (ref 0.45–4.5)

## 2023-04-12 ENCOUNTER — TELEPHONE (OUTPATIENT)
Dept: GASTROENTEROLOGY | Facility: CLINIC | Age: 68
End: 2023-04-12
Payer: COMMERCIAL

## 2023-04-12 NOTE — TELEPHONE ENCOUNTER
"Caller: Lzia Aaron \"Liza Aaron\"     Relationship to patient: Self     Best call back number: [phone]     Patient is needing: PATIENT IS RETURNING CALL FROM [facility] TO SCHEDULE COLONOSCOPY. CALL BACK ANYTIME AND OK TO [facility].   "

## 2023-04-17 ENCOUNTER — TELEPHONE (OUTPATIENT)
Dept: NEUROLOGY | Facility: CLINIC | Age: 68
End: 2023-04-17
Payer: COMMERCIAL

## 2023-04-17 RX ORDER — LEVETIRACETAM 500 MG/1
1000 TABLET, EXTENDED RELEASE ORAL DAILY
Qty: 60 TABLET | Refills: 0 | Status: SHIPPED | OUTPATIENT
Start: 2023-04-17

## 2023-04-17 RX ORDER — ROSUVASTATIN CALCIUM 10 MG/1
10 TABLET, COATED ORAL NIGHTLY
Qty: 30 TABLET | Refills: 0 | Status: SHIPPED | OUTPATIENT
Start: 2023-04-17

## 2023-04-17 NOTE — TELEPHONE ENCOUNTER
Provider:  KORY  Caller: LAVERN  [facility]  Address: [address], [place] [address]  PHONE: [phone]  Phone Number: [phone]  Reason for Call: PT CALLED AND IS OUT OF TOWN AND NEEDING HER KEPPRA SENT TO A PHARMACY WHERE SHE IS AS SHE IS COMPLETELY OUT.  PHARMACY:  [facility] [id number], [place] [address]  PHONE: [phone]

## 2023-04-17 NOTE — TELEPHONE ENCOUNTER
"  Caller: Liza Aaron \"Lizaevans [place]abbton\"    Relationship: Self    Best call back number: [phone]    Requested Prescriptions:   Requested Prescriptions     Pending Prescriptions Disp Refills   • rosuvastatin (CRESTOR) 10 MG tablet 90 tablet 1     Sig: Take 1 tablet by mouth Every Night.        Pharmacy where request should be sent: [id number] - [place], [place] - [address] AT [address]  [phone] [facility] [phone] FX     Last office visit with prescribing clinician: 1/6/2023   Last telemedicine visit with prescribing clinician: 7/10/2023   Next office visit with prescribing clinician: 7/17/2023     Additional details provided by patient: THE PATIENT IS CURRENTLY IN [place] AND LEFT THIS MEDICATION AT HOME BY MISTAKE. THE PATIENT WOULD LIKE A REFILL CALLED IN, IF POSSIBLE. PLEASE ADVISE.     Does the patient have less than a 3 day supply:  [x] Yes  [] No    Would you like a call back once the refill request has been completed: [x] Yes [] No    If the office needs to give you a call back, can they leave a voicemail: [x] Yes [] No    Bonilla Restrepo Rep   04/17/23 09:50 EDT       "

## 2023-04-18 ENCOUNTER — TELEPHONE (OUTPATIENT)
Dept: GASTROENTEROLOGY | Facility: CLINIC | Age: 68
End: 2023-04-18
Payer: COMMERCIAL

## 2023-04-18 PROBLEM — Z86.0100 PERSONAL HISTORY OF COLONIC POLYPS: Status: ACTIVE | Noted: 2023-04-18

## 2023-04-18 PROBLEM — Z86.010 PERSONAL HISTORY OF COLONIC POLYPS: Status: ACTIVE | Noted: 2023-04-18

## 2023-04-18 NOTE — TELEPHONE ENCOUNTER
"  Hub staff attempted to follow warm transfer process and was unsuccessful     Caller: Liza Aaron \"Liza Aaron\"    Relationship to patient: Self    Best call back number: [phone]    Patient is needing: PT RETURNED CALL TO SCHEDULE COLONOSCOPY..STATED THAT WHEN SHE CALLS BACK THE PHONE JUST RINGS NO CONNECTION TO A PERSON FOR THE PROCEDURE OPTION.      PLEASE CALL PT TO SCHEDULE SHE IS AVAILABLE TO TAKE CALLS TODAY AND TOMORROW    "

## 2023-04-18 NOTE — TELEPHONE ENCOUNTER
JESS KEY  for colonoscopy on 10/17/2023  arrive at  1030AM  . Mailed Prep instructions to Mailing address on-file. ----miralax

## 2023-05-17 RX ORDER — LEVETIRACETAM 500 MG/1
1000 TABLET, EXTENDED RELEASE ORAL DAILY
Qty: 180 TABLET | Refills: 3 | Status: SHIPPED | OUTPATIENT
Start: 2023-05-17

## 2023-05-17 RX ORDER — ROSUVASTATIN CALCIUM 10 MG/1
10 TABLET, COATED ORAL NIGHTLY
Qty: 90 TABLET | Refills: 1 | Status: SHIPPED | OUTPATIENT
Start: 2023-05-17

## 2023-08-23 ENCOUNTER — OFFICE VISIT (OUTPATIENT)
Dept: NEUROLOGY | Facility: CLINIC | Age: 68
End: 2023-08-23
Payer: COMMERCIAL

## 2023-08-23 VITALS
BODY MASS INDEX: 39.86 KG/M2 | DIASTOLIC BLOOD PRESSURE: 100 MMHG | HEIGHT: 66 IN | OXYGEN SATURATION: 93 % | WEIGHT: 248 LBS | SYSTOLIC BLOOD PRESSURE: 130 MMHG | HEART RATE: 87 BPM

## 2023-08-23 DIAGNOSIS — G40.909 NONINTRACTABLE EPILEPSY WITHOUT STATUS EPILEPTICUS, UNSPECIFIED EPILEPSY TYPE: Primary | ICD-10-CM

## 2023-08-23 PROCEDURE — 99215 OFFICE O/P EST HI 40 MIN: CPT | Performed by: PSYCHIATRY & NEUROLOGY

## 2023-08-23 NOTE — PATIENT INSTRUCTIONS
[facility]  Cassidy Alexander MD  Neurology clinic  [phone]    With anti-seizure medications, you may initially notice side effects of fatigue, drowsiness, unsteadiness, and dizziness.  Other possible side effects include nausea, abdominal pain, headache, blurry or double vision, slurred speech and mood changes.  Generally, patients will noticed these symptoms when the medication is first started or with higher doses and will go away with time.    It is import to consistently take your medication every day.  Missing just one dose may put you at risk for a breakthrough seizure.  Consider using reminders on your phone or a pill box.    If you develop a rash, please call the neurology clinic immediately or notify another healthcare professional, as this may be potentially life-threatening.  If you are unable to reach a healthcare professional, go to the emergency room immediately for further evaluation.    If you develop thoughts of wanting to hurt yourself or others, please call the neurology clinic immediately to notify another healthcare professional.  If you are unable to reach a healthcare professional, go to the emergency room immediately for further evaluation.    Taking anti-seizure medications may increase the risk of birth defects.  If you are a female of child-bearing potential, it is recommended that you take folic acid (1-4 mg) daily.  This may reduce the risk of birth defects while pregnant and taking seizure medication.  If you become pregnant, contact our office immediately. You will need to be followed very closely (at least monthly appointments).  I also recommend contacting The North American Antiepileptic Drug Pregnancy Registry at www.aedpregnancyregistry.org or 1-928.126.4495.    It is the [place] state law that you cannot drive within 90 days of a seizure.    You should avoid certain activities that if you were to have a seizure, you could harm yourself or others. In  general, it is recommended that you avoid operating heavy machinery or power tools, swimming or taking baths by yourself (showers are ok), don't stand over open flames, don't get on high ladders or the roof.  I also recommend to avoid sleeping on your stomach.    For further information on epilepsy and resources available to patients and their families, please visit the [facility] at www.efky.org or call [phone].    **Check out the [facility]'s monthly Art Group Gathering.  They are located at [facility] [place], [address] [place] [address].  Call Niesha Eloy at [phone] or email her at [email or web address] for the dates of future gatherings.**      **If you have having memory problems, consider HOBSCOTCH (Home-Based Self-management and Cognitive Training Changes lives).  It is an 8 week self-management program for adults with epilepsy and memory problems.  The program is free at the Epile[facility].  Contact Nara Ken at [phone] or [email or web address].**    Check out My Epilepsy Story (MyEpilepsyStory.org).  Their goal is to foster the growth of young girls and women affected by epilepsy and encourage them not just to lives, but to THRIVE!  You can get involved by donating, sharing your story, or becoming an ambassador.  Services include educational resources and transportation.          **Check out their seizure first aid training and certification courses.    **Consider calling the Epilepsy Foundation's 24/7 helpline if you have questions:  1-340.334.9700      **If you are interested in the Ketogenic Diet, check out [email or web address].[email or web address].      **Consider seizure monitoring wrist-worn wearable devices.  You can download apps to your Apple Watch like [employer] or purchase the Opternative device.

## 2023-08-23 NOTE — PROGRESS NOTES
Subjective:     Patient ID: Liza Aaron is a 68 y.o. female.    History of Present Illness  Ms. Aaron is a 68-year-old right-handed female with history of arthritis, hyperlipidemia, and seizures who presents to the neurology clinic today as an established patient for follow-up.  The patient was last seen on August 24, 2022.  She was a new patient back in January 2021.  For details regarding her history, please refer to that note.  The patient had 1 definitive seizure on December 9, 2020.  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  Her brain MRI in December 2020 did not show anything potentially epileptogenic.  She also had a routine EEG that was also normal.  She is currently on levetiracetam extended release 1000 mg in the evening.  She denies any side effects the medication reports that the medication is affordable.  She denies any seizures since her last visit.  The following portions of the patient's history were reviewed and updated as appropriate: allergies, current medications, past family history, past medical history, past social history, past surgical history, and problem list.    Review of Systems     Objective:    Neurological Exam    Physical Exam    Assessment/Plan:    The patient is a 68-year-old right-handed female with history of arthritis, hyperlipidemia, and seizures who presents today for follow-up.    1.  Unknown epilepsy-it is difficult to conclude if the patient has focal onset or primary generalized seizures.  Her clinical presentation seems more consistent with a primary generalized epilepsy since it started in adolescence and she has had few seizures that respond well to low doses of antiseizure medications.  She has been seizure-free for over 2 years now.  We discussed potentially coming off the medication.  According to the online seizure prediction tool, her risk of seizure recurrence after antiseizure medication withdrawal in 2 years is 73%  and 84% in 5 years.  The patient reports that she would prefer to stay on the medication.  We will see her back in 1 year or sooner if needed.    A total of 40 minutes of time was spent on this encounter today.  This includes reviewing the patient's records, face-to-face time, documentation.       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

## 2023-09-05 ENCOUNTER — OFFICE VISIT (OUTPATIENT)
Dept: INTERNAL MEDICINE | Facility: CLINIC | Age: 68
End: 2023-09-05
Payer: COMMERCIAL

## 2023-09-05 VITALS
BODY MASS INDEX: 38.41 KG/M2 | SYSTOLIC BLOOD PRESSURE: 105 MMHG | HEIGHT: 66 IN | TEMPERATURE: 97.8 F | OXYGEN SATURATION: 95 % | WEIGHT: 239 LBS | HEART RATE: 75 BPM | DIASTOLIC BLOOD PRESSURE: 72 MMHG

## 2023-09-05 DIAGNOSIS — E78.00 PURE HYPERCHOLESTEROLEMIA: Primary | ICD-10-CM

## 2023-09-05 DIAGNOSIS — G47.9 TROUBLE IN SLEEPING: ICD-10-CM

## 2023-09-05 PROCEDURE — 99213 OFFICE O/P EST LOW 20 MIN: CPT | Performed by: FAMILY MEDICINE

## 2023-09-05 RX ORDER — ROSUVASTATIN CALCIUM 10 MG/1
10 TABLET, COATED ORAL NIGHTLY
Qty: 90 TABLET | Refills: 1 | Status: SHIPPED | OUTPATIENT
Start: 2023-09-05

## 2023-09-05 NOTE — PROGRESS NOTES
Subjective   Liza Aaron is a 68 y.o. female.   Chief Complaint   Patient presents with    Hyperlipidemia       History of Present Illness     1.  Hyperlipidemia-on rosuvastatin 10 mg a day.  She takes it every day.  She has no side effects.   from 80, HDL 44.  No side effects.  LFTs normal.  No chest pain, no lightheadedness, no palpitations.  She gets SOB when she walks up the hill, she does it infrequently.  She lost 11 pounds from last office visit.  She did it following weight watchers.  She eats healthier.  She does water exercise 4-5 times a week.  1 of those days she does class that includes cardio exercise.  She enjoys it.     2.  Interrupted sleeping-she has interrupted sleeping.  Not sure if she snores.  She wakes up with morning headaches.  They resolve spontaneously.  She was never tested for sleep apnea.  BMI 38.6.    Review of Systems   Respiratory:  Positive for shortness of breath. Negative for cough and wheezing.    Cardiovascular:  Negative for chest pain and palpitations.   Neurological:  Negative for light-headedness.       Objective   Wt Readings from Last 3 Encounters:   09/05/23 108 kg (239 lb)   08/23/23 112 kg (248 lb)   01/06/23 113 kg (250 lb)      Vitals:    09/05/23 1101   BP: 105/72   Pulse: 75   Temp: 97.8 °F (36.6 °C)   SpO2: 95%     Temp Readings from Last 3 Encounters:   09/05/23 97.8 °F (36.6 °C)   01/06/23 96.4 °F (35.8 °C)   07/02/22 97.3 °F (36.3 °C) (Temporal)     BP Readings from Last 3 Encounters:   09/05/23 105/72   08/23/23 130/100   01/06/23 124/74     Pulse Readings from Last 3 Encounters:   09/05/23 75   08/23/23 87   01/06/23 86     Body mass index is 38.59 kg/m².    Physical Exam  Constitutional:       Appearance: She is well-developed.   Neck:      Thyroid: No thyromegaly.      Vascular: No carotid bruit.   Cardiovascular:      Rate and Rhythm: Normal rate and regular rhythm.      Heart sounds: Normal heart sounds.   Pulmonary:      Effort: Pulmonary effort  is normal.      Breath sounds: Normal breath sounds.   Skin:     General: Skin is warm and dry.   Neurological:      Mental Status: She is alert.       Assessment & Plan   Diagnoses and all orders for this visit:    1. Pure hypercholesterolemia (Primary)    2. Trouble in sleeping  -     Ambulatory Referral to Sleep Medicine    Other orders  -     rosuvastatin (CRESTOR) 10 MG tablet; Take 1 tablet by mouth Every Night.  Dispense: 90 tablet; Refill: 1        Hyperlipidemia-continue current treatment.  Follow-up in 6 months.    Interrupted sleeping-referral to sleep specialist.

## 2023-10-10 ENCOUNTER — TELEPHONE (OUTPATIENT)
Dept: GASTROENTEROLOGY | Facility: CLINIC | Age: 68
End: 2023-10-10
Payer: COMMERCIAL

## 2023-10-10 NOTE — TELEPHONE ENCOUNTER
"Hub staff attempted to follow warm transfer process and was unsuccessful     Caller: Liza Aaron \"Liza Aaron\"    Relationship to patient: Self    Best call back number: [phone]    Patient is needing: PT IS ON ROSUVASTATIN GENERIC CRESTOR AND LEVETIRACETAM GENERIC KEPPRA  XR PT TAKES THESE THE NIGHT BEFORE AND WANTED TO KNOW IF SHE IS ABLE TO TAKE THIS MEDICATION WITH A FEW MORE QUESTIONS ABOUT THE PREP.      REQUEST CALL BACK BETWEEN FROM NOW UNTIL 10:30AM TODAY PLEASE.      "

## 2023-10-10 NOTE — TELEPHONE ENCOUNTER
"Hub staff attempted to follow warm transfer process and was unsuccessful     Caller: Liza Aaron \"Liza Aaron\"    Relationship to patient: Self    Best call back number: [phone]    Patient is needing: PT IS ON ROSUVASTATIN GENERIC CRESTOR AND LEVETIRACETAM GENERIC KEPPRA  XR PT TAKES THESE THE NIGHT BEFORE AND WANTED TO KNOW IF SHE IS ABLE TO TAKE THIS MEDICATION WITH A FEW MORE QUESTIONS ABOUT THE PREP.      REQUEST CALL BACK BETWEEN FROM NOW UNTIL 10:30AM TODAY PLEASE.      "

## 2023-10-17 ENCOUNTER — ANESTHESIA EVENT (OUTPATIENT)
Dept: GASTROENTEROLOGY | Facility: HOSPITAL | Age: 68
End: 2023-10-17
Payer: COMMERCIAL

## 2023-10-17 ENCOUNTER — HOSPITAL ENCOUNTER (OUTPATIENT)
Facility: HOSPITAL | Age: 68
Setting detail: HOSPITAL OUTPATIENT SURGERY
Discharge: HOME | End: 2023-10-17
Attending: INTERNAL MEDICINE | Admitting: INTERNAL MEDICINE
Payer: COMMERCIAL

## 2023-10-17 ENCOUNTER — ANESTHESIA (OUTPATIENT)
Dept: GASTROENTEROLOGY | Facility: HOSPITAL | Age: 68
End: 2023-10-17
Payer: COMMERCIAL

## 2023-10-17 VITALS
SYSTOLIC BLOOD PRESSURE: 112 MMHG | OXYGEN SATURATION: 96 % | HEART RATE: 60 BPM | WEIGHT: 236 LBS | BODY MASS INDEX: 37.93 KG/M2 | DIASTOLIC BLOOD PRESSURE: 72 MMHG | RESPIRATION RATE: 16 BRPM | HEIGHT: 66 IN

## 2023-10-17 DIAGNOSIS — Z86.010 PERSONAL HISTORY OF COLONIC POLYPS: ICD-10-CM

## 2023-10-17 PROCEDURE — 25010000002 PROPOFOL 10 MG/ML EMULSION: Performed by: REGISTERED NURSE

## 2023-10-17 PROCEDURE — 25810000003 LACTATED RINGERS PER 1000 ML: Performed by: INTERNAL MEDICINE

## 2023-10-17 PROCEDURE — S0260 H&P FOR SURGERY: HCPCS | Performed by: INTERNAL MEDICINE

## 2023-10-17 PROCEDURE — 88305 TISSUE EXAM BY PATHOLOGIST: CPT | Performed by: INTERNAL MEDICINE

## 2023-10-17 RX ORDER — SODIUM CHLORIDE 0.9 % (FLUSH) 0.9 %
10 SYRINGE (ML) INJECTION AS NEEDED
Status: DISCONTINUED | OUTPATIENT
Start: 2023-10-17 | End: 2023-10-17 | Stop reason: HOSPADM

## 2023-10-17 RX ORDER — LIDOCAINE HYDROCHLORIDE 20 MG/ML
INJECTION, SOLUTION INFILTRATION; PERINEURAL AS NEEDED
Status: DISCONTINUED | OUTPATIENT
Start: 2023-10-17 | End: 2023-10-17 | Stop reason: SURG

## 2023-10-17 RX ORDER — ONDANSETRON 2 MG/ML
4 INJECTION INTRAMUSCULAR; INTRAVENOUS ONCE AS NEEDED
Status: DISCONTINUED | OUTPATIENT
Start: 2023-10-17 | End: 2023-10-17 | Stop reason: HOSPADM

## 2023-10-17 RX ORDER — PROPOFOL 10 MG/ML
VIAL (ML) INTRAVENOUS AS NEEDED
Status: DISCONTINUED | OUTPATIENT
Start: 2023-10-17 | End: 2023-10-17 | Stop reason: SURG

## 2023-10-17 RX ORDER — SODIUM CHLORIDE, SODIUM LACTATE, POTASSIUM CHLORIDE, CALCIUM CHLORIDE 600; 310; 30; 20 MG/100ML; MG/100ML; MG/100ML; MG/100ML
1000 INJECTION, SOLUTION INTRAVENOUS CONTINUOUS
Status: DISCONTINUED | OUTPATIENT
Start: 2023-10-17 | End: 2023-10-17 | Stop reason: HOSPADM

## 2023-10-17 RX ADMIN — LIDOCAINE HYDROCHLORIDE 60 MG: 20 INJECTION, SOLUTION INFILTRATION; PERINEURAL at 11:07

## 2023-10-17 RX ADMIN — PROPOFOL 160 MCG/KG/MIN: 10 INJECTION, EMULSION INTRAVENOUS at 11:09

## 2023-10-17 RX ADMIN — PROPOFOL 100 MG: 10 INJECTION, EMULSION INTRAVENOUS at 11:08

## 2023-10-17 RX ADMIN — PROPOFOL 50 MG: 10 INJECTION, EMULSION INTRAVENOUS at 11:12

## 2023-10-17 RX ADMIN — SODIUM CHLORIDE, POTASSIUM CHLORIDE, SODIUM LACTATE AND CALCIUM CHLORIDE: 600; 310; 30; 20 INJECTION, SOLUTION INTRAVENOUS at 11:03

## 2023-10-17 NOTE — ANESTHESIA PREPROCEDURE EVALUATION
Anesthesia Evaluation     Patient summary reviewed and Nursing notes reviewed                Airway   Mallampati: II  TM distance: >3 FB  Neck ROM: full  Dental      Pulmonary - negative pulmonary ROS   (+) a smoker Former,  Cardiovascular - negative cardio ROS    ECG reviewed  Rhythm: regular  Rate: normal    (+) dysrhythmias PVC, PAC, hyperlipidemia      Neuro/Psych- negative ROS  (+) seizures, syncope  GI/Hepatic/Renal/Endo - negative ROS   (+) obesity    Musculoskeletal (-) negative ROS    Abdominal    Substance History - negative use  (+) alcohol use     OB/GYN negative ob/gyn ROS         Other   arthritis,                 Anesthesia Plan    ASA 3     MAC     intravenous induction     Anesthetic plan, risks, benefits, and alternatives have been provided, discussed and informed consent has been obtained with: patient.    Plan discussed with CRNA.    CODE STATUS:

## 2023-10-17 NOTE — DISCHARGE INSTRUCTIONS
For the next 24 hours patient needs to be with a responsible adult.    For 24 hours DO NOT drive, operate machinery, appliances, drink alcohol, make important decisions or sign legal documents.    Start with a light or bland diet if you are feeling sick to your stomach otherwise advance to regular diet as tolerated.    Follow recommendations on procedure report if provided by your doctor.    Call Dr Flores for problems [phone]    Problems may include but not limited to: large amounts of bleeding, trouble breathing, repeated vomiting, severe unrelieved pain, fever or chills.

## 2023-10-17 NOTE — ANESTHESIA POSTPROCEDURE EVALUATION
Patient: Liza Aaron    Procedure Summary       Date: 10/17/23 Room / Location: [facility] ENDOSCOPY 6 /  [facility]    Anesthesia Start: 1103 Anesthesia Stop: 1132    Procedure: COLONOSCOPY INTO CECUM AND T.I. WITH HOT SNARE POLYPECTOMY Diagnosis:       Personal history of colonic polyps      (Personal history of colonic polyps [Z86.010])    Surgeons: Rosario Flores MD Provider: Gregorio Calderon MD    Anesthesia Type: MAC ASA Status: 3            Anesthesia Type: MAC    Vitals  Vitals Value Taken Time   /72 10/17/23 1152   Temp     Pulse 63 10/17/23 1157   Resp 16 10/17/23 1150   SpO2 97 % 10/17/23 1157   Vitals shown include unfiled device data.        Post Anesthesia Care and Evaluation    Patient location during evaluation: PACU  Patient participation: complete - patient participated  Level of consciousness: awake and alert  Pain management: adequate    Airway patency: patent  Anesthetic complications: No anesthetic complications    Cardiovascular status: acceptable  Respiratory status: acceptable  Hydration status: acceptable    Comments: --------------------            10/17/23               1155     --------------------   BP:                  Pulse:      60       Resp:                SpO2:      96%      --------------------

## 2023-10-17 NOTE — H&P
"[facility]  Pre Procedure History & Physical    Chief Complaint:   H/o adenomatous polyps    Subjective     HPI:   67 yo here today for colonoscopy.  Pt reports no FH CRC/polyps.  Patient denies GI symptoms currently.  Last exam 2021    Past Medical History:   Past Medical History:   Diagnosis Date    Arthritis     Diverticulosis     Family history of coronary artery disease     SAW DR THOMPSON 4/2019    Hip pain     BILATERAL    Hyperglycemia     Hyperlipidemia     Irregular heart beat     OCCASIONAL \"SKIPPED BEAT\"    Pure hypercholesterolemia     Seizure     WHEN IN COLLEGE, NONE SINCE    Umbilical hernia     UTI (urinary tract infection)     FINISHED ANTIBIOTIC    Vitamin D deficiency        Past Surgical History:  Past Surgical History:   Procedure Laterality Date    COLONOSCOPY N/A 12/14/2015    Diverticulosis in the entire examined colon, non-bleeding internal hemorrhodis, no specimens collected-Dr. Rosario Flores    COLONOSCOPY N/A 5/12/2021    Procedure: COLONOSCOPY  into cecum with cold/hot snare and cold bx polypectomies;  Surgeon: Rosario Flores MD;  Location: [facility] ENDOSCOPY;  Service: Gastroenterology;  Laterality: N/A;  pre: screen  post: diverticulosis, polyps, hemorrhoids     TOTAL ABDOMINAL HYSTERECTOMY WITH SALPINGO OOPHORECTOMY Bilateral 12/16/2004    Dr. Dominick Villarreal    TOTAL HIP ARTHROPLASTY Right 2/5/2020    Procedure: TOTAL HIP ARTHROPLASTY;  Surgeon: Cesar Johnson MD;  Location: [facility] MAIN OR;  Service: Orthopedics;  Laterality: Right;    TOTAL HIP ARTHROPLASTY Left 9/21/2020    Procedure: TOTAL HIP ARTHROPLASTY;  Surgeon: Cesar Johnson MD;  Location: [facility] MAIN OR;  Service: Orthopedics;  Laterality: Left;    UMBILICAL HERNIA REPAIR N/A 12/16/2004    Dr. Dominick Villarreal       Family History:  Family History   Problem Relation Age of Onset    Breast cancer Mother     Hypertension Mother     Dementia Mother     Stroke Mother     Heart disease Father     Cancer " "Maternal Grandmother     Heart disease Brother     Heather Chow Neg Hx        Social History:   reports that she quit smoking about 41 years ago. Her smoking use included cigarettes. She has a 5.00 pack-year smoking history. She has never used smokeless tobacco. She reports current alcohol use. She reports that she does not use drugs.    Medications:   Medications Prior to Admission   Medication Sig Dispense Refill Last Dose    Cholecalciferol (VITAMIN D) 25 MCG (1000 UT) tablet Take 1 tablet by mouth Daily.   10/16/2023    Lactobacillus (PROBIOTIC ACIDOPHILUS PO) Take 1 tablet by mouth.   Past Week    levETIRAcetam XR (KEPPRA XR) 500 MG 24 hr tablet Take 2 tablets by mouth Daily. 60 tablet 0 10/16/2023    melatonin 5 MG tablet tablet Take 1 tablet by mouth At Night As Needed.   Past Week    multivitamin with minerals tablet tablet Take 1 tablet by mouth Daily.   10/16/2023    rosuvastatin (CRESTOR) 10 MG tablet Take 1 tablet by mouth Every Night. 90 tablet 1 10/16/2023    Turmeric 500 MG capsule Take  by mouth Daily.   Past Month       Allergies:  Nickel and Penicillins    ROS:    Pertinent items are noted in HPI     Objective     Blood pressure 122/82, pulse 82, resp. rate 18, height 167.6 cm (66\"), weight 107 kg (236 lb), SpO2 98%, not currently breastfeeding.    Physical Exam   Constitutional: Pt is oriented to person, place, and time and well-developed, well-nourished, and in no distress.   Mouth/Throat: Oropharynx is clear and moist.   Neck: Normal range of motion.   Cardiovascular: Normal rate, regular rhythm    Pulmonary/Chest: Effort normal    Abdominal: Soft. Nontender  Skin: Skin is warm and dry.   Psychiatric: Mood, memory, affect and judgment normal.     Assessment & Plan     Diagnosis:  H/o adenomatous polyps    Anticipated Surgical Procedure:  colonoscopy    The risks, benefits, and alternatives of this procedure have been discussed with the patient or the responsible party- the patient " understands and agrees to proceed.

## 2023-10-18 LAB
LAB AP CASE REPORT: NORMAL
PATH REPORT.FINAL DX SPEC: NORMAL
PATH REPORT.GROSS SPEC: NORMAL

## 2023-10-22 NOTE — PROGRESS NOTES
The polyp(s) biopsies showed adenomatous change. This is not cancerous but is considered potentially precancerous. Follow-up colonoscopy in 5 years is advised.    None

## 2023-10-23 ENCOUNTER — TELEPHONE (OUTPATIENT)
Dept: GASTROENTEROLOGY | Facility: CLINIC | Age: 68
End: 2023-10-23
Payer: COMMERCIAL

## 2023-10-23 NOTE — TELEPHONE ENCOUNTER
[facility] Advised to call if any questions.     Colonoscopy placed in  and recall for 10/17/28.

## 2023-10-23 NOTE — TELEPHONE ENCOUNTER
----- Message from Rosario Flores MD sent at 10/22/2023 10:56 AM EDT -----  The polyp(s) biopsies showed adenomatous change. This is not cancerous but is considered potentially precancerous. Follow-up colonoscopy in 5 years is advised.

## 2023-11-03 ENCOUNTER — TELEPHONE (OUTPATIENT)
Dept: NEUROLOGY | Facility: CLINIC | Age: 68
End: 2023-11-03
Payer: COMMERCIAL

## 2023-11-27 ENCOUNTER — TELEPHONE (OUTPATIENT)
Dept: NEUROLOGY | Facility: CLINIC | Age: 68
End: 2023-11-27
Payer: COMMERCIAL

## 2023-11-27 RX ORDER — LEVETIRACETAM 500 MG/1
1000 TABLET, FILM COATED, EXTENDED RELEASE ORAL DAILY
Qty: 30 TABLET | Refills: 0 | Status: SHIPPED | OUTPATIENT
Start: 2023-11-27

## 2023-11-27 NOTE — TELEPHONE ENCOUNTER
Lm for patient letting her know that Dr Alexander is leaving the practice and relocating out of state, we are needing to reschedule her follow up appt with another provider and to please call.  DUE FOR FU IN AUG    Letter and [facility] message sent as well    Needs a 60 min fu with Dr Plummer OR or a 40 min fu with Dr Chadwick, whichever she prefers

## 2023-11-30 ENCOUNTER — TELEPHONE (OUTPATIENT)
Dept: NEUROLOGY | Facility: CLINIC | Age: 68
End: 2023-11-30
Payer: COMMERCIAL

## 2023-11-30 RX ORDER — LEVETIRACETAM 500 MG/1
1000 TABLET, FILM COATED, EXTENDED RELEASE ORAL DAILY
Qty: 360 TABLET | Refills: 3 | Status: SHIPPED | OUTPATIENT
Start: 2023-11-30

## 2023-11-30 NOTE — TELEPHONE ENCOUNTER
"Caller: Liza Aaron \"Liza Aaron\"    Relationship: Self    Best call back number: [phone]    Requested Prescriptions:   Requested Prescriptions     Pending Prescriptions Disp Refills    levETIRAcetam XR (KEPPRA XR) 500 MG tablet 60 tablet 0     Sig: Take 2 tablets by mouth Daily.        Pharmacy where request should be sent: [facility]     Last office visit with prescribing clinician: 8/23/2023   Last telemedicine visit with prescribing clinician: Visit date not found   Next office visit with prescribing clinician: Visit date not found     Additional details provided by patient: PATIENT RECENTLY GOT A REFILL BUT IT WAS ONLY 30 TABLETS WHICH IS A 15 DAY SUPPLY. PLEASE REFILL FOR 90 DAYS AND WHO WILL THE PATIENT BE FOLLOWING UP WITH AFTER DR. HORN LEAVES, WILL IT BE DR. DENNIS?    Does the patient have less than a 3 day supply:  [] Yes  [x] No    Would you like a call back once the refill request has been completed: [] Yes [x] No    If the office needs to give you a call back, can they leave a voicemail: [] Yes [x] No    Bonilla Almanza Rep   11/30/23 11:27 EST     "

## 2023-11-30 NOTE — TELEPHONE ENCOUNTER
Tried calling pt back, recisabel gonzalez- said yes we know we gave 15 day supply stated we are needing to set up an appt with another provider or refer out, then we will be able to provide enough refills until the next visit.

## 2023-12-08 RX ORDER — LEVETIRACETAM 500 MG/1
1000 TABLET, FILM COATED, EXTENDED RELEASE ORAL DAILY
Qty: 30 TABLET | Refills: 0 | Status: CANCELLED | OUTPATIENT
Start: 2023-12-08

## 2023-12-12 NOTE — PROGRESS NOTES
[facility]  [address]  [place] [address]  Phone   Fax       Liza Aaron  [id number]   1955  68 y.o.  female      Referring Provider and PCP: Minnie Whaley MD    Type of service: Initial Sleep Medicine Consult.  Date of service: 12/13/2023            CHIEF COMPLAINT: Trouble sleeping, suspected sleep apnea      HISTORY OF PRESENT ILLNESS:  Thank you for asking us to see Liza Aaron.  Liza Aaron 68 y.o. was seen today on 12/13/2023 at [facility].  Patient presents today with symptoms of snoring, morning fatigue, and suspected sleep apnea.  Patient has no history of tonsillectomy, adenoidectomy, nasal surgery, UPPP.  Usually falls asleep fairly easily, if having trouble may take melatonin occasionally.  May wake up at 4 AM or 5:30 AM or both and may have trouble falling back to sleep.  Does seem to sleep better at night on the days she does some swimming.   has sleep apnea, on PAP machine.  She does have a history of morning headaches and waking up with dry mouth.      SLEEP HISTORY:  Sleep schedule:  Bedtime: 11PM-1AM   Wake time: 6:30-7:30AM   Time it takes to fall asleep: 5-20 minutes  Average hours of sleep: 6-7  Number of naps per day: 0    Symptoms:   In addition to the above, patient reports the following associated symptoms:  Have you ever awakened gasping for breath, coughing, choking: No  Change in weight:  No  Morning headaches:  Yes   Awaken with a sore throat or dry mouth:  Yes   Leg jerking at night:  No  Creepy crawly feeling in legs/urge to move legs: No   Teeth grinding: No  Have you ever awakened at night with a sour taste or burning sensation in your chest:  No  Do you have muscle weakness with laughing or anger:  No  Have you ever felt paralyzed while going to sleep or waking up:  No  Sleepwalking: No  Nightmares: No  Nocturia (urination at night): 0 times per night  Memory Problem:  "No    Medical Conditions (PMH):   History of seizures--3 over entire life time  Arthritis    Social history:  Do you drive a commercial vehicle:  No  Shift work:  No  Tobacco use: Former, quit age 26  Alcohol use: 2 per week  Caffeinated drinks: 2 per day  Occupation:     Family History (parents and siblings) (pertaining to sleep medicine):  Sleepwalking-- brother as a child    Medications: reviewed    Allergies:  Nickel and Penicillins      REVIEW OF SYSTEMS:  Pertinent positive symptoms are:  Snoring  Gorham Sleepiness Scale of Total score: 8   Frequent urination          PHYSICAL EXAM:  CONSTITUTINONAL:   Vitals:    12/13/23 1300   Pulse: 63   SpO2: 97%   Weight: 110 kg (242 lb)   Height: 167.6 cm (66\")    Body mass index is 39.06 kg/m².   HEAD: atraumatic, normocephalic   THROAT: tonsils are not enlarged, Mallampati class II-III  NECK: Neck Circumference: 16 inches, trachea is midline  RESPIRATORY SYSTEM: Respirations even, unlabored, normal rate  CARDIOVASULAR SYSTEM: Normal rate, no edema  NEUROLOGICAL SYSTEM: Alert and oriented x 3, normal gait  PSYCHIATRIC SYSTEM: Mood is normal/ appropriate     Office note(s) from care team reviewed. Office note(s) reviewed: 9/5/2023 PCP note    Labs/ Test Results Reviewed:  TSH          12/29/2022    11:13 4/10/2023    09:52 8/7/2023    10:16   TSH   TSH 4.710  4.230  4.120       Most Recent A1C          8/7/2023    10:16   HGBA1C Most Recent   Hemoglobin A1C 4.70    Also reviewed 12/2020 echo          ASSESSMENT AND PLAN:   Suspected sleep apnea: patient's symptoms and physical examination are concerning for possible sleep apnea (G47.30).   I discussed the signs, symptoms, and pathophysiology of sleep apnea with this patient.  I also discussed the potential complications of untreated sleep apnea including but not limited to resistant hypertension, insulin resistance, pulmonary hypertension, atrial fibrillation, stroke, nonrestorative sleep with hypersomnia which " can increase risk for motor vehicle accidents, etc.   Different testing methods including home-based and lab based sleep studies were discussed with this patient.   Based on patient history and physical examination, will proceed with in lab polysomnogram with full EEG given history of epilepsy.  The order for the sleep study is placed in [facility].  The test will be scheduled after prior authorization has been obtained through patient's insurance.  Treatment and management will be discussed in more detail with this patient after the test is completed.  All questions were answered to patient's satisfaction.   Snoring (R06.83): snoring is the sound created by turbulent airflow vibrating upper airway soft tissue.  I have also discussed factors affecting snoring including sleep deprivation, sleeping on the back and alcohol ingestion. To minimize snoring, patient is advised to have adequate sleep, sleep on their side, and avoid alcohol and sedative medications around bedtime.   Obesity: Body mass index is 39.06 kg/m².. Patients who are overweight or obese are at increased risk of sleep apnea/ sleep disordered breathing. Weight reduction and healthy lifestyle are encouraged in overweight/ obese patients as part of a comprehensive approach to sleep apnea treatment.    Hyperlipidemia  Unknown epilepsy: Follows with neurology.  On Keppra.    I have also discussed with the patient the following  Sleep hygiene: try to maintain a regular bed time and wake time, avoid watching TV/ using electronic devices in bed (including cell phones), limit caffeinated and alcoholic beverages before bed, try to maintain a cool and quiet sleep environment, avoid daytime naps.  Adequate amount of sleep: most people need around 7 to 8 hours of sleep each night        Patient will follow-up after study, 31 to 90 days after PAP therapy initiated if applicable, or contact the office sooner for questions or concerns. Patient's questions were answered.             Thank you again for asking me to consult on this patient.  Please do not hesitate to call me if you have additional questions or concerns.       Shelley Kwok DNP, APRN  [facility]

## 2023-12-13 ENCOUNTER — OFFICE VISIT (OUTPATIENT)
Dept: SLEEP MEDICINE | Facility: HOSPITAL | Age: 68
End: 2023-12-13
Payer: COMMERCIAL

## 2023-12-13 VITALS — HEART RATE: 63 BPM | WEIGHT: 242 LBS | OXYGEN SATURATION: 97 % | BODY MASS INDEX: 38.89 KG/M2 | HEIGHT: 66 IN

## 2023-12-13 DIAGNOSIS — R29.818 SUSPECTED SLEEP APNEA: Primary | ICD-10-CM

## 2023-12-13 DIAGNOSIS — R06.83 SNORING: ICD-10-CM

## 2023-12-13 DIAGNOSIS — E66.9 CLASS 2 OBESITY WITH BODY MASS INDEX (BMI) OF 39.0 TO 39.9 IN ADULT, UNSPECIFIED OBESITY TYPE, UNSPECIFIED WHETHER SERIOUS COMORBIDITY PRESENT: ICD-10-CM

## 2023-12-13 DIAGNOSIS — G40.909 NONINTRACTABLE EPILEPSY WITHOUT STATUS EPILEPTICUS, UNSPECIFIED EPILEPSY TYPE: ICD-10-CM

## 2023-12-13 DIAGNOSIS — G47.9 TROUBLE IN SLEEPING: ICD-10-CM

## 2023-12-13 PROCEDURE — G0463 HOSPITAL OUTPT CLINIC VISIT: HCPCS

## 2024-01-31 DIAGNOSIS — R06.83 SNORING: ICD-10-CM

## 2024-01-31 DIAGNOSIS — R29.818 SUSPECTED SLEEP APNEA: Primary | ICD-10-CM

## 2024-01-31 DIAGNOSIS — E66.9 CLASS 2 OBESITY WITH BODY MASS INDEX (BMI) OF 39.0 TO 39.9 IN ADULT, UNSPECIFIED OBESITY TYPE, UNSPECIFIED WHETHER SERIOUS COMORBIDITY PRESENT: ICD-10-CM

## 2024-02-09 ENCOUNTER — TELEPHONE (OUTPATIENT)
Dept: GASTROENTEROLOGY | Facility: CLINIC | Age: 69
End: 2024-02-09
Payer: COMMERCIAL

## 2024-02-19 ENCOUNTER — HOSPITAL ENCOUNTER (OUTPATIENT)
Dept: SLEEP MEDICINE | Facility: HOSPITAL | Age: 69
Discharge: HOME | End: 2024-02-19
Admitting: NURSE PRACTITIONER
Payer: COMMERCIAL

## 2024-02-19 DIAGNOSIS — R29.818 SUSPECTED SLEEP APNEA: ICD-10-CM

## 2024-02-19 DIAGNOSIS — R06.83 SNORING: ICD-10-CM

## 2024-02-19 DIAGNOSIS — E66.9 CLASS 2 OBESITY WITH BODY MASS INDEX (BMI) OF 39.0 TO 39.9 IN ADULT, UNSPECIFIED OBESITY TYPE, UNSPECIFIED WHETHER SERIOUS COMORBIDITY PRESENT: ICD-10-CM

## 2024-02-19 PROCEDURE — 95806 SLEEP STUDY UNATT&RESP EFFT: CPT

## 2024-02-27 DIAGNOSIS — E78.00 PURE HYPERCHOLESTEROLEMIA: ICD-10-CM

## 2024-02-27 DIAGNOSIS — R73.01 IMPAIRED FASTING GLUCOSE: Primary | ICD-10-CM

## 2024-02-29 DIAGNOSIS — G47.33 OSA (OBSTRUCTIVE SLEEP APNEA): Primary | ICD-10-CM

## 2024-02-29 DIAGNOSIS — R06.83 SNORING: ICD-10-CM

## 2024-02-29 LAB
ALBUMIN SERPL-MCNC: 4.6 G/DL (ref 3.5–5.2)
ALBUMIN/GLOB SERPL: 2.4 G/DL
ALP SERPL-CCNC: 69 U/L (ref 39–117)
ALT SERPL-CCNC: 13 U/L (ref 1–33)
AST SERPL-CCNC: 30 U/L (ref 1–32)
BILIRUB SERPL-MCNC: 0.7 MG/DL (ref 0–1.2)
BUN SERPL-MCNC: 13 MG/DL (ref 8–23)
BUN/CREAT SERPL: 18.1 (ref 7–25)
CALCIUM SERPL-MCNC: 9.3 MG/DL (ref 8.6–10.5)
CHLORIDE SERPL-SCNC: 102 MMOL/L (ref 98–107)
CHOLEST SERPL-MCNC: 139 MG/DL (ref 0–200)
CO2 SERPL-SCNC: 25.7 MMOL/L (ref 22–29)
CREAT SERPL-MCNC: 0.72 MG/DL (ref 0.57–1)
EGFRCR SERPLBLD CKD-EPI 2021: 91.8 ML/MIN/1.73
GLOBULIN SER CALC-MCNC: 1.9 GM/DL
GLUCOSE SERPL-MCNC: 90 MG/DL (ref 65–99)
HBA1C MFR BLD: 4.8 % (ref 4.8–5.6)
HDLC SERPL-MCNC: 49 MG/DL (ref 40–60)
LDLC SERPL CALC-MCNC: 69 MG/DL (ref 0–100)
LDLC/HDLC SERPL: 1.36 {RATIO}
POTASSIUM SERPL-SCNC: 4.3 MMOL/L (ref 3.5–5.2)
PROT SERPL-MCNC: 6.5 G/DL (ref 6–8.5)
SODIUM SERPL-SCNC: 138 MMOL/L (ref 136–145)
TRIGL SERPL-MCNC: 117 MG/DL (ref 0–150)
VLDLC SERPL CALC-MCNC: 21 MG/DL (ref 5–40)

## 2024-03-04 ENCOUNTER — TELEPHONE (OUTPATIENT)
Dept: SLEEP MEDICINE | Facility: HOSPITAL | Age: 69
End: 2024-03-04
Payer: COMMERCIAL

## 2024-03-04 NOTE — TELEPHONE ENCOUNTER
Lm on pts vm for pt to cb about results. Faxed order and notes to [employer] for set up. Pt will need to schedule a f/u for compliance.

## 2024-03-06 ENCOUNTER — OFFICE VISIT (OUTPATIENT)
Dept: INTERNAL MEDICINE | Facility: CLINIC | Age: 69
End: 2024-03-06
Payer: COMMERCIAL

## 2024-03-06 VITALS
TEMPERATURE: 97.5 F | WEIGHT: 235 LBS | OXYGEN SATURATION: 98 % | DIASTOLIC BLOOD PRESSURE: 80 MMHG | BODY MASS INDEX: 37.77 KG/M2 | SYSTOLIC BLOOD PRESSURE: 130 MMHG | HEIGHT: 66 IN | HEART RATE: 73 BPM

## 2024-03-06 DIAGNOSIS — Z82.49 FAMILY HISTORY OF HEART DISEASE: ICD-10-CM

## 2024-03-06 DIAGNOSIS — E78.00 PURE HYPERCHOLESTEROLEMIA: Primary | ICD-10-CM

## 2024-03-06 PROCEDURE — 99213 OFFICE O/P EST LOW 20 MIN: CPT | Performed by: FAMILY MEDICINE

## 2024-03-06 RX ORDER — ROSUVASTATIN CALCIUM 10 MG/1
10 TABLET, COATED ORAL NIGHTLY
Qty: 90 TABLET | Refills: 1 | Status: SHIPPED | OUTPATIENT
Start: 2024-03-06

## 2024-03-06 NOTE — PROGRESS NOTES
Subjective   Liza Aaron is a 69 y.o. female.   Chief Complaint   Patient presents with    Hyperlipidemia        Hyperlipidemia-on rosuvastatin 10 mg a day.  She takes it every day.  She has no side effects.  LDL 69, HDL 49.  LFTs normal.  She lost 7 pounds from December. She did it following weight watchers.  She eats healthier.  She does water exercise 4-5 times a week.  No chest pain, no lightheadedness, no palpitations, no shortness of breath.  She is interested in CT cardiac calcium score.  She has a strong family history of heart disease.    She had a sleep study done in February.  She is awaiting results.    Review of Systems   Respiratory:  Negative for shortness of breath.    Cardiovascular:  Negative for chest pain and palpitations.   Neurological:  Negative for light-headedness.         Objective   Wt Readings from Last 3 Encounters:   03/06/24 107 kg (235 lb)   12/13/23 110 kg (242 lb)   10/17/23 107 kg (236 lb)      Vitals:    03/06/24 0954   BP: 130/80   Pulse: 73   Temp: 97.5 °F (36.4 °C)   SpO2: 98%     Temp Readings from Last 3 Encounters:   03/06/24 97.5 °F (36.4 °C)   09/05/23 97.8 °F (36.6 °C)   01/06/23 96.4 °F (35.8 °C)     BP Readings from Last 3 Encounters:   03/06/24 130/80   10/17/23 112/72   09/05/23 105/72     Pulse Readings from Last 3 Encounters:   03/06/24 73   12/13/23 63   10/17/23 60     Body mass index is 37.95 kg/m².    Physical Exam  Constitutional:       Appearance: She is well-developed.   Neck:      Thyroid: No thyromegaly.      Vascular: No carotid bruit.   Cardiovascular:      Rate and Rhythm: Normal rate and regular rhythm.      Heart sounds: Normal heart sounds.   Pulmonary:      Effort: Pulmonary effort is normal.      Breath sounds: Normal breath sounds.   Skin:     General: Skin is warm and dry.   Neurological:      Mental Status: She is alert.   Psychiatric:         Behavior: Behavior normal.         Assessment & Plan   Diagnoses and all orders for this visit:    1.  Pure hypercholesterolemia (Primary)    2. Family history of heart disease  -     CT Cardiac Calcium Score Without Dye; Future    Other orders  -     rosuvastatin (CRESTOR) 10 MG tablet; Take 1 tablet by mouth Every Night.  Dispense: 90 tablet; Refill: 1        Hyperlipidemia-continue current treatment.  Good job with weight loss and exercise.  Follow-up in 6 months.  Family history of heart disease-we discussed indications for cardiac CT calcium score.  It is mostly helpful for patients with intermediate risk to guide decision about risk modification/statins/aspirin.

## 2024-03-21 ENCOUNTER — TELEPHONE (OUTPATIENT)
Dept: SLEEP MEDICINE | Facility: HOSPITAL | Age: 69
End: 2024-03-21
Payer: COMMERCIAL

## 2024-03-21 ENCOUNTER — APPOINTMENT (OUTPATIENT)
Dept: WOMENS IMAGING | Facility: HOSPITAL | Age: 69
End: 2024-03-21
Payer: COMMERCIAL

## 2024-03-21 ENCOUNTER — OFFICE VISIT (OUTPATIENT)
Dept: SLEEP MEDICINE | Facility: HOSPITAL | Age: 69
End: 2024-03-21
Payer: COMMERCIAL

## 2024-03-21 VITALS
WEIGHT: 236 LBS | HEART RATE: 76 BPM | BODY MASS INDEX: 37.93 KG/M2 | HEIGHT: 66 IN | OXYGEN SATURATION: 97 % | DIASTOLIC BLOOD PRESSURE: 64 MMHG | SYSTOLIC BLOOD PRESSURE: 108 MMHG

## 2024-03-21 DIAGNOSIS — R56.9 NEW ONSET SEIZURE: ICD-10-CM

## 2024-03-21 DIAGNOSIS — G47.33 OSA ON CPAP: Primary | ICD-10-CM

## 2024-03-21 PROCEDURE — 99212 OFFICE O/P EST SF 10 MIN: CPT | Performed by: INTERNAL MEDICINE

## 2024-03-21 PROCEDURE — G0463 HOSPITAL OUTPT CLINIC VISIT: HCPCS

## 2024-03-21 PROCEDURE — 77063 BREAST TOMOSYNTHESIS BI: CPT | Performed by: RADIOLOGY

## 2024-03-21 PROCEDURE — 77067 SCR MAMMO BI INCL CAD: CPT | Performed by: RADIOLOGY

## 2024-03-21 NOTE — PROGRESS NOTES
"  [facility]  [address]  [address]  [place] [address]  Phone   Fax       SLEEP CLINIC FOLLOW UP PROGRESS NOTE.    Liza Aaron  [id number]   1955  69 y.o.  female      PCP: Minnie Whaley MD      Date of visit: 3/21/2024    Chief Complaint   Patient presents with    Sleep Apnea       HPI:  This is a 69 y.o. years old patient is here for the management of obstructive sleep apnea.  Sleep apnea is mild in severity with a AHI of 7.4/hr.  Normally patient goes to bed at 11 PM and wakes up at 7 AM .  The patient wakes up 2-3 time(s) during the night and has no problem going back to sleep.  Feels refreshed after waking up.  She is here to discuss the test results and the treatment options.  She also has a history of seizures she is on Keppra the last seizure was in 2020.    She is an  works for [employer].    I have discussed the test results with the patient and recommend that she uses a CPAP due to mild sleep apnea and moreover because of history of seizures.  Untreated sleep apnea can lower the threshold for seizures.    Medications and allergies are reviewed by me and documented in the encounter.     SOCIAL (habits pertaining to sleep medicine)  History tobacco use:No   History of alcohol use: 3 per week  Caffeine use: 2     REVIEW OF SYSTEMS:   Pertaining positive symptoms are:  Mission Sleepiness Scale :Total score: 7   Nasal congestion      PHYSICAL EXAMINATION:  CONSTITUTIONAL:  Vitals:    03/21/24 0845   BP: 108/64   Pulse: 76   SpO2: 97%   Weight: 107 kg (236 lb)   Height: 167.6 cm (65.98\")    Body mass index is 38.11 kg/m².   NOSE: nasal passages are clear, No deformities noted   RESP SYSTEM: Not in any respiratory distress, no chest deformities noted,   CARDIOVASULAR: No edema noted  NEURO: Oriented x 3, gait normal,  Mood and affect appeared appropriate          ASSESSMENT AND PLAN:  Obstructive sleep apnea ( G 47.33).  Discussed the " Previously Declined (within the last year) test results.  Patient has mild sleep apnea and a history of seizures.  She would benefit from CPAP.  She is agreeable for CPAP and order has been sent to [facility] and see her in about 31 to 90 days for follow-up  Obesity  2 with BMI is Body mass index is 38.11 kg/m².. I have discuss the relationship between the weight and sleep apnea. The benefit of weight loss in reducing severity of sleep apnea was discussed. Discussed diet and exercise with the patient to achieve ideal BMI.  History of seizures on Keppra  Return for 31 to 90 days after PAP setup with down load. . Patient's questions were answered.    3/21/2024  Lucía Horn MD  Sleep Medicine.  Medical Director,   [facility].

## 2024-03-21 NOTE — TELEPHONE ENCOUNTER
I called Pt and lvm going over sleep report and letting Pt know that we are sending her cpap order to [facility]

## 2024-04-01 ENCOUNTER — HOSPITAL ENCOUNTER (OUTPATIENT)
Dept: CT IMAGING | Facility: HOSPITAL | Age: 69
Discharge: HOME | End: 2024-04-01
Admitting: FAMILY MEDICINE

## 2024-04-01 DIAGNOSIS — Z82.49 FAMILY HISTORY OF HEART DISEASE: ICD-10-CM

## 2024-04-01 PROCEDURE — 75571 CT HRT W/O DYE W/CA TEST: CPT

## 2024-04-11 ENCOUNTER — TELEPHONE (OUTPATIENT)
Dept: ORTHOPEDIC SURGERY | Facility: CLINIC | Age: 69
End: 2024-04-11

## 2024-04-11 ENCOUNTER — OFFICE VISIT (OUTPATIENT)
Dept: ORTHOPEDIC SURGERY | Facility: CLINIC | Age: 69
End: 2024-04-11
Payer: COMMERCIAL

## 2024-04-11 VITALS — BODY MASS INDEX: 37.93 KG/M2 | WEIGHT: 236 LBS | HEIGHT: 66 IN | TEMPERATURE: 97.7 F

## 2024-04-11 DIAGNOSIS — M17.11 PRIMARY OSTEOARTHRITIS OF RIGHT KNEE: ICD-10-CM

## 2024-04-11 DIAGNOSIS — M25.561 RIGHT KNEE PAIN, UNSPECIFIED CHRONICITY: Primary | ICD-10-CM

## 2024-04-11 PROCEDURE — 99214 OFFICE O/P EST MOD 30 MIN: CPT | Performed by: NURSE PRACTITIONER

## 2024-04-11 RX ORDER — MELOXICAM 15 MG/1
15 TABLET ORAL DAILY
Qty: 30 TABLET | Refills: 3 | Status: SHIPPED | OUTPATIENT
Start: 2024-04-11

## 2024-04-11 NOTE — TELEPHONE ENCOUNTER
Patient said she thought [facility] was ordering her for PT but I did not see a referral in there. She said if she needs to wait until after her trip that is fine. Please advise and let me know.

## 2024-04-11 NOTE — PROGRESS NOTES
Patient: Liza Aaron  YOB: 1955 69 y.o. female  Medical Record Number: [id number]    Chief Complaints:   Chief Complaint   Patient presents with    Right Knee - Initial Evaluation       History of Present Illness:Liza Aaron is a 69 y.o. female who presents with complaints of worsening in right knee pain.  The patient reports she has had pain off and on for at least 2 to 3 months, also some milder intermittent pain through the years, but reports over the last 6 weeks the pain has become more severe.  She denies any injury.  She has had Synvisc in the past with no improvement.  Patient has been taking ibuprofen with minimal improvement.  She describes the pain at times as severe worse with increased activity, only slightly better with rest    Allergies:   Allergies   Allergen Reactions    Nickel Rash     Pt states cannot wear jewelry with Nickel.     Penicillins Rash       Medications:   Current Outpatient Medications   Medication Sig Dispense Refill    Cholecalciferol (VITAMIN D) 25 MCG (1000 UT) tablet Take 1 tablet by mouth Daily.      Lactobacillus (PROBIOTIC ACIDOPHILUS PO) Take 1 tablet by mouth.      levETIRAcetam XR (KEPPRA XR) 500 MG tablet Take 2 tablets by mouth Daily. 360 tablet 3    melatonin 5 MG tablet tablet Take 1 tablet by mouth At Night As Needed.      multivitamin with minerals tablet tablet Take 1 tablet by mouth Daily.      rosuvastatin (CRESTOR) 10 MG tablet Take 1 tablet by mouth Every Night. 90 tablet 1     No current facility-administered medications for this visit.         The following portions of the patient's history were reviewed and updated as appropriate: allergies, current medications, past family history, past medical history, past social history, past surgical history and problem list.    Review of Systems:   14 point review of systems was performed. All systems negative except pertinent positives/negatives listed in HPI above    Physical Exam:   Vitals:     "04/11/24 0959   Temp: 97.7 °F (36.5 °C)   TempSrc: Temporal   Weight: 107 kg (236 lb)   Height: 167.6 cm (65.98\")   PainSc:   1   PainLoc: Knee       General: A and O x 3, ASA, NAD  Skin clear no unusual lesions noted  Right knee patient has trace amount of effusion noted with 110 degrees flexion +10 degrees in extension with a positive Bernardo negative Lockman calf soft and nontender        Radiology:  Xrays 3views (ap,lateral, sunrise) right knee were ordered and reviewed today and show bone-on-bone end-stage osteoarthritis with complete destruction of all 3 joint spaces.  No comparative views available      Assessment/Plan: End-stage osteoarthritis right knee with severe pain    Patient and I discussed options, she would like to proceed with physical therapy, prescription given for meloxicam to take as needed instead of ibuprofen, she is getting ready to go on a trip in June so she will return the office right before her trip for cortisone injection.  Patient understands if her symptoms worsen we would further discuss total knee replacement surgery      Angelica Montalvo, APRN  4/11/2024  "

## 2024-04-18 ENCOUNTER — TELEPHONE (OUTPATIENT)
Dept: ORTHOPEDIC SURGERY | Facility: CLINIC | Age: 69
End: 2024-04-18
Payer: COMMERCIAL

## 2024-04-18 DIAGNOSIS — M25.561 RIGHT KNEE PAIN, UNSPECIFIED CHRONICITY: Primary | ICD-10-CM

## 2024-04-18 NOTE — TELEPHONE ENCOUNTER
Order entered for patient right knee aquatic physical therapy per Angelica COLEMAN APRN, patient will be called to schedule

## 2024-04-18 NOTE — TELEPHONE ENCOUNTER
"CELIA SENT THIS MESSAGE TO US VIA PT REFERRAL.      \"PATIENT IS GOING OUT OF TOWN AND WANTED TO WAIT ON PT SCHEDULING. PATIENT WANTED TO KNOW CAN YOU ADD AQUATICS TO THIS REFERRAL? SHE WOULD LIKE AQUATIC THERAPY. \"    PLEASE UPDATE, THANK YOU!  "

## 2024-04-24 ENCOUNTER — TELEPHONE (OUTPATIENT)
Dept: ORTHOPEDIC SURGERY | Facility: CLINIC | Age: 69
End: 2024-04-24
Payer: COMMERCIAL

## 2024-04-24 ENCOUNTER — TELEPHONE (OUTPATIENT)
Dept: ORTHOPEDIC SURGERY | Facility: CLINIC | Age: 69
End: 2024-04-24

## 2024-04-24 DIAGNOSIS — M25.561 CHRONIC PAIN OF RIGHT KNEE: Primary | ICD-10-CM

## 2024-04-24 DIAGNOSIS — G89.29 CHRONIC PAIN OF RIGHT KNEE: Primary | ICD-10-CM

## 2024-04-24 NOTE — TELEPHONE ENCOUNTER
Caller: LAVERN IVAN     Relationship to patient: PATIENT     Best call back number: [phone]    Chief complaint: RIGHT KNEE AC INJECTION     Type of visit: AC INJECTION     Requested date: IN THE TIMEFRAME OF 05/23/2024 - PATIENT GOING ON A TRIP AND WANTS INJECTION PRIOR TO TRIP BY A WEEK OR SO     If rescheduling, when is the original appointment: 05/23/2024     Additional notes:PATIENT PREFERS OLVIN LICONA .  RETURNING CALL TO SCHEDULE BECAUSE OLVIN AU

## 2024-04-24 NOTE — TELEPHONE ENCOUNTER
Provider: OLVIN HYLTON     Caller: LAVERN PIA IVAN     Relationship to Patient: [phone]    Phone Number: [phone]    Reason for Call: RIGHT KNEE PHYSICAL THERAPY ORDER     When was the patient last seen: 04/11/2024    PATIENT IS LEAVING FOR A TRIP PATRIC[place] 3 AND WILL BE ABOUT UNTIL MID JUNE.  PATIENT WANTS TO KNOW IF SHE SHOULD START PHYSICAL THERAPY BEFORE TRIP OR WAIT UNTIL SHE GETS HOME?      ALSO, PATIENT WANTS TO KNOW IF OLVIN THINKS [facility] WOULD BE OKAY FOR HER TO DO HER PHYSICAL THERAPY?  IF SO, [facility] ?  PLEASE CALL PATIENT TO DISCUSS.   THANK YOU

## 2024-04-24 NOTE — TELEPHONE ENCOUNTER
Spoke with patient let her know Angelica said PT at [facility] sounds great, referral placed in patient chart.     Patient verbalized understanding and will call [facility] to get started on PT.

## 2024-05-05 NOTE — TELEPHONE ENCOUNTER
LM for pt to call back to schedule scope.     Sent in by MD Estrella Cox for further evaluation. States she had a total thyroidectomy on Wednesday at [facility], has been taking high dose calcium supplements due to numbness and tingling in B/L hands. States she spoke with surgeon today who advised to come to ED for workup for low calcium.

## 2024-05-09 ENCOUNTER — OFFICE VISIT (OUTPATIENT)
Dept: SLEEP MEDICINE | Facility: HOSPITAL | Age: 69
End: 2024-05-09
Payer: COMMERCIAL

## 2024-05-09 VITALS
DIASTOLIC BLOOD PRESSURE: 70 MMHG | SYSTOLIC BLOOD PRESSURE: 110 MMHG | BODY MASS INDEX: 36.26 KG/M2 | WEIGHT: 225.6 LBS | OXYGEN SATURATION: 96 % | HEART RATE: 72 BPM | HEIGHT: 66 IN

## 2024-05-09 DIAGNOSIS — E66.9 CLASS 2 OBESITY WITHOUT SERIOUS COMORBIDITY WITH BODY MASS INDEX (BMI) OF 35.0 TO 35.9 IN ADULT, UNSPECIFIED OBESITY TYPE: ICD-10-CM

## 2024-05-09 DIAGNOSIS — G47.33 OSA ON CPAP: Primary | ICD-10-CM

## 2024-05-09 PROCEDURE — G0463 HOSPITAL OUTPT CLINIC VISIT: HCPCS

## 2024-05-20 ENCOUNTER — TELEPHONE (OUTPATIENT)
Dept: ORTHOPEDIC SURGERY | Facility: CLINIC | Age: 69
End: 2024-05-20

## 2024-05-20 NOTE — TELEPHONE ENCOUNTER
Provider: DORITA    Caller: LAVERN    Relationship to Patient: SELF    Pharmacy:     Phone Number: [phone]    Reason for Call: PT CALLING TO SEE IF SHE CAN BE SCHEDULED THIS WEEK FOR THE INJ IN HER RIGHT KNEE, [place] AND WOULD LIKE TO HAVE INJ SOONER THAN THE NXT AVAIL ON MAY 30TH

## 2024-05-22 ENCOUNTER — TELEPHONE (OUTPATIENT)
Dept: ORTHOPEDIC SURGERY | Facility: CLINIC | Age: 69
End: 2024-05-22
Payer: COMMERCIAL

## 2024-05-22 NOTE — TELEPHONE ENCOUNTER
Patient seeking sooner appointment time for her knee Injection. I have left my direct line for patient to call back and schedule. Should patient call back please direct her to Sourav.  Thank you,  Sourav

## 2024-05-23 ENCOUNTER — OFFICE VISIT (OUTPATIENT)
Dept: ORTHOPEDIC SURGERY | Facility: CLINIC | Age: 69
End: 2024-05-23
Payer: COMMERCIAL

## 2024-05-23 VITALS — TEMPERATURE: 97.8 F | HEIGHT: 66 IN | BODY MASS INDEX: 36.35 KG/M2 | WEIGHT: 226.2 LBS

## 2024-05-23 DIAGNOSIS — M17.11 PRIMARY OSTEOARTHRITIS OF RIGHT KNEE: Primary | ICD-10-CM

## 2024-05-23 RX ORDER — METHYLPREDNISOLONE ACETATE 80 MG/ML
80 INJECTION, SUSPENSION INTRA-ARTICULAR; INTRALESIONAL; INTRAMUSCULAR; SOFT TISSUE
Status: COMPLETED | OUTPATIENT
Start: 2024-05-23 | End: 2024-05-23

## 2024-05-23 RX ORDER — LIDOCAINE HYDROCHLORIDE 10 MG/ML
5 INJECTION, SOLUTION EPIDURAL; INFILTRATION; INTRACAUDAL; PERINEURAL
Status: COMPLETED | OUTPATIENT
Start: 2024-05-23 | End: 2024-05-23

## 2024-05-23 RX ADMIN — METHYLPREDNISOLONE ACETATE 80 MG: 80 INJECTION, SUSPENSION INTRA-ARTICULAR; INTRALESIONAL; INTRAMUSCULAR; SOFT TISSUE at 10:16

## 2024-05-23 RX ADMIN — LIDOCAINE HYDROCHLORIDE 5 ML: 10 INJECTION, SOLUTION EPIDURAL; INFILTRATION; INTRACAUDAL; PERINEURAL at 10:16

## 2024-05-23 NOTE — PROGRESS NOTES
5/23/2024    Liza Aaron is here today for worsening knee pain. Pt has known right knee osteoarthritis and is here today to proceed forward with an intra-articular joint injection.    KNEE Injection Procedure Note:    Large Joint Arthrocentesis: R knee  Date/Time: 5/23/2024 10:16 AM  Consent given by: patient  Site marked: site marked  Timeout: Immediately prior to procedure a time out was called to verify the correct patient, procedure, equipment, support staff and site/side marked as required   Supporting Documentation  Indications: pain and joint swelling   Procedure Details  Location: knee - R knee  Preparation: Patient was prepped and draped in the usual sterile fashion  Needle size: 22 G  Approach: anterolateral  Medications administered: 80 mg methylPREDNISolone acetate 80 MG/ML; 5 mL lidocaine PF 1% 1 %  Patient tolerance: patient tolerated the procedure well with no immediate complications      (3 mL of lidocaine was used for anesthetic purposes)    At the conclusion of the injection I discussed the importance of continued quad strengthening exercises on a daily basis.  She is about to start outpatient therapy at [facility].  I will see the patient back if the symptoms should fail to improve or worsen.    Bennett Smith, APRN  5/23/2024

## 2024-06-28 ENCOUNTER — TREATMENT (OUTPATIENT)
Dept: PHYSICAL THERAPY | Facility: CLINIC | Age: 69
End: 2024-06-28
Payer: COMMERCIAL

## 2024-06-28 DIAGNOSIS — Z74.09 IMPAIRED FUNCTIONAL MOBILITY AND ACTIVITY TOLERANCE: ICD-10-CM

## 2024-06-28 DIAGNOSIS — G89.29 CHRONIC PAIN OF RIGHT KNEE: Primary | ICD-10-CM

## 2024-06-28 DIAGNOSIS — M25.561 CHRONIC PAIN OF RIGHT KNEE: Primary | ICD-10-CM

## 2024-06-28 DIAGNOSIS — M17.11 PRIMARY OSTEOARTHRITIS OF RIGHT KNEE: ICD-10-CM

## 2024-06-28 DIAGNOSIS — R26.9 GAIT DISTURBANCE: ICD-10-CM

## 2024-06-28 NOTE — PROGRESS NOTES
Physical Therapy Initial Evaluation and Plan of Care      Patient: Liza Aaron   : 1955  Diagnosis/ICD-10 Code:  Chronic pain of right knee [M25.561, G89.29]  Referring practitioner: CONCETTA Johnson  Date of Initial Visit: 2024  Today's Date: 2024  Patient seen for 1 sessions    Visit Diagnoses:    ICD-10-CM ICD-9-CM   1. Chronic pain of right knee  M25.561 719.46    G89.29 338.29   2. Primary osteoarthritis of right knee  M17.11 715.16   3. Gait disturbance  R26.9 781.2   4. Impaired functional mobility and activity tolerance  Z74.09 V49.89       [facility] [place] [address]  [phone] (phone)  [phone] (fax)         Subjective Evaluation    History of Present Illness  Onset date: chronic, increased over the last few months.  Mechanism of injury: Liza has end stage R knee OA, mainly medial compartment. She got a cortisone injection about a month ago. She was supposed to go on a trip and it got canceled. She had a trip in the fall and was walking well, but over the last few months the pain has increased. She would likely qualify for  TKA, but is unsure when to get it done (has 3 grandchildren on the way from Nov to ). She is a member at [facility] and has been doing a few classes in the warm water pool but would like to learn a HEP more custom to her needs.     PMHx: B ALEX (posterolateral), R knee meniscus tear, has 5 children      Patient Occupation:  for [employer] Quality of life: excellent    Pain  Current pain ratin  At worst pain ratin  Location: R knee  Quality: radiating and dull ache  Relieving factors: rest (Advil prn)  Aggravating factors: squatting, standing, stairs, prolonged positioning and sleeping (kneeling, step to pattern on stairs up, sideways descending stairs)    Social Support  Lives in: multiple-level home (bedroom on 1st floor)  Lives with: spouse    Diagnostic  Tests  X-ray: abnormal    Treatments  Previous treatment: physical therapy  Current treatment: injection treatment  Patient Goals  Patient goals for therapy: increased strength and decreased pain  Patient goal: prepare for surgery           Objective          Static Posture     Pelvis   Pelvis (Left): Elevated.     Knee   Genu valgus.     Tenderness     Right Knee   Tenderness in the medial joint line.     Active Range of Motion   Left Knee   Flexion: 120 degrees   Extension: 10 degrees     Right Knee   Flexion: 110 degrees   Extension: 18 degrees     Strength/Myotome Testing     Left Hip   Planes of Motion   Flexion: 4  Abduction: 4  Adduction: 4+  External rotation: 4+    Right Hip   Planes of Motion   Flexion: 4-  Abduction: 4-  Adduction: 4+  External rotation: 4    Left Knee   Flexion: 4  Extension: 4+    Right Knee   Flexion: 5  Extension: 5    Left Ankle/Foot   Dorsiflexion: 5    Right Ankle/Foot   Dorsiflexion: 5    Ambulation     Observational Gait   Gait: antalgic   Decreased right stance time.   Left arm swing: within functional limits  Right arm swing: within functional limits    Quality of Movement During Gait     Knee    Knee (Right): Positive valgus.         Therapeutic exercises:  -bridge, x10  -hip abd/ER with blue band, B and single leg    -seated hamstring and calf stretch with heel on floor, 3x20 sec  -education on aquatic therapy    Functional Outcome Score:   Knee Outcome Survey=49%        Assessment & Plan       Assessment  Impairments: abnormal gait, abnormal or restricted ROM, activity intolerance, impaired physical strength, lacks appropriate home exercise program and pain with function   Functional limitations: sleeping, walking, uncomfortable because of pain, standing and stooping (Stairs, kneeling)  Assessment details: Liza Aaron is a 69 y.o. year-old female referred to physical therapy for R knee pain. She presents with a evolving clinical presentation.  She has comorbidities of end  stage R knee OA and no personal factors that may affect her progress in the plan of care.Pt has decreased ROM, knee and hip strength, and flexibility.  Pt will benefit from therapy to improve this deficits prior to knee surgery.       Prognosis: good    Goals  Plan Goals: ST wks  1. Pt will be educated on surgery preparation, transfers, and gait prior to TKA  2. Pt to improve R knee ext from 18 deg to 12 deg for improved gait pattern    LT wks  1. Pt to improve R knee ext from 12 deg to 8 deg for improved gait pattern  2. Pt to improve hip and knee strength to 4+ to 5/5 for greater ease with stairs in her home  3. Pt to improve score on Knee Outcome Survey from 49% to 62% for ease of negotiating her home and community  4. Pt will be independent with a HEP for ROM, flexibility, and strength to help improve surgical outcomes    Plan  Therapy options: will be seen for skilled therapy services  Planned modality interventions: cryotherapy, TENS, dry needling, hydrotherapy and thermotherapy (hydrocollator packs)  Other planned modality interventions: aquatic therapy  Planned therapy interventions: body mechanics training, flexibility, functional ROM exercises, gait training, home exercise program, manual therapy, neuromuscular re-education, strengthening, stretching, therapeutic activities, transfer training, abdominal trunk stabilization and soft tissue mobilization  Frequency: 2x week  Duration in weeks: 12  Treatment plan discussed with: patient  Plan details: Pt to be treated in the aquatic environment for 3 weeks 2xwk and 3-4 visits on land based to help prep for TKA        Timed:  Manual Therapy:         mins  32544;  Therapeutic Exercise:    10     mins  40970;     Neuromuscular Fide:        mins  28251;    Therapeutic Activity:          mins  77706;     Gait Training:           mins  59073;     Ultrasound:          mins  91433;    Iontophoresis         mins 19771        Untimed:  Electrical Stimulation:          mins  92010 ( );  Traction:       mins  57222;   Dry Needling   (1-2 muscles)             mins 20560 (Self-pay)  Dry Needling (3-4 muscles)           mins 20561 (Self-pay)  Dry Needling Trial              mins DRYNDLTRIAL  (No Charge)    Low Eval          Mins  48449  Mod Eval     35     Mins  45248  High Eval                            Mins  13904    Timed Treatment:   10   mins   Total Treatment:     45   mins    PT SIGNATURE: Maeve Solorzano PT, CDNT    License Number: [id number]    Electronically signed by Maeve Solorzano PT, 06/28/24, 2:18 PM EDT    DATE TREATMENT INITIATED: 6/28/2024    Initial Certification  Certification Period: 9/26/2024  I certify that the therapy services are furnished while this patient is under my care.  The services outlined above are required by this patient, and will be reviewed every 90 days.     PHYSICIAN: Angelica Montalvo APRN   NPI: [id number]                                         DATE:     Please sign and return via fax to [phone] Thank you, [facility].

## 2024-07-18 ENCOUNTER — TREATMENT (OUTPATIENT)
Dept: PHYSICAL THERAPY | Facility: CLINIC | Age: 69
End: 2024-07-18
Payer: COMMERCIAL

## 2024-07-18 DIAGNOSIS — R26.9 GAIT DISTURBANCE: ICD-10-CM

## 2024-07-18 DIAGNOSIS — G89.29 CHRONIC PAIN OF RIGHT KNEE: Primary | ICD-10-CM

## 2024-07-18 DIAGNOSIS — Z74.09 IMPAIRED FUNCTIONAL MOBILITY AND ACTIVITY TOLERANCE: ICD-10-CM

## 2024-07-18 DIAGNOSIS — M17.11 PRIMARY OSTEOARTHRITIS OF RIGHT KNEE: ICD-10-CM

## 2024-07-18 DIAGNOSIS — M25.561 CHRONIC PAIN OF RIGHT KNEE: Primary | ICD-10-CM

## 2024-07-18 NOTE — PROGRESS NOTES
Physical Therapy Daily Treatment Note    [facility]  [address] [place] [address]  [phone] (phone)  [phone] (fax)    Patient: Liza Aaron   : 1955  Diagnosis/ICD-10 Code:  Chronic pain of right knee [M25.561, G89.29]  Referring practitioner: CONCETTA Johnson  Date of Initial Visit: Type: THERAPY  Noted: 2024  Today's Date: 2024  Patient seen for 2 sessions             Subjective Evaluation    History of Present Illness    Subjective comment: I'm a member and usually come to exercise in pool in the evenings.  I've had water therapy for my hips / knee in the past and am probably going to have to get TKA in future so figured I may as well do some therapy.       Objective     AQUATIC EX:    Water Walk   Forward, backwards, sideways x 2 laps ea  March Walk  2 laps   Stretch 1   Calf stretch 20-30 sec x 2 ea  Stretch 2   Hamstring 20-30 sec x 2 ea, heel propped on bench  Stretch 3   -  Stretch Other 1  -  Stretch Other 2  -  Vertical Traction  -  Abdominals   -  Clams    -  Hip Abd/Add   15x ea  Hip Flex (SLR) 15x ea  Hip Ext   15x ea, tighten thigh / buttock (small range)  March in Place  15x  Mini Squat   12x  Toe/Heel Raises  15x  Leg Press  10x w/ large blue aqua ring  Uni-Squat   -  Uni-Clock   -  Step Ups   -  STS from pool bench 10x, no hands  Bicycle   2 min, seated suspended  Flutter/Scissor  15 / 15  Exercise 1   LAQ + ankle DF x 10 ea  Exercise 2   -  Exercise 3   -  Exercise 4   -  Exercise 5  -  Exercise 6  -        Assessment & Plan       Assessment  Assessment details: Patient seen today for initial aquatic therapy session including education and instruction in basic aquatic ex/activity for mobility, flexibility, and strength/stabilization.  She entered pool descending stairs sideways (non reciprocal) and exited pool ascending stairs with reciprocal pattern with HR support  both up/down.  Instructed her to stand tall,  engage abdominals / gluts / quads, and keep ex performance in comfortable range to minimize compensation / strain on joints.  PT provided demonstration and cuing throughout session for optimal posture, core/glut/quad activation, and correct form/technique with ex/activity.    Plan:  Assess patient response to initial aquatic session and modify/progress as appropriate.                    Timed:  Aquatic Therapy    39     mins 38383;    Jaimie Cervantes, PT  Physical Therapist    [place] License: [id number]

## 2024-07-23 ENCOUNTER — TREATMENT (OUTPATIENT)
Dept: PHYSICAL THERAPY | Facility: CLINIC | Age: 69
End: 2024-07-23
Payer: COMMERCIAL

## 2024-07-23 DIAGNOSIS — R26.9 GAIT DISTURBANCE: ICD-10-CM

## 2024-07-23 DIAGNOSIS — G89.29 CHRONIC PAIN OF RIGHT KNEE: Primary | ICD-10-CM

## 2024-07-23 DIAGNOSIS — M17.11 PRIMARY OSTEOARTHRITIS OF RIGHT KNEE: ICD-10-CM

## 2024-07-23 DIAGNOSIS — M25.561 CHRONIC PAIN OF RIGHT KNEE: Primary | ICD-10-CM

## 2024-07-23 DIAGNOSIS — Z74.09 IMPAIRED FUNCTIONAL MOBILITY AND ACTIVITY TOLERANCE: ICD-10-CM

## 2024-07-23 NOTE — PROGRESS NOTES
Physical Therapy Daily Treatment Note    [facility]  [address] [place] [address]  [phone] (phone)  [phone] (fax)    Patient: Liza Aaron   : 1955  Diagnosis/ICD-10 Code:  Chronic pain of right knee [M25.561, G89.29]  Referring practitioner: CONCETTA Johnson  Date of Initial Visit: Type: THERAPY  Noted: 2024  Today's Date: 2024  Patient seen for 3 sessions             Subjective Evaluation    History of Present Illness    Subjective comment: Haven't done much since I was here the first time because I went to [place] - did do a good bit of walking while there.  My knee's more stiff than painful       Objective     AQUATIC EX:     Water Walk                 Forward, backwards, sideways x 2 laps ea  March Walk                 2 laps   Stretch 1                      Calf stretch 20-30 sec x 2 ea  Stretch 2                      Hamstring 20-30 sec x 2 ea, heel propped on bench  Stretch 3                      -  Stretch Other 1           -  Stretch Other 2           -  Vertical Traction          -  Abdominals                 -  Clams                          -  Hip Abd/Add                15x ea  Hip Flex (SLR)            15x ea  Hip Ext                        15x ea, tighten thigh / buttock (small range)  March in Place            15x  Mini Squat                   15x  Toe/Heel Raises         15x  Leg Press                    15x w/ large blue aqua ring  Alt HS curls   10x ea  Uni-Squat                    -  Uni-Clock                    -  Step Ups                     10x ea, bottom pool step  STS from pool bench  10x, no hands  Bicycle                         2 min, seated  Flutter/Scissor             15 / 15  Exercise 1                   Standing LAQ + ankle DF x 10 ea  Exercise 2                   -  Exercise 3                   -  Exercise 4                   -  Exercise 5                   -  Exercise 6                    -      Assessment & Plan       Assessment  Assessment details: Patient reports her knee is more stiff than painful.  Continued with previous aquatic ex/activity for mobility, flexibility, and strength/stabilization.  Increased reps on a few ex, switched to standing LAQ, and added alt HS curls, step ups this visit.  She had more difficulty stabilizing when stepping up with L LE and noted a little twinge of pain in L knee during step ups (bottom pool step).  Emphasis on standing up tall, actively engaging abdominals / gluts / quads, and performing ex/activity with controlled movement in comfortable range to minimize compensation / strain on joints and optimize ex benefit.  Demonstration and cuing provided throughout session for optimal posture, core/glut/quad activation, and proper form/technique with ex/activity.    Plan:  Continue to assess patient response to aquatic ex/activity and modify/progress as appropriate.                    Timed:  Aquatic Therapy    40     mins 33982;    Jaimie Cervantes PT  Physical Therapist    [place] License: [id number]

## 2024-07-25 ENCOUNTER — TREATMENT (OUTPATIENT)
Dept: PHYSICAL THERAPY | Facility: CLINIC | Age: 69
End: 2024-07-25
Payer: COMMERCIAL

## 2024-07-25 DIAGNOSIS — M17.11 PRIMARY OSTEOARTHRITIS OF RIGHT KNEE: ICD-10-CM

## 2024-07-25 DIAGNOSIS — Z74.09 IMPAIRED FUNCTIONAL MOBILITY AND ACTIVITY TOLERANCE: ICD-10-CM

## 2024-07-25 DIAGNOSIS — G89.29 CHRONIC PAIN OF RIGHT KNEE: Primary | ICD-10-CM

## 2024-07-25 DIAGNOSIS — M25.561 CHRONIC PAIN OF RIGHT KNEE: Primary | ICD-10-CM

## 2024-07-25 DIAGNOSIS — R26.9 GAIT DISTURBANCE: ICD-10-CM

## 2024-07-25 NOTE — PROGRESS NOTES
Physical Therapy Daily Treatment Note    [facility]  [address] [place] [address]  [phone] (phone)  [phone] (fax)    Patient: Liza Aaron   : 1955  Diagnosis/ICD-10 Code:  Chronic pain of right knee [M25.561, G89.29]  Referring practitioner: CONCETTA Johnson  Date of Initial Visit: Type: THERAPY  Noted: 2024  Today's Date: 2024  Patient seen for 4 sessions             Subjective Evaluation    History of Present Illness    Subjective comment: I came to [place] last night and did some water ex on my own for about an hour so I'm a little sore this morning.  Seems like my knee hurts more in the mornings when I sleep well during the night - probably because I don't move as much.       Objective     AQUATIC EX:     Water Walk                 Forward, backwards, sideways x 2 laps ea - on own  March Walk                 2 laps   Stretch 1                      Calf stretch 20-30 sec x 2 ea  Stretch 2                      Hamstring 20-30 sec x 2 ea, heel propped on bench  Stretch 3                     -  Stretch Other 1           -  Stretch Other 2           -  Vertical Traction          -  Abdominals                 -  Clams                         -  Hip Abd/Add               15x ea  Hip Flex (SLR)           15x ea  Hip Ext                       15x ea, tighten thigh / buttock (small range)  March in Place           15x  Mini Squat                 15x  Toe/Heel Raises          20x  Leg Press                    15x w/ large blue aqua ring  Alt HS curls                 12x ea  Hip circles cw/ccw 10x ea direction  Uni-Squat                    -  Uni-Clock                    -  Step Ups                     10x ea, bottom pool step, little to no UE rail support as able  STS from pool bench  10x, no hands  Bicycle                         2 min, suspended  Flutter/Scissor             20 / 20, seated  Exercise 1                   Standing LAQ + ankle  DF x 12 ea  Exercise 2                   -  Exercise 3                   -  Exercise 4                   -  Exercise 5                   -  Exercise 6                   -         Assessment & Plan       Assessment  Assessment details: Patient reports she's a little sore this morning but she spent about an hour last night doing water ex on her own.  Continued with previous aquatic ex/activity for mobility, flexibility, and strength/stabilization.  Increased reps on a few ex, tried suspended bicycle, and added hip circles this visit.  She reports it seems harder / has a different feel trying to engage quad / glut for small hip ext on R LE.  Focused on upright posture, actively engaging abdominals / gluts / quads, and performing ex/activity with good form / quality and control to help improve desired muscle activation and reduce compensation / strain on joints.  Demonstration and cuing provided throughout session for optimal posture, core/glut/quad activation, and proper form/technique with ex/activity.    Plan:  30 day progress note next visit.  Will continue with aquatic ex/activity and modify/progress as appropriate.                    Timed:  Aquatic Therapy    38     mins 66712;    Jaimie Cervantes PT  Physical Therapist    [place] License: [id number]

## 2024-08-05 ENCOUNTER — TREATMENT (OUTPATIENT)
Dept: PHYSICAL THERAPY | Facility: CLINIC | Age: 69
End: 2024-08-05
Payer: COMMERCIAL

## 2024-08-05 ENCOUNTER — TELEPHONE (OUTPATIENT)
Dept: PHYSICAL THERAPY | Facility: CLINIC | Age: 69
End: 2024-08-05

## 2024-08-05 DIAGNOSIS — M17.11 PRIMARY OSTEOARTHRITIS OF RIGHT KNEE: ICD-10-CM

## 2024-08-05 DIAGNOSIS — G89.29 CHRONIC PAIN OF RIGHT KNEE: Primary | ICD-10-CM

## 2024-08-05 DIAGNOSIS — Z74.09 IMPAIRED FUNCTIONAL MOBILITY AND ACTIVITY TOLERANCE: ICD-10-CM

## 2024-08-05 DIAGNOSIS — M25.561 CHRONIC PAIN OF RIGHT KNEE: Primary | ICD-10-CM

## 2024-08-05 DIAGNOSIS — R26.9 GAIT DISTURBANCE: ICD-10-CM

## 2024-08-05 PROCEDURE — 97113 AQUATIC THERAPY/EXERCISES: CPT | Performed by: PHYSICAL THERAPIST

## 2024-08-05 NOTE — PROGRESS NOTES
Physical Therapy 30-Day Progress Note     [facility]  [address] [place] [address]  [phone] (phone)  [phone] (fax)    Patient: Liza Aaron   : 1955  Diagnosis/ICD-10 Code:  Chronic pain of right knee [M25.561, G89.29]  Referring practitioner: CONCETTA Johnson  Date of Initial Visit: Type: THERAPY  Noted: 2024  Today's Date: 2024  Patient seen for 5 sessions      Subjective:     Clinical Progress: unchanged  Treatment has included:  aquatic therapy    Subjective   My knee is about the same.  Knee pain typically is a 5-6/10, can flare to an 8-9/10 with activity.    Objective      Right Knee AROM Extension: lacks 18 degrees    Posture right Genu valgus      AQUATIC EX:     Water Walk                 Forward, backwards, sideways Independent  March Walk                 2 laps   Calf stretch 20-30 sec x 2 ea   Hamstring Stretch 20-30 sec x 2 ea, heel propped on bench  Quad Stretch 20 sec X 2 each  Abdominals                Lg Solid Noodle Pushdowns X 15  Hip Abd/Add                15x ea  Hip Flex (SLR)            15x ea  Hip Ext                        15x ea  March in Place            15x  Mini Squat                   15x  Toe/Heel Raises           20x  Leg Press                    15x w/ large blue aqua ring  Alt HS curls                 12x ea Deferred  Hip circles cw/ccw10x ea direction  Step Ups                     15x ea, bottom pool step (6 inch)   STS from pool bench  10x, no hands  Bicycle                         2 min, suspended  Flutter/Scissor             20 / 20, seated    Assessment & Plan       Assessment  Assessment details: Liza Aaron is a 69 y.o. year old female referred to physical therapy for R knee pain. She has end stage R knee OA. Liza has attended 4 sessions of aquatic therapy in the past month.  Right knee Extension ROM remains limited.  One short term goal is partially met and the long term goals remain  ongoing.    Liza would benefit from skilled physical therapy to improve right knee ROM and strength in preparation for knee surgery.       Prognosis: good    Goals  Plan Goals: STGs:  1. Pt will be educated on surgery preparation, transfers, and gait prior to TKA.  PARTIALLY MET, needs further education    2. Pt to improve R knee ext from 18 deg to 12 deg for improved gait pattern.  ONGOING    LTGS: (to be met in 12 weeks)  1. Pt to improve R knee ext from 12 deg to 8 deg for improved gait pattern.   2. Pt to improve hip and knee strength to 4+ to 5/5 for greater ease with stairs in her home.  3. Pt to improve score on Knee Outcome Survey from 49% to 62% for ease of negotiating her home and community.    4. Pt will be independent with a HEP for ROM, flexibility, and strength to help improve surgical outcomes    Plan  Therapy options: will be seen for skilled therapy services  Planned modality interventions: hydrotherapy, TENS, cryotherapy and thermotherapy (hydrocollator packs)  Other planned modality interventions: aquatic therapy  Planned therapy interventions: body mechanics training, flexibility, functional ROM exercises, gait training, home exercise program, neuromuscular re-education, strengthening, stretching, therapeutic activities, transfer training, abdominal trunk stabilization, manual therapy, soft tissue mobilization and joint mobilization  Frequency: 2x week  Duration in weeks: 10  Treatment plan discussed with: patient  Plan details: Pt to be treated in the aquatic environment for 3 weeks and 3-4 visits on land based to help prep for TKA           Recommendations: Continue as planned  Timeframe: 1 month  Prognosis to achieve goals: good    PT Signature: Kp Doran, PT  [place] License: [id number]      Based upon review of the patient's progress and continued therapy plan, it is my medical opinion that Liza Aaron should continue physical therapy treatment at [facility]  PHYSICAL THERAPY  [address] [place] [address] [address] [place] [place] [id number]  [phone].    Signature: __________________________________  Angelica Montalvo APRN  NPI: [id number]                                          Timed:  Aquatic therapy  12967    30   mins

## 2024-08-08 ENCOUNTER — TREATMENT (OUTPATIENT)
Dept: PHYSICAL THERAPY | Facility: CLINIC | Age: 69
End: 2024-08-08
Payer: COMMERCIAL

## 2024-08-08 DIAGNOSIS — R26.9 GAIT DISTURBANCE: ICD-10-CM

## 2024-08-08 DIAGNOSIS — Z74.09 IMPAIRED FUNCTIONAL MOBILITY AND ACTIVITY TOLERANCE: ICD-10-CM

## 2024-08-08 DIAGNOSIS — M25.561 CHRONIC PAIN OF RIGHT KNEE: Primary | ICD-10-CM

## 2024-08-08 DIAGNOSIS — M17.11 PRIMARY OSTEOARTHRITIS OF RIGHT KNEE: ICD-10-CM

## 2024-08-08 DIAGNOSIS — G89.29 CHRONIC PAIN OF RIGHT KNEE: Primary | ICD-10-CM

## 2024-08-08 NOTE — PROGRESS NOTES
Physical Therapy Daily Treatment Note    [facility]  [address] [place] [address]  [phone] (phone)  [phone] (fax)    Patient: Liza Aaron   : 1955  Diagnosis/ICD-10 Code:  Chronic pain of right knee [M25.561, G89.29]  Referring practitioner: CONCETTA Johnson  Date of Initial Visit: Type: THERAPY  Noted: 2024  Today's Date: 2024  Patient seen for 6 sessions             Subjective Evaluation    History of Present Illness    Subjective comment: Sorry I'm late.  It's hard for me to make it to appts during the day with work.       Objective     AQUATIC EX:     Water Walk                 Forward, backwards, sideways - on own  March Walk                 2 laps   Calf stretch   20-30 sec x 2 ea, unilateral calf stretch off edge of step x 20 ea R / L  Hamstring Stretch  20-30 sec x 2 ea, heel propped on bench  Quad Stretch   20 sec x 2 ea  Abdominals                Lg Solid Noodle Pushdowns x 15  Hip Abd/Add               15x ea  Hip Flex (SLR)            15x ea - *deferred  Hip Ext                        15x ea, tighten thigh / buttock  March in Place            15x - *deferred  Mini Squat                   15x  Toe/Heel Raises           20x - deferred  Leg Press                    15x w/ large blue aqua ring  Alt HS curls                 12x ea Deferred  Hip circles cw/ccw 10x ea direction  Step Ups                     15x ea, bottom pool step (6 inch)   STS to heel raise  10x, no hands (from pool bench)  Bicycle                         2 min, suspended  Flutter/Scissor             20 / 20, seated  Running Man   15x ea - harder on L       Assessment & Plan       Assessment  Assessment details: Patient arrived at 9:40am for her 9:30am appt. and states it's difficult for her to get her during the day with work.  She is now thinking her TKA probably won't happen until sometime next spring.  She did mention that there wasn't really any change  "in her knee ROM when reassessed last visit and questions if maybe she needs to try doing land PT.  Continued with some previous aquatic ex/activity for mobility, flexibility, and strength/stabilization.  Had her try STS to heel raise and \"running man\" this visit which were a bit challenging to her balance / stability.  Discussed need to be diligent with knee stretching daily in an effort to gain extension ROM with lower intensity, longer duration holds.  Emphasis placed on intentionality, facilitating desired muscle activation, and using muscles to control movement with prescribed ex.  Demonstration and cuing provided throughout session for optimal posture, core/glut/quad activation, and proper form/technique with ex/activity.    Plan:  Continue skilled therapy progressing ex/activity as appropriate. May consider combination of aquatic / land PT or possible transition to land PT for further education and instruction in land HEP to help with ROM / strength in preparation for future TKA surgery.                   Timed:  Aquatic Therapy    23     mins 52145;    Jaimie Cervantes, PT  Physical Therapist    [place] License: [id number]  "

## 2024-08-13 ENCOUNTER — OFFICE VISIT (OUTPATIENT)
Dept: OBSTETRICS AND GYNECOLOGY | Age: 69
End: 2024-08-13
Payer: COMMERCIAL

## 2024-08-13 VITALS
BODY MASS INDEX: 36.8 KG/M2 | SYSTOLIC BLOOD PRESSURE: 128 MMHG | WEIGHT: 229 LBS | HEIGHT: 66 IN | DIASTOLIC BLOOD PRESSURE: 76 MMHG

## 2024-08-13 DIAGNOSIS — N32.81: ICD-10-CM

## 2024-08-13 DIAGNOSIS — Z13.820 OSTEOPOROSIS SCREENING: ICD-10-CM

## 2024-08-13 DIAGNOSIS — Z12.31 BREAST CANCER SCREENING BY MAMMOGRAM: ICD-10-CM

## 2024-08-13 DIAGNOSIS — N95.2 ATROPHIC VAGINITIS: ICD-10-CM

## 2024-08-13 DIAGNOSIS — Z86.010 PERSONAL HISTORY OF COLONIC POLYPS: Primary | ICD-10-CM

## 2024-08-13 DIAGNOSIS — N81.6 RECTOCELE: ICD-10-CM

## 2024-08-13 DIAGNOSIS — Z00.00 ENCOUNTER FOR ANNUAL PHYSICAL EXAM: ICD-10-CM

## 2024-08-13 PROBLEM — Z90.722 HISTORY OF HYSTERECTOMY WITH BILATERAL OOPHORECTOMY: Status: ACTIVE | Noted: 2024-08-13

## 2024-08-13 PROBLEM — Z90.710 HISTORY OF HYSTERECTOMY WITH BILATERAL OOPHORECTOMY: Status: ACTIVE | Noted: 2024-08-13

## 2024-08-13 RX ORDER — TOLTERODINE 2 MG/1
2 CAPSULE, EXTENDED RELEASE ORAL DAILY
Qty: 60 CAPSULE | Refills: 6 | Status: SHIPPED | OUTPATIENT
Start: 2024-08-13

## 2024-08-13 NOTE — PROGRESS NOTES
"Subjective     Chief Complaint   Patient presents with    Gynecologic Exam     New gyn: Eleni.care: LAC ,last pap ,mammo 3/24,colonoscopy 10/23,dexa , Discuss when to repeat dexa       History of Present Illness  Wellness exam  Liza Aaron is a very pleasant 69 y.o. female .Mammo Exam 2020 4 repeat order has been placed, Exercise walking 4-5 times a week  Patient is here to establish GYN care she previously had a total abdominal hysterectomy bilateral salpingo-oophorectomy for an enlarged ovary, pathology was returned as benign, it was performed by Dr. 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  She is on Crestor and sees Dr. Whaley on a regular basis.  Fortunately she had a reassuring cardiac calcium CT imaging study recently performed.    She has had 3 seizures in her life, she currently is on a low-dose of Keppra for prophylaxis.  She follows up with neurology normally.    Liza is probably overdue for a DEXA scan and that has been ordered        .      Obstetric History:  OB History          5    Para   5    Term   5            AB        Living   5         SAB        IAB        Ectopic        Molar        Multiple        Live Births   5               Menstrual History:     No LMP recorded (lmp unknown). Patient has had a hysterectomy.       Sexual History:       Past Medical History:   Diagnosis Date    Arthritis     Diverticulosis     Family history of coronary artery disease     SAW DR THOMPSON 2019    Hip pain     BILATERAL    Hyperglycemia     Hyperlipidemia     Irregular heart beat     OCCASIONAL \"SKIPPED BEAT\"    Pure hypercholesterolemia     Seizure     WHEN IN COLLEGE, NONE SINCE    Umbilical hernia     UTI (urinary tract infection)     FINISHED ANTIBIOTIC    Vitamin D deficiency      Past Surgical History: " "  Procedure Laterality Date    COLONOSCOPY N/A 12/14/2015    Diverticulosis in the entire examined colon, non-bleeding internal hemorrhodis, no specimens collected-Dr. Rosario Flores    COLONOSCOPY N/A 5/12/2021    Procedure: COLONOSCOPY  into cecum with cold/hot snare and cold bx polypectomies;  Surgeon: Rosario Flores MD;  Location: [facility] ENDOSCOPY;  Service: Gastroenterology;  Laterality: N/A;  pre: screen  post: diverticulosis, polyps, hemorrhoids     COLONOSCOPY N/A 10/17/2023    Procedure: COLONOSCOPY INTO CECUM AND T.I. WITH HOT SNARE POLYPECTOMY;  Surgeon: Rosario Flores MD;  [facility] END[facility];  Service: Gastroenterology;  Laterality: N/A;  PRE- HISTORY OF COLON POLYPS  POST- DIVERTICULOSIS, POLYP, HEMORRHOIDS    TOTAL ABDOMINAL HYSTERECTOMY WITH SALPINGO OOPHORECTOMY Bilateral 12/16/2004    Dr. Dominick Villarreal    TOTAL HIP ARTHROPLASTY Right 2/5/2020    Procedure: TOTAL HIP ARTHROPLASTY;  Surgeon: Cesar Johnson MD;  Location: [facility] MAIN OR;  Service: Orthopedics;  Laterality: Right;    TOTAL HIP ARTHROPLASTY Left 9/21/2020    Procedure: TOTAL HIP ARTHROPLASTY;  Surgeon: Cesar Johnson MD;  Location: [facility] MAIN OR;  Service: Orthopedics;  Laterality: Left;    UMBILICAL HERNIA REPAIR N/A 12/16/2004    Dr. Dominick Villarreal         SOCIAL Hx:        The following portions of the patient's history were reviewed and updated as appropriate: allergies, current medications, past family history, past medical history, past social history, past surgical history and problem list.    Review of Systems       Urinary incontinence symptoms were discussed      Except as outlined in history of physical illness, patient denies any changes in her GYN, , GI systems. All other systems reviewed are negative.         Objective   Physical Exam    /76   Ht 167.6 cm (65.98\")   Wt 104 kg (229 lb)   LMP  (LMP Unknown)   BMI 36.98 kg/m²     General: Patient is alert and oriented and appears overall " healthy  Neck: Is supple without thyromegaly, no carotid bruits and no lymphadenopathy  Lungs: Clear bilaterally, no wheezing, rhonchi, or rales.  Respiratory rate is normal  Breasts: Symmetrical, no masses, no lymphadenopathy, no erythema, no discharge  Heart: Regular rate and rhythm are appreciated, no murmurs or rubs are heard  Abdomen: Is soft, without organomegaly, bowel sounds are positive, there is no rebound or guarding and palpation does not produce any discomfort  Back: Nontender without CVA tenderness  Pelvic: External genitalia appear normal and consistent with mature female.  BUS normal              Urethra and bladder are without any masses or tenderness              Urethral meatus is normal without discharge or masses or tenderness              Bladder is nonenlarged nontender              Vagina is clean dry without discharge and appears adequately               estrogenized, no lesions or masses are present                    Uterus and adnexa are absent              Rectal digital exam reveals adequate sphincter tone and no masses or lesions, expected relaxation consistent with second-degree asymptomatic rectocele              are  appreciated on digital rectal examination.        Patient Active Problem List   Diagnosis    HLD (hyperlipidemia)    Incisional hernia, without obstruction or gangrene    Osteoarthritis of multiple joints    Class 2 obesity without serious comorbidity with body mass index (BMI) of 35.0 to 35.9 in adult    Primary osteoarthritis of right hip    Primary osteoarthritis of both hips    Primary osteoarthritis of left hip    OA (osteoarthritis) of hip    New onset seizure    Obesity (BMI 30-39.9)    PVC (premature ventricular contraction)    Paroxysmal SVT (supraventricular tachycardia)    Syncope and collapse    Encounter for screening for malignant neoplasm of colon    Personal history of colonic polyps    ALLA on CPAP    History of hysterectomy with bilateral oophorectomy                 Assessment & Plan   Diagnoses and all orders for this visit:    1. Personal history of colonic polyps (Primary)  -     IGP, Apt HPV,rfx 16 / 18,45    2. Atrophic vaginitis  -     IGP, Apt HPV,rfx 16 / 18,45    3. Breast cancer screening by mammogram  -     Mammo Screening Digital Tomosynthesis Bilateral With CAD; Future  -     IGP, Apt HPV,rfx 16 / 18,45    4. Detrusor dyssynergia  -     IGP, Apt HPV,rfx 16 / 18,45    5. Encounter for annual physical exam  -     IGP, Apt HPV,rfx 16 / 18,45    6. Osteoporosis screening  -     DEXA Bone Density Axial; Future  -     IGP, Apt HPV,rfx 16 / 18,45    Other orders  -     tolterodine LA (Detrol LA) 2 MG 24 hr capsule; Take 1 capsule by mouth Daily.  Dispense: 60 capsule; Refill: 6    DEXA scan ordered  See history as outlined in HPI    Discussed today's findings and concerns with patient.  Continue to recommend regular exercise including cardiovascular and resistance training as well as  breast self-exam. Wellness lab, mammography, & pap smear, in accordance with age guidelines.    I have encouraged her to call for today's test results if she has not received them within 10 days.  Patient is advised to call with any change in her condition or with any other questions, otherwise return  for annual examination.                 EMR Dragon/ Transcription disclaimer:  Much of the encounter note is an electronic transcription/translation of spoken language to printed text. The electronic translation of spoken language may permit erroneous, or at times, nonessential words or phrases to be inadvertently transcribes; Although i have reviewed the note for such errors, some may still exist.See history as outlined in HPI

## 2024-08-17 LAB
CYTOLOGIST CVX/VAG CYTO: NORMAL
CYTOLOGY CVX/VAG DOC CYTO: NORMAL
CYTOLOGY CVX/VAG DOC THIN PREP: NORMAL
DX ICD CODE: NORMAL
HPV I/H RISK 4 DNA CVX QL PROBE+SIG AMP: NEGATIVE
Lab: NORMAL
OTHER STN SPEC: NORMAL
STAT OF ADQ CVX/VAG CYTO-IMP: NORMAL

## 2024-08-19 ENCOUNTER — TREATMENT (OUTPATIENT)
Dept: PHYSICAL THERAPY | Facility: CLINIC | Age: 69
End: 2024-08-19
Payer: COMMERCIAL

## 2024-08-19 DIAGNOSIS — M17.11 PRIMARY OSTEOARTHRITIS OF RIGHT KNEE: ICD-10-CM

## 2024-08-19 DIAGNOSIS — G89.29 CHRONIC PAIN OF RIGHT KNEE: Primary | ICD-10-CM

## 2024-08-19 DIAGNOSIS — M25.561 CHRONIC PAIN OF RIGHT KNEE: Primary | ICD-10-CM

## 2024-08-19 DIAGNOSIS — R26.9 GAIT DISTURBANCE: ICD-10-CM

## 2024-08-19 DIAGNOSIS — Z74.09 IMPAIRED FUNCTIONAL MOBILITY AND ACTIVITY TOLERANCE: ICD-10-CM

## 2024-08-19 PROCEDURE — 97112 NEUROMUSCULAR REEDUCATION: CPT | Performed by: PHYSICAL THERAPIST

## 2024-08-19 PROCEDURE — 97110 THERAPEUTIC EXERCISES: CPT | Performed by: PHYSICAL THERAPIST

## 2024-08-19 NOTE — PROGRESS NOTES
Physical Therapy Daily Treatment Note    Patient: Liza Aaron   : 1955  Diagnosis/ICD-10 Code:  Chronic pain of right knee [M25.561, G89.29]  Referring practitioner: CONCETTA Johnson  Date of Initial Visit: Type: THERAPY  Noted: 2024  Today's Date: 2024  Patient seen for 7 sessions    Visit Diagnoses:    ICD-10-CM ICD-9-CM   1. Chronic pain of right knee  M25.561 719.46    G89.29 338.29   2. Primary osteoarthritis of right knee  M17.11 715.16   3. Gait disturbance  R26.9 781.2   4. Impaired functional mobility and activity tolerance  Z74.09 V49.89       [facility] [place] [address]  [phone] (phone)  [phone] (fax)         Subjective knee pain really isn't much better but I did get a HEP and correction of things that I was doing wrong    Objective          Active Range of Motion   Left Knee   Extension: 7 degrees     Right Knee   Extension: 15 degrees         Therapeutic exercises/NM re-ed: (done B LE unless noted)  -quad set with towel roll, x10 holding for 5 sec  -hip add iso w/ball, exhale with TA contraction 20x 5 sec hold  -bridge, 2x10  -hip abd/ER with blue band, B and single leg  2x10  -hamstring curls, green x20  -hamstring 90/90 stretch 3x20  -seated hamstring and calf stretch with heel on floor, 3x20 sec  V/t cues for form    Medbridge Access Code: [email or web address]    Assessment/Plan  Pt has transitioned from aquatic therapy to land based to learn some exercises to progress her ROM, strength, and flexibility. She has improved her B knee ext, but the R needs to progress more prior to her TKA.          Timed:    Manual Therapy:         mins  19001;  Therapeutic Exercise:    30     mins  48109;     Neuromuscular Fide:    8    mins  37349;    Therapeutic Activity:          mins  57661;     Gait Training:           mins  77089;     Ultrasound:          mins  94643;    Electrical Stimulation:         mins  69911 (  );  Iontophoresis         mins 83825;  Aquatic Therapy         mins 72744;      Untimed:  Electrical Stimulation:         mins  54721 ( );  Traction:         mins  00208;   Dry Needling   (1-2 muscles)             mins 20560 (Self-pay)  Dry Needling (3-4 muscles)           mins 20561 (Self-pay)  Dry Needling Trial              mins DRYNDLTRIAL  (No Charge)    Timed Treatment:   38   mins   Total Treatment:     38   mins    Maeve Solorzano PT, CDNT  Physical Therapist    [place] License:[id number]

## 2024-08-27 ENCOUNTER — TELEPHONE (OUTPATIENT)
Dept: ORTHOPEDIC SURGERY | Facility: CLINIC | Age: 69
End: 2024-08-27

## 2024-08-29 ENCOUNTER — TELEPHONE (OUTPATIENT)
Dept: NEUROLOGY | Facility: CLINIC | Age: 69
End: 2024-08-29
Payer: COMMERCIAL

## 2024-08-30 DIAGNOSIS — E78.00 PURE HYPERCHOLESTEROLEMIA: Primary | ICD-10-CM

## 2024-09-03 ENCOUNTER — HOSPITAL ENCOUNTER (OUTPATIENT)
Facility: HOSPITAL | Age: 69
Discharge: HOME | End: 2024-09-03
Admitting: OBSTETRICS & GYNECOLOGY
Payer: COMMERCIAL

## 2024-09-03 DIAGNOSIS — Z13.820 OSTEOPOROSIS SCREENING: ICD-10-CM

## 2024-09-03 PROCEDURE — 77080 DXA BONE DENSITY AXIAL: CPT

## 2024-09-06 ENCOUNTER — OFFICE VISIT (OUTPATIENT)
Dept: NEUROLOGY | Facility: CLINIC | Age: 69
End: 2024-09-06
Payer: COMMERCIAL

## 2024-09-06 VITALS
OXYGEN SATURATION: 95 % | DIASTOLIC BLOOD PRESSURE: 72 MMHG | HEIGHT: 66 IN | BODY MASS INDEX: 36.48 KG/M2 | WEIGHT: 227 LBS | SYSTOLIC BLOOD PRESSURE: 118 MMHG | HEART RATE: 84 BPM

## 2024-09-06 DIAGNOSIS — R56.9 GENERALIZED CONVULSIVE SEIZURES: Primary | ICD-10-CM

## 2024-09-06 DIAGNOSIS — R20.2 NUMBNESS AND TINGLING OF RIGHT ARM: ICD-10-CM

## 2024-09-06 DIAGNOSIS — R20.0 NUMBNESS AND TINGLING OF RIGHT ARM: ICD-10-CM

## 2024-09-06 PROBLEM — R55 SYNCOPE AND COLLAPSE: Status: RESOLVED | Noted: 2021-01-15 | Resolved: 2024-09-06

## 2024-09-06 RX ORDER — LEVETIRACETAM 500 MG/1
1000 TABLET, FILM COATED, EXTENDED RELEASE ORAL DAILY
Qty: 180 TABLET | Refills: 3 | Status: SHIPPED | OUTPATIENT
Start: 2024-09-06
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
## 2024-09-06 NOTE — ASSESSMENT & PLAN NOTE
Today she reports a history of pain involving her right arm with pain that she thinks originates in her neck and she will experience numbness as well when she is sleeping even if she is not sleeping on the right side.  This has been going on for a couple years and worse over the past 6 months.  She did have physical therapy but the symptoms did not improve.  I will order an EMG/NCS of her right upper extremity for further evaluation.    SCM

## 2024-09-06 NOTE — PATIENT INSTRUCTIONS
In general, we recommend using good judgement when you are doing certain activities and to avoid those activities that if you were to have a seizure, you could harm yourself or others. In the state of [place], it is the law that you cannot drive within 90 days of a seizure. We also recommend not standing over open flames, not getting on high ladders or the roof, not swimming or taking baths by yourself (showers are ok) and not operating heavy machinery or power tools.  [facility]  Claudia Chadwick MD  Neurology clinic  [phone]    With anti-seizure medications, you may initially notice side effects of fatigue, drowsiness, unsteadiness, and dizziness.  Other possible side effects include nausea, abdominal pain, headache, blurry or double vision, slurred speech and mood changes.  Generally, patients will noticed these symptoms when the medication is first started or with higher doses and will go away with time.    It is import to consistently take your medication every day.  Missing just one dose may put you at risk for a breakthrough seizure.  Consider using reminders on your phone or a pill box.    If you develop a rash, please call the neurology clinic immediately or notify another healthcare professional, as this may be potentially life-threatening.  If you are unable to reach a healthcare professional, go to the emergency room immediately for further evaluation.    If you develop thoughts of wanting to hurt yourself or others, please call the neurology clinic immediately to notify another healthcare professional.  If you are unable to reach a healthcare professional, go to the emergency room immediately for further evaluation.    It is the [place] state law that you cannot drive within 90 days of a seizure.    You should avoid certain activities that if you were to have a seizure, you could harm yourself or others. In general, it is recommended that you avoid operating heavy machinery or  power tools, swimming or taking baths by yourself (showers are ok), don't stand over open flames, don't get on high ladders or the roof.  I also recommend to avoid sleeping on your stomach.    For further information on epilepsy and resources available to patients and their families, please visit the [facility] at [email or web address] or call [phone].    **Check out the [facility]'s monthly Art Group Gathering.  [facility] [address].  Call Niesha Lyons at [phone] or email her at [email or web address] for the dates of future gatherings.**      **If you have having memory problems, consider HOBSCOTCH (Home-Based Self-management and Cognitive Training Changes lives).  It is an 8 week self-management program for adults with epilepsy and memory problems.  The program is free at the [facility].  Contact Nara Ken at [phone] or [email or web address].**

## 2024-09-30 LAB
ALBUMIN SERPL-MCNC: 4.3 G/DL (ref 3.5–5.2)
ALBUMIN/GLOB SERPL: 2.7 G/DL
ALP SERPL-CCNC: 61 U/L (ref 39–117)
ALT SERPL-CCNC: 11 U/L (ref 1–33)
AST SERPL-CCNC: 23 U/L (ref 1–32)
BILIRUB SERPL-MCNC: 0.6 MG/DL (ref 0–1.2)
BUN SERPL-MCNC: 15 MG/DL (ref 8–23)
BUN/CREAT SERPL: 19.7 (ref 7–25)
CALCIUM SERPL-MCNC: 8.9 MG/DL (ref 8.6–10.5)
CHLORIDE SERPL-SCNC: 100 MMOL/L (ref 98–107)
CHOLEST SERPL-MCNC: 155 MG/DL (ref 0–200)
CO2 SERPL-SCNC: 26.7 MMOL/L (ref 22–29)
CREAT SERPL-MCNC: 0.76 MG/DL (ref 0.57–1)
EGFRCR SERPLBLD CKD-EPI 2021: 85.5 ML/MIN/1.73
GLOBULIN SER CALC-MCNC: 1.6 GM/DL
GLUCOSE SERPL-MCNC: 103 MG/DL (ref 65–99)
HDLC SERPL-MCNC: 55 MG/DL (ref 40–60)
LDLC SERPL CALC-MCNC: 80 MG/DL (ref 0–100)
LDLC/HDLC SERPL: 1.42 {RATIO}
POTASSIUM SERPL-SCNC: 3.8 MMOL/L (ref 3.5–5.2)
PROT SERPL-MCNC: 5.9 G/DL (ref 6–8.5)
SODIUM SERPL-SCNC: 136 MMOL/L (ref 136–145)
TRIGL SERPL-MCNC: 110 MG/DL (ref 0–150)
VLDLC SERPL CALC-MCNC: 20 MG/DL (ref 5–40)

## 2024-10-07 ENCOUNTER — TREATMENT (OUTPATIENT)
Dept: PHYSICAL THERAPY | Facility: CLINIC | Age: 69
End: 2024-10-07
Payer: COMMERCIAL

## 2024-10-07 ENCOUNTER — OFFICE VISIT (OUTPATIENT)
Dept: INTERNAL MEDICINE | Facility: CLINIC | Age: 69
End: 2024-10-07
Payer: COMMERCIAL

## 2024-10-07 VITALS
HEART RATE: 77 BPM | HEIGHT: 66 IN | BODY MASS INDEX: 36.96 KG/M2 | DIASTOLIC BLOOD PRESSURE: 68 MMHG | TEMPERATURE: 96.6 F | WEIGHT: 230 LBS | SYSTOLIC BLOOD PRESSURE: 110 MMHG | OXYGEN SATURATION: 97 %

## 2024-10-07 DIAGNOSIS — G89.29 CHRONIC PAIN OF RIGHT KNEE: Primary | ICD-10-CM

## 2024-10-07 DIAGNOSIS — M72.2 PLANTAR FASCIITIS: ICD-10-CM

## 2024-10-07 DIAGNOSIS — R73.01 IMPAIRED FASTING GLUCOSE: ICD-10-CM

## 2024-10-07 DIAGNOSIS — R26.9 GAIT DISTURBANCE: ICD-10-CM

## 2024-10-07 DIAGNOSIS — Z23 NEED FOR INFLUENZA VACCINATION: ICD-10-CM

## 2024-10-07 DIAGNOSIS — E78.00 PURE HYPERCHOLESTEROLEMIA: Primary | ICD-10-CM

## 2024-10-07 DIAGNOSIS — Z74.09 IMPAIRED FUNCTIONAL MOBILITY AND ACTIVITY TOLERANCE: ICD-10-CM

## 2024-10-07 DIAGNOSIS — M25.561 CHRONIC PAIN OF RIGHT KNEE: Primary | ICD-10-CM

## 2024-10-07 DIAGNOSIS — M17.11 PRIMARY OSTEOARTHRITIS OF RIGHT KNEE: ICD-10-CM

## 2024-10-07 PROBLEM — E66.812 CLASS 2 OBESITY WITHOUT SERIOUS COMORBIDITY WITH BODY MASS INDEX (BMI) OF 35.0 TO 35.9 IN ADULT: Status: RESOLVED | Noted: 2018-09-04 | Resolved: 2024-10-07

## 2024-10-07 PROBLEM — M16.9 OA (OSTEOARTHRITIS) OF HIP: Status: RESOLVED | Noted: 2020-09-21 | Resolved: 2024-10-07

## 2024-10-07 PROBLEM — H40.9 GLAUCOMA: Status: ACTIVE | Noted: 2024-10-07

## 2024-10-07 PROBLEM — Z12.11 ENCOUNTER FOR SCREENING FOR MALIGNANT NEOPLASM OF COLON: Status: RESOLVED | Noted: 2021-03-12 | Resolved: 2024-10-07

## 2024-10-07 PROCEDURE — 97110 THERAPEUTIC EXERCISES: CPT | Performed by: PHYSICAL THERAPIST

## 2024-10-07 PROCEDURE — 90662 IIV NO PRSV INCREASED AG IM: CPT | Performed by: FAMILY MEDICINE

## 2024-10-07 PROCEDURE — 90471 IMMUNIZATION ADMIN: CPT | Performed by: FAMILY MEDICINE

## 2024-10-07 PROCEDURE — 99213 OFFICE O/P EST LOW 20 MIN: CPT | Performed by: FAMILY MEDICINE

## 2024-10-07 PROCEDURE — 97112 NEUROMUSCULAR REEDUCATION: CPT | Performed by: PHYSICAL THERAPIST

## 2024-10-07 PROCEDURE — 97530 THERAPEUTIC ACTIVITIES: CPT | Performed by: PHYSICAL THERAPIST

## 2024-10-07 RX ORDER — ROSUVASTATIN CALCIUM 10 MG/1
10 TABLET, COATED ORAL NIGHTLY
Qty: 90 TABLET | Refills: 1 | Status: SHIPPED | OUTPATIENT
Start: 2024-10-07

## 2024-10-07 NOTE — PROGRESS NOTES
Re-Assessment / Re-Certification / Discharge        Patient: Liza Aaron   : 1955  Diagnosis/ICD-10 Code:  Chronic pain of right knee [M25.561, G89.29]  Referring practitioner: CONCETTA Johnson  Date of Initial Visit: Type: THERAPY  Noted: 2024  Today's Date: 10/7/2024  Patient seen for 8 sessions    Visit Diagnoses:    ICD-10-CM ICD-9-CM   1. Chronic pain of right knee  M25.561 719.46    G89.29 338.29   2. Primary osteoarthritis of right knee  M17.11 715.16   3. Gait disturbance  R26.9 781.2   4. Impaired functional mobility and activity tolerance  Z74.09 V49.89       [facility] [place] [address]  [phone] (phone)  [phone] (fax)    Subjective:     Clinical Progress: improved  Home Program Compliance: Yes  Treatment has included:  therapeutic exercise, therapeutic activity, neuro-muscular retraining , aquatic therapy, and patient education with home exercise program     Subjective I have been coming and getting in the pool but would like to learn some more things in the gym. Also dealing with some plantar fasciitis. Went to my primary care this am    Objective          Active Range of Motion   Left Knee   Extension: 5 degrees     Right Knee   Extension: 12 degrees     Strength/Myotome Testing     Left Knee   Flexion: 5  Extension: 5    Right Knee   Flexion: 5  Extension: 4+        Therapeutic exercises/NM re-ed: (done B LE unless noted)  -NuStep seat 11, level 5, 5 min  -K hip abd, 45lb 2x15  -K hip add, 65lb 2x15  -K leg press, 100lb 2x15  -seated gastroc and soleus stretching 3x20 sec  -stretch of plantar fascia 3x20 sec    V/t/demo cues for form     Discussion of custom orthotics as she has developed plantar fasciitis. (Showed her my orthotics and how they play a role in what is going on in B LE and LB) She is going to schedule with Sammy Lanza PT to have them made.   Education on night splint (may be helpful),  stretching of the foot      Functional outcome score:   Knee Outcome Survey=51%      Assessment & Plan       Assessment  Assessment details:  Liza Aaron was seen for 8 physical therapy sessions for R knee pain.  Treatment included therapeutic exercise, therapeutic activity, neuro-muscular retraining , gait training, aquatic therapy, and patient education with home exercise program . Progress to physical therapy goals was good. Liza is making gains in her ROM and strength. She continues with R knee pain and is planning on having a R TKA in the spring of 2025.  She was discharged to an independent HEP and provided patient education to self-manage condition.         Goals  Plan Goals: STGs:  1. Pt will be educated on surgery preparation, transfers, and gait prior to TKA.  (MET)      2. Pt to improve R knee ext from 18 deg to 12 deg for improved gait pattern.  (MET)      LTGS: (to be met in 12 weeks)  1. Pt to improve R knee ext from 12 deg to 8 deg for improved gait pattern. (NOT MET)   2. Pt to improve hip and knee strength to 4+ to 5/5 for greater ease with stairs in her home. (MET)   3. Pt to improve score on Knee Outcome Survey from 49% to 62% for ease of negotiating her home and community.  (NOT MET) improved to 51%  4. Pt will be independent with a HEP for ROM, flexibility, and strength to help improve surgical outcomes (MET)       Plan  Treatment plan discussed with: patient           Recommendations: Discharge  Timeframe:  today  Prognosis to achieve goals: good    PT Signature: Maeve Solorzano, PT, CDNT  License: [id number]      Based upon review of the patient's progress and continued therapy plan, it is my medical opinion that Liza Aaron should continue physical therapy treatment at [facility] PHYSICAL THERAP[facility]  [address] [place] [address] [address] [place] [address]  [phone].    Signature: __________________________________  Angelica Montalvo APRN  BHAMBPTSIG    Timed:  Manual  Therapy:         mins  81740;  Therapeutic Exercise:    25     mins  46961;     Neuromuscular Fide:    8    mins  86034;    Therapeutic Activity:     10     mins  37229;     Gait Training:           mins  51713;     Ultrasound:          mins  44486;    Electrical Stimulation:         mins  53262 ( );  Iontophoresis         mins 91368;  Group therapy                   mins 71180    Untimed:  Electrical Stimulation:         mins  33307 ( );  Mechanical Traction:         mins  45691;   Dry Needling   (1-2 muscles)            mins 20560 (Self-pay)  Dry Needling (3-4 muscles)          mins 20561 (Self-pay)  Dry Needling Trial              mins DRYNDLTRIAL  (No Charge)    Timed Treatment:   43   mins   Total Treatment:     43   mins

## 2024-10-07 NOTE — PROGRESS NOTES
Subjective   Liza Aaron is a 69 y.o. female.   Chief Complaint   Patient presents with    Hyperlipidemia    Foot Pain     Left heel pain       History of Present Illness     Hyperlipidemia-on rosuvastatin 10 mg a day.  She takes it every day.  She has no side effects.  LDL 80 from 69, HDL 55.  LFTs normal.   She does water exercise 4-5 times a week.  No chest pain, no lightheadedness, no palpitations, no shortness of breath.  She had CT cardiac calcium score in April 2024.  It was negative.    IFG-.    Left heel pain-started 2 to 3 weeks ago.  Started after she was wearing heels, otherwise no known injury.  Pain is worse in the mornings with first steps, then it gets  better, and then worse at the end of the day.  Stretching helps.  No skin changes.    Patient was diagnosed with early onset of glaucoma.  She uses Xalatan eyedrops and follows with ophthalmologist.    Review of Systems   Respiratory:  Negative for shortness of breath.    Cardiovascular:  Negative for chest pain and palpitations.   Neurological:  Negative for light-headedness.         Objective   Wt Readings from Last 3 Encounters:   10/07/24 104 kg (230 lb)   09/06/24 103 kg (227 lb)   08/13/24 104 kg (229 lb)      Vitals:    10/07/24 0846   BP: 110/68   Pulse: 77   Temp: 96.6 °F (35.9 °C)   SpO2: 97%     Temp Readings from Last 3 Encounters:   10/07/24 96.6 °F (35.9 °C)   05/23/24 97.8 °F (36.6 °C) (Tympanic)   04/11/24 97.7 °F (36.5 °C) (Temporal)     BP Readings from Last 3 Encounters:   10/07/24 110/68   09/06/24 118/72   08/13/24 128/76     Pulse Readings from Last 3 Encounters:   10/07/24 77   09/06/24 84   05/09/24 72     Body mass index is 37.14 kg/m².    Physical Exam  Constitutional:       Appearance: She is well-developed.   Neck:      Vascular: No carotid bruit.   Cardiovascular:      Rate and Rhythm: Normal rate and regular rhythm.      Heart sounds: Normal heart sounds.   Pulmonary:      Effort: Pulmonary effort is normal.       Breath sounds: Normal breath sounds.   Skin:     General: Skin is warm and dry.      Comments: Feet-no skin changes.  No TTP.    Neurological:      Mental Status: She is alert.         Assessment & Plan   Diagnoses and all orders for this visit:    1. Pure hypercholesterolemia (Primary)  -     Basic Metabolic Panel; Future  -     Lipid Panel With LDL / HDL Ratio; Future  -     Thyroid Cascade Profile; Future    2. Impaired fasting glucose  -     Basic Metabolic Panel; Future  -     Hemoglobin A1c; Future    3. Plantar fasciitis    Other orders  -     rosuvastatin (CRESTOR) 10 MG tablet; Take 1 tablet by mouth Every Night.  Dispense: 90 tablet; Refill: 1        Hyperlipidemia-continue current treatment.  Follow-up in 6 months.    IFG-limit carbs and sweets.  Information on prediabetic diet and counting carbs added to discharge summary.  Weight loss would be beneficial.  Continue to follow-up with weight watchers.  Check A1c in 6 months prior to office visit.    Plantar fasciitis-inserts, stretching discussed with patient.  Follow-up as needed.

## 2024-10-29 ENCOUNTER — OFFICE VISIT (OUTPATIENT)
Dept: ORTHOPEDIC SURGERY | Facility: CLINIC | Age: 69
End: 2024-10-29
Payer: COMMERCIAL

## 2024-10-29 VITALS — TEMPERATURE: 98.6 F | HEIGHT: 66 IN | WEIGHT: 233.8 LBS | BODY MASS INDEX: 37.57 KG/M2

## 2024-10-29 DIAGNOSIS — M17.11 PRIMARY OSTEOARTHRITIS OF RIGHT KNEE: Primary | ICD-10-CM

## 2024-10-29 PROBLEM — M17.9 OA (OSTEOARTHRITIS) OF KNEE: Status: ACTIVE | Noted: 2024-10-29

## 2024-10-29 PROCEDURE — 99214 OFFICE O/P EST MOD 30 MIN: CPT | Performed by: ORTHOPAEDIC SURGERY

## 2024-10-29 RX ORDER — PREGABALIN 150 MG/1
150 CAPSULE ORAL ONCE
OUTPATIENT
Start: 2024-10-29 | End: 2024-10-29

## 2024-10-29 RX ORDER — CHLORHEXIDINE GLUCONATE 500 MG/1
CLOTH TOPICAL 2 TIMES DAILY
OUTPATIENT
Start: 2024-10-29

## 2024-10-29 NOTE — PROGRESS NOTES
Patient: Liza Aaron  YOB: 1955 69 y.o. female  Medical Record Number: [id number]    Chief Complaints:   Chief Complaint   Patient presents with    Right Knee - Follow-up       History of Present Illness:Liza Aaron is a 69 y.o. female who presents for follow-up of right knee.  Having progressive right knee pain which is beginning to limit basic activities of daily living and walking tolerance.  She has done physical therapy had injections which give only short-lived relief.  This has progressed markedly over the last year or 2.    Allergies:   Allergies   Allergen Reactions    Nickel Rash     Pt states cannot wear jewelry with Nickel.     Penicillins Rash       Medications:   Current Outpatient Medications   Medication Sig Dispense Refill    Cholecalciferol (VITAMIN D) 25 MCG (1000 UT) tablet Take 1 tablet by mouth Daily.      Lactobacillus (PROBIOTIC ACIDOPHILUS PO) Take 1 tablet by mouth.      latanoprost (XALATAN) 0.005 % ophthalmic solution Administer 1 drop to both eyes every night at bedtime. 2.5 mL 12    levETIRAcetam XR (KEPPRA XR) 500 MG tablet Take 2 tablets by mouth Daily. 180 tablet 3    melatonin 5 MG tablet tablet Take 1 tablet by mouth At Night As Needed.      multivitamin with minerals tablet tablet Take 1 tablet by mouth Daily.      rosuvastatin (CRESTOR) 10 MG tablet Take 1 tablet by mouth Every Night. 90 tablet 1    tolterodine LA (Detrol LA) 2 MG 24 hr capsule Take 1 capsule by mouth Daily. (Patient not taking: Reported on 10/29/2024) 60 capsule 6     No current facility-administered medications for this visit.         The following portions of the patient's history were reviewed and updated as appropriate: allergies, current medications, past family history, past medical history, past social history, past surgical history and problem list.    Review of Systems:   Pertinent positives/negative listed in HPI above    Physical Exam:   Vitals:    10/29/24 1518   Temp: 98.6 °F (37  "°C)   TempSrc: Temporal   Weight: 106 kg (233 lb 12.8 oz)   Height: 167.6 cm (66\")   PainSc:   2   PainLoc: Knee       General: A and O x 3, ASA, NAD      Knee Exam List: Knee:  right    ALIGNMENT:     Valgus      GAIT:    Antalgic    SKIN:    No abnormality    RANGE OF MOTION:   5  -  110   DEG    STRENGTH:   4  / 5    LIGAMENTS:    No varus / valgus instability.   Negative  Lachman.    MENISCUS:     Negative   Bernardo       DISTAL PULSES:    Paplable    DISTAL SENSATION :   Intact    LYMPHATICS:     No   lymphadenopathy    OTHER:          - Positive   effusion      - Crepitance with ROM     Radiology:  Xrays 3views rosalino knees (ap,lateral, sunrise) taken previously demonstrating advanced valgus osteoarthritis with bone on bone articulation, subchondral cysts, and periarticular osteophytes      Assessment/Plan:  Right knee advanced OA  Knee Plan List: Continuation of conservative management vs. TKA discussed.  The patient wishes to proceed with total knee replacement.  At this point the patient has failed the full compliment of conservative treatment and stating complete understanding of the risks/benefits/ anternatives wishes to proceed with surgical treatment.    Risk and benefits of surgery were reviewed.  Including, but not limited to, blood clots or pulmonary embolism, anesthesia risk, infection, fracture, skin/leg numbness, persistent pain/crepitance/popping/catching, failure of the implant, need for future surgeries, hematoma, possible nerve or blood vessel injury, need for transfusion, and potential risk of stroke,heart attack or death, among others.  The patient understands and wishes to proceed.     It was explained that if tissue has been repaired or reconstructed, there is also an increased chance of failure which may require further management.  Following the completion of the discussion, the patient expressed understanding of this planned course of care, all their questions were answered and consent " will be obtained preoperatively.    Operative Plan: [facility] Total Knee Replacement performing the procedure on an outpatient basis with home health rehab    There are no diagnoses linked to this encounter.    Cesar Johnson MD  10/29/2024

## 2024-12-11 ENCOUNTER — TELEPHONE (OUTPATIENT)
Dept: ORTHOPEDIC SURGERY | Facility: CLINIC | Age: 69
End: 2024-12-11

## 2024-12-11 NOTE — TELEPHONE ENCOUNTER
Caller: Liza Aaron    Relationship to patient: Self    Best call back number: [phone]    Chief complaint: RIGHT KNEE     Type of visit: SURGERY AND INJECTION    Requested date: AFTER 3-25-24 FOR SURGERY AND ASAP FOR KNEE INJECTION      If rescheduling, when is the original appointment: SURGERY 2-10-25 AND 1-31-25     Additional notes:PATIENT WOULD LIKE TO SEE REJI SOONER FOR AN INJECTION IF POSSIBLE. SHE WOULD LIKE TO ALSO KNOW IS SHE CAN HAVE AN INJECTION PRIOR TO SURGERY. PLEASE ADVISE.

## 2024-12-12 NOTE — TELEPHONE ENCOUNTER
Returned patient's call, but had to [facility]. Cancelled 2/10/25 surgery and associated appointments. Will r/s after I am able to discuss options with patient. Upon review of chart, patient is also scheduled for appointment on 12/18/24 w/ Dr. Garcia for her foot, but that is not the correct provider for that issue. I mentioned this in the message as well and provided my direct line, [phone], for call back to address all of these matters.

## 2024-12-16 NOTE — TELEPHONE ENCOUNTER
Patient has been scheduled to see Bennett Smith for Right knee injection 12/19/24 and Dr. Arias for Left foot pain on 12/20/24.

## 2024-12-19 ENCOUNTER — OFFICE VISIT (OUTPATIENT)
Dept: ORTHOPEDIC SURGERY | Facility: CLINIC | Age: 69
End: 2024-12-19
Payer: COMMERCIAL

## 2024-12-19 VITALS — WEIGHT: 239.8 LBS | BODY MASS INDEX: 37.64 KG/M2 | TEMPERATURE: 97.7 F | HEIGHT: 67 IN

## 2024-12-19 DIAGNOSIS — M17.11 PRIMARY OSTEOARTHRITIS OF RIGHT KNEE: Primary | ICD-10-CM

## 2024-12-19 RX ORDER — METHYLPREDNISOLONE ACETATE 80 MG/ML
80 INJECTION, SUSPENSION INTRA-ARTICULAR; INTRALESIONAL; INTRAMUSCULAR; SOFT TISSUE
Status: COMPLETED | OUTPATIENT
Start: 2024-12-19 | End: 2024-12-19

## 2024-12-19 RX ORDER — LIDOCAINE HYDROCHLORIDE 10 MG/ML
5 INJECTION, SOLUTION EPIDURAL; INFILTRATION; INTRACAUDAL; PERINEURAL
Status: COMPLETED | OUTPATIENT
Start: 2024-12-19 | End: 2024-12-19

## 2024-12-19 RX ADMIN — LIDOCAINE HYDROCHLORIDE 5 ML: 10 INJECTION, SOLUTION EPIDURAL; INFILTRATION; INTRACAUDAL; PERINEURAL at 11:37

## 2024-12-19 RX ADMIN — METHYLPREDNISOLONE ACETATE 80 MG: 80 INJECTION, SUSPENSION INTRA-ARTICULAR; INTRALESIONAL; INTRAMUSCULAR; SOFT TISSUE at 11:37

## 2024-12-19 NOTE — PROGRESS NOTES
12/19/2024    Liza Aaron is here today for worsening knee pain. Pt has undergone injection of the knee in the past with good resolution of symptoms. Pt is requesting a repeat injection.     KNEE Injection Procedure Note:    Large Joint Arthrocentesis: R knee  Date/Time: 12/19/2024 11:37 AM  Consent given by: patient  Site marked: site marked  Timeout: Immediately prior to procedure a time out was called to verify the correct patient, procedure, equipment, support staff and site/side marked as required   Supporting Documentation  Indications: pain and joint swelling   Procedure Details  Location: knee - R knee  Preparation: Patient was prepped and draped in the usual sterile fashion  Needle size: 22 G  Approach: anterolateral  Medications administered: 80 mg methylPREDNISolone acetate 80 MG/ML; 5 mL lidocaine PF 1% 1 %  Patient tolerance: patient tolerated the procedure well with no immediate complications      (3 mL of lidocaine was used for anesthetic purposes)    At the conclusion of the injection I discussed the importance of continued quad strengthening exercises on a daily basis. I will see the patient back if the symptoms should fail to improve or worsen.    Bennett Smith, APRN  12/19/2024

## 2024-12-20 ENCOUNTER — OFFICE VISIT (OUTPATIENT)
Dept: ORTHOPEDIC SURGERY | Facility: CLINIC | Age: 69
End: 2024-12-20
Payer: COMMERCIAL

## 2024-12-20 VITALS — HEIGHT: 66 IN | BODY MASS INDEX: 38.8 KG/M2 | TEMPERATURE: 97.7 F | WEIGHT: 241.4 LBS

## 2024-12-20 DIAGNOSIS — M19.072 ARTHRITIS OF LEFT FOOT: ICD-10-CM

## 2024-12-20 DIAGNOSIS — M72.2 PLANTAR FASCIITIS: ICD-10-CM

## 2024-12-20 DIAGNOSIS — M79.672 LEFT FOOT PAIN: Primary | ICD-10-CM

## 2024-12-20 PROCEDURE — 99214 OFFICE O/P EST MOD 30 MIN: CPT | Performed by: ORTHOPAEDIC SURGERY

## 2024-12-20 NOTE — PROGRESS NOTES
"   New Patient Complaint      Patient: Liza Aaron  YOB: 1955 69 y.o. female  Medical Record Number: [id number]    Chief Complaints: My heel hurts    History of Present Illness: Patient reports onset of pain mainly in the plantar aspect of the left heel that began in September 2024 after she warmed some wedge shoes at an outdoor wedding and was on uneven ground.  Subsequent to that she had persistent worsening pain in the plantar aspect of the left heel worse with first step in the morning.  She felt like she \"pulled something\".  She did some exercises that she had done previously with some stretching and water exercises.  Evidently I had treated her in the past for plantar fasciitis.      Today she reports in the last several days her symptoms began to improve and has been using a sandal around the house.  She has no other pain in the foot. She continues to work as in [employer].    She has a history of knee arthritis and is having a knee replacement done with Dr. Johnson likely in April 2025 as she is having 3 grandchildren born in January and February.  (she already has 9).     Allergies:   Allergies   Allergen Reactions    Nickel Rash     Pt states cannot wear jewelry with Nickel.     Penicillins Rash       Medications:   Current Outpatient Medications on File Prior to Visit   Medication Sig    Cholecalciferol (VITAMIN D) 25 MCG (1000 UT) tablet Take 1 tablet by mouth Daily.    Lactobacillus (PROBIOTIC ACIDOPHILUS PO) Take 1 tablet by mouth.    latanoprost (XALATAN) 0.005 % ophthalmic solution Administer 1 drop to both eyes every night at bedtime.    levETIRAcetam XR (KEPPRA XR) 500 MG tablet Take 2 tablets by mouth Daily.    melatonin 5 MG tablet tablet Take 1 tablet by mouth At Night As Needed.    multivitamin with minerals tablet tablet Take 1 tablet by mouth Daily.    rosuvastatin (CRESTOR) 10 MG tablet Take 1 tablet by mouth Every Night.    [DISCONTINUED] tolterodine LA (Detrol " "LA) 2 MG 24 hr capsule Take 1 capsule by mouth Daily. (Patient not taking: Reported on 10/29/2024)     No current facility-administered medications on file prior to visit.       Past Medical History:   Diagnosis Date    Arthritis     Diverticulosis     Family history of coronary artery disease     SAW DR THOMPSON 4/2019    Hip pain     BILATERAL    Hyperglycemia     Hyperlipidemia     Irregular heart beat     OCCASIONAL \"SKIPPED BEAT\"    Knee swelling     Pure hypercholesterolemia     Seizure     WHEN IN COLLEGE, NONE SINCE    Tear of meniscus of knee     Tendinitis of knee     Umbilical hernia     UTI (urinary tract infection)     FINISHED ANTIBIOTIC    Vitamin D deficiency      Past Surgical History:   Procedure Laterality Date    COLONOSCOPY N/A 12/14/2015    Diverticulosis in the entire examined colon, non-bleeding internal hemorrhodis, no specimens collected-Dr. Rosario Flores    COLONOSCOPY N/A 05/12/2021    Procedure: COLONOSCOPY  into cecum with cold/hot snare and cold bx polypectomies;  Surgeon: Rosario Flores MD;  Location: [facility] ENDOSCOPY;  Service: Gastroenterology;  Laterality: N/A;  pre: screen  post: diverticulosis, polyps, hemorrhoids     COLONOSCOPY N/A 10/17/2023    Procedure: COLONOSCOPY INTO CECUM AND T.I. WITH HOT SNARE POLYPECTOMY;  Surgeon: Rosario Flores MD;  Location: [facility] ENDOSCOPY;  Service: Gastroenterology;  Laterality: N/A;  PRE- HISTORY OF COLON POLYPS  POST- DIVERTICULOSIS, POLYP, HEMORRHOIDS    JOINT REPLACEMENT  2020    TOTAL ABDOMINAL HYSTERECTOMY WITH SALPINGO OOPHORECTOMY Bilateral 12/16/2004    Dr. Dominick Villarreal    TOTAL HIP ARTHROPLASTY Right 02/05/2020    Procedure: TOTAL HIP ARTHROPLASTY;  Surgeon: Cesar Johnson MD;  Location: [facility] OR;  Service: Orthopedics;  Laterality: Right;    TOTAL HIP ARTHROPLASTY Left 09/21/2020    Procedure: TOTAL HIP ARTHROPLASTY;  Surgeon: Cesar Johnson MD;  Location: [facility] OR;  Service: Orthopedics;  Laterality: Left;    " "UMBILICAL HERNIA REPAIR N/A 2004    Dr. Dominick Villarreal     Social History     Occupational History    Occupation:      Employer: [employer]   Tobacco Use    Smoking status: Former     Current packs/day: 0.00     Average packs/day: 1 pack/day for 5.0 years (5.0 ttl pk-yrs)     Types: Cigarettes     Start date: 1977     Quit date:      Years since quittin.9    Smokeless tobacco: Never    Tobacco comments:     caffiene twice a week   Vaping Use    Vaping status: Never Used   Substance and Sexual Activity    Alcohol use: Yes     Alcohol/week: 4.0 standard drinks of alcohol     Types: 2 Glasses of wine, 2 Drinks containing 0.5 oz of alcohol per week     Comment: social    Drug use: No    Sexual activity: Yes     Partners: Male     Birth control/protection: Post-menopausal      Social History     Social History Narrative    Not on file     Family History   Problem Relation Age of Onset    Breast cancer Mother     Hypertension Mother     Dementia Mother     Stroke Mother     Cancer Mother     Osteoporosis Mother     Heart disease Father     Cancer Maternal Grandmother     Heart disease Brother     Malig Hyperthermia Neg Hx        Review of Systems: 14 point review of systems performed, positive pertinent findings identified in HPI. All remaining systems negative     Review of Systems      Physical Exam:   Vitals:    24 1329   Temp: 97.7 °F (36.5 °C)   Weight: 109 kg (241 lb 6.4 oz)   Height: 167.6 cm (66\")   PainSc:   1   PainLoc: Foot     Physical Exam   Constitutional: pleasant, well developed   Eyes: sclera non icteric  Hearing : adequate for exam  Cardiovascular: palpable pulses in left foot, left calf/ thigh NT without sign of DVT  Respiratoy: breathing unlabored   Neurological: grossly sensate to LT throughout left LE  Psychiatric: oriented with normal mood and affect.   Lymphatic: No palpable popliteal lymphadenopathy left LE  Skin: intact throughout left " leg/foot  Musculoskeletal: On exam she had neutral dorsiflexion of the left heel cord.  There is mild tenderness palpation at the insertion of the left plantar fascia but no pain at the insertion of the Achilles and no palpable defects.  No exacerbation of pain with medial lateral calcaneal compression and negative Tinel's of the medial hindfoot.  Physical Exam  Ortho Exam    Radiology: 3 views of the left foot ordered evaluate pain and alignment reviewed and no prior x-rays available for comparison these show arthritic change at the midfoot especially the first second and third TMT joints.  There is neutral alignment of the first MTP joints relative elongation of the second and third metatarsals with slight extension deformity of the second toe.  There are some plantar calcaneal spurring and a small calcification at the insertion of the Achilles.  There is arthritis of the medial naviculocuneiform joint as well.    Assessment/Plan: Left heel pain consistent with exacerbation of previous plantar fasciitis    Plantar fascitis etiology and  treatment protocol discussed with pt. Information sheet provided and reviewed. This with consist of a night splint that was provided and discussed etiology of use. Towel stretch prior to first step in morning. Avoid barefoot ambulation. Gastroc and soleus heel cord stretch 4-5 times throughout the day, for 2-1/2 minutes of each session.  instruction sheet provided and demonstrated for patient. Use of shoes with arch support and ice bottle massage for 15-20 minutes each night.  She was also fitted with a gel heel pad and prescribed compounding cream to apply several times daily.  She was counseled that improvement may take several months. Additional treatment modalities may include injection which we will hold off on today especially given her more recent improvement, therapy, or immobilization.  We decided to hold off on therapy as she will be doing quite a bit next year for her  knee.  I do not have anything to offer regarding surgical treatment at this time. Pt will follow up in 8 weeks but if not making any improvement in 5 weeks she will let us know so we can get an MRI of her left hindfoot prior to follow-up..

## 2025-01-17 ENCOUNTER — TELEPHONE (OUTPATIENT)
Dept: NEUROLOGY | Facility: CLINIC | Age: 70
End: 2025-01-17

## 2025-01-17 NOTE — TELEPHONE ENCOUNTER
Caller: Liza Aaron      Relationship:     Best call back number:   Telephone Information:   Mobile [phone]         What form or medical record are you requesting: [facility] MEDICAL FORM     Who is requesting this form or medical record from you: ALBERTA DOMINGUEZ     How would you like to receive the form or medical records (pick-up, mail, fax): SHE WILL FAX TO CLINIC - RETURN FAX TO [facility] FAX     Additional notes: PLEASE WATCH OUT FOR FAX

## 2025-01-20 ENCOUNTER — TELEPHONE (OUTPATIENT)
Dept: NEUROLOGY | Facility: CLINIC | Age: 70
End: 2025-01-20

## 2025-01-20 NOTE — TELEPHONE ENCOUNTER
provider out of office on 1/20/2025  - lvm x2 for pt to call and reschedule appt, Dr. Chadwick out - KB 1/19 &1/20 f/u for  for to be completed - pt can be put on next avail even Wednesday appt if available.

## 2025-01-22 ENCOUNTER — OFFICE VISIT (OUTPATIENT)
Dept: NEUROLOGY | Facility: CLINIC | Age: 70
End: 2025-01-22
Payer: COMMERCIAL

## 2025-01-22 ENCOUNTER — TELEPHONE (OUTPATIENT)
Dept: NEUROLOGY | Facility: CLINIC | Age: 70
End: 2025-01-22

## 2025-01-22 VITALS
BODY MASS INDEX: 39.15 KG/M2 | DIASTOLIC BLOOD PRESSURE: 88 MMHG | HEIGHT: 65 IN | WEIGHT: 235 LBS | HEART RATE: 40 BPM | SYSTOLIC BLOOD PRESSURE: 124 MMHG | OXYGEN SATURATION: 94 %

## 2025-01-22 DIAGNOSIS — R56.9 GENERALIZED CONVULSIVE SEIZURES: Primary | ICD-10-CM

## 2025-01-22 PROCEDURE — 99214 OFFICE O/P EST MOD 30 MIN: CPT | Performed by: PSYCHIATRY & NEUROLOGY

## 2025-01-22 RX ORDER — LEVETIRACETAM 500 MG/1
1000 TABLET, FILM COATED, EXTENDED RELEASE ORAL DAILY
Qty: 180 TABLET | Refills: 3 | Status: SHIPPED | OUTPATIENT
Start: 2025-01-22
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
## 2025-01-22 NOTE — PATIENT INSTRUCTIONS
In general, we recommend using good judgement when you are doing certain activities and to avoid those activities that if you were to have a seizure, you could harm yourself or others. In the state of [place], it is the law that you cannot drive within 90 days of a seizure. We also recommend not standing over open flames, not getting on high ladders or the roof, not swimming or taking baths by yourself (showers are ok) and not operating heavy machinery or power tools.  [facility]  Claudia Chadwick MD  Neurology clinic  [phone]    With anti-seizure medications, you may initially notice side effects of fatigue, drowsiness, unsteadiness, and dizziness.  Other possible side effects include nausea, abdominal pain, headache, blurry or double vision, slurred speech and mood changes.  Generally, patients will noticed these symptoms when the medication is first started or with higher doses and will go away with time.    It is import to consistently take your medication every day.  Missing just one dose may put you at risk for a breakthrough seizure.  Consider using reminders on your phone or a pill box.    If you develop a rash, please call the neurology clinic immediately or notify another healthcare professional, as this may be potentially life-threatening.  If you are unable to reach a healthcare professional, go to the emergency room immediately for further evaluation.    If you develop thoughts of wanting to hurt yourself or others, please call the neurology clinic immediately to notify another healthcare professional.  If you are unable to reach a healthcare professional, go to the emergency room immediately for further evaluation.    It is the [place] state law that you cannot drive within 90 days of a seizure.    You should avoid certain activities that if you were to have a seizure, you could harm yourself or others. In general, it is recommended that you avoid operating heavy machinery or  power tools, swimming or taking baths by yourself (showers are ok), don't stand over open flames, don't get on high ladders or the roof.  I also recommend to avoid sleeping on your stomach.    For further information on epilepsy and resources available to patients and their families, please visit the [facility] at www.efky.org or call [phone].    **Check out the [facility]'s monthly Art Group Gathering.  [facility] [address].  Call Niesha Lyons at [phone] or email her at [email or web address] for the dates of future gatherings.**      **If you have having memory problems, consider HOBSCOTCH (Home-Based Self-management and Cognitive Training Changes lives).  It is an 8 week self-management program for adults with epilepsy and memory problems.  The program is free at the [facility].  Contact Nara Ken at [phone] or [email or web address].**
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
## 2025-01-28 NOTE — TELEPHONE ENCOUNTER
Attempted to reach pt left pt vm on 1/28/2025 to call us back to collect payment over the phone or come in office to pay forms fee to release paperwork.

## 2025-01-29 NOTE — TELEPHONE ENCOUNTER
Spoke to pt on 1/29/2025 collected fee payment. Patient would like paperwork faxed to office stated on paperwork and a copy of the paperwork and receipt for fee payment made mailed to her. Confirmed address with patient.

## 2025-02-17 ENCOUNTER — TELEPHONE (OUTPATIENT)
Dept: ORTHOPEDIC SURGERY | Facility: CLINIC | Age: 70
End: 2025-02-17
Payer: COMMERCIAL

## 2025-02-17 DIAGNOSIS — M17.11 PRIMARY OSTEOARTHRITIS OF RIGHT KNEE: Primary | ICD-10-CM

## 2025-02-17 NOTE — TELEPHONE ENCOUNTER
Spoke to patient and she voiced understanding    Placed order for prehab. Patient requested to go to [facility]

## 2025-02-17 NOTE — TELEPHONE ENCOUNTER
Okay to put an order in for physical therapy for prehab, okay to keep appointment with me as scheduled I will get her labs once done at a later date, Dr. Johnson does not like for his patients to travel within 6 to 8 weeks of joint replacement surgery, PWA tech will be contacting patient for postoperative exercises

## 2025-02-17 NOTE — TELEPHONE ENCOUNTER
Provider: ANDREW    Caller: Liza Aaron    Relationship to Patient: Self    Phone Number: [phone]    Reason for Call: PATIENT IS ASKING IF SHE NEEDS TO DO P/T BEFORE HER KNEE SURGERY. SHE IS ALSO ASKING IF HER PRE-OP WITH [facility] NEEDS TO BE R/S SINCE SHE HAD TO R/S HER PAT APPT FROM 03/17/25 TO 03/20/25, SHE WASN'T SURE IF [facility] WILL NEED THOSE RESULTS FOR THE PRE-OP APPT. SHE IS ALSO ASKING IF SHE WILL BE OK TO TRAVEL OUT OF THE COUNTRY IN [place] AFTER HER KNEE SURGERY. SHE WOULD LIKE TO KNOW IF GETTING A PRESCRIPTION FOR A RECUMBENT BIKE IS AN OPTION FOR AFTER SURGERY.

## 2025-03-05 ENCOUNTER — TREATMENT (OUTPATIENT)
Dept: PHYSICAL THERAPY | Facility: CLINIC | Age: 70
End: 2025-03-05
Payer: COMMERCIAL

## 2025-03-05 DIAGNOSIS — M25.561 CHRONIC PAIN OF RIGHT KNEE: Primary | ICD-10-CM

## 2025-03-05 DIAGNOSIS — M17.11 PRIMARY OSTEOARTHRITIS OF RIGHT KNEE: ICD-10-CM

## 2025-03-05 DIAGNOSIS — G89.29 CHRONIC PAIN OF RIGHT KNEE: Primary | ICD-10-CM

## 2025-03-05 DIAGNOSIS — Z74.09 IMPAIRED FUNCTIONAL MOBILITY AND ACTIVITY TOLERANCE: ICD-10-CM

## 2025-03-05 PROCEDURE — 97161 PT EVAL LOW COMPLEX 20 MIN: CPT | Performed by: PHYSICAL THERAPIST

## 2025-03-05 PROCEDURE — 97110 THERAPEUTIC EXERCISES: CPT | Performed by: PHYSICAL THERAPIST

## 2025-03-05 PROCEDURE — 97530 THERAPEUTIC ACTIVITIES: CPT | Performed by: PHYSICAL THERAPIST

## 2025-03-05 NOTE — PROGRESS NOTES
Physical Therapy Initial Evaluation and Plan of Care  [facility] [place]   [address], [address]  [place] [address]  P: [phone]  F: [phone]    Patient: Liza Aaron   : 1955  Diagnosis/ICD-10 Code:  Chronic pain of right knee [M25.561, G89.29]  Referring practitioner: Cesar Johnson MD  Date of Initial Visit: 3/5/2025  Today's Date: 3/5/2025  Patient seen for 1 session         Visit Diagnoses:    ICD-10-CM ICD-9-CM   1. Chronic pain of right knee  M25.561 719.46    G89.29 338.29   2. Primary osteoarthritis of right knee  M17.11 715.16   3. Impaired functional mobility and activity tolerance  Z74.09 V49.89       PMHx Reviewed : 3/5/2025      Subjective Evaluation    History of Present Illness  Mechanism of injury: Pt reports chronic R knee pain, scheduled to have R TKA on 3/31/25. Referred to PT for Prehab R knee. Reports she does attend water classes at [employer]    Reports history B ALEX.     Pain  Current pain ratin  At best pain ratin  At worst pain ratin  Location: R knee  Quality: sharp and tight  Relieving factors: change in position, rest, support and medications  Aggravating factors: movement, stairs, standing, prolonged positioning and ambulation    Social Support  Lives with: spouse    Treatments  Previous treatment: injection treatment  Current treatment: physical therapy  Patient Goals  Patient goals for therapy: decreased pain, increased motion, increased strength, independence with ADLs/IADLs and return to sport/leisure activities             Objective          Tenderness     Right Knee   Tenderness in the medial joint line.     Active Range of Motion   Left Knee   Flexion: 125 degrees   Extension: 0 degrees     Right Knee   Flexion: 105 degrees with pain  Extensor lag: 10 degrees with pain    Strength/Myotome Testing     Left Knee   Normal strength  Quadriceps contraction: good    Right Knee   Flexion: 4+  Extension: 4+  Quadriceps  contraction: poor    Functional Assessment     Comments  LEFS: 41          Assessment & Plan       Assessment  Impairments: abnormal gait, abnormal or restricted ROM, activity intolerance, impaired balance, impaired physical strength, lacks appropriate home exercise program, pain with function, safety issue and weight-bearing intolerance   Functional limitations: carrying objects, sleeping, walking, uncomfortable because of pain, moving in bed, sitting, standing and stooping   Assessment details: Pt presents to PT with symptoms consistent with end stage OA of the R knee.  Pt would benefit from skilled PT intervention to address the deficits noted and prepare for the possible or scheduled TKR.    Reviewed the benefit of PT prior to a TKR Sx to help the recovery process.       Barriers to therapy: End-stage OA of the knee  Prognosis: good    Goals  Plan Goals: SHORT TERM GOALS: Time for Goal Achievement: 2 visits    1.  Patient to be compliant with HEP.   2.  Patient demonstrates understanding verbally HEP and progression.      LONG TERM GOALS: Time for Goal Achievement: 3 visits  1.  Pt to exhibit increased knee AROM to allow for increased ability to exercise and for ADL's, IADL's and household activities.   2.  Pt to exhibit LE endurance/strength to 4+/5 to allow for walking, steps and transfers to occur safely and help recover occur sp Sx.      Plan  Therapy options: will be seen for skilled therapy services  Planned modality interventions: cryotherapy, thermotherapy (hydrocollator packs) and electrical stimulation/Russian stimulation  Planned therapy interventions: manual therapy, balance/weight-bearing training, body mechanics training, compression, flexibility, functional ROM exercises, gait training, home exercise program, neuromuscular re-education, soft tissue mobilization, strengthening, stretching and therapeutic activities  Frequency: 1-2x week.  Duration in visits: 3  Duration in weeks: 4  Treatment plan  discussed with: patient    Access Code: [email or web address]  URL: [email or web address]  Date: 03/05/2025  Prepared by: Olga Gallagher    Exercises  - Seated Long Arc Quad  - 1 x daily - 7 x weekly - 1-3 sets - 10 reps - 5 hold  - Seated March  - 1 x daily - 7 x weekly - 1-3 sets - 10 reps - 5 hold  - Supine Quad Set  - 1 x daily - 7 x weekly - 1-3 sets - 10 reps - 5 hold  - Active Straight Leg Raise with Quad Set  - 1 x daily - 7 x weekly - 1-3 sets - 10 reps - 5 hold  - Sidelying Hip Abduction  - 1 x daily - 7 x weekly - 1-3 sets - 10 reps - 5 hold  - Seated Hamstring Stretch  - 1 x daily - 7 x weekly - 1 sets - 3-5 reps - 20 hold  Pt was educated on findings of evaluation, purpose of treatment and goals for therapy. Treatment options discussed and questions answered. Pt was educated on exercises, self treatment and pain relief techniques.   Reviewed the benefit of PT prior to a TKR Sx to help the recovery process.         Manual Therapy:          mins  36819;  Therapeutic Exercise:  10        mins  57494;     Neuromuscular Fide:          mins  26752;    Therapeutic Activity:       8    mins  58669;     Gait Training:            mins  14378;     Ultrasound:           mins  75110;    Electrical Stimulation:          mins  12278 ( );  Dry Needling           mins self-pay  Traction           mins 47139  [place] Repositioning         mins 18657      Timed Treatment:    18  mins   Total Treatment:     35   mins      PT: Olga Gallagher PT     License Number: [id number]  Electronically signed by Olga Gallagher PT, 03/05/25, 1:28 PM EST    Certification Period: 3/5/2025 thru 6/2/2025  I certify that the therapy services are furnished while this patient is under my care.  The services outlined above are required by this patient, and will be reviewed every 90 days.         Physician Signature:__________________________________________________    PHYSICIAN: Cesar Johnson MD  NPI: [id number]                                       DATE:      Please sign in Epic or return via fax to [email or web address] . Thank you, [facility].

## 2025-03-05 NOTE — PATIENT INSTRUCTIONS
Access Code: [email or web address]  URL: [email or web address]  Date: 03/05/2025  Prepared by: Olga Gallagher    Exercises  - Seated Long Arc Quad  - 1 x daily - 7 x weekly - 1-3 sets - 10 reps - 5 hold  - Seated March  - 1 x daily - 7 x weekly - 1-3 sets - 10 reps - 5 hold  - Supine Quad Set  - 1 x daily - 7 x weekly - 1-3 sets - 10 reps - 5 hold  - Active Straight Leg Raise with Quad Set  - 1 x daily - 7 x weekly - 1-3 sets - 10 reps - 5 hold  - Sidelying Hip Abduction  - 1 x daily - 7 x weekly - 1-3 sets - 10 reps - 5 hold  - Seated Hamstring Stretch  - 1 x daily - 7 x weekly - 1 sets - 3-5 reps - 20 hold  Pt was educated on findings of evaluation, purpose of treatment and goals for therapy. Treatment options discussed and questions answered. Pt was educated on exercises, self treatment and pain relief techniques.

## 2025-03-06 ENCOUNTER — OFFICE VISIT (OUTPATIENT)
Dept: ORTHOPEDIC SURGERY | Facility: CLINIC | Age: 70
End: 2025-03-06
Payer: COMMERCIAL

## 2025-03-06 VITALS — HEIGHT: 65 IN | BODY MASS INDEX: 38.42 KG/M2 | WEIGHT: 230.6 LBS | TEMPERATURE: 95.3 F

## 2025-03-06 DIAGNOSIS — M19.072 ARTHRITIS OF LEFT FOOT: Primary | ICD-10-CM

## 2025-03-06 DIAGNOSIS — M72.2 PLANTAR FASCIITIS: ICD-10-CM

## 2025-03-06 PROCEDURE — 99213 OFFICE O/P EST LOW 20 MIN: CPT | Performed by: ORTHOPAEDIC SURGERY

## 2025-03-06 NOTE — PROGRESS NOTES
"Ankle Follow Up      Patient: Liza Aaron    YOB: 1955 70 y.o. female    Chief Complaints: Ankle pain    History of Present Illness:Patient was seen on 12/20/2024 reporting onset of pain mainly in the plantar aspect of the left heel that began in September 2024 after she wore some wedge shoes at an outdoor wedding and was on uneven ground.  Subsequent to that she had had persistent worsening pain in the plantar aspect of the left heel worse with first step in the morning.  She had felt like she \"pulled something\".  She did some exercises that she had done previously with some stretching and water exercises.  Evidently I had treated her in the past for plantar fasciitis.      When seen on 12/20/2024 she reported that for the previous several days her symptoms had begun to improve and had been using a sandal around the house.  She reported no other pain in the foot. She was continuing to work as in [employer]     She reported a history of knee arthritis and was having a knee replacement done with Dr. Johnson likely in April 2025 as she was having 3 grandchildren born in January and February.  (she already had 9)..    I feel that her symptoms were consistent with exacerbation of previous plantar fasciitis.  Reviewed there was nothing I would offer from an operative standpoint at that time.  She was advised on etiology and treatment of plantar fasciitis.  She was fitted with a night splint discussed etiology with use as well as towel stretch prior to first up in the morning and avoidance of barefoot ambulation.  She was instructed on heel cord stretching exercises and use of ice massage and fitted with a gel heel pad and prescribed compounding cream.  We decided to hold off on therapy as she was going to be doing quite a bit for her knee.    Patient is seen back today reporting that she is at least 50% better but not \"all the way\".  Some days she is \"really good but does bother her some " "days if she does increased walking or has increased activity.  She has used a night splint intermittently but has had some difficulty tolerating.  She has been heel cord stretching once or twice a day which she admittedly knows is not as much as I had recommended.  She reports the compounding cream does help and intermittently uses it in the morning.  She has also been doing ice massage  HPI    ROS: Heel pain  Past Medical History:   Diagnosis Date    Arthritis     Diverticulosis     Family history of coronary artery disease     SAW DR THOMPSON 4/2019    Hip pain     BILATERAL    Hyperglycemia     Hyperlipidemia     Irregular heart beat     OCCASIONAL \"SKIPPED BEAT\"    Knee swelling     Pure hypercholesterolemia     Seizure     WHEN IN COLLEGE, NONE SINCE    Sleep apnea     Tear of meniscus of knee     Tendinitis of knee     Umbilical hernia     UTI (urinary tract infection)     FINISHED ANTIBIOTIC    Vitamin D deficiency        Physical Exam:   Vitals:    03/06/25 0913   Temp: 95.3 °F (35.2 °C)   Weight: 105 kg (230 lb 9.6 oz)   Height: 165.1 cm (65\")   PainSc: 5      Well developed with normal mood.  On exam she had only slight tenderness palpation at the insertion of the left plantar fascia no exacerbation of pain with medial lateral calcaneal compression and negative Tinel's over the medial hindfoot.  She had a lesion neutral dorsiflexion of the heel cord      Radiology: None performed      Assessment/Plan: Proved exacerbation of left heel pain consistent with plantar fasciitis    Overall I am encouraged she is at least 50% better and would not recommend further workup with MRI nor injection at this time.    Do recommend she increase her heel cord stretching to at least 3 or 4 times a day and continue with compounding cream as needed and use the night splint is much as possible.    If she has persistent worsening symptoms she will let me know otherwise by mutual agreement I will see her back as needed.    She is " having her knee replaced at the end of March 2025.

## 2025-03-14 ENCOUNTER — HOSPITAL ENCOUNTER (EMERGENCY)
Facility: HOSPITAL | Age: 70
Discharge: HOME | End: 2025-03-14
Attending: STUDENT IN AN ORGANIZED HEALTH CARE EDUCATION/TRAINING PROGRAM
Payer: COMMERCIAL

## 2025-03-14 ENCOUNTER — HOSPITAL ENCOUNTER (EMERGENCY)
Facility: HOSPITAL | Age: 70
Discharge: LEFT BEFORE DISCHARGE | End: 2025-03-14
Payer: COMMERCIAL

## 2025-03-14 VITALS
HEART RATE: 66 BPM | TEMPERATURE: 98.1 F | DIASTOLIC BLOOD PRESSURE: 86 MMHG | SYSTOLIC BLOOD PRESSURE: 136 MMHG | RESPIRATION RATE: 18 BRPM | OXYGEN SATURATION: 99 %

## 2025-03-14 VITALS
DIASTOLIC BLOOD PRESSURE: 79 MMHG | HEART RATE: 77 BPM | BODY MASS INDEX: 35.52 KG/M2 | WEIGHT: 221 LBS | SYSTOLIC BLOOD PRESSURE: 128 MMHG | RESPIRATION RATE: 16 BRPM | OXYGEN SATURATION: 98 % | TEMPERATURE: 97.6 F | HEIGHT: 66 IN

## 2025-03-14 DIAGNOSIS — M54.50 LOW BACK PAIN RADIATING TO LEFT LEG: Primary | ICD-10-CM

## 2025-03-14 DIAGNOSIS — M79.605 LOW BACK PAIN RADIATING TO LEFT LEG: Primary | ICD-10-CM

## 2025-03-14 PROCEDURE — 99282 EMERGENCY DEPT VISIT SF MDM: CPT

## 2025-03-14 PROCEDURE — 99211 OFF/OP EST MAY X REQ PHY/QHP: CPT

## 2025-03-14 PROCEDURE — 99283 EMERGENCY DEPT VISIT LOW MDM: CPT | Performed by: STUDENT IN AN ORGANIZED HEALTH CARE EDUCATION/TRAINING PROGRAM

## 2025-03-14 PROCEDURE — 96372 THER/PROPH/DIAG INJ SC/IM: CPT

## 2025-03-14 PROCEDURE — 25010000002 KETOROLAC TROMETHAMINE PER 15 MG: Performed by: STUDENT IN AN ORGANIZED HEALTH CARE EDUCATION/TRAINING PROGRAM

## 2025-03-14 RX ORDER — KETOROLAC TROMETHAMINE 30 MG/ML
30 INJECTION, SOLUTION INTRAMUSCULAR; INTRAVENOUS ONCE
Status: COMPLETED | OUTPATIENT
Start: 2025-03-14 | End: 2025-03-14

## 2025-03-14 RX ADMIN — KETOROLAC TROMETHAMINE 30 MG: 30 INJECTION, SOLUTION INTRAMUSCULAR at 04:24

## 2025-03-14 NOTE — FSED PROVIDER NOTE
"Subjective   History of Present Illness  70-year-old female with past medical history of low back pain presents emergency department left-sided low back pain.  Patient states that her pain radiates down her leg.  She states that she may have strained it while exercising.  She has been using Flexeril at home with little relief.        Review of Systems   All other systems reviewed and are negative.      Past Medical History:   Diagnosis Date    Arthritis     Diverticulosis     Family history of coronary artery disease     SAW DR THOMPSON 4/2019    Hip pain     BILATERAL    Hyperglycemia     Hyperlipidemia     Irregular heart beat     OCCASIONAL \"SKIPPED BEAT\"    Knee swelling     Pure hypercholesterolemia     Seizure     WHEN IN COLLEGE, NONE SINCE    Sleep apnea     Tear of meniscus of knee     Tendinitis of knee     Umbilical hernia     UTI (urinary tract infection)     FINISHED ANTIBIOTIC    Vitamin D deficiency        Allergies   Allergen Reactions    Nickel Rash     Pt states cannot wear jewelry with Nickel.     Penicillins Rash       Past Surgical History:   Procedure Laterality Date    COLONOSCOPY N/A 12/14/2015    Diverticulosis in the entire examined colon, non-bleeding internal hemorrhodis, no specimens collected-Dr. Rosario Flores    COLONOSCOPY N/A 05/12/2021    Procedure: COLONOSCOPY  into cecum with cold/hot snare and cold bx polypectomies;  Surgeon: Rosario Flores MD;  Location: [facility];  Service: Gastroenterology;  Laterality: N/A;  pre: screen  post: diverticulosis, polyps, hemorrhoids     COLONOSCOPY N/A 10/17/2023    Procedure: COLONOSCOPY INTO CECUM AND T.I. WITH HOT SNARE POLYPECTOMY;  Surgeon: Rosario Flores MD;  Location: [facility];  Service: Gastroenterology;  Laterality: N/A;  PRE- HISTORY OF COLON POLYPS  POST- DIVERTICULOSIS, POLYP, HEMORRHOIDS    JOINT REPLACEMENT  2020    TOTAL ABDOMINAL HYSTERECTOMY WITH SALPINGO OOPHORECTOMY Bilateral 12/16/2004    Dr. Dominick Villarreal "    TOTAL HIP ARTHROPLASTY Right 2020    Procedure: TOTAL HIP ARTHROPLASTY;  Surgeon: Cesar Johnson MD;  Location: [facility] MAIN OR;  Service: Orthopedics;  Laterality: Right;    TOTAL HIP ARTHROPLASTY Left 2020    Procedure: TOTAL HIP ARTHROPLASTY;  Surgeon: Cesar Johnson MD;  Location:  [facility];  Service: Orthopedics;  Laterality: Left;    UMBILICAL HERNIA REPAIR N/A 2004    Dr. Dominick Villarreal       Family History   Problem Relation Age of Onset    Breast cancer Mother     Hypertension Mother     Dementia Mother     Stroke Mother     Cancer Mother     Osteoporosis Mother     Heart disease Father     Cancer Maternal Grandmother     Heart disease Brother     Malig Hyperthermia Neg Hx        Social History     Socioeconomic History    Marital status:      Spouse name: EDDIE IVAN    Number of children: 5    Years of education: 17YEARS [employer]   Tobacco Use    Smoking status: Former     Current packs/day: 0.00     Average packs/day: 1 pack/day for 5.0 years (5.0 ttl pk-yrs)     Types: Cigarettes     Start date: 1977     Quit date:      Years since quittin.2    Smokeless tobacco: Never    Tobacco comments:     caffiene twice a week   Vaping Use    Vaping status: Never Used   Substance and Sexual Activity    Alcohol use: Yes     Alcohol/week: 4.0 standard drinks of alcohol     Types: 2 Glasses of wine, 2 Drinks containing 0.5 oz of alcohol per week     Comment: social    Drug use: No    Sexual activity: Yes     Partners: Male     Birth control/protection: Post-menopausal, Hysterectomy           Objective   Physical Exam  Constitutional:       Appearance: Normal appearance.   HENT:      Head: Normocephalic.      Mouth/Throat:      Mouth: Mucous membranes are moist.   Eyes:      Pupils: Pupils are equal, round, and reactive to light.   Cardiovascular:      Rate and Rhythm: Normal rate and regular rhythm.   Pulmonary:      Effort: Pulmonary effort is normal.   Abdominal:       Palpations: Abdomen is soft.   Musculoskeletal:         General: Normal range of motion.      Cervical back: Normal range of motion.      Comments: Moderate tenderness to palpation over the left sided paraspinal muscles of the lumbar spine and left upper buttock   Neurological:      General: No focal deficit present.      Mental Status: She is alert.   Psychiatric:         Mood and Affect: Mood normal.         Procedures           ED Course                                           Medical Decision Making  70-year-old female with past medical history of low back pain presents emergency department left-sided low back pain.  Patient states that her pain radiates down her leg.  She states that she may have strained it while exercising.  She has been using Flexeril at home with little relief.  History and physical consistent with musculoskeletal back pain.  Patient was treated with Toradol with a relief of her symptoms.  She is advised to follow-up with her primary care physician.  She is also advised to continue taking Flexeril at home for pain control.    Problems Addressed:  Low back pain radiating to left leg: complicated acute illness or injury    Risk  Prescription drug management.        Final diagnoses:   Low back pain radiating to left leg       ED Disposition  ED Disposition       ED Disposition   Discharge    Condition   Stable    Comment   --               Minnie Whaley MD  [address]  [address] [place], [place] [address] [phone]    In 3 days           Medication List      No changes were made to your prescriptions during this visit.

## 2025-03-17 ENCOUNTER — OFFICE VISIT (OUTPATIENT)
Dept: INTERNAL MEDICINE | Facility: CLINIC | Age: 70
End: 2025-03-17
Payer: COMMERCIAL

## 2025-03-17 VITALS
HEART RATE: 71 BPM | OXYGEN SATURATION: 98 % | WEIGHT: 222 LBS | DIASTOLIC BLOOD PRESSURE: 86 MMHG | SYSTOLIC BLOOD PRESSURE: 124 MMHG | HEIGHT: 65 IN | TEMPERATURE: 98 F | BODY MASS INDEX: 36.99 KG/M2

## 2025-03-17 DIAGNOSIS — M54.42 ACUTE LEFT-SIDED LOW BACK PAIN WITH LEFT-SIDED SCIATICA: Primary | ICD-10-CM

## 2025-03-17 PROCEDURE — 99213 OFFICE O/P EST LOW 20 MIN: CPT | Performed by: FAMILY MEDICINE

## 2025-03-17 NOTE — PROGRESS NOTES
Subjective   Liza Aaron is a 70 y.o. female   Chief Complaint   Patient presents with    ER follow up      Leg and back pain        History of Present Illness     Lower back pain with left-sided sciatica-patient was evaluated in urgent care on 3/13/2025 for lower back pain.  No injury, but it started after she did water exercise and picked her grandchild a few times.  She was prescribed meloxicam and Flexeril.  Later that night symptoms got worse-she had severe pain in left leg.  She was evaluated in ER.  She was treated with Toradol with improvement. She was advised to continue meloxicam and Flexeril and follow-up with us.  She completed Flexeril yesterday morning.  She continues meloxicam daily.  Overall she feels better.  She has back pain, more off then on.  She has left leg pain above the knee.  Pain is dull but can be sharp when she walks.  Intensity 0-4 out of 10.  Pain is worse when standing up after sitting for a longer period of time.  No numbness or tingling.  No muscle weakness.    Review of Systems   Musculoskeletal:  Positive for back pain.   Neurological:  Negative for weakness and numbness.         Objective   Wt Readings from Last 3 Encounters:   03/17/25 101 kg (222 lb)   03/06/25 105 kg (230 lb 9.6 oz)   01/22/25 107 kg (235 lb)      Vitals:    03/17/25 1413   BP: 124/86   Pulse: 71   Temp: 98 °F (36.7 °C)   SpO2: 98%     Temp Readings from Last 3 Encounters:   03/17/25 98 °F (36.7 °C)   03/14/25 98.1 °F (36.7 °C)   03/13/25 98.6 °F (37 °C) (Temporal)     BP Readings from Last 3 Encounters:   03/17/25 124/86   03/14/25 136/86   03/13/25 114/77     Pulse Readings from Last 3 Encounters:   03/17/25 71   03/14/25 66   03/13/25 63     Body mass index is 36.94 kg/m².    Physical Exam  Constitutional:       Appearance: She is well-developed.   Pulmonary:      Effort: Pulmonary effort is normal.   Abdominal:      Tenderness: There is no right CVA tenderness or left CVA tenderness.   Musculoskeletal:          General: Normal range of motion.      Lumbar back: Normal. No deformity or tenderness.      Comments: SLR positive on the left.   Skin:     General: Skin is warm and dry.      Findings: No erythema or lesion.   Neurological:      Deep Tendon Reflexes: Reflexes are normal and symmetric.         Assessment & Plan   Diagnoses and all orders for this visit:    1. Acute left-sided low back pain with left-sided sciatica (Primary)  -     Ambulatory Referral to Physical Therapy for Evaluation & Treatment        Back pain with sciatica-overall improvement, but still symptomatic.  Patient will continue meloxicam.  Referral to PT.  She will call if she needs refill on meloxicam.  Follow-up as needed.

## 2025-03-18 ENCOUNTER — PATIENT OUTREACH (OUTPATIENT)
Dept: CASE MANAGEMENT | Facility: OTHER | Age: 70
End: 2025-03-18
Payer: COMMERCIAL

## 2025-03-18 ENCOUNTER — TREATMENT (OUTPATIENT)
Dept: PHYSICAL THERAPY | Facility: CLINIC | Age: 70
End: 2025-03-18
Payer: COMMERCIAL

## 2025-03-18 DIAGNOSIS — Z74.09 IMPAIRED FUNCTIONAL MOBILITY, BALANCE, GAIT, AND ENDURANCE: ICD-10-CM

## 2025-03-18 DIAGNOSIS — M54.50 ACUTE LEFT-SIDED LOW BACK PAIN, UNSPECIFIED WHETHER SCIATICA PRESENT: Primary | ICD-10-CM

## 2025-03-18 NOTE — PATIENT INSTRUCTIONS
Access Code: [email or web address]  URL: [email or web address]  Date: 03/18/2025  Prepared by: Jia Grey    Exercises  - Supine Posterior Pelvic Tilt  - 1 x daily - 7 x weekly - 1 sets - 10 reps - 5s hold  - Supine Hip Adduction Isometric with Ball  - 1 x daily - 7 x weekly - 1 sets - 10 reps - 5s hold  - Hooklying Single Leg Bent Knee Fallouts with Resistance  - 1 x daily - 7 x weekly - 1 sets - 15 reps  - Supine Sciatic Nerve Glide  - 1 x daily - 7 x weekly - 1 sets - 20 reps  - Supine Lower Trunk Rotation  - 1 x daily - 7 x weekly - 1 sets - 10 reps - 5s hold  - Supine Figure 4 Piriformis Stretch  - 1 x daily - 7 x weekly - 1 sets - 3 reps - 20s hold  - Seated Figure 4 Piriformis Stretch  - 1 x daily - 7 x weekly - 1 sets - 3 reps - 20s hold

## 2025-03-18 NOTE — OUTREACH NOTE
Adult Wellness Assessment  Questions/Answers      Flowsheet Row Most Recent Value   Help with Reading Health-Related Information never   Problems Learning about Medical Condition never   Confidence in Filling Out Forms by Self always            Adult Patient Profile  Questions/Answers      Flowsheet Row Most Recent Value   Symptoms/Conditions Managed at Home musculoskeletal   Musculoskeletal Symptoms/Conditions --  [Total knee replacement scheduled 3/31/25]   Musculoskeletal Comment Telephone dali with patient. Discussed upcoming surgery for total knee replacement.  Assisting patient with scheduling out patient physical therapy and question regarding [employer] device used in recovery post surgery at home.   How to be Addressed Liza Aaron   How Well Do You Speak English? very well   Source of Information patient            AMBULATORY CASE MANAGEMENT NOTE    Names and Relationships of Patient/Support Persons: Contact: Liza Aaron; Relationship: Self -     Telephone call with patient.  Discussed upcoming total knee surgery on 3/31/25 with Dr. Cesar Johnson.  Patient has been prescribed a rehab system called [employer] [facility] [employer] for post surgery recovery at home.  It is a recommendation and not mandatory per Dr. Johnson's office. I reached out to [employer] at [phone] to check if covered by insurance. Coverage was excluded per patient's Medicare policy.  [employer] will review and reach out to patient with update on coverage in 24-48 hours.  Patient reports she is participating in rehab currently and would like to have post surgery rehab for out patient physical therapy at  the [facility] location. Will plan to help patient confirm scheduling for PT.    Education Documentation  No documentation found.        JANES PALACIO  Ambulatory Case Management    3/18/2025, 16:40 EDT

## 2025-03-18 NOTE — PROGRESS NOTES
Physical Therapy Initial Evaluation and Plan of Care  Clinic Location [address], [place] [address]    Patient: Liza Aaron   : 1955  Diagnosis/ICD-10 Code:  Acute left-sided low back pain, unspecified whether sciatica present [M54.50]  Referring practitioner: Minnie Whaley MD  Date of Initial Visit: 3/18/2025  Today's Date: 3/18/2025  Patient seen for 1 session         Visit Diagnoses:    ICD-10-CM ICD-9-CM   1. Acute left-sided low back pain, unspecified whether sciatica present  M54.50 724.2   2. Impaired functional mobility, balance, gait, and endurance  Z74.09 V49.89         Subjective Questionnaire: Oswestry: 56/100      Subjective Evaluation    History of Present Illness  Mechanism of injury: Low back pain 3/9/25 after twisting exercise at water aerobics. Worse after babysitting grandchild (>35lbs) 3/13/25. Went to , prescribed flexeril & meloxicam. That night, could not move L LE. Describes L LE pain. Went to /ED and was given a shot of Toradol. Was able to sleep in recliner. Has slept in recliner since. Was using a walker. Will use in home if fatigued.    Still taking meloxicam 1x/day    Hx L plantar fasciitis 2024 after wearing wedge shoes to . Wearing night boot & exercising, intermittently. Plan for R TKA 3/31/25. B ALEX 2021.      Patient Occupation:  for [employer], remote Pain  Current pain ratin  At worst pain ratin  Location: L low back, anterolateral thigh  Quality: dull ache and sharp  Relieving factors: medications, heat and ice  Aggravating factors: ambulation, sleeping and standing  Progression: improved    Social Support  Lives in: multiple-level home  Lives with: spouse    Diagnostic Tests  No diagnostic tests performed    Treatments  Previous treatment: medication  Current treatment: physical therapy  Patient Goals  Patient goals for therapy: decreased pain, increased motion and independence with ADLs/IADLs            Objective        Special Questions      Additional Special Questions  Denies change in bladder/bowel, tingling, numbness      Tenderness     Lumbar Spine  Tenderness in the spinous process.     Left Hip   Tenderness in the sacroiliac joint.     Right Hip   Tenderness in the sacroiliac joint.     Additional Tenderness Details  L3-S1    Active Range of Motion     Lumbar   Flexion: WFL  Extension: 0 degrees with pain  Left lateral flexion: Active left lumbar lateral flexion: 50% of normal. with pain  Right lateral flexion: WFL  Left rotation: Active left lumbar rotation: 50% of normal. with pain  Right rotation: Active right lumbar rotation: 25% of normal. with pain    Strength/Myotome Testing     Left Hip   Planes of Motion   Flexion: 4    Right Hip   Planes of Motion   Flexion: 4    Left Knee   Flexion: 4+  Extension: 4+    Right Knee   Flexion: 4+  Extension: 4+    Left Ankle/Foot   Dorsiflexion: 5    Right Ankle/Foot   Dorsiflexion: 5    Tests       Thoracic   Negative slump.     Lumbar     Left   Negative passive SLR (Pain lowering).     Right   Negative passive SLR.     Additional Tests Details  Neg CAMMY          Assessment & Plan       Assessment  Impairments: abnormal or restricted ROM, activity intolerance, impaired balance, impaired physical strength, lacks appropriate home exercise program and pain with function   Functional limitations: carrying objects, lifting, walking, uncomfortable because of pain and standing   Assessment details: Pt presents with low back and L LE pain, limited lumbar AROM, and LE weakness. Will benefit from skilled PT services in order to address listed impairments and increase tolerance to normal daily activities including ADLs and recreational activities.    Prognosis: good    Goals  Plan Goals: Short Term Goals: 2 weeks (Plan for R TKA 3/31/25)  Patient will:  1. Be independent with initial HEP demonstrating compliance  2. Report >/=50% decrease in pain w/  ADLs      Plan  Therapy options: will be seen for skilled therapy services  Planned modality interventions: cryotherapy, electrical stimulation/Russian stimulation, thermotherapy (hydrocollator packs) and traction  Planned therapy interventions: abdominal trunk stabilization, body mechanics training, flexibility, functional ROM exercises, gait training, home exercise program, joint mobilization, manual therapy, neuromuscular re-education, soft tissue mobilization, spinal/joint mobilization, strengthening, stretching and therapeutic activities  Frequency: 2x week  Duration in weeks: 2  Treatment plan discussed with: patient        History # of Personal Factors and/or Comorbidities: MODERATE (1-2)  Examination of Body System(s): # of elements: LOW (1-2)  Clinical Presentation: STABLE   Clinical Decision Making: LOW       Timed:         Manual Therapy:    0     mins  89310;     Therapeutic Exercise:    15     mins  13209;     Neuromuscular Fide:    0    mins  06424;    Therapeutic Activity:     10     mins  76406;     Gait Trainin     mins  44401;     Ultrasound:     0     mins  97660;    Ionto                               0    mins   48804  Self Care                       0     mins   73582  Canalith Repos    0     mins 22001      Un-Timed:  Electrical Stimulation:    10     mins  01269 ( );  Dry Needling     0     mins self-pay  Traction     0     mins 75727  Low Eval     0     Mins  07927  Mod Eval     20     Mins  60738  High Eval                       0     Mins  82362        Timed Treatment:   25   mins   Total Treatment:     55   mins          PT: Jia Grey PT     License Number: [id number]  Electronically signed by Jia Grey PT, 25, 7:05 AM EDT    Certification Period: 3/18/2025 thru 6/15/2025  I certify that the therapy services are furnished while this patient is under my care.  The services outlined above are required by this patient, and will be reviewed every   days.         Physician Signature:__________________________________________________    PHYSICIAN: Minnie Whaley MD  NPI: [id number]                                      DATE:      Please sign and return via fax to [email or web address] . Thank you, [facility].

## 2025-03-19 ENCOUNTER — TELEPHONE (OUTPATIENT)
Dept: ORTHOPEDIC SURGERY | Facility: HOSPITAL | Age: 70
End: 2025-03-19
Payer: COMMERCIAL

## 2025-03-19 ENCOUNTER — PATIENT OUTREACH (OUTPATIENT)
Dept: CASE MANAGEMENT | Facility: OTHER | Age: 70
End: 2025-03-19
Payer: COMMERCIAL

## 2025-03-19 NOTE — TELEPHONE ENCOUNTER
Risk Factor yes no   Age >75  X   BMI <20 >40  X   Patient History     Chronic Pain (2 or more meds/Pain Management)  X   ETOH (more than 3 drinks Daily)  X   Uncontrolled Depression/Bipolar/Schizoaffective Disorder  X   Arrhythmias--  X   Stent placement/MI  X   DVT/PE  X   Pacemaker  X   HTN (uncontrolled or requiring more than 2 medications)  X   CHF/Retained fluids/Edema  X   Stroke with Residual   X   COPD/Asthma  X   ALLA--Non-compliant with CPAP  X   Diabetes (on insulin or more than 2 meds)         A1C:  X   BPH/Urinary retention (on medication)  X   CKD  X   Home environment and support     Current ambulation status (use of cane, walker, W/C, Multiple falls/weakness)  X   Stairs to enter and throughout home X    Lives Alone  X   Doesn't have support at home  X   Outpatient Screening Assessment    Home needs: (Walker/BSC):  Has walker  ? Steps 3 steps in   Caregiver 24-48hrs post-discharge:      Discharge Plan:   [facility]    Prescriptions: Meds to bed    Home medications:   [] Blood thinner/anti-coag therapy--   [] BPH or diuretic--  [] BP meds--   ? Pain/Anti-inflammatories-- Mobic     Pre-op Education:  Educate patient on spinal anesthesia/pain control:  ? patient verbalize understanding    Educate patient on hospital course/timeline:  ?  patient verbalize understanding    Joint Care Class:  ?  yes [] no  Notes:

## 2025-03-20 ENCOUNTER — OFFICE VISIT (OUTPATIENT)
Dept: ORTHOPEDIC SURGERY | Facility: CLINIC | Age: 70
End: 2025-03-20
Payer: COMMERCIAL

## 2025-03-20 ENCOUNTER — TREATMENT (OUTPATIENT)
Dept: PHYSICAL THERAPY | Facility: CLINIC | Age: 70
End: 2025-03-20
Payer: COMMERCIAL

## 2025-03-20 ENCOUNTER — PRE-ADMISSION TESTING (OUTPATIENT)
Dept: PREADMISSION TESTING | Facility: HOSPITAL | Age: 70
End: 2025-03-20
Payer: COMMERCIAL

## 2025-03-20 VITALS
HEIGHT: 67 IN | OXYGEN SATURATION: 99 % | RESPIRATION RATE: 20 BRPM | SYSTOLIC BLOOD PRESSURE: 105 MMHG | WEIGHT: 222 LBS | TEMPERATURE: 97.7 F | HEART RATE: 72 BPM | BODY MASS INDEX: 34.84 KG/M2 | DIASTOLIC BLOOD PRESSURE: 70 MMHG

## 2025-03-20 VITALS
HEIGHT: 67 IN | DIASTOLIC BLOOD PRESSURE: 69 MMHG | TEMPERATURE: 98.6 F | BODY MASS INDEX: 34.83 KG/M2 | SYSTOLIC BLOOD PRESSURE: 105 MMHG | HEART RATE: 76 BPM | WEIGHT: 221.9 LBS

## 2025-03-20 DIAGNOSIS — Z74.09 IMPAIRED FUNCTIONAL MOBILITY, BALANCE, GAIT, AND ENDURANCE: ICD-10-CM

## 2025-03-20 DIAGNOSIS — M25.561 CHRONIC PAIN OF RIGHT KNEE: ICD-10-CM

## 2025-03-20 DIAGNOSIS — M17.11 PRIMARY OSTEOARTHRITIS OF RIGHT KNEE: ICD-10-CM

## 2025-03-20 DIAGNOSIS — G89.29 CHRONIC PAIN OF RIGHT KNEE: ICD-10-CM

## 2025-03-20 DIAGNOSIS — R52 PAIN: Primary | ICD-10-CM

## 2025-03-20 DIAGNOSIS — Z74.09 IMPAIRED FUNCTIONAL MOBILITY AND ACTIVITY TOLERANCE: ICD-10-CM

## 2025-03-20 DIAGNOSIS — M54.50 ACUTE LEFT-SIDED LOW BACK PAIN, UNSPECIFIED WHETHER SCIATICA PRESENT: Primary | ICD-10-CM

## 2025-03-20 LAB
ANION GAP SERPL CALCULATED.3IONS-SCNC: 10 MMOL/L (ref 5–15)
BUN SERPL-MCNC: 11 MG/DL (ref 8–23)
BUN/CREAT SERPL: 17.2 (ref 7–25)
CALCIUM SPEC-SCNC: 9.4 MG/DL (ref 8.6–10.5)
CHLORIDE SERPL-SCNC: 103 MMOL/L (ref 98–107)
CO2 SERPL-SCNC: 25 MMOL/L (ref 22–29)
CREAT SERPL-MCNC: 0.64 MG/DL (ref 0.57–1)
DEPRECATED RDW RBC AUTO: 44.1 FL (ref 37–54)
EGFRCR SERPLBLD CKD-EPI 2021: 95.8 ML/MIN/1.73
ERYTHROCYTE [DISTWIDTH] IN BLOOD BY AUTOMATED COUNT: 13.4 % (ref 12.3–15.4)
GLUCOSE SERPL-MCNC: 108 MG/DL (ref 65–99)
HCT VFR BLD AUTO: 37.7 % (ref 34–46.6)
HGB BLD-MCNC: 12.2 G/DL (ref 12–15.9)
MCH RBC QN AUTO: 29.1 PG (ref 26.6–33)
MCHC RBC AUTO-ENTMCNC: 32.4 G/DL (ref 31.5–35.7)
MCV RBC AUTO: 90 FL (ref 79–97)
PLATELET # BLD AUTO: 170 10*3/MM3 (ref 140–450)
PMV BLD AUTO: 9.1 FL (ref 6–12)
POTASSIUM SERPL-SCNC: 3.9 MMOL/L (ref 3.5–5.2)
QT INTERVAL: 396 MS
QTC INTERVAL: 410 MS
RBC # BLD AUTO: 4.19 10*6/MM3 (ref 3.77–5.28)
SODIUM SERPL-SCNC: 138 MMOL/L (ref 136–145)
WBC NRBC COR # BLD AUTO: 3.51 10*3/MM3 (ref 3.4–10.8)

## 2025-03-20 PROCEDURE — 36415 COLL VENOUS BLD VENIPUNCTURE: CPT

## 2025-03-20 PROCEDURE — 85027 COMPLETE CBC AUTOMATED: CPT

## 2025-03-20 PROCEDURE — 93005 ELECTROCARDIOGRAM TRACING: CPT

## 2025-03-20 PROCEDURE — 80048 BASIC METABOLIC PNL TOTAL CA: CPT

## 2025-03-20 RX ORDER — CYCLOBENZAPRINE HCL 5 MG
5 TABLET ORAL 3 TIMES DAILY PRN
COMMUNITY

## 2025-03-20 RX ORDER — CHLORAL HYDRATE 500 MG
1000 CAPSULE ORAL
COMMUNITY

## 2025-03-20 RX ORDER — MULTIVIT-MIN/IRON/FOLIC ACID/K 18-600-40
2 CAPSULE ORAL 2 TIMES DAILY
COMMUNITY

## 2025-03-20 NOTE — DISCHARGE INSTRUCTIONS
Take the following medications the morning of surgery:    keppra    If you are on prescription narcotic pain medication to control your pain you may also take that medication the morning of surgery.      General Instructions:     Do not eat solid food after midnight the night before surgery.  Clear liquids day of surgery are allowed but must be stopped at least two hours before your hospital arrival time.       Allowed clear liquids      Water, sodas, and tea or coffee with no cream or milk added.       12 to 20 ounces of a clear liquid that contains carbohydrates is recommended.  If non-diabetic, have Gatorade or Powerade.  If diabetic, have G2 or Powerade Zero.     Do not have liquids red in color.  Do not consume chicken, beef, pork or vegetable broth or bouillon cubes of any variety as they are not considered clear liquids and are not allowed.      Infants may have breast milk up to four hours before surgery.  Infants drinking formula may drink formula up to six hours before surgery.   Patients who avoid smoking, chewing tobacco and alcohol for 4 weeks prior to surgery have a reduced risk of post-operative complications.  Quit smoking as many days before surgery as you can.  Do not smoke, use chewing tobacco or drink alcohol the day of surgery.   If applicable bring your C-PAP/ BI-PAP machine in with you to preop day of surgery.  Bring any papers given to you in the doctor’s office.  Wear clean comfortable clothes.  Do not wear contact lenses, false eyelashes or make-up.  Bring a case for your glasses.   Bring crutches or walker if applicable.  Remove all piercings.  Leave jewelry and any other valuables at home.  Hair extensions with metal clips must be removed prior to surgery.  The Pre-Admission Testing nurse will instruct you to bring medications if unable to obtain an accurate list in Pre-Admission Testing.    Day of surgery you will need to let the preoperative nurse know the last time you took each of  your medications.  To ensure a safe environment for patients and staff, we kindly ask that children under the age of 16 not accompany patients.  If you must bring a dependent child or dependent adult please ensure a responsible adult, other than yourself, is present to supervise them.      If you were given a blood bank ID arm band remember to bring it with you the day of surgery.    Preventing a Surgical Site Infection:  For 2 to 3 days before surgery, avoid shaving with a razor because the razor can irritate skin and make it easier to develop an infection.    Any areas of open skin can increase the risk of a post-operative wound infection by allowing bacteria to enter and travel throughout the body.  Notify your surgeon if you have any skin wounds / rashes even if it is not near the expected surgical site.  The area will need assessed to determine if surgery should be delayed until it is healed.  The night prior to surgery shower using a fresh bar of anti-bacterial soap (such as Dial) and clean washcloth.  Sleep in a clean bed with clean clothing.  Do not allow pets to sleep with you.  Shower on the morning of surgery using a fresh bar of anti-bacterial soap (such as Dial) and clean washcloth.  Dry with a clean towel and dress in clean clothing.  Ask your surgeon if you will be receiving antibiotics prior to surgery.  Make sure you, your family, and all healthcare providers clean their hands with soap and water or an alcohol based hand  before caring for you or your wound.    Day of surgery:3/31/2025  Your arrival time is approximately two hours before your scheduled surgery time.  Please note if you have an early arrival time the surgery doors do not open before 5:00 AM.  Upon arrival, a Pre-op nurse and Anesthesiologist will review your health history, obtain vital signs, and answer questions you may have.  The only belongings needed at this time will be a list of your home medications and if applicable  your C-PAP/BI-PAP machine.  A Pre-op nurse will start an IV and you may receive medication in preparation for surgery, including something to help you relax.     Please be aware that surgery does come with discomfort.  We want to make every effort to control your discomfort so please discuss any uncontrolled symptoms with your nurse.   Your doctor will most likely have prescribed pain medications.      If you are going home after surgery you will receive individualized written care instructions before being discharged.  A responsible adult must drive you to and from the hospital on the day of your surgery and ideally stay with you through the night.   .  Discharge prescriptions can be filled by the hospital pharmacy during regular pharmacy hours.  If you are having surgery late in the day/evening your prescription may be e-prescribed to your pharmacy.  Please verify your pharmacy hours or chose a 24 hour pharmacy to avoid not having access to your prescription because your pharmacy has closed for the day.    If you are staying overnight following surgery, you will be transported to your hospital room following the recovery period.  [facility] has all private rooms.    If you have any questions please call Pre-Admission Testing at [phone].  Deductibles and co-payments are collected on the day of service. Please be prepared to pay the required co-pay, deductible or deposit on the day of service as defined by your plan.    Call your surgeon immediately if you experience any of the following symptoms:  Sore Throat  Shortness of Breath or difficulty breathing  Cough  Chills  Body soreness or muscle pain  Headache  Fever  New loss of taste or smell  Do not arrive for your surgery ill.  Your procedure will need to be rescheduled to another time.  You will need to call your physician before the day of surgery to avoid any unnecessary exposure to hospital staff as well as other patients.  CHLORHEXIDINE  CLOTH INSTRUCTIONS  The morning of surgery follow these instructions using the Chlorhexidine cloths you've been given.  These steps reduce bacteria on the body.  Do not use the cloths near your eyes, ears mouth, genitalia or on open wounds.  Throw the cloths away after use but do not try to flush them down a toilet.      Open and remove one cloth at a time from the package.    Leave the cloth unfolded and begin the bathing.  Massage the skin with the cloths using gentle pressure to remove bacteria.  Do not scrub harshly.   Follow the steps below with one 2% CHG cloth per area (6 total cloths).  One cloth for neck, shoulders and chest.  One cloth for both arms, hands, fingers and underarms (do underarms last).  One cloth for the abdomen followed by groin.  One cloth for right leg and foot including between the toes.  One cloth for left leg and foot including between the toes.  The last cloth is to be used for the back of the neck, back and buttocks.    Allow the CHG to air dry 3 minutes on the skin which will give it time to work and decrease the chance of irritation.  The skin may feel sticky until it is dry.  Do not rinse with water or any other liquid or you will lose the beneficial effects of the CHG.  If mild skin irritation occurs, do rinse the skin to remove the CHG.  Report this to the nurse at time of admission.  Do not apply lotions, creams, ointments, deodorants or perfumes after using the clothes. Dress in clean clothes before coming to the hospital.

## 2025-03-20 NOTE — PROGRESS NOTES
Physical Therapy Daily Treatment Note      Patient: Liza Aaron   : 1955  Referring practitioner: Minnie Whaley MD  Date of Initial Visit: Type: THERAPY  Noted: 3/18/2025  Today's Date: 3/20/2025  Patient seen for 2 sessions       Visit Diagnoses:    ICD-10-CM ICD-9-CM   1. Acute left-sided low back pain, unspecified whether sciatica present  M54.50 724.2   2. Impaired functional mobility, balance, gait, and endurance  Z74.09 V49.89   3. Chronic pain of right knee  M25.561 719.46    G89.29 338.29   4. Primary osteoarthritis of right knee  M17.11 715.16   5. Impaired functional mobility and activity tolerance  Z74.09 V49.89       Subjective   Much improved. Low back is sore, not painful. A little pain in L (anterior) thigh yesterday.    Objective   See Exercise, Manual, and Modality Logs for complete treatment.       Assessment/Plan  Subjective reports of back and L LE improving. Reviewed HEP and prehab. Reports pain relief with estim and ice. Progress per POC.    Timed:         Manual Therapy:    0     mins  99702;     Therapeutic Exercise:    40     mins  10801;     Neuromuscular Fide:    0    mins  85645;    Therapeutic Activity:     0     mins  33779;     Gait Trainin     mins  36590;     Ultrasound:     0     mins  88897;    Ionto                               0    mins   90518  Self Care                       0     mins   36309      Un-Timed:  Electrical Stimulation:    10     mins  65512 ( );  Dry Needling     0     mins self-pay  Traction     0     mins 61231  Canalith Repos    0     mins 08704    Timed Treatment:   40   mins   Total Treatment:     50   mins    Jia Grey, PT  [place] License: [id number]

## 2025-03-20 NOTE — H&P (VIEW-ONLY)
Patient: Liza Aaron    Date of Admission: 3/31/2025    YOB: 1955    Medical Record Number: [id number]    Admitting Physician: Dr. Cesar Johnson    Reason for Admission: End Stage Right Knee OA    History of Present Illness: 70 y.o. female presents with severe end stage knee osteoarthritis which has not been responsive to the full compliment of conservative measures. Despite conservative attempts, there is still severe, constant activity-limiting pain. Given the severity of the pain, the patient has elected to proceed with knee replacement.    Allergies:   Allergies   Allergen Reactions    Nickel Rash     Pt states cannot wear jewelry with Nickel.     Penicillins Rash         Current Medications:  Home Medications:    Current Outpatient Medications on File Prior to Visit   Medication Sig    Diclofenac Sodium 4 %, Topiramate 2 %, cloNIDine HCl 0.2 %, Lidocaine HCl 5 % Apply 1-2 g topically to the appropriate area as directed 3 (Three) to 4 (Four) times daily.    Lactobacillus (PROBIOTIC ACIDOPHILUS PO) Take 2 tablets by mouth Daily.    latanoprost (XALATAN) 0.005 % ophthalmic solution Administer 1 drop to both eyes every night at bedtime.    levETIRAcetam XR (KEPPRA XR) 500 MG tablet Take 2 tablets by mouth Daily. (Patient taking differently: Take 2 tablets by mouth Every Night.)    melatonin 5 MG tablet tablet Take 1 tablet by mouth At Night As Needed. Chewy ones    meloxicam (MOBIC) 15 MG tablet Take 1 tablet by mouth Daily. (Patient taking differently: Take 1 tablet by mouth Every Evening.)    multivitamin with minerals tablet tablet Take 1 tablet by mouth Daily.    rosuvastatin (CRESTOR) 10 MG tablet Take 1 tablet by mouth Every Night.    [DISCONTINUED] Cholecalciferol (VITAMIN D) 25 MCG (1000 UT) tablet Take 1 tablet by mouth Daily.     No current facility-administered medications on file prior to visit.     PRN Meds:.    PMH:     Past Medical History:   Diagnosis Date    Allergic     Arrhythmia in  "2018    maybe every 4th beat    Arthritis     Diverticulosis     Family history of coronary artery disease     SAW DR THOMPSON 4/2019    Glaucoma     Hip pain     BILATERAL    Hyperglycemia     Hyperlipidemia     Irregular heart beat     OCCASIONAL \"SKIPPED BEAT\"    Knee swelling     Pure hypercholesterolemia     Seizure     WHEN IN COLLEGE,  last 12/2022    Sleep apnea     machine    Squamous cell skin cancer 03/2025    Tear of meniscus of knee     Tendinitis of knee     Umbilical hernia     Umbilical hernia     UTI (urinary tract infection)     FINISHED ANTIBIOTIC    Vitamin D deficiency        PF/Surg/Soc Hx:     Past Surgical History:   Procedure Laterality Date    COLONOSCOPY N/A 12/14/2015    Diverticulosis in the entire examined colon, non-bleeding internal hemorrhodis, no specimens collected-Dr. Rosario Flores    COLONOSCOPY N/A 05/12/2021    Procedure: COLONOSCOPY  into cecum with cold/hot snare and cold bx polypectomies;  Surgeon: Rosario Flores MD;  Location: [facility] ENDOSCOPY;  Service: Gastroenterology;  Laterality: N/A;  pre: screen  post: diverticulosis, polyps, hemorrhoids     COLONOSCOPY N/A 10/17/2023    Procedure: COLONOSCOPY INTO CECUM AND T.I. WITH HOT SNARE POLYPECTOMY;  Surgeon: Rosario Flores MD;  Location: [facility] ENDOSCOPY;  Service: Gastroenterology;  Laterality: N/A;  PRE- HISTORY OF COLON POLYPS  POST- DIVERTICULOSIS, POLYP, HEMORRHOIDS    JOINT REPLACEMENT  2020    LASIK Bilateral     TOTAL ABDOMINAL HYSTERECTOMY WITH SALPINGO OOPHORECTOMY Bilateral 12/16/2004    Dr. Dominick Villarreal    TOTAL HIP ARTHROPLASTY Right 02/05/2020    Procedure: TOTAL HIP ARTHROPLASTY;  Surgeon: Cesar Johnson MD;  Location: [facility] MAIN OR;  Service: Orthopedics;  Laterality: Right;    TOTAL HIP ARTHROPLASTY Left 09/21/2020    Procedure: TOTAL HIP ARTHROPLASTY;  Surgeon: Cesar Johnson MD;  Location: [facility] MAIN OR;  Service: Orthopedics;  Laterality: Left;    UMBILICAL HERNIA REPAIR N/A 12/16/2004    Dr." "Dominick Villarreal    UMBILICAL HERNIA REPAIR          Social History     Occupational History    Occupation:      Employer: [employer]   Tobacco Use    Smoking status: Former     Current packs/day: 0.00     Average packs/day: 1 pack/day for 5.0 years (5.0 ttl pk-yrs)     Types: Cigarettes     Start date: 1977     Quit date:      Years since quittin.2    Smokeless tobacco: Never    Tobacco comments:     caffiene twice a week smoked 1 pack daily  5 years  quit    Vaping Use    Vaping status: Never Used   Substance and Sexual Activity    Alcohol use: Yes     Alcohol/week: 4.0 standard drinks of alcohol     Types: 2 Glasses of wine, 2 Drinks containing 0.5 oz of alcohol per week    Drug use: Never    Sexual activity: Yes     Partners: Male     Birth control/protection: Post-menopausal, Hysterectomy      Social History     Social History Narrative    Not on file        Family History   Problem Relation Age of Onset    Breast cancer Mother     Hypertension Mother     Dementia Mother     Stroke Mother     Cancer Mother     Osteoporosis Mother     Arthritis Mother     Depression Mother     Heart disease Father     Hearing loss Father     Heart attack Father     Cancer Maternal Grandmother     Arthritis Maternal Grandmother     Heart disease Maternal Grandmother     Heart failure Maternal Grandmother     Heart disease Brother     Heart attack Brother     Heart disease Maternal Grandfather     Heart attack Maternal Grandfather     Malig Hyperthermia Neg Hx          Review of Systems:   A 14 point review of systems was performed, pertinent positives discussed above, all other systems are negative    Physical Exam: 70 y.o. female  Vital Signs :   Vitals:    25 1518   BP: 105/69   Pulse: 76   Temp: 98.6 °F (37 °C)   Weight: 101 kg (221 lb 14.4 oz)   Height: 168.9 cm (66.5\")   PainSc: 0-No pain   PainLoc: Knee     General: Alert and Oriented x 3, No acute distress.  Psych: " mood and affect appropriate; recent and remote memory intact  Eyes: conjunctivae clear; pupils equally round and reactive, sclerae anicteric  CV: RRR  Resp: normal respiratory effort  Skin: no rashes or wounds; normal turgor      Xrays:  -3 views (AP, lateral, and sunrise) were ordered and reviewed demonstrating end-stage OA with bone on bone articulation.  -A full length AP xray was ordered and reviewed today for purposes of operative alignment demonstrating end stage arthritic findings. There are no previous full length films for review    Assessment:  End-stage Right knee osteoarthritis. Conservative measures have failed.      Plan:  The plan is to proceed with Right Total Knee Replacement. The patient voiced understanding of the risks, benefits, and alternative forms of treatment that were discussed with Dr Johnson at the time of scheduling.  Same day [facility] [place] [facility]    Angelica Montalvo, APRN  3/20/2025

## 2025-03-20 NOTE — H&P
Patient: Liza Aaron    Date of Admission: 3/31/2025    YOB: 1955    Medical Record Number: [id number]    Admitting Physician: Dr. Cesar Johnson    Reason for Admission: End Stage Right Knee OA    History of Present Illness: 70 y.o. female presents with severe end stage knee osteoarthritis which has not been responsive to the full compliment of conservative measures. Despite conservative attempts, there is still severe, constant activity-limiting pain. Given the severity of the pain, the patient has elected to proceed with knee replacement.    Allergies:   Allergies   Allergen Reactions    Nickel Rash     Pt states cannot wear jewelry with Nickel.     Penicillins Rash         Current Medications:  Home Medications:    Current Outpatient Medications on File Prior to Visit   Medication Sig    Diclofenac Sodium 4 %, Topiramate 2 %, cloNIDine HCl 0.2 %, Lidocaine HCl 5 % Apply 1-2 g topically to the appropriate area as directed 3 (Three) to 4 (Four) times daily.    Lactobacillus (PROBIOTIC ACIDOPHILUS PO) Take 2 tablets by mouth Daily.    latanoprost (XALATAN) 0.005 % ophthalmic solution Administer 1 drop to both eyes every night at bedtime.    levETIRAcetam XR (KEPPRA XR) 500 MG tablet Take 2 tablets by mouth Daily. (Patient taking differently: Take 2 tablets by mouth Every Night.)    melatonin 5 MG tablet tablet Take 1 tablet by mouth At Night As Needed. Chewy ones    meloxicam (MOBIC) 15 MG tablet Take 1 tablet by mouth Daily. (Patient taking differently: Take 1 tablet by mouth Every Evening.)    multivitamin with minerals tablet tablet Take 1 tablet by mouth Daily.    rosuvastatin (CRESTOR) 10 MG tablet Take 1 tablet by mouth Every Night.    [DISCONTINUED] Cholecalciferol (VITAMIN D) 25 MCG (1000 UT) tablet Take 1 tablet by mouth Daily.     No current facility-administered medications on file prior to visit.     PRN Meds:.    PMH:     Past Medical History:   Diagnosis Date    Allergic     Arrhythmia in  "2018    maybe every 4th beat    Arthritis     Diverticulosis     Family history of coronary artery disease     SAW DR THOMPSON 4/2019    Glaucoma     Hip pain     BILATERAL    Hyperglycemia     Hyperlipidemia     Irregular heart beat     OCCASIONAL \"SKIPPED BEAT\"    Knee swelling     Pure hypercholesterolemia     Seizure     WHEN IN COLLEGE,  last 12/2022    Sleep apnea     machine    Squamous cell skin cancer 03/2025    Tear of meniscus of knee     Tendinitis of knee     Umbilical hernia     Umbilical hernia     UTI (urinary tract infection)     FINISHED ANTIBIOTIC    Vitamin D deficiency        PF/Surg/Soc Hx:     Past Surgical History:   Procedure Laterality Date    COLONOSCOPY N/A 12/14/2015    Diverticulosis in the entire examined colon, non-bleeding internal hemorrhodis, no specimens collected-Dr. Rosario Flores    COLONOSCOPY N/A 05/12/2021    Procedure: COLONOSCOPY  into cecum with cold/hot snare and cold bx polypectomies;  Surgeon: Rosario Flores MD;  Location: [facility] ENDOSCOPY;  Service: Gastroenterology;  Laterality: N/A;  pre: screen  post: diverticulosis, polyps, hemorrhoids     COLONOSCOPY N/A 10/17/2023    Procedure: COLONOSCOPY INTO CECUM AND T.I. WITH HOT SNARE POLYPECTOMY;  Surgeon: Rosario Flores MD;  Location: [facility] ENDOSCOPY;  Service: Gastroenterology;  Laterality: N/A;  PRE- HISTORY OF COLON POLYPS  POST- DIVERTICULOSIS, POLYP, HEMORRHOIDS    JOINT REPLACEMENT  2020    LASIK Bilateral     TOTAL ABDOMINAL HYSTERECTOMY WITH SALPINGO OOPHORECTOMY Bilateral 12/16/2004    Dr. Dominick Villarreal    TOTAL HIP ARTHROPLASTY Right 02/05/2020    Procedure: TOTAL HIP ARTHROPLASTY;  Surgeon: Cesar Johnson MD;  Location: [facility] MAIN OR;  Service: Orthopedics;  Laterality: Right;    TOTAL HIP ARTHROPLASTY Left 09/21/2020    Procedure: TOTAL HIP ARTHROPLASTY;  Surgeon: Cesar Johnson MD;  Location: [facility] MAIN OR;  Service: Orthopedics;  Laterality: Left;    UMBILICAL HERNIA REPAIR N/A 12/16/2004    Dr." "Dominick Villarreal    UMBILICAL HERNIA REPAIR          Social History     Occupational History    Occupation:      Employer: [employer]   Tobacco Use    Smoking status: Former     Current packs/day: 0.00     Average packs/day: 1 pack/day for 5.0 years (5.0 ttl pk-yrs)     Types: Cigarettes     Start date: 1977     Quit date:      Years since quittin.2    Smokeless tobacco: Never    Tobacco comments:     caffiene twice a week smoked 1 pack daily  5 years  quit    Vaping Use    Vaping status: Never Used   Substance and Sexual Activity    Alcohol use: Yes     Alcohol/week: 4.0 standard drinks of alcohol     Types: 2 Glasses of wine, 2 Drinks containing 0.5 oz of alcohol per week    Drug use: Never    Sexual activity: Yes     Partners: Male     Birth control/protection: Post-menopausal, Hysterectomy      Social History     Social History Narrative    Not on file        Family History   Problem Relation Age of Onset    Breast cancer Mother     Hypertension Mother     Dementia Mother     Stroke Mother     Cancer Mother     Osteoporosis Mother     Arthritis Mother     Depression Mother     Heart disease Father     Hearing loss Father     Heart attack Father     Cancer Maternal Grandmother     Arthritis Maternal Grandmother     Heart disease Maternal Grandmother     Heart failure Maternal Grandmother     Heart disease Brother     Heart attack Brother     Heart disease Maternal Grandfather     Heart attack Maternal Grandfather     Malig Hyperthermia Neg Hx          Review of Systems:   A 14 point review of systems was performed, pertinent positives discussed above, all other systems are negative    Physical Exam: 70 y.o. female  Vital Signs :   Vitals:    25 1518   BP: 105/69   Pulse: 76   Temp: 98.6 °F (37 °C)   Weight: 101 kg (221 lb 14.4 oz)   Height: 168.9 cm (66.5\")   PainSc: 0-No pain   PainLoc: Knee     General: Alert and Oriented x 3, No acute distress.  Psych: " mood and affect appropriate; recent and remote memory intact  Eyes: conjunctivae clear; pupils equally round and reactive, sclerae anicteric  CV: RRR  Resp: normal respiratory effort  Skin: no rashes or wounds; normal turgor      Xrays:  -3 views (AP, lateral, and sunrise) were ordered and reviewed demonstrating end-stage OA with bone on bone articulation.  -A full length AP xray was ordered and reviewed today for purposes of operative alignment demonstrating end stage arthritic findings. There are no previous full length films for review    Assessment:  End-stage Right knee osteoarthritis. Conservative measures have failed.      Plan:  The plan is to proceed with Right Total Knee Replacement. The patient voiced understanding of the risks, benefits, and alternative forms of treatment that were discussed with Dr Johnson at the time of scheduling.  Same day [facility]    Angelica Montalvo, APRN  3/20/2025

## 2025-03-21 ENCOUNTER — HOSPITAL ENCOUNTER (OUTPATIENT)
Facility: HOSPITAL | Age: 70
Discharge: HOME | End: 2025-03-21
Admitting: OBSTETRICS & GYNECOLOGY
Payer: COMMERCIAL

## 2025-03-21 ENCOUNTER — PATIENT OUTREACH (OUTPATIENT)
Dept: CASE MANAGEMENT | Facility: OTHER | Age: 70
End: 2025-03-21
Payer: COMMERCIAL

## 2025-03-21 DIAGNOSIS — Z12.31 BREAST CANCER SCREENING BY MAMMOGRAM: ICD-10-CM

## 2025-03-21 PROCEDURE — 77067 SCR MAMMO BI INCL CAD: CPT

## 2025-03-21 PROCEDURE — 77063 BREAST TOMOSYNTHESIS BI: CPT

## 2025-03-21 NOTE — OUTREACH NOTE
AMBULATORY CASE MANAGEMENT NOTE    Names and Relationships of Patient/Support Persons: Contact: Liza Aaron; Relationship: Other  Contact: Liza Aaron; Relationship: Self -     Patient Outreach    Discussed with patient upcoming knee surgery on 3/31/25.  Patient has scheduled post op out patient therapy.  Patient reports she has a walker at home.  She has support at home and assistance with transportation.  Patient wanted confirmation on when to stop taking supplements and vitamins.  I outreached to Angelica HYLTON, oncology. Shared with patient to stop taking 1 week prior to surgery.  Patient acknowledged she received my message. Encouraged patient to reach out to Employee Case Management as needed.  Education Documentation  No documentation found.        JANES PALACIO  Ambulatory Case Management    3/21/2025, 15:43 EDT

## 2025-03-25 ENCOUNTER — TREATMENT (OUTPATIENT)
Dept: PHYSICAL THERAPY | Facility: CLINIC | Age: 70
End: 2025-03-25
Payer: COMMERCIAL

## 2025-03-25 DIAGNOSIS — Z74.09 IMPAIRED FUNCTIONAL MOBILITY, BALANCE, GAIT, AND ENDURANCE: ICD-10-CM

## 2025-03-25 DIAGNOSIS — M54.50 ACUTE LEFT-SIDED LOW BACK PAIN, UNSPECIFIED WHETHER SCIATICA PRESENT: Primary | ICD-10-CM

## 2025-03-25 PROCEDURE — 97110 THERAPEUTIC EXERCISES: CPT | Performed by: PHYSICAL THERAPIST

## 2025-03-25 PROCEDURE — 97112 NEUROMUSCULAR REEDUCATION: CPT | Performed by: PHYSICAL THERAPIST

## 2025-03-25 PROCEDURE — 97530 THERAPEUTIC ACTIVITIES: CPT | Performed by: PHYSICAL THERAPIST

## 2025-03-25 PROCEDURE — 97014 ELECTRIC STIMULATION THERAPY: CPT | Performed by: PHYSICAL THERAPIST

## 2025-03-25 NOTE — PROGRESS NOTES
Physical Therapy Daily Treatment Note  [facility] [place]   [address], [address]  [place] [address]  P: [phone]  F: [phone]    Patient: Liza Aaron   : 1955  Referring practitioner: Minnie Whaley MD  Date of Initial Visit: Type: THERAPY  Noted: 3/18/2025  Today's Date: 3/25/2025  Patient seen for 3 sessions       Visit Diagnoses:    ICD-10-CM ICD-9-CM   1. Acute left-sided low back pain, unspecified whether sciatica present  M54.50 724.2   2. Impaired functional mobility, balance, gait, and endurance  Z74.09 V49.89         Liza Aaron reports: low back is feeling better     Subjective     Objective   See Exercise, Manual, and Modality Logs for complete treatment.       Assessment/Plan good tolerance to exercises today. Responds well to e-stim. Plan details: Progress ROM / strengthening / stabilization / functional activity as tolerated       Timed:         Manual Therapy:         mins  66801;     Therapeutic Exercise:   10      mins  75966;     Neuromuscular Fide:   15     mins  63027;    Therapeutic Activity:     10     mins  13541;     Gait Training:           mins  80268;     Ultrasound:          mins  82357;    Ionto                                   mins  85585  Self Care                            mins  18359  Traction          mins 73349      Un-Timed:  Canalith Repos         mins 61612  Electrical Stimulation:    15     mins  84385 ( );  Dry Needling          mins self-pay  Traction          mins 27699        Timed Treatment:  35    mins   Total Treatment:     50   mins    Olga Gallagher PT  [place] License #: [id number]    Physical Therapist

## 2025-03-26 ENCOUNTER — PATIENT OUTREACH (OUTPATIENT)
Dept: CASE MANAGEMENT | Facility: OTHER | Age: 70
End: 2025-03-26
Payer: COMMERCIAL

## 2025-03-26 ENCOUNTER — TELEPHONE (OUTPATIENT)
Dept: OBSTETRICS AND GYNECOLOGY | Age: 70
End: 2025-03-26
Payer: COMMERCIAL

## 2025-03-26 DIAGNOSIS — N64.89 BREAST ASYMMETRY: Primary | ICD-10-CM

## 2025-03-26 NOTE — TELEPHONE ENCOUNTER
3/26/2025 mammogram results to pt and order placed for L dx mammo/US. Pt will call C to schedule   Since being in ED,  Pt has been given 1 labetalol 10 IV push.  Some improvement of his SBP from 220 to 190s.  Pt states he's no longer having any active headache / blurry vision and has not had any thumping sensation in his chest.  GIven his previous Calcium Score, will admit to Tele for further eval.

## 2025-03-26 NOTE — TELEPHONE ENCOUNTER
[facility]     Patient called stating [facility] did not have any openings upate late April. Patient is has knee surgery 3/31 wants to know if she can have it done at any other [facility] location.

## 2025-03-26 NOTE — OUTREACH NOTE
AMBULATORY CASE MANAGEMENT NOTE    Names and Relationships of Patient/Support Persons:  -     Patient Outreach    Call with [employer] regarding coverage for the [employer] device that patient will use as part of her therapy after knee replacement.   They confirmed that it is covered by insurance and is being scheduled for delivery. Communicated this to patient. Patient was appreciative of assistance. Encouraged her to reach out to Employee Case Management as needed.     Education Documentation  No documentation found.        JANES PALACIO  Ambulatory Case Management    3/26/2025, 09:04 EDT

## 2025-03-27 ENCOUNTER — TREATMENT (OUTPATIENT)
Dept: PHYSICAL THERAPY | Facility: CLINIC | Age: 70
End: 2025-03-27
Payer: COMMERCIAL

## 2025-03-27 ENCOUNTER — TELEPHONE (OUTPATIENT)
Dept: OBSTETRICS AND GYNECOLOGY | Age: 70
End: 2025-03-27
Payer: COMMERCIAL

## 2025-03-27 ENCOUNTER — APPOINTMENT (OUTPATIENT)
Dept: WOMENS IMAGING | Facility: HOSPITAL | Age: 70
End: 2025-03-27
Payer: COMMERCIAL

## 2025-03-27 DIAGNOSIS — M54.50 ACUTE LEFT-SIDED LOW BACK PAIN, UNSPECIFIED WHETHER SCIATICA PRESENT: Primary | ICD-10-CM

## 2025-03-27 DIAGNOSIS — Z74.09 IMPAIRED FUNCTIONAL MOBILITY, BALANCE, GAIT, AND ENDURANCE: ICD-10-CM

## 2025-03-27 PROCEDURE — 77065 DX MAMMO INCL CAD UNI: CPT | Performed by: RADIOLOGY

## 2025-03-27 PROCEDURE — 97530 THERAPEUTIC ACTIVITIES: CPT | Performed by: PHYSICAL THERAPIST

## 2025-03-27 PROCEDURE — 77061 BREAST TOMOSYNTHESIS UNI: CPT | Performed by: RADIOLOGY

## 2025-03-27 PROCEDURE — 76642 ULTRASOUND BREAST LIMITED: CPT | Performed by: RADIOLOGY

## 2025-03-27 PROCEDURE — 97014 ELECTRIC STIMULATION THERAPY: CPT | Performed by: PHYSICAL THERAPIST

## 2025-03-27 PROCEDURE — 97112 NEUROMUSCULAR REEDUCATION: CPT | Performed by: PHYSICAL THERAPIST

## 2025-03-27 PROCEDURE — 97110 THERAPEUTIC EXERCISES: CPT | Performed by: PHYSICAL THERAPIST

## 2025-03-27 PROCEDURE — G0279 TOMOSYNTHESIS, MAMMO: HCPCS | Performed by: RADIOLOGY

## 2025-03-27 NOTE — PROGRESS NOTES
Physical Therapy Daily Treatment Note  [facility] [place]   [address], [address]  [place] [address]  P: [phone]  F: [phone]    Patient: Liza Aaron   : 1955  Referring practitioner: Minnie Whaley MD  Date of Initial Visit: Type: THERAPY  Noted: 3/18/2025  Today's Date: 3/27/2025  Patient seen for 4 sessions       Visit Diagnoses:    ICD-10-CM ICD-9-CM   1. Acute left-sided low back pain, unspecified whether sciatica present  M54.50 724.2   2. Impaired functional mobility, balance, gait, and endurance  Z74.09 V49.89         Liza Aaron reports: low back is feeling better,  R TKA scheduled for 3/31/25    Subjective     Objective   See Exercise, Manual, and Modality Logs for complete treatment.       Assessment/Plan Pt responded well to treatment and reports low back pain is improved. DC PT and continue HEP       Timed:         Manual Therapy:         mins  56715;     Therapeutic Exercise:   10      mins  55585;     Neuromuscular Fide:    15    mins  58587;    Therapeutic Activity:     10     mins  75791;     Gait Training:           mins  65545;     Ultrasound:          mins  92784;    Ionto                                   mins  96998  Self Care                            mins  20379  Traction          mins 76950      Un-Timed:  Canalith Repos         mins 67467  Electrical Stimulation:    15     mins  52981 ( );  Dry Needling          mins self-pay  Traction          mins 16579        Timed Treatment:   35   mins   Total Treatment:      50   mins    Olga Gallagher PT  [place] License #: [id number]    Physical Therapist

## 2025-03-27 NOTE — TELEPHONE ENCOUNTER
3/27/2025 I was able to get pt in today for f/u breast imaging at [facility] at 12:15. Pt was notified and will be at appt. Orders placed previously. Pt is having total knee replacement on 3/31/25

## 2025-03-28 ENCOUNTER — TELEPHONE (OUTPATIENT)
Dept: ORTHOPEDIC SURGERY | Facility: CLINIC | Age: 70
End: 2025-03-28
Payer: COMMERCIAL

## 2025-03-28 NOTE — DISCHARGE PLACEMENT REQUEST
"Liza Aaron (70 y.o. Female)       Date of Birth   1955    Social Security Number       Address   [address] [place] [address]    Home Phone   [phone]    MRN   [id number]       [employer]    Marital Status                               Admission Date       Admission Type   Elective    Admitting Provider   Cesar Johnson MD    Attending Provider   Cesar Johnson MD    Department, Room/Bed   [facility], --/--       Discharge Date       Discharge Disposition       Discharge Destination                                 Attending Provider: Cesar Johnson MD    Allergies: Nickel, Penicillins    Isolation: None   Infection: None   Code Status: Prior    Ht: 168.9 cm (66.5\")   Wt: 101 kg (221 lb 14.4 oz)    Admission Cmt: None   Principal Problem: OA (osteoarthritis) of knee [M17.9]                   Active Insurance as of 3/31/2025       Primary Coverage       Payor Plan Insurance Group Employer/Plan Group    [facility] [employer] EMPL[employer] [id number]       Payor Plan Address Payor Plan Phone Number Payor Plan Fax Number Effective Dates    [address] [phone]  1/6/2023 - None Entered    [place] [address]         Subscriber Name Subscriber Birth Date Member ID       LIZA AARON 1955 [id number]                     Emergency Contacts        (Rel.) Home Phone Work Phone Mobile Phone    Handy Aaron (Spouse) [phone] -- --                "

## 2025-03-28 NOTE — CASE MANAGEMENT/SOCIAL WORK
Continued Stay Note  [facility]     Patient Name: Liza Aaron  MRN: [id number]  Today's Date: 3/28/2025    Admit Date: (Not on file)    Plan: Home with [facility]    Discharge Plan       Row Name 03/28/25 1441       Plan    Plan Home with [facility]     Patient/Family in Agreement with Plan yes    Provided Post Acute Provider List? Yes    Post Acute Provider List Home Health    Delivered To Patient    Method of Delivery Telephone                   Discharge Codes    No documentation.                       Shannon Epley, RN

## 2025-03-31 ENCOUNTER — DOCUMENTATION (OUTPATIENT)
Dept: HOME HEALTH SERVICES | Facility: HOME HEALTHCARE | Age: 70
End: 2025-03-31
Payer: COMMERCIAL

## 2025-03-31 ENCOUNTER — APPOINTMENT (OUTPATIENT)
Dept: GENERAL RADIOLOGY | Facility: HOSPITAL | Age: 70
End: 2025-03-31
Payer: COMMERCIAL

## 2025-03-31 ENCOUNTER — HOSPITAL ENCOUNTER (OUTPATIENT)
Facility: HOSPITAL | Age: 70
Discharge: HOME HEALTH | End: 2025-03-31
Attending: ORTHOPAEDIC SURGERY | Admitting: ORTHOPAEDIC SURGERY
Payer: COMMERCIAL

## 2025-03-31 ENCOUNTER — ANESTHESIA (OUTPATIENT)
Dept: PERIOP | Facility: HOSPITAL | Age: 70
End: 2025-03-31
Payer: COMMERCIAL

## 2025-03-31 ENCOUNTER — ANESTHESIA EVENT (OUTPATIENT)
Dept: PERIOP | Facility: HOSPITAL | Age: 70
End: 2025-03-31
Payer: COMMERCIAL

## 2025-03-31 VITALS
HEART RATE: 64 BPM | TEMPERATURE: 97.4 F | WEIGHT: 222.66 LBS | RESPIRATION RATE: 18 BRPM | BODY MASS INDEX: 34.95 KG/M2 | SYSTOLIC BLOOD PRESSURE: 104 MMHG | OXYGEN SATURATION: 95 % | HEIGHT: 67 IN | DIASTOLIC BLOOD PRESSURE: 85 MMHG

## 2025-03-31 DIAGNOSIS — Z96.651 S/P TOTAL KNEE REPLACEMENT, RIGHT: Primary | ICD-10-CM

## 2025-03-31 DIAGNOSIS — M17.11 PRIMARY OSTEOARTHRITIS OF RIGHT KNEE: ICD-10-CM

## 2025-03-31 PROCEDURE — C1776 JOINT DEVICE (IMPLANTABLE): HCPCS | Performed by: ORTHOPAEDIC SURGERY

## 2025-03-31 PROCEDURE — 25010000002 MORPHINE PER 10 MG: Performed by: ORTHOPAEDIC SURGERY

## 2025-03-31 PROCEDURE — 25810000003 SODIUM CHLORIDE 0.9 % SOLUTION: Performed by: ORTHOPAEDIC SURGERY

## 2025-03-31 PROCEDURE — 25010000002 FENTANYL CITRATE (PF) 50 MCG/ML SOLUTION: Performed by: NURSE ANESTHETIST, CERTIFIED REGISTERED

## 2025-03-31 PROCEDURE — 25010000002 HYDROMORPHONE PER 4 MG: Performed by: NURSE ANESTHETIST, CERTIFIED REGISTERED

## 2025-03-31 PROCEDURE — 25010000002 MAGNESIUM SULFATE PER 500 MG OF MAGNESIUM: Performed by: NURSE ANESTHETIST, CERTIFIED REGISTERED

## 2025-03-31 PROCEDURE — 97161 PT EVAL LOW COMPLEX 20 MIN: CPT

## 2025-03-31 PROCEDURE — 27447 TOTAL KNEE ARTHROPLASTY: CPT | Performed by: ORTHOPAEDIC SURGERY

## 2025-03-31 PROCEDURE — 25010000002 EPINEPHRINE 1 MG/ML SOLUTION 30 ML VIAL: Performed by: ORTHOPAEDIC SURGERY

## 2025-03-31 PROCEDURE — 25010000002 ROPIVACAINE PER 1 MG: Performed by: STUDENT IN AN ORGANIZED HEALTH CARE EDUCATION/TRAINING PROGRAM

## 2025-03-31 PROCEDURE — 25010000002 KETOROLAC TROMETHAMINE PER 15 MG: Performed by: ORTHOPAEDIC SURGERY

## 2025-03-31 PROCEDURE — 25010000002 MIDAZOLAM PER 1 MG: Performed by: STUDENT IN AN ORGANIZED HEALTH CARE EDUCATION/TRAINING PROGRAM

## 2025-03-31 PROCEDURE — 73560 X-RAY EXAM OF KNEE 1 OR 2: CPT

## 2025-03-31 PROCEDURE — 25010000002 HYDROMORPHONE 1 MG/ML SOLUTION: Performed by: NURSE ANESTHETIST, CERTIFIED REGISTERED

## 2025-03-31 PROCEDURE — C1713 ANCHOR/SCREW BN/BN,TIS/BN: HCPCS | Performed by: ORTHOPAEDIC SURGERY

## 2025-03-31 PROCEDURE — 25010000002 SUGAMMADEX 200 MG/2ML SOLUTION: Performed by: NURSE ANESTHETIST, CERTIFIED REGISTERED

## 2025-03-31 PROCEDURE — 25810000003 LACTATED RINGERS SOLUTION: Performed by: ORTHOPAEDIC SURGERY

## 2025-03-31 PROCEDURE — 25010000002 LIDOCAINE 2% SOLUTION: Performed by: NURSE ANESTHETIST, CERTIFIED REGISTERED

## 2025-03-31 PROCEDURE — 25010000002 ACETAMINOPHEN 10 MG/ML SOLUTION: Performed by: NURSE ANESTHETIST, CERTIFIED REGISTERED

## 2025-03-31 PROCEDURE — 25010000002 FENTANYL CITRATE (PF) 50 MCG/ML SOLUTION: Performed by: STUDENT IN AN ORGANIZED HEALTH CARE EDUCATION/TRAINING PROGRAM

## 2025-03-31 PROCEDURE — 25010000002 PROPOFOL 10 MG/ML EMULSION: Performed by: NURSE ANESTHETIST, CERTIFIED REGISTERED

## 2025-03-31 PROCEDURE — 25010000002 ROPIVACAINE PER 1 MG: Performed by: ORTHOPAEDIC SURGERY

## 2025-03-31 PROCEDURE — 25010000002 ONDANSETRON PER 1 MG: Performed by: NURSE ANESTHETIST, CERTIFIED REGISTERED

## 2025-03-31 PROCEDURE — 25010000002 DEXAMETHASONE PER 1 MG: Performed by: STUDENT IN AN ORGANIZED HEALTH CARE EDUCATION/TRAINING PROGRAM

## 2025-03-31 PROCEDURE — 27447 TOTAL KNEE ARTHROPLASTY: CPT | Performed by: SPECIALIST/TECHNOLOGIST, OTHER

## 2025-03-31 PROCEDURE — 97116 GAIT TRAINING THERAPY: CPT

## 2025-03-31 PROCEDURE — 25010000002 CEFAZOLIN PER 500 MG: Performed by: ORTHOPAEDIC SURGERY

## 2025-03-31 PROCEDURE — 25810000003 LACTATED RINGERS PER 1000 ML: Performed by: STUDENT IN AN ORGANIZED HEALTH CARE EDUCATION/TRAINING PROGRAM

## 2025-03-31 PROCEDURE — 25010000002 VANCOMYCIN 10 G RECONSTITUTED SOLUTION: Performed by: ORTHOPAEDIC SURGERY

## 2025-03-31 DEVICE — LEGION CR HIGH FLEX XLPE SZ 5-6 9MM
Type: IMPLANTABLE DEVICE | Site: KNEE | Status: FUNCTIONAL
Brand: LEGION

## 2025-03-31 DEVICE — KNOTLESS TISSUE CONTROL DEVICE, UNDYED UNIDIRECTIONAL (ANTIBACTERIAL) SYNTHETIC ABSORBABLE DEVICE
Type: IMPLANTABLE DEVICE | Site: KNEE | Status: FUNCTIONAL
Brand: STRATAFIX

## 2025-03-31 DEVICE — VIOLET ANTIBACTERIAL POLYDIOXANONE, KNOTLESS TISSUE CONTROL DEVICE
Type: IMPLANTABLE DEVICE | Site: KNEE | Status: FUNCTIONAL
Brand: STRATAFIX

## 2025-03-31 DEVICE — PALACOS® R IS A FAST-CURING, RADIOPAQUE, POLY(METHYL METHACRYLATE)-BASED BONE CEMENT.PALACOS ® R CONTAINS THE X-RAY CONTRAST MEDIUM ZIRCONIUM DIOXIDE. TO IMPROVE VISIBILITY IN THE SURGICAL FIELD PALACOS ® R HAS BEEN COLOURED WITH CHLOROPHYLL (E141). THE BONE CEMENT IS PREPARED DIRECTLY BEFORE USE BY MIXING A POLYMER POWDER COMPONENT WITH A LIQUID MONOMER COMPONENT. A DUCTILE DOUGH FORMS WHICH CURES WITHIN A FEW MINUTES.
Type: IMPLANTABLE DEVICE | Site: KNEE | Status: FUNCTIONAL
Brand: PALACOS®

## 2025-03-31 DEVICE — LEGION CRUCIATE RETAINING OXINIUM                                    FEMORAL SIZE 6 RIGHT
Type: IMPLANTABLE DEVICE | Site: KNEE | Status: FUNCTIONAL
Brand: LEGION

## 2025-03-31 DEVICE — GENESIS II BICONVEX PATELLA 29MM
Type: IMPLANTABLE DEVICE | Site: KNEE | Status: FUNCTIONAL
Brand: GENESIS II

## 2025-03-31 DEVICE — GENESIS II NON-POROUS TIBIAL                                    BASEPLATE SIZE 5 RIGHT
Type: IMPLANTABLE DEVICE | Site: KNEE | Status: FUNCTIONAL
Brand: GENESIS II

## 2025-03-31 DEVICE — IMPLANTABLE DEVICE: Type: IMPLANTABLE DEVICE | Status: FUNCTIONAL

## 2025-03-31 RX ORDER — ONDANSETRON 2 MG/ML
4 INJECTION INTRAMUSCULAR; INTRAVENOUS ONCE AS NEEDED
Status: CANCELLED | OUTPATIENT
Start: 2025-03-31

## 2025-03-31 RX ORDER — ATROPINE SULFATE 0.4 MG/ML
0.4 INJECTION, SOLUTION INTRAMUSCULAR; INTRAVENOUS; SUBCUTANEOUS ONCE AS NEEDED
Status: DISCONTINUED | OUTPATIENT
Start: 2025-03-31 | End: 2025-04-01 | Stop reason: HOSPADM

## 2025-03-31 RX ORDER — MELOXICAM 15 MG/1
15 TABLET ORAL DAILY
Qty: 14 TABLET | Refills: 0 | Status: SHIPPED | OUTPATIENT
Start: 2025-03-31

## 2025-03-31 RX ORDER — DEXAMETHASONE SODIUM PHOSPHATE 4 MG/ML
INJECTION, SOLUTION INTRA-ARTICULAR; INTRALESIONAL; INTRAMUSCULAR; INTRAVENOUS; SOFT TISSUE
Status: COMPLETED | OUTPATIENT
Start: 2025-03-31 | End: 2025-03-31

## 2025-03-31 RX ORDER — SODIUM CHLORIDE, SODIUM LACTATE, POTASSIUM CHLORIDE, CALCIUM CHLORIDE 600; 310; 30; 20 MG/100ML; MG/100ML; MG/100ML; MG/100ML
9 INJECTION, SOLUTION INTRAVENOUS CONTINUOUS
Status: DISCONTINUED | OUTPATIENT
Start: 2025-03-31 | End: 2025-04-01 | Stop reason: HOSPADM

## 2025-03-31 RX ORDER — MELOXICAM 15 MG/1
15 TABLET ORAL ONCE
Status: COMPLETED | OUTPATIENT
Start: 2025-03-31 | End: 2025-03-31

## 2025-03-31 RX ORDER — ONDANSETRON 2 MG/ML
INJECTION INTRAMUSCULAR; INTRAVENOUS AS NEEDED
Status: DISCONTINUED | OUTPATIENT
Start: 2025-03-31 | End: 2025-03-31 | Stop reason: SURG

## 2025-03-31 RX ORDER — IPRATROPIUM BROMIDE AND ALBUTEROL SULFATE 2.5; .5 MG/3ML; MG/3ML
3 SOLUTION RESPIRATORY (INHALATION) ONCE AS NEEDED
Status: DISCONTINUED | OUTPATIENT
Start: 2025-03-31 | End: 2025-04-01 | Stop reason: HOSPADM

## 2025-03-31 RX ORDER — EPHEDRINE SULFATE 50 MG/ML
5 INJECTION, SOLUTION INTRAVENOUS ONCE AS NEEDED
Status: DISCONTINUED | OUTPATIENT
Start: 2025-03-31 | End: 2025-04-01 | Stop reason: HOSPADM

## 2025-03-31 RX ORDER — MIDAZOLAM HYDROCHLORIDE 1 MG/ML
0.5 INJECTION, SOLUTION INTRAMUSCULAR; INTRAVENOUS
Status: DISCONTINUED | OUTPATIENT
Start: 2025-03-31 | End: 2025-03-31 | Stop reason: HOSPADM

## 2025-03-31 RX ORDER — HYDROCODONE BITARTRATE AND ACETAMINOPHEN 5; 325 MG/1; MG/1
1 TABLET ORAL ONCE AS NEEDED
Status: DISCONTINUED | OUTPATIENT
Start: 2025-03-31 | End: 2025-04-01 | Stop reason: HOSPADM

## 2025-03-31 RX ORDER — MAGNESIUM HYDROXIDE 1200 MG/15ML
LIQUID ORAL AS NEEDED
Status: DISCONTINUED | OUTPATIENT
Start: 2025-03-31 | End: 2025-03-31 | Stop reason: HOSPADM

## 2025-03-31 RX ORDER — LABETALOL HYDROCHLORIDE 5 MG/ML
5 INJECTION, SOLUTION INTRAVENOUS
Status: DISCONTINUED | OUTPATIENT
Start: 2025-03-31 | End: 2025-04-01 | Stop reason: HOSPADM

## 2025-03-31 RX ORDER — ONDANSETRON 2 MG/ML
4 INJECTION INTRAMUSCULAR; INTRAVENOUS ONCE AS NEEDED
Status: DISCONTINUED | OUTPATIENT
Start: 2025-03-31 | End: 2025-04-01 | Stop reason: HOSPADM

## 2025-03-31 RX ORDER — FLUMAZENIL 0.1 MG/ML
0.2 INJECTION INTRAVENOUS AS NEEDED
Status: DISCONTINUED | OUTPATIENT
Start: 2025-03-31 | End: 2025-04-01 | Stop reason: HOSPADM

## 2025-03-31 RX ORDER — FENTANYL CITRATE 50 UG/ML
INJECTION, SOLUTION INTRAMUSCULAR; INTRAVENOUS AS NEEDED
Status: DISCONTINUED | OUTPATIENT
Start: 2025-03-31 | End: 2025-03-31 | Stop reason: SURG

## 2025-03-31 RX ORDER — TRANEXAMIC ACID 100 MG/ML
INJECTION, SOLUTION INTRAVENOUS AS NEEDED
Status: DISCONTINUED | OUTPATIENT
Start: 2025-03-31 | End: 2025-03-31 | Stop reason: SURG

## 2025-03-31 RX ORDER — LIDOCAINE HYDROCHLORIDE 20 MG/ML
INJECTION, SOLUTION INFILTRATION; PERINEURAL AS NEEDED
Status: DISCONTINUED | OUTPATIENT
Start: 2025-03-31 | End: 2025-03-31 | Stop reason: SURG

## 2025-03-31 RX ORDER — PREGABALIN 75 MG/1
150 CAPSULE ORAL ONCE
Status: COMPLETED | OUTPATIENT
Start: 2025-03-31 | End: 2025-03-31

## 2025-03-31 RX ORDER — DEXAMETHASONE SODIUM PHOSPHATE 4 MG/ML
INJECTION, SOLUTION INTRA-ARTICULAR; INTRALESIONAL; INTRAMUSCULAR; INTRAVENOUS; SOFT TISSUE AS NEEDED
Status: DISCONTINUED | OUTPATIENT
Start: 2025-03-31 | End: 2025-03-31 | Stop reason: SURG

## 2025-03-31 RX ORDER — ROPIVACAINE HYDROCHLORIDE 5 MG/ML
INJECTION, SOLUTION EPIDURAL; INFILTRATION; PERINEURAL
Status: COMPLETED | OUTPATIENT
Start: 2025-03-31 | End: 2025-03-31

## 2025-03-31 RX ORDER — ONDANSETRON 4 MG/1
4 TABLET, FILM COATED ORAL EVERY 8 HOURS PRN
Qty: 10 TABLET | Refills: 0 | Status: SHIPPED | OUTPATIENT
Start: 2025-03-31

## 2025-03-31 RX ORDER — HYDRALAZINE HYDROCHLORIDE 20 MG/ML
5 INJECTION INTRAMUSCULAR; INTRAVENOUS
Status: DISCONTINUED | OUTPATIENT
Start: 2025-03-31 | End: 2025-04-01 | Stop reason: HOSPADM

## 2025-03-31 RX ORDER — PROMETHAZINE HYDROCHLORIDE 25 MG/1
25 TABLET ORAL ONCE AS NEEDED
Status: DISCONTINUED | OUTPATIENT
Start: 2025-03-31 | End: 2025-04-01 | Stop reason: HOSPADM

## 2025-03-31 RX ORDER — PANTOPRAZOLE SODIUM 40 MG/1
40 TABLET, DELAYED RELEASE ORAL DAILY
Qty: 14 TABLET | Refills: 0 | Status: SHIPPED | OUTPATIENT
Start: 2025-03-31 | End: 2025-04-14

## 2025-03-31 RX ORDER — SODIUM CHLORIDE 0.9 % (FLUSH) 0.9 %
3 SYRINGE (ML) INJECTION EVERY 12 HOURS SCHEDULED
Status: DISCONTINUED | OUTPATIENT
Start: 2025-03-31 | End: 2025-03-31 | Stop reason: HOSPADM

## 2025-03-31 RX ORDER — ACETAMINOPHEN 10 MG/ML
INJECTION, SOLUTION INTRAVENOUS AS NEEDED
Status: DISCONTINUED | OUTPATIENT
Start: 2025-03-31 | End: 2025-03-31 | Stop reason: SURG

## 2025-03-31 RX ORDER — FENTANYL CITRATE 50 UG/ML
50 INJECTION, SOLUTION INTRAMUSCULAR; INTRAVENOUS
Status: DISCONTINUED | OUTPATIENT
Start: 2025-03-31 | End: 2025-04-01 | Stop reason: HOSPADM

## 2025-03-31 RX ORDER — ROCURONIUM BROMIDE 10 MG/ML
INJECTION, SOLUTION INTRAVENOUS AS NEEDED
Status: DISCONTINUED | OUTPATIENT
Start: 2025-03-31 | End: 2025-03-31 | Stop reason: SURG

## 2025-03-31 RX ORDER — ONDANSETRON 4 MG/1
4 TABLET, ORALLY DISINTEGRATING ORAL EVERY 6 HOURS PRN
Status: CANCELLED | OUTPATIENT
Start: 2025-03-31

## 2025-03-31 RX ORDER — PROMETHAZINE HYDROCHLORIDE 25 MG/1
12.5 TABLET ORAL EVERY 4 HOURS PRN
Status: CANCELLED | OUTPATIENT
Start: 2025-03-31

## 2025-03-31 RX ORDER — DIPHENHYDRAMINE HYDROCHLORIDE 50 MG/ML
12.5 INJECTION, SOLUTION INTRAMUSCULAR; INTRAVENOUS
Status: DISCONTINUED | OUTPATIENT
Start: 2025-03-31 | End: 2025-04-01 | Stop reason: HOSPADM

## 2025-03-31 RX ORDER — MAGNESIUM SULFATE HEPTAHYDRATE 500 MG/ML
INJECTION, SOLUTION INTRAMUSCULAR; INTRAVENOUS AS NEEDED
Status: DISCONTINUED | OUTPATIENT
Start: 2025-03-31 | End: 2025-03-31 | Stop reason: SURG

## 2025-03-31 RX ORDER — DROPERIDOL 2.5 MG/ML
0.62 INJECTION, SOLUTION INTRAMUSCULAR; INTRAVENOUS
Status: DISCONTINUED | OUTPATIENT
Start: 2025-03-31 | End: 2025-04-01 | Stop reason: HOSPADM

## 2025-03-31 RX ORDER — ASPIRIN 81 MG/1
81 TABLET ORAL EVERY 12 HOURS SCHEDULED
Status: CANCELLED | OUTPATIENT
Start: 2025-04-01

## 2025-03-31 RX ORDER — PROPOFOL 10 MG/ML
VIAL (ML) INTRAVENOUS AS NEEDED
Status: DISCONTINUED | OUTPATIENT
Start: 2025-03-31 | End: 2025-03-31 | Stop reason: SURG

## 2025-03-31 RX ORDER — PROMETHAZINE HYDROCHLORIDE 25 MG/1
25 SUPPOSITORY RECTAL ONCE AS NEEDED
Status: DISCONTINUED | OUTPATIENT
Start: 2025-03-31 | End: 2025-04-01 | Stop reason: HOSPADM

## 2025-03-31 RX ORDER — HYDROCODONE BITARTRATE AND ACETAMINOPHEN 7.5; 325 MG/1; MG/1
1 TABLET ORAL EVERY 4 HOURS PRN
Refills: 0 | Status: CANCELLED | OUTPATIENT
Start: 2025-03-31 | End: 2025-04-07

## 2025-03-31 RX ORDER — HYDROCODONE BITARTRATE AND ACETAMINOPHEN 7.5; 325 MG/1; MG/1
2 TABLET ORAL EVERY 4 HOURS PRN
Refills: 0 | Status: CANCELLED | OUTPATIENT
Start: 2025-03-31 | End: 2025-04-07

## 2025-03-31 RX ORDER — SODIUM CHLORIDE 0.9 % (FLUSH) 0.9 %
3-10 SYRINGE (ML) INJECTION AS NEEDED
Status: DISCONTINUED | OUTPATIENT
Start: 2025-03-31 | End: 2025-03-31 | Stop reason: HOSPADM

## 2025-03-31 RX ORDER — VANCOMYCIN/0.9 % SOD CHLORIDE 1.5G/250ML
15 PLASTIC BAG, INJECTION (ML) INTRAVENOUS ONCE
Status: COMPLETED | OUTPATIENT
Start: 2025-03-31 | End: 2025-03-31

## 2025-03-31 RX ORDER — HYDROMORPHONE HYDROCHLORIDE 1 MG/ML
0.5 INJECTION, SOLUTION INTRAMUSCULAR; INTRAVENOUS; SUBCUTANEOUS
Status: DISCONTINUED | OUTPATIENT
Start: 2025-03-31 | End: 2025-04-01 | Stop reason: HOSPADM

## 2025-03-31 RX ORDER — FENTANYL CITRATE 50 UG/ML
50 INJECTION, SOLUTION INTRAMUSCULAR; INTRAVENOUS ONCE AS NEEDED
Status: COMPLETED | OUTPATIENT
Start: 2025-03-31 | End: 2025-03-31

## 2025-03-31 RX ORDER — NALOXONE HCL 0.4 MG/ML
0.2 VIAL (ML) INJECTION AS NEEDED
Status: DISCONTINUED | OUTPATIENT
Start: 2025-03-31 | End: 2025-04-01 | Stop reason: HOSPADM

## 2025-03-31 RX ORDER — HYDROCODONE BITARTRATE AND ACETAMINOPHEN 7.5; 325 MG/1; MG/1
1 TABLET ORAL EVERY 4 HOURS PRN
Qty: 40 TABLET | Refills: 0 | Status: SHIPPED | OUTPATIENT
Start: 2025-03-31 | End: 2025-04-02 | Stop reason: SDUPTHER

## 2025-03-31 RX ORDER — ACETAMINOPHEN 325 MG/1
650 TABLET ORAL EVERY 6 HOURS PRN
Status: CANCELLED | OUTPATIENT
Start: 2025-03-31 | End: 2025-04-03

## 2025-03-31 RX ORDER — ASPIRIN 81 MG/1
TABLET ORAL
Qty: 60 TABLET | Refills: 0 | Status: SHIPPED | OUTPATIENT
Start: 2025-03-31

## 2025-03-31 RX ORDER — POLYETHYLENE GLYCOL 3350 17 G/17G
17 POWDER, FOR SOLUTION ORAL 2 TIMES DAILY
Qty: 238 G | Refills: 0 | Status: SHIPPED | OUTPATIENT
Start: 2025-03-31 | End: 2025-04-07

## 2025-03-31 RX ORDER — OXYCODONE AND ACETAMINOPHEN 7.5; 325 MG/1; MG/1
1 TABLET ORAL EVERY 4 HOURS PRN
Status: DISCONTINUED | OUTPATIENT
Start: 2025-03-31 | End: 2025-04-01 | Stop reason: HOSPADM

## 2025-03-31 RX ORDER — LIDOCAINE HYDROCHLORIDE 10 MG/ML
0.5 INJECTION, SOLUTION INFILTRATION; PERINEURAL ONCE AS NEEDED
Status: DISCONTINUED | OUTPATIENT
Start: 2025-03-31 | End: 2025-03-31 | Stop reason: HOSPADM

## 2025-03-31 RX ADMIN — OXYCODONE HYDROCHLORIDE AND ACETAMINOPHEN 1 TABLET: 7.5; 325 TABLET ORAL at 11:02

## 2025-03-31 RX ADMIN — PROPOFOL 200 MG: 10 INJECTION, EMULSION INTRAVENOUS at 09:16

## 2025-03-31 RX ADMIN — OXYCODONE HYDROCHLORIDE AND ACETAMINOPHEN 1 TABLET: 7.5; 325 TABLET ORAL at 16:37

## 2025-03-31 RX ADMIN — CEFAZOLIN 2 G: 2 INJECTION, POWDER, FOR SOLUTION INTRAMUSCULAR; INTRAVENOUS at 09:05

## 2025-03-31 RX ADMIN — ROCURONIUM BROMIDE 50 MG: 10 INJECTION INTRAVENOUS at 09:16

## 2025-03-31 RX ADMIN — MELOXICAM 15 MG: 15 TABLET ORAL at 08:02

## 2025-03-31 RX ADMIN — MAGNESIUM SULFATE HEPTAHYDRATE 1 G: 500 INJECTION, SOLUTION INTRAMUSCULAR; INTRAVENOUS at 09:43

## 2025-03-31 RX ADMIN — HYDROMORPHONE HYDROCHLORIDE 0.5 MG: 1 INJECTION, SOLUTION INTRAMUSCULAR; INTRAVENOUS; SUBCUTANEOUS at 10:35

## 2025-03-31 RX ADMIN — FENTANYL CITRATE 50 MCG: 50 INJECTION, SOLUTION INTRAMUSCULAR; INTRAVENOUS at 11:18

## 2025-03-31 RX ADMIN — FENTANYL CITRATE 50 MCG: 50 INJECTION, SOLUTION INTRAMUSCULAR; INTRAVENOUS at 08:36

## 2025-03-31 RX ADMIN — TRANEXAMIC ACID 1000 MG: 100 INJECTION, SOLUTION INTRAVENOUS at 10:11

## 2025-03-31 RX ADMIN — HYDROMORPHONE HYDROCHLORIDE 0.5 MG: 1 INJECTION, SOLUTION INTRAMUSCULAR; INTRAVENOUS; SUBCUTANEOUS at 10:58

## 2025-03-31 RX ADMIN — ACETAMINOPHEN 1000 MG: 1000 INJECTION INTRAVENOUS at 09:25

## 2025-03-31 RX ADMIN — FENTANYL CITRATE 25 MCG: 50 INJECTION, SOLUTION INTRAMUSCULAR; INTRAVENOUS at 10:39

## 2025-03-31 RX ADMIN — ROPIVACAINE HYDROCHLORIDE 15 ML: 5 INJECTION EPIDURAL; INFILTRATION; PERINEURAL at 08:42

## 2025-03-31 RX ADMIN — SODIUM CHLORIDE, POTASSIUM CHLORIDE, SODIUM LACTATE AND CALCIUM CHLORIDE: 600; 310; 30; 20 INJECTION, SOLUTION INTRAVENOUS at 09:15

## 2025-03-31 RX ADMIN — VANCOMYCIN HYDROCHLORIDE 1500 MG: 10 INJECTION, POWDER, LYOPHILIZED, FOR SOLUTION INTRAVENOUS at 08:24

## 2025-03-31 RX ADMIN — DEXAMETHASONE SODIUM PHOSPHATE 8 MG: 4 INJECTION, SOLUTION INTRA-ARTICULAR; INTRALESIONAL; INTRAMUSCULAR; INTRAVENOUS; SOFT TISSUE at 09:16

## 2025-03-31 RX ADMIN — SODIUM CHLORIDE, POTASSIUM CHLORIDE, SODIUM LACTATE AND CALCIUM CHLORIDE: 600; 310; 30; 20 INJECTION, SOLUTION INTRAVENOUS at 10:43

## 2025-03-31 RX ADMIN — FENTANYL CITRATE 25 MCG: 50 INJECTION, SOLUTION INTRAMUSCULAR; INTRAVENOUS at 10:42

## 2025-03-31 RX ADMIN — ONDANSETRON 4 MG: 2 INJECTION, SOLUTION INTRAMUSCULAR; INTRAVENOUS at 10:17

## 2025-03-31 RX ADMIN — PREGABALIN 150 MG: 75 CAPSULE ORAL at 08:02

## 2025-03-31 RX ADMIN — PROPOFOL 150 MCG/KG/MIN: 10 INJECTION, EMULSION INTRAVENOUS at 09:20

## 2025-03-31 RX ADMIN — MIDAZOLAM HYDROCHLORIDE 1 MG: 1 INJECTION, SOLUTION INTRAMUSCULAR; INTRAVENOUS at 08:36

## 2025-03-31 RX ADMIN — HYDROMORPHONE HYDROCHLORIDE 0.5 MG: 1 INJECTION, SOLUTION INTRAMUSCULAR; INTRAVENOUS; SUBCUTANEOUS at 10:38

## 2025-03-31 RX ADMIN — HYDROMORPHONE HYDROCHLORIDE 0.5 MG: 1 INJECTION, SOLUTION INTRAMUSCULAR; INTRAVENOUS; SUBCUTANEOUS at 10:50

## 2025-03-31 RX ADMIN — SODIUM CHLORIDE, POTASSIUM CHLORIDE, SODIUM LACTATE AND CALCIUM CHLORIDE 500 ML: 600; 310; 30; 20 INJECTION, SOLUTION INTRAVENOUS at 08:24

## 2025-03-31 RX ADMIN — DEXAMETHASONE SODIUM PHOSPHATE 4 MG: 4 INJECTION, SOLUTION INTRA-ARTICULAR; INTRALESIONAL; INTRAMUSCULAR; INTRAVENOUS; SOFT TISSUE at 08:42

## 2025-03-31 RX ADMIN — LIDOCAINE HYDROCHLORIDE 100 MG: 20 INJECTION, SOLUTION INFILTRATION; PERINEURAL at 09:16

## 2025-03-31 RX ADMIN — SUGAMMADEX 200 MG: 100 INJECTION, SOLUTION INTRAVENOUS at 10:35

## 2025-03-31 NOTE — ANESTHESIA PROCEDURE NOTES
Airway  Reason: elective    Date/Time: 3/31/2025 9:19 AM  Airway not difficult    General Information and Staff    Patient location during procedure: OR  Anesthesiologist: Billy Bruner MD  CRNA/CAA: Rosario Tucker CRNA    Indications and Patient Condition  Indications for airway management: airway protection    Preoxygenated: yes    Mask difficulty assessment: 2 - vent by mask + OA or adjuvant +/- NMBA    Final Airway Details    Final airway type: endotracheal airway      Successful airway: ETT  Cuffed: yes   Successful intubation technique: direct laryngoscopy  Adjuncts used in placement: intubating stylet  Endotracheal tube insertion site: oral  Blade: Bonnie  Blade size: 3  ETT size (mm): 7.0  Cormack-Lehane Classification: grade I - full view of glottis  Placement verified by: chest auscultation and capnometry   Cuff volume (mL): 6  Measured from: teeth  ETT/EBT  to teeth (cm): 21  Number of attempts at approach: 1  Assessment: lips, teeth, and gum same as pre-op and atraumatic intubation

## 2025-03-31 NOTE — ANESTHESIA PREPROCEDURE EVALUATION
Anesthesia Evaluation     Patient summary reviewed and Nursing notes reviewed   no history of anesthetic complications:   NPO Solid Status: > 8 hours  NPO Liquid Status: > 2 hours           Airway   Mallampati: II  TM distance: >3 FB  Neck ROM: full  Dental      Pulmonary    (+) ,sleep apnea  Cardiovascular     ECG reviewed    (+) hyperlipidemia      Neuro/Psych  (+) seizures  GI/Hepatic/Renal/Endo    (+) obesity    Musculoskeletal     Abdominal    Substance History      OB/GYN          Other                          Anesthesia Plan    ASA 2     general     intravenous induction     Anesthetic plan, risks, benefits, and alternatives have been provided, discussed and informed consent has been obtained with: patient.        CODE STATUS:

## 2025-03-31 NOTE — ANESTHESIA POSTPROCEDURE EVALUATION
"Patient: Liza Aaron    Procedure Summary       Date: 03/31/25 Room / Location:  [facility] [address] [place] [address] [facility]    Anesthesia Start: 0913 Anesthesia Stop: 1050    Procedure: TOTAL KNEE ARTHROPLASTY (Right: Knee) Diagnosis:       Primary osteoarthritis of right knee      (Primary osteoarthritis of right knee [M17.11])    Surgeons: Cesar Johnson MD Provider: Billy Bruner MD    Anesthesia Type: general ASA Status: 2            Anesthesia Type: general    Vitals  Vitals Value Taken Time   /76 03/31/25 12:45   Temp 36.3 °C (97.4 °F) 03/31/25 12:00   Pulse 71 03/31/25 12:45   Resp 15 03/31/25 12:15   SpO2 97 % 03/31/25 12:45           Post Anesthesia Care and Evaluation    Patient location during evaluation: bedside  Pain management: adequate    Airway patency: patent  Anesthetic complications: No anesthetic complications    Cardiovascular status: acceptable  Respiratory status: acceptable  Hydration status: acceptable    Comments: */76   Pulse 71   Temp 36.3 °C (97.4 °F) (Oral)   Resp 15   Ht 168.9 cm (66.5\")   Wt 101 kg (222 lb 10.6 oz)   LMP  (LMP Unknown)   SpO2 97%   BMI 35.40 kg/m²       "

## 2025-03-31 NOTE — ANESTHESIA PROCEDURE NOTES
Peripheral Block    Pre-sedation assessment completed: 3/31/2025 8:40 AM    Patient reassessed immediately prior to procedure    Patient location during procedure: holding area  Start time: 3/31/2025 8:41 AM  Stop time: 3/31/2025 8:42 AM  Reason for block: at surgeon's request and post-op pain management  Performed by  Anesthesiologist: Billy Bruner MD  Preanesthetic Checklist  Completed: patient identified, IV checked, site marked, risks and benefits discussed, surgical consent, monitors and equipment checked, pre-op evaluation and timeout performed  Prep:  Pt Position: supine  Sterile barriers:cap, washed/disinfected hands, gloves, mask and alcohol skin prep  Prep: ChloraPrep  Patient monitoring: blood pressure monitoring, continuous pulse oximetry and EKG  Procedure    Sedation: yes  Performed under: local infiltration  Guidance:ultrasound guided    ULTRASOUND INTERPRETATION.  Using ultrasound guidance a 21 G gauge needle was placed in close proximity to the nerve, at which point, under ultrasound guidance anesthetic was injected in the area of the nerve and spread of the anesthesia was seen on ultrasound in close proximity thereto.  There were no abnormalities seen on ultrasound; a digital image was taken; and the patient tolerated the procedure with no complications. Images:still images obtained, printed/placed on chart    Laterality:right  Block Type:adductor canal block  Injection Technique:single-shot  Needle Type:echogenic and Tuohy  Needle Gauge:21 G  Resistance on Injection: none    Medications Used: ropivacaine (NAROPIN) 0.5 % injection - Injection   15 mL - 3/31/2025 8:42:00 AM  dexamethasone (DECADRON) injection - Injection   4 mg - 3/31/2025 8:42:00 AM      Post Assessment  Injection Assessment: negative aspiration for heme, no paresthesia on injection and incremental injection  Patient Tolerance:comfortable throughout block  Complications:no  Additional Notes  Ultrasound guidance used to  visualize nerve anatomy, guide needle placement and verify local anesthetic disbursement.       Performed by: Billy Bruner MD

## 2025-03-31 NOTE — PLAN OF CARE
Goal Outcome Evaluation:  Plan of Care Reviewed With: patient, spouse      Patient is a 70 y.o. female POD 0 R TKA and is WBAT - seen today in [facility]. Patient is independent at baseline and lives with her . Pt has 3-4 MARILOU with no steps inside. Pt presents to PT with impaired strength, endurance, and pain limiting overall mobility. Today, patient required CGA for transfers and ambulated 60ft with rwx and CGA. Pt with slow pace, very drowsy during eval. Cued for swing-through pattern and increased R knee flexion to improve heel strike. Pt returned to room and completed standing marches with good foot clearance and SLS tolerance, verbalized understanding of stair sequencing. Based on current clinical presentation, patient okay to DC home today with assist and HHPT to follow up. No further acute PT needs.

## 2025-03-31 NOTE — OP NOTE
Name: Liza Aaron    YOB: 1955    DATE OF SURGERY: 3/31/2025    PREOPERATIVE DIAGNOSIS: Right knee end-stage osteoarthritis    POSTOPERATIVE DIAGNOSIS: Right knee end-stage osteoarthritis    PROCEDURE PERFORMED: Right total knee replacement     SURGEON: Cesar Johnson M.D.    ASSISTANT: WEN OROZCO    A surgical assistant was integral in ensuring a successful outcome with this procedure.  The assistant was utilized to assist in positioning the patient, draping the patient, was used throughout the case to provide with retraction of tissues, suctioning of blood and body fluids for visualization, positioning of the extremity to allow for proper exposure so that I could perform the procedure.  Without the use of a surgical assistant during this procedure I feel that the outcome may have been compromised or would have been suboptimal or at risk for complications.    IMPLANTS: [employer]:     Implant Name Type Inv. Item Serial No.  Lot No. LRB No. Used Action   CMT BONE PALACOS R HI/VISC 1X40 - [id number] Implant CMT BONE PALACOS R HI/VISC 1X40  [facility] [id number] Right 1 Implanted   DEV CONTRL TISS STRATAFIX SYMM PDS PLUS ZULAY CT-1 60CM - [id number] Implant DEV CONTRL TISS STRATAFIX SYMM PDS PLUS ZULAY CT-1 60CM  [employer]  DIV [employer] J [employer] J [id number] Right 1 Implanted   DEV CONTRL TISS STRATAFIXSPIRALMNCRYL PLSPS2 REV3/0 45CM - [id number] Implant DEV CONTRL TISS STRATAFIXSPIRALMNCRYL PLSPS2 REV3/0 45CM  [employer]  DIV OF J AND J [id number] Right 1 Implanted   CMT BONE PALACOS R HI/VISC 1X40 - [id number] Implant CMT BONE PALACOS R HI/VISC 1X40  [facility] [id number] Right 1 Implanted   PAT GEN2 BICONVEX [id number] - [id number] Implant PAT GEN2 BICONVEX 46P86CM  [employer] [id number] Right 1 Implanted   COMP FEM LEGION OXINIUM CR SZ6 RT - [id number] Implant COMP FEM LEGION OXINIUM CR SZ6 RT  [employer] [id number] Right 1 Implanted   INSRT ART/KN LEGION CR HF XLPE SZ5TO6 9MM -  [id number] Implant INSRT ART/KN LEGION CR HF XLPE SZ5TO6 9MM  [employer] [id number] Right 1 Implanted   BASE TIB/KN GEN2 NONPOR TI SZ5 RT - [id number] Implant BASE TIB/KN GEN2 NONPOR TI SZ5 RT  [employer] [id number] Right 1 Implanted       Estimated Blood Loss: 200cc  Specimens : none  Complications: none    DESCRIPTION OF PROCEDURE: The patient was taken to the operating room and placed in the supine position. A sequential compression device was carefully placed on the non-operative leg. Preoperative antibiotics were administered. Surgical time out was performed. After adequate induction of anesthesia, the leg was prepped and draped in the usual sterile fashion, exsanguinated with an Esmarch bandage and the tourniquet inflated to 250 mmHg. A midline incision was performed followed by a medial parapatellar arthrotomy. The patella was subluxed laterally.  A portion of the fat pad, ACL, and anterior horns of the meniscus were excised.  A drill hole was then placed in the center of the femoral canal in line with the canal.  It was irrigated and suctioned.  The intramedullary umair was then placed and a 5 degree distal valgus cut was performed after the block pinned in place appropriately.  Cut surface was then removed.  The sizing and rotation guide was then placed and seated appropriately.  It was sized and then the drill holes for the 4-in-1 cutting guide were placed in 3 degrees of external rotation based off of the posterior condyles.  The 4-in-1 cutting block was then placed and the femoral cuts were performed.  The excess bone was removed and the cut surfaces looked good.  At this point we placed the retractors around the proximal tibia and a slight release of the PCL fibers off of the posterior proximal tibia was performed.  We used the extramedullary tibial alignment guide and it was aligned appropriately and then the depth was set and the block pinned in place.  The tibial cut was then performed and  the alignment guide was removed.  The tibial cut was removed and the cut surface looked good.  The posterior horns of the menisci were then removed as well as the posterior osteophytes.  Flexion extension blocks were then used to check the balance of the knee. The tibial cut surface was then sized with the sizing templates and the tibial and femoral trial were then placed. The knee was placed in full extension and then the tibial tray rotation was then matched to the femoral rotation and marked.    Attention was then placed to the patella. The patella was noted to track centrally through range of motion. The patella was then sized with the trials. The thickness of the patella was then measured. The patella was resurfaced and the surrounding osteophytes were removed. The preoperative thickness was reproduced. The patella tracked centrally through range of motion.  We then checked the balance with the trial implants in place and there was excellent medial lateral and flexion-extension balance.  The tibial and femoral arrays were then removed.  The checkpoint markers were then removed.   At this point all trial components were removed, the knee was copiously irrigated with pulsed lavage, and the knee was injected with anesthetic cocktail solution. The cut surfaces were then dried with clean lap sponges, and the components were cemented tibia, followed by femur, then patella.  The metal ring had been removed from the patella due to patient's nickel allergy.  The knee was held in full extension and all excess cement was removed. The knee was held still until the cement had completely hardened. We then placed the trial polyethylene spacer which resulted in full extension and excellent flexion-extension balance. We placed the final polyethylene spacer.   The knee was then copiously irrigated. The tourniquet was then released. There was excellent hemostasis. We placed a one-eighth inch Hemovac drain. We closed the knee in  multiple layers in standard fashion. Sterile dressing were applied. At the end of the case, the sponge and needle counts were reported as being correct. There were no known complications. The patient was then transported to the recovery room.      Cesar Johnson M.D.

## 2025-03-31 NOTE — PROGRESS NOTES
Start PACC Note    Home Health Referral    Evaluated patient and spouse on Home Care and services available. Patient offered choice of available HHC and agreeable to PT services with [facility].    Care Types:   Isolation Precautions: [unfilled]    Social Determinants of Health:  Tobacco Use: Medium Risk (3/31/2025)    Patient History     Smoking Tobacco Use: Former     Smokeless Tobacco Use: Never     Passive Exposure: Not on file     Social History     Substance and Sexual Activity   Alcohol Use Yes    Alcohol/week: 4.0 standard drinks of alcohol    Types: 2 Glasses of wine, 2 Drinks containing 0.5 oz of alcohol per week     Social History     Substance and Sexual Activity   Drug Use Never       Does the patient have any financial resource strain?   Does the patient have any food insecurities?   Does the patient have any housing instabilities?     If any of the above is noted as yes - consider a MSW evaluation once the patient returns home.    START PATIENT REGISTRATION INFORMATION  Order Information  Order Signing Physician: Annette att. providers found  Service Ordered RN?: No  Service Ordered PT?: Yes  Service Ordered OT?: No  Service Ordered ST?: No  Service Ordered MSW?: No  Service Ordered HHA?: No  Following Physician: RAVEN JOHNSON MD  Following Physician Phone: [phone]  Overseeing Physician: Minnie Whaley MD  (Required for Residents Only)  Agreeable to Follow? Yes  Date/Time of Call 03/31/25 10:09 EDT, Spoke with: per Dr Johnson's order in epic    Care Coordination  Same Day SOC?: No  Primary Care Physician: Minnie Whaley MD  Primary Care Physician Phone: [phone]  Primary Care Physician Address: [address] [place] [address] / [place] [address]  Visit Instructions: N/A  Service Discharge Location Type: Home  Service Facility Name: N/A  Service Floor Facility: N/A  Service Room No: N/A    Demographics  Patient Last Name: Galen  Patient First Name: Liza  Language/Communication Barrier:  NONE  Service Address: [address] [place] [address]  Service City: [place]  Service State: [place]  Service Zip: [address]  Service Home Phone: [phone]  Other Phone Numbers:   Telephone Information:   Mobile [phone]     Emergency Contact:   Extended Emergency Contact Information  Primary Emergency Contact: Handy Aaron   [facility]  Home Phone: [phone]  Mobile Phone: [phone]  Relation: Spouse    Admission Information  Admit Date: 03/31/2025  Patient Status at Discharge:   Admitting Diagnosis: RTK aftercare    Caregiver Information  Caregiver First Name:   Caregiver Last Name:   Caregiver Relationship to Patient:   Caregiver Phone Number:   Caregiver Notes:     HITECH  [employer]  HIGHTECH: HI TECH - FRESH ORTHO  Procedure: RTK  Date of Procedure: 03/31/2025  Precautions: none  Surgeon: Dr Alex RIVAS PATIENT REGISTRATION INFORMATION    Start PACC Summary    Additional Comments: none    END PACC Summary    Discharge Date: Pending    Referral Source: [facility]    Signed By: Janae Warner RN, 3/31/2025, 10:09 EDT     Date/Time: 03/31/25 10:09 EDT    End PACC Note

## 2025-03-31 NOTE — THERAPY EVALUATION
"Patient Name: Liza Aaron  : 1955    MRN: [id number]                              Today's Date: 3/31/2025       Admit Date: 3/31/2025    Visit Dx:     ICD-10-CM ICD-9-CM   1. S/P total knee replacement, right  Z96.651 V43.65   2. Primary osteoarthritis of right knee  M17.11 715.16     Patient Active Problem List   Diagnosis    HLD (hyperlipidemia)    Incisional hernia, without obstruction or gangrene    Osteoarthritis of multiple joints    Primary osteoarthritis of right hip    Primary osteoarthritis of both hips    Primary osteoarthritis of left hip    Generalized convulsive seizures    Obesity (BMI 30-39.9)    PVC (premature ventricular contraction)    Paroxysmal SVT (supraventricular tachycardia)    Personal history of colonic polyps    ALLA on CPAP    History of hysterectomy with bilateral oophorectomy    Rectocele    Numbness and tingling of right arm    Glaucoma    OA (osteoarthritis) of knee     Past Medical History:   Diagnosis Date    Allergic     Arrhythmia in 2018    maybe every 4th beat    Arthritis     Diverticulosis     Family history of coronary artery disease     SAW DR THOMPSON 2019    Glaucoma     Hip pain     BILATERAL    Hyperglycemia     Hyperlipidemia     Irregular heart beat     OCCASIONAL \"SKIPPED BEAT\"    Knee swelling     Plantar fasciitis     Pure hypercholesterolemia     Seizure     WHEN IN [employer],  last 2022    Sleep apnea     machine    Squamous cell skin cancer 2025    Tear of meniscus of knee     Tendinitis of knee     Umbilical hernia     Umbilical hernia     UTI (urinary tract infection)     FINISHED ANTIBIOTIC    Vitamin D deficiency      Past Surgical History:   Procedure Laterality Date    COLONOSCOPY N/A 2015    Diverticulosis in the entire examined colon, non-bleeding internal hemorrhodis, no specimens collected-Dr. Rosario Flores    COLONOSCOPY N/A 2021    Procedure: COLONOSCOPY  into cecum with cold/hot snare and cold bx polypectomies;  Surgeon: " Rosario Flores MD;  Location: [facility] END[facility];  Service: Gastroenterology;  Laterality: N/A;  pre: screen  post: diverticulosis, polyps, hemorrhoids     COLONOSCOPY N/A 10/17/2023    Procedure: COLONOSCOPY INTO CECUM AND T.I. WITH HOT SNARE POLYPECTOMY;  Surgeon: Rosario Flores MD;  Location: [facility] ENDOSCOPY;  Service: Gastroenterology;  Laterality: N/A;  PRE- HISTORY OF COLON POLYPS  POST- DIVERTICULOSIS, POLYP, HEMORRHOIDS    JOINT REPLACEMENT  2020    LASIK Bilateral     TOTAL ABDOMINAL HYSTERECTOMY WITH SALPINGO OOPHORECTOMY Bilateral 12/16/2004    Dr. Dominick Villarreal    TOTAL HIP ARTHROPLASTY Right 02/05/2020    Procedure: TOTAL HIP ARTHROPLASTY;  Surgeon: Cesar Johnson MD;  Location: [facility] MAIN OR;  Service: Orthopedics;  Laterality: Right;    TOTAL HIP ARTHROPLASTY Left 09/21/2020    Procedure: TOTAL HIP ARTHROPLASTY;  Surgeon: Cesar Johnson MD;  Location: [facility] MAIN OR;  Service: Orthopedics;  Laterality: Left;    UMBILICAL HERNIA REPAIR N/A 12/16/2004    Dr. Dominick Villarreal    UMBILICAL HERNIA REPAIR        General Information       Row Name 03/31/25 1619          Physical Therapy Time and Intention    Document Type evaluation;discharge evaluation/summary  -     Mode of Treatment individual therapy;physical therapy  -       Row Name 03/31/25 1619          General Information    Patient Profile Reviewed yes  -     Prior Level of Function independent:;gait;transfer;bed mobility  -     Existing Precautions/Restrictions fall  -     Barriers to Rehab none identified  -       Row Name 03/31/25 1619          Living Environment    Current Living Arrangements home  -     People in Home spouse  -       Row Name 03/31/25 1619          Home Main Entrance    Number of Stairs, Main Entrance three;four  -       Row Name 03/31/25 1619          Stairs Within Home, Primary    Number of Stairs, Within Home, Primary none  -       Row Name 03/31/25 1619          Cognition    Orientation Status  (Cognition) oriented x 4  -       Row Name 03/31/25 1619          Safety Issues/Impairments Affecting Functional Mobility    Impairments Affecting Function (Mobility) balance;endurance/activity tolerance;strength;pain;range of motion (ROM)  -               User Key  (r) = Recorded By, (t) = Taken By, (c) = Cosigned By      Initials Name Provider Type     Cassidy Franco, PT Physical Therapist                   Mobility       Row Name 03/31/25 1620          Bed Mobility    Comment, (Bed Mobility) NT - UIC  -       Row Name 03/31/25 1620          Sit-Stand Transfer    Sit-Stand Island (Transfers) contact guard;verbal cues  -     Assistive Device (Sit-Stand Transfers) walker, front-wheeled  -       Row Name 03/31/25 1620          Gait/Stairs (Locomotion)    Island Level (Gait) verbal cues;contact guard  -     Assistive Device (Gait) walker, front-wheeled  -     Distance in Feet (Gait) 60  -     Deviations/Abnormal Patterns (Gait) antalgic;griselda decreased;gait speed decreased;stride length decreased  -     Bilateral Gait Deviations forward flexed posture  -     Right Sided Gait Deviations heel strike decreased;weight shift ability decreased  -       Row Name 03/31/25 1620          Mobility    Extremity Weight-bearing Status right lower extremity  -     Right Lower Extremity (Weight-bearing Status) weight-bearing as tolerated (WBAT)  -               User Key  (r) = Recorded By, (t) = Taken By, (c) = Cosigned By      Initials Name Provider Type     Cassidy Franco PT Physical Therapist                   Obj/Interventions       Row Name 03/31/25 1621          Range of Motion Comprehensive    General Range of Motion lower extremity range of motion deficits identified  -     Comment, General Range of Motion Surgical knee ROM: grossly 80 degrees with heel slides  -       Row Name 03/31/25 1621          Strength Comprehensive (MMT)    General Manual Muscle Testing (MMT)  Assessment lower extremity strength deficits identified  -     Comment, General Manual Muscle Testing (MMT) Assessment Expected post-op strength deficits, BLE grossly 4/5  -       Row Name 03/31/25 1621          Motor Skills    Therapeutic Exercise knee  -[facility] Name 03/31/25 1621          Knee (Therapeutic Exercise)    Knee (Therapeutic Exercise) isometric exercises;strengthening exercise  -     Knee Isometrics (Therapeutic Exercise) quad sets  -     Knee Strengthening (Therapeutic Exercise) SLR (straight leg raise);SAQ (short arc quad);LAQ (long arc quad);heel slides  -       Row Name 03/31/25 1621          Balance    Balance Interventions sitting;standing;sit to stand  -       Row Name 03/31/25 1621          Sensory Assessment (Somatosensory)    Sensory Assessment (Somatosensory) LE sensation intact  -               User Key  (r) = Recorded By, (t) = Taken By, (c) = Cosigned By      Initials Name Provider Type     Cassidy Franco PT Physical Therapist                   Goals/Plan    No documentation.                  Clinical Impression       [facility] Name 03/31/25 1622          Pain    Pretreatment Pain Rating 3/10  -     Posttreatment Pain Rating 4/10  -     Pain Location knee  -     Pain Side/Orientation right  -     Pain Management Interventions exercise or physical activity utilized  -     Response to Pain Interventions activity participation with tolerable pain  -[facility] Name 03/31/25 1622          Plan of Care Review    Plan of Care Reviewed With patient;spouse  -     Outcome Evaluation Patient is a 70 y.o. female POD 0 R TKA and is WBAT - seen today in [facility]. Patient is independent at baseline and lives with her . Pt has 3-4 MARILOU with no steps inside. Pt presents to PT with impaired strength, endurance, and pain limiting overall mobility. Today, patient required CGA for transfers and ambulated 60ft with rwx and CGA. Pt with slow pace, very drowsy during eval. Cued  for swing-through pattern and increased R knee flexion to improve heel strike. Pt returned to room and completed standing marches with good foot clearance and SLS tolerance, verbalized understanding of stair sequencing. Based on current clinical presentation, patient okay to DC home today with assist and HHPT to follow up. No further acute PT needs.  -       Row Name 03/31/25 1622          Therapy Assessment/Plan (PT)    Therapy Frequency (PT) evaluation only  -       Row Name 03/31/25 1622          Vital Signs    O2 Delivery Pre Treatment supplemental O2  Wears Cpap at night - sleeping on arrival  -     O2 Delivery Intra Treatment room air  -     O2 Delivery Post Treatment room air  -       Row Name 03/31/25 1622          Positioning and Restraints    Pre-Treatment Position sitting in chair/recliner  -     Post Treatment Position chair  -     In Chair notified nsg;reclined;call light within reach;encouraged to call for assist;with family/caregiver  -               User Key  (r) = Recorded By, (t) = Taken By, (c) = Cosigned By      Initials Name Provider Type     Cassidy Franco, PT Physical Therapist                   Outcome Measures       Row Name 03/31/25 1623          How much help from another person do you currently need...    Turning from your back to your side while in flat bed without using bedrails? 3  -BH     Moving from lying on back to sitting on the side of a flat bed without bedrails? 3  -BH     Moving to and from a bed to a chair (including a wheelchair)? 3  -BH     Standing up from a chair using your arms (e.g., wheelchair, bedside chair)? 3  -BH     Climbing 3-5 steps with a railing? 3  -BH     To walk in hospital room? 3  -BH     AM-PAC 6 Clicks Score (PT) 18  -     Highest Level of Mobility Goal 6 --> Walk 10 steps or more  -       Row Name 03/31/25 1623          Functional Assessment    Outcome Measure Options AM-PAC 6 Clicks Basic Mobility (PT)  -               User  Key  (r) = Recorded By, (t) = Taken By, (c) = Cosigned By      Initials Name Provider Type     Cassidy Franco PT Physical Therapist                                 Physical Therapy Education       Title: PT OT SLP Therapies (Done)       Topic: Physical Therapy (Done)       Point: Mobility training (Done)       Learning Progress Summary            Patient Acceptance, E,TB,D, VU by  at 3/31/2025 1623                      Point: Home exercise program (Done)       Learning Progress Summary            Patient Acceptance, E,TB,D, VU by  at 3/31/2025 1623                      Point: Body mechanics (Done)       Learning Progress Summary            Patient Acceptance, E,TB,D, VU by  at 3/31/2025 1623                      Point: Precautions (Done)       Learning Progress Summary            Patient Acceptance, E,TB,D, VU by  at 3/31/2025 1623                                      User Key       Initials Effective Dates Name Provider Type Discipline     04/08/22 -  Cassidy Franco PT Physical Therapist PT                  PT Recommendation and Plan     Outcome Evaluation: Patient is a 70 y.o. female POD 0 R TKA and is WBAT - seen today in [facility]. Patient is independent at baseline and lives with her . Pt has 3-4 MARILOU with no steps inside. Pt presents to PT with impaired strength, endurance, and pain limiting overall mobility. Today, patient required CGA for transfers and ambulated 60ft with rwx and CGA. Pt with slow pace, very drowsy during eval. Cued for swing-through pattern and increased R knee flexion to improve heel strike. Pt returned to room and completed standing marches with good foot clearance and SLS tolerance, verbalized understanding of stair sequencing. Based on current clinical presentation, patient okay to DC home today with assist and HHPT to follow up. No further acute PT needs.     Time Calculation:   PT Evaluation Complexity  History, PT Evaluation Complexity: 1-2 personal factors and/or  comorbidities  Examination of Body Systems (PT Eval Complexity): total of 3 or more elements  Clinical Presentation (PT Evaluation Complexity): stable  Clinical Decision Making (PT Evaluation Complexity): low complexity  Overall Complexity (PT Evaluation Complexity): low complexity     PT Charges       Row Name 03/31/25 1624             Time Calculation    Start Time 1442  -      Stop Time 1506  -      Time Calculation (min) 24 min  -      PT Received On 03/31/25  -         Time Calculation- PT    Total Timed Code Minutes- PT 20 minute(s)  -         Timed Charges    70673 - PT Therapeutic Exercise Minutes 5  -      14941 - Gait Training Minutes  10  -BH      11634 - PT Therapeutic Activity Minutes 5  -BH         Total Minutes    Timed Charges Total Minutes 20  -       Total Minutes 20  -BH                User Key  (r) = Recorded By, (t) = Taken By, (c) = Cosigned By      Initials Name Provider Type     Cassidy Franco, PT Physical Therapist                  Therapy Charges for Today       Code Description Service Date Service Provider Modifiers Qty    89073969328 HC GAIT TRAINING EA 15 MIN 3/31/2025 Cassidy Franco, PT GP 1    [id number] HC PT EVAL LOW COMPLEXITY 3 3/31/2025 Cassidy Franco, PT GP 1            PT G-Codes  Outcome Measure Options: AM-PAC 6 Clicks Basic Mobility (PT)  AM-PAC 6 Clicks Score (PT): 18  PT Discharge Summary  Anticipated Discharge Disposition (PT): home with assist, home with home health    Cassidy Franco, PT  3/31/2025

## 2025-04-02 ENCOUNTER — PATIENT OUTREACH (OUTPATIENT)
Dept: CASE MANAGEMENT | Facility: OTHER | Age: 70
End: 2025-04-02
Payer: COMMERCIAL

## 2025-04-02 DIAGNOSIS — Z96.651 S/P TOTAL KNEE REPLACEMENT, RIGHT: ICD-10-CM

## 2025-04-02 RX ORDER — HYDROCODONE BITARTRATE AND ACETAMINOPHEN 7.5; 325 MG/1; MG/1
1 TABLET ORAL EVERY 4 HOURS PRN
Qty: 40 TABLET | Refills: 0 | Status: SHIPPED | OUTPATIENT
Start: 2025-04-02

## 2025-04-02 NOTE — OUTREACH NOTE
AMBULATORY CASE MANAGEMENT NOTE    Names and Relationships of Patient/Support Persons: Contact: Liza Aaron; Relationship: Self -     Patient Outreach    Late entry. Spoke with patient on 3/31/25. Verified she had received [employer] [facility] for post surgery rehab.  Encouraged her to reach out if needed.     Education Documentation  No documentation found.        JANES PALACIO  Ambulatory Case Management    4/2/2025, 15:27 EDT

## 2025-04-02 NOTE — TELEPHONE ENCOUNTER
Surgery date 3-  Last refill 3-    While speaking with patient she stated that she has put ten pounds since Monday

## 2025-04-02 NOTE — OUTREACH NOTE
AMBULATORY CASE MANAGEMENT NOTE    Names and Relationships of Patient/Support Persons: Contact: Liza Aaron; Relationship: Self  Contact: Liza Aaron; Relationship: Self -     Patient Outreach    Talked with patient.  She is having edema in leg post knee replacement.  She said she spoke with Dr. Johnson who suggested she can place wraps back on leg. Encouraged patient to elevate leg and use ice packs.      Education Documentation  No documentation found.        JANES PALACIO  Ambulatory Case Management    4/2/2025, 17:10 EDT

## 2025-04-07 ENCOUNTER — HOSPITAL ENCOUNTER (OUTPATIENT)
Dept: CARDIOLOGY | Facility: HOSPITAL | Age: 70
Discharge: HOME | End: 2025-04-07
Admitting: ORTHOPAEDIC SURGERY
Payer: COMMERCIAL

## 2025-04-07 ENCOUNTER — TELEPHONE (OUTPATIENT)
Dept: ORTHOPEDIC SURGERY | Facility: CLINIC | Age: 70
End: 2025-04-07
Payer: COMMERCIAL

## 2025-04-07 ENCOUNTER — TELEPHONE (OUTPATIENT)
Dept: ORTHOPEDIC SURGERY | Facility: HOSPITAL | Age: 70
End: 2025-04-07
Payer: COMMERCIAL

## 2025-04-07 DIAGNOSIS — R52 PAIN: ICD-10-CM

## 2025-04-07 DIAGNOSIS — Z96.651 S/P TOTAL KNEE REPLACEMENT, RIGHT: ICD-10-CM

## 2025-04-07 DIAGNOSIS — Z96.651 S/P TOTAL KNEE REPLACEMENT, RIGHT: Primary | ICD-10-CM

## 2025-04-07 LAB
BH CV LOWER VASCULAR LEFT COMMON FEMORAL AUGMENT: NORMAL
BH CV LOWER VASCULAR LEFT COMMON FEMORAL COMPETENT: NORMAL
BH CV LOWER VASCULAR LEFT COMMON FEMORAL COMPRESS: NORMAL
BH CV LOWER VASCULAR LEFT COMMON FEMORAL PHASIC: NORMAL
BH CV LOWER VASCULAR LEFT COMMON FEMORAL SPONT: NORMAL
BH CV LOWER VASCULAR RIGHT COMMON FEMORAL AUGMENT: NORMAL
BH CV LOWER VASCULAR RIGHT COMMON FEMORAL COMPETENT: NORMAL
BH CV LOWER VASCULAR RIGHT COMMON FEMORAL COMPRESS: NORMAL
BH CV LOWER VASCULAR RIGHT COMMON FEMORAL PHASIC: NORMAL
BH CV LOWER VASCULAR RIGHT COMMON FEMORAL SPONT: NORMAL
BH CV LOWER VASCULAR RIGHT DISTAL FEMORAL COMPRESS: NORMAL
BH CV LOWER VASCULAR RIGHT GASTRONEMIUS COMPRESS: NORMAL
BH CV LOWER VASCULAR RIGHT GREATER SAPH AK COMPRESS: NORMAL
BH CV LOWER VASCULAR RIGHT GREATER SAPH BK COMPRESS: NORMAL
BH CV LOWER VASCULAR RIGHT LESSER SAPH COMPRESS: NORMAL
BH CV LOWER VASCULAR RIGHT MID FEMORAL AUGMENT: NORMAL
BH CV LOWER VASCULAR RIGHT MID FEMORAL COMPETENT: NORMAL
BH CV LOWER VASCULAR RIGHT MID FEMORAL COMPRESS: NORMAL
BH CV LOWER VASCULAR RIGHT MID FEMORAL PHASIC: NORMAL
BH CV LOWER VASCULAR RIGHT MID FEMORAL SPONT: NORMAL
BH CV LOWER VASCULAR RIGHT PERONEAL COMPRESS: NORMAL
BH CV LOWER VASCULAR RIGHT POPLITEAL AUGMENT: NORMAL
BH CV LOWER VASCULAR RIGHT POPLITEAL COMPETENT: NORMAL
BH CV LOWER VASCULAR RIGHT POPLITEAL COMPRESS: NORMAL
BH CV LOWER VASCULAR RIGHT POPLITEAL PHASIC: NORMAL
BH CV LOWER VASCULAR RIGHT POPLITEAL SPONT: NORMAL
BH CV LOWER VASCULAR RIGHT POSTERIOR TIBIAL COMPRESS: NORMAL
BH CV LOWER VASCULAR RIGHT PROFUNDA FEMORAL COMPRESS: NORMAL
BH CV LOWER VASCULAR RIGHT PROXIMAL FEMORAL COMPRESS: NORMAL
BH CV LOWER VASCULAR RIGHT SAPHENOFEMORAL JUNCTION COMPRESS: NORMAL

## 2025-04-07 PROCEDURE — 93971 EXTREMITY STUDY: CPT

## 2025-04-07 PROCEDURE — 93971 EXTREMITY STUDY: CPT | Performed by: SURGERY

## 2025-04-07 NOTE — TELEPHONE ENCOUNTER
Caller: LATRELL    Relationship to patient: [facility]    Best call back number:     Patient is needing:  LATRELL SAW MS IVAN THIS MORNING AND STATED THE PATIENT'S RIGHT LEG IS MORE SWOLLEN, SHE IS NAUSEATED AND SHE WAS RUNNING A LOW GRADE FEVER .9 THIS WEEKEND. SHE IS ALSO HAVING PAIN IN THE BACK OF HER CALF AND HAS PITTING EDEMA FROM HER TOES TO MID THIGH.  MS IVAN HAD A TOTAL KNEE ARTHROPLASTY ON 3/31/25

## 2025-04-07 NOTE — TELEPHONE ENCOUNTER
Left message on machine for patient to call me back regarding nausea and intermittent low-grade fever.  Schedulers still attempting to schedule stat venous Doppler

## 2025-04-07 NOTE — TELEPHONE ENCOUNTER
Post op day 7  Discharge Instructions:  Ask patient about his or her discharge instructions  ?  Patient confirmed understanding   []  Further instruction needed   What, if any, recommendations, teaching, or interventions did you provide? Click or tap here to enter text.  Health status:  Pain controlled Yes   Does have some increased pain, cutting back on the pain medication taking ½ every 2 hours vs 2 every 4 hours.   Recommended interventions:  Yes  incision/dressing status   ?  Clean without redness, drainage, odor  []  Redness    []  Drainage - color Click or tap here to enter text.  []  Odor  JAMIE - Green light blinking Choose an item.  Dressing in place. Box at day 7  Difficulties urination No  Last BM 4/7/2025 (if no BM by day 3-recommend OTC suppository or fleets enema)  Is having BM's doesn't feel she is constipated   Medications:  ?Medications reviewed with patient/family/caregiver  Patient taking medications as prescribed?   Yes  If not taking medications as prescribed, note specific medicine(s) and reason for each:  Click or tap here to enter text.  Hospital Follow Up Plan:  Follow up Appointment with Orthopedic surgeon:  ?Has f/u appointment                []Scheduled f/u appointment  Home Care ordered at discharge?    Yes        Home Care started, or contact made?    Yes   If no, action taken:   DME obtained/used in home?         Yes   Using IS  Choose an item.   Other information: Ms. Aaron said she is doing ok. Things have been pretty rough. She is working with  PT and doing what she can but the swelling won't let up. It's more swollen now. PT just left and said she was going to message the MD about a possible doppler. Dressing looks go. She is icing and elevating. She has ran a fever 2 days in a row that comes and goes. Still having a lot of nausea. But not sure if its from the pain, pain medication. Fever, or swelling. Told her I would let the md office know as well, with everything else that is  going on. Ms. Aaron doesn't have any questions for me at this time. She has my contact information should she need anything.

## 2025-04-09 DIAGNOSIS — Z96.651 S/P TOTAL KNEE REPLACEMENT, RIGHT: ICD-10-CM

## 2025-04-10 RX ORDER — HYDROCODONE BITARTRATE AND ACETAMINOPHEN 7.5; 325 MG/1; MG/1
1 TABLET ORAL EVERY 4 HOURS PRN
Qty: 40 TABLET | Refills: 0 | Status: SHIPPED | OUTPATIENT
Start: 2025-04-10

## 2025-04-14 NOTE — PROGRESS NOTES
[facility] [place]              [address]                                                                                   [place] [address]                         Phone [phone]                   Fax [phone]        Physical Therapy Initial Evaluation and Plan of Care    Patient: Liza Aaron   : 1955  Diagnosis/ICD-10 Code:  Primary osteoarthritis of right knee [M17.11]  Referring practitioner: Cesar Johnson MD  Date of Initial Visit: 2025  Today's Date: 2025  Patient seen for 1 session         Visit Diagnoses:    ICD-10-CM ICD-9-CM   1. Primary osteoarthritis of right knee  M17.11 715.16   2. Acute pain of right knee  M25.561 719.46   3. Pain and swelling of right knee  M25.561 719.46    M25.461 719.06   4. S/P TKR (total knee replacement), right  Z96.651 V43.65         Subjective Questionnaire: LEFS:       Subjective Evaluation    History of Present Illness  Date of surgery: 3-31-25.  Mechanism of injury: Right TKR 3-31-25.  Discharged home. No complications but did have excessive swelling. Home health completed this week.  Saw MD yesterday, staples removed and steri strips applied.  Doppler last week to r/o DVT, normal. PT did massage to help.  Lingering left plantar fascia wears boot at night.  Right foot sore today, I wore more shoes than initially and  then new shoes yesterday when went to MD.  Sore on the top of my foot.  Denies numbness and tingling.  Previous back injury right before surgery, resolved with treatment.  Doing well with therapy so far. No calf pain. Very tender back of right foot sometimes makes it difficult to rest leg flat.    Subjective comment: PLOF: Walking, aquatic exercise classes, gardening, play pickleball.  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  Has not returned yet.   Precautions and Work Restrictions: NonePain  Current pain ratin  Location: general right knee pain,  "right back of heel, top and side foot  Quality: tightness, stiff.  Relieving factors: ice  Aggravating factors: squatting, prolonged positioning, ambulation, stairs, standing and sleeping    Social Support  Lives in: multiple-level home  Lives with: spouse    Diagnostic Tests  X-ray: abnormal (3-31-25 Intact appearing knee arthroplasty hardware is seen with adjacent surgical soft tissue gas, drain. No acute fracture is identified.)    Treatments  Previous treatment: physical therapy and medication  Current treatment: physical therapy  Discharged from (in last 30 days): inpatient hospitalization and home health care  Patient Goals  Patient goals for therapy: return to sport/leisure activities  Patient goal: Relieve pain   Return to work  Improve mobility  Improve strength  Know what to do to help the symptoms  Decrease swelling  Heal wound         Past Medical History:   Diagnosis Date    Allergic     Arrhythmia in 2018    maybe every 4th beat    Arthritis     Diverticulosis     Family history of coronary artery disease     SAW DR THOMPSON 4/2019    Glaucoma     Hip pain     BILATERAL    Hyperglycemia     Hyperlipidemia     Irregular heart beat     OCCASIONAL \"SKIPPED BEAT\"    Knee swelling     Approximately 2014    Plantar fasciitis     Pure hypercholesterolemia     Seizure     WHEN IN COLLEGE,  last 12/2022    Sleep apnea     machine    Squamous cell skin cancer 03/2025    Tear of meniscus of knee     This knee not replaced    Tendinitis of knee     Umbilical hernia     Umbilical hernia     UTI (urinary tract infection)     FINISHED ANTIBIOTIC    Vitamin D deficiency      Past Surgical History:   Procedure Laterality Date    COLONOSCOPY N/A 12/14/2015    Diverticulosis in the entire examined colon, non-bleeding internal hemorrhodis, no specimens collected-Dr. Rosario Flores    COLONOSCOPY N/A 05/12/2021    Procedure: COLONOSCOPY  into cecum with cold/hot snare and cold bx polypectomies;  Surgeon: Rosario Flores MD;  " Location: [facility] ENDOSCOPY;  Service: Gastroenterology;  Laterality: N/A;  pre: screen  post: diverticulosis, polyps, hemorrhoids     COLONOSCOPY N/A 10/17/2023    Procedure: COLONOSCOPY INTO CECUM AND T.I. WITH HOT SNARE POLYPECTOMY;  Surgeon: Rosario Flores MD;  Location: [facility] ENDOSCOPY;  Service: Gastroenterology;  Laterality: N/A;  PRE- HISTORY OF COLON POLYPS  POST- DIVERTICULOSIS, POLYP, HEMORRHOIDS    JOINT REPLACEMENT  2020    LASIK Bilateral     TOTAL ABDOMINAL HYSTERECTOMY WITH SALPINGO OOPHORECTOMY Bilateral 12/16/2004    Dr. Dominick Villarreal    TOTAL HIP ARTHROPLASTY Right 02/05/2020    Procedure: TOTAL HIP ARTHROPLASTY;  Surgeon: Cesar Johnson MD;  Location: [facility] MAIN OR;  Service: Orthopedics;  Laterality: Right;    TOTAL HIP ARTHROPLASTY Left 09/21/2020    Procedure: TOTAL HIP ARTHROPLASTY;  Surgeon: Cesar Johnson MD;  Location: [facility] MAIN OR;  Service: Orthopedics;  Laterality: Left;    TOTAL KNEE ARTHROPLASTY Right 3/31/2025    Procedure: TOTAL KNEE ARTHROPLASTY;  Surgeon: Cesar Johnson MD;  Location: [facility] OR [facility];  Service: Orthopedics;  Laterality: Right;    UMBILICAL HERNIA REPAIR N/A 12/16/2004    Dr. Dominick Villarreal    UMBILICAL HERNIA REPAIR           4-7-25 Doppler  Normal right lower extremity venous duplex scan.     3-31-25 Xray  Intact appearing knee arthroplasty hardware is seen with adjacent  surgical soft tissue gas, drain. No acute fracture is identified.    Objective          Observations     Right Knee   Positive for edema and incision.     Right Ankle/Foot   Positive for edema.     Additional Knee Observation Details  Extensive ecchymosis throughout right leg to foot.  Incision healing well.  Steristrips in place. No exudate noted, no s/s of infection, no erythema present.    Additional Ankle/Foot Observation Details  Area of redness dorsolateral foot possibly from friction of she.  Extensive ecchymosis remains.    Tenderness     Right Ankle/Foot   Tenderness in the  anterior ankle and fifth metatarsal base.     Neurological Testing     Sensation     Knee   Left Knee   Intact: Light touch    Right Knee   Intact: light touch     Ankle/Foot   Left Ankle/Foot   Intact: light touch    Right Ankle/Foot   Intact: light touch     Active Range of Motion   Left Knee   Flexion: 125 degrees   Extension: 6 degrees     Right Knee   Flexion: 90 degrees   Extension: 10 degrees     Right Ankle/Foot   Dorsiflexion (ke): with pain  Plantar flexion: with pain    Additional Active Range of Motion Details  Functional bilaterally without pain.  Mild limitation actively.    Passive Range of Motion     Right Knee   Flexion: 102 degrees   Extension: 8 degrees     Strength/Myotome Testing     Right Hip   Planes of Motion   Flexion: 4-  Extension: 3-  Abduction: 2  Adduction: 2    Left Knee   Flexion: 5  Extension: 5  Quadriceps contraction: good    Right Knee   Flexion: 4-  Extension: 3-  Quadriceps contraction: fair (slight superior glide with quad contraction)    Right Ankle/Foot   Dorsiflexion: 4  Plantar flexion: 4-    Tests     Additional Tests Details  (-) Juan's    Swelling     Left Knee Girth Measurement (cm)   Joint line: 46.0.  10 cm above joint line: 48.8.  10 cm below joint line: 41.2.    Right Knee Girth Measurement (cm)   Joint line: 49.9.  10 cm above joint line: 51.0.  10 cm below joint line: 43.0.    Ambulation   Weight-Bearing Status   Weight-Bearing Status (Right): weight-bearing as tolerated    Assistive device used: rollator walker with seat    Ambulation: Level Surfaces   Ambulation with assistive device: independent    Observational Gait   Decreased walking speed, stride length and right stance time.        Access Code: [email or web address]  URL: [email or web address]  Date: 04/16/2025  Prepared by: Jasmine Salgado    Exercises  - Seated Knee Flexion Slide  - 3 x daily - 7 x weekly - 1 sets - 10 reps - 10 hold  - Supine Single Leg Ankle Pumps  - 3 x daily - 7 x weekly - 1 sets - 30  reps  - Supine Heel Slide with Strap  - 3 x daily - 7 x weekly - 1 sets - 10 reps - 10 hold  - Long Sitting Calf Stretch with Strap  - 3 x daily - 7 x weekly - 1 sets - 3 reps - 30 hold  - Seated Table Hamstring Stretch (Mirrored)  - 3 x daily - 7 x weekly - 1 sets - 3 reps - 30 hold  - Long Sitting Quad Set with Towel Roll Under Heel  - 3 x daily - 7 x weekly - 1 sets - 30 reps - 5 hold  - Long Sitting Quad Set  - 3 x daily - 7 x weekly - 1 sets - 10 reps - 3 hold  - Seated Knee Extension Stretch with Chair  - 3 x daily - 7 x weekly - 1 sets - 1 reps - 3 minutes hold    Patient Education  - TKA Precautions Handout  - Going Up and Down Stairs with Walker  - Bed Transfer with Four Wheel Walker  - Car Transfer with Walker and Surgery Precautions    Patient was educated on findings of evaluation, purpose of treatment, and goals for therapy.  Treatment options discussed and questions answered.  Patient was educated on exercises/self treatment/pain relief/infection/dvt precautions/icing/incisional care, etc.  Icing several times a day with frequent ankle pumps.  Gait training and continued use of assistive device until strength and balance return with increased weightbearing as tolerated.           Assessment & Plan       Assessment  Impairments: abnormal gait, abnormal muscle tone, abnormal or restricted ROM, impaired balance, impaired physical strength, lacks appropriate home exercise program and pain with function   Functional limitations: carrying objects, lifting, sleeping, walking, uncomfortable because of pain, moving in bed and standing   Assessment details:  Patient is a 70 year old female that is 2 weeks s/p right tkr. She was discharged home and received home eduardo.   She presents with PROM of  8-102 degrees.  She has a limited but visible quad contraction.  Strength is limited at hip, knee, and ankle.  Her incision is covered with steristrips without s/s of infection. Her calf is soft and not significantly  tender without sign of DVT.  She has a fair amount of suprapatellar to lower leg and foot edema with diffuse tenderness about the knee, anterior ankle and hindfoot. She ambulated into the clinic WBAT with rolling walker with a mild antalgic gait.   She presents with an evolving clinical presentation.  She has multiple comorbidities including recent severe sciatic, left plantar fasica, right foot pain, and history of bilateral total hips. She has personal factors of living in a multi level home and returning to work full time that may affect care Patient is appropriate for skilled physical therapy in order to restore ROM, strength, normalize gait, reduce pain, increase ease with daily mobility and return to I level of function.   Based on the above findings, the patient is an excellent candidate for therapy.    Prognosis: good    Goals  Plan Goals: STG X 4 weeks. Pt will:  1. Demonstrate improve R knee AROM of 115-5  2. Report pain of </=3/10 with all ADLs without pain meds  3. Ambulate with cane FWB safely without hip or foot pain in a normal pattern  4. Strength at least 4/5 right LE  LTG X 12 weeks. Pt will:  1. Demonstrate improved right LE MMT of >4+/5  2. R knee A/PROM 4/2-120/125 degrees to allow for normal gait  3. Ambulate without AD safely up to 15 minutes to allow for airport and vacation walking this summer.  4. Return to ambulating without AD community distances, driving unrestricted and navigate full flight of stairs.    Plan  Therapy options: will be seen for skilled therapy services  Planned modality interventions: cryotherapy and electrical stimulation/Russian stimulation  Planned therapy interventions: manual therapy, neuromuscular re-education, balance/weight-bearing training, ADL retraining, flexibility, functional ROM exercises, gait training, home exercise program, transfer training, therapeutic activities, stretching and strengthening  Frequency: 2x week  Duration in weeks: 12  Treatment plan  "discussed with: patient        History # of Personal Factors and/or Comorbidities: HIGH (3+)  Examination of Body System(s): # of elements: LOW (1-2)  Clinical Presentation: EVOLVING  Clinical Decision Making: MODERATE      Timed:         Manual Therapy:    8     mins  18293;     Therapeutic Exercise:    15     mins  16886;     Neuromuscular Fide:    -    mins  53660;    Therapeutic Activity:     -     mins  34871;     Gait Training:      -     mins  01260;     Ultrasound:     -     mins  75029;    Ionto                               -    mins   55817  Self Care                       10    mins   80802  Orthotic fit/train              -   mins  42258  Self Pay 15  -       mins   PTSPMIN1  Self Pay 30  -      mins  PTSPMIN2   Dry Needling Tri __-__ DNTRIAL    Un-Timed:  Electrical Stimulation:    -     mins  92909 (MC );  Dry Needling     -     mins self-pay  Traction     -     mins 70280  Low Eval     -     Mins  06881  Mod Eval     30     Mins  46312  High Eval                       -     Mins  60156        Timed Treatment:   33   mins   Total Treatment:     65   mins          [facility]     License Number: [id number]  Electronically signed by Jasmine Salgado PT, 04/16/25, 1:59 PM EDT    Certification Period: 4/16/2025 thru 7/14/2025  I certify that the therapy services are furnished while this patient is under my care.  The services outlined above are required by this patient, and will be reviewed every 90 days.         Physician Signature:__________________________________________________    PHYSICIAN: Alex Guerrero MD      DATE:     Please sign and return via fax to [phone]. Thank you, [facility]. \"Parts of this note may be an electronic transcription/translation of spoken language to printed text using the Dragon dictation system  "

## 2025-04-15 ENCOUNTER — OFFICE VISIT (OUTPATIENT)
Dept: ORTHOPEDIC SURGERY | Facility: CLINIC | Age: 70
End: 2025-04-15
Payer: COMMERCIAL

## 2025-04-15 VITALS — TEMPERATURE: 96.8 F | HEIGHT: 67 IN | WEIGHT: 215 LBS | BODY MASS INDEX: 33.74 KG/M2

## 2025-04-15 DIAGNOSIS — Z96.651 STATUS POST RIGHT KNEE REPLACEMENT: Primary | ICD-10-CM

## 2025-04-15 PROCEDURE — 99024 POSTOP FOLLOW-UP VISIT: CPT | Performed by: ORTHOPAEDIC SURGERY

## 2025-04-15 NOTE — PROGRESS NOTES
Liza Aaron : 1955 MRN: [id number] DATE: 4/15/2025    DIAGNOSIS: 2 week follow up right total knee      SUBJECTIVE:Patient returns today for 2 week follow up of right total knee replacement. Patient reports doing well with no unusual complaints. Appears to be progressing appropriately.    OBJECTIVE:   Exam:. The incision is healing appropriately. No sign of infection. Range of motion is progressing as expected. The calf is soft and nontender with a negative Homans sign.    ASSESSMENT: 2 week status post right knee replacement.    PLAN: 1) Staples removed and steri strips applied   2) Order given for PT   3) Discontinue SANTIAGO hose   4) Continue ice PRN   5) aspirin 81 mg orally every day for 1 month   6) Follow up in 6 weeks with repeat Xrays of right knee (3views)    Cesar Johnson MD  4/15/2025

## 2025-04-16 ENCOUNTER — TREATMENT (OUTPATIENT)
Age: 70
End: 2025-04-16
Payer: COMMERCIAL

## 2025-04-16 DIAGNOSIS — Z96.651 S/P TKR (TOTAL KNEE REPLACEMENT), RIGHT: ICD-10-CM

## 2025-04-16 DIAGNOSIS — M25.561 ACUTE PAIN OF RIGHT KNEE: ICD-10-CM

## 2025-04-16 DIAGNOSIS — M25.561 PAIN AND SWELLING OF RIGHT KNEE: ICD-10-CM

## 2025-04-16 DIAGNOSIS — M17.11 PRIMARY OSTEOARTHRITIS OF RIGHT KNEE: Primary | ICD-10-CM

## 2025-04-16 DIAGNOSIS — M25.461 PAIN AND SWELLING OF RIGHT KNEE: ICD-10-CM

## 2025-04-16 RX ORDER — ROSUVASTATIN CALCIUM 10 MG/1
10 TABLET, COATED ORAL NIGHTLY
Qty: 90 TABLET | Refills: 1 | Status: SHIPPED | OUTPATIENT
Start: 2025-04-16

## 2025-04-18 ENCOUNTER — TREATMENT (OUTPATIENT)
Age: 70
End: 2025-04-18
Payer: COMMERCIAL

## 2025-04-18 DIAGNOSIS — M25.461 PAIN AND SWELLING OF RIGHT KNEE: ICD-10-CM

## 2025-04-18 DIAGNOSIS — Z96.651 S/P TKR (TOTAL KNEE REPLACEMENT), RIGHT: ICD-10-CM

## 2025-04-18 DIAGNOSIS — M25.561 PAIN AND SWELLING OF RIGHT KNEE: ICD-10-CM

## 2025-04-18 DIAGNOSIS — M25.561 ACUTE PAIN OF RIGHT KNEE: ICD-10-CM

## 2025-04-18 DIAGNOSIS — M17.11 PRIMARY OSTEOARTHRITIS OF RIGHT KNEE: Primary | ICD-10-CM

## 2025-04-18 NOTE — PROGRESS NOTES
"   Physical Therapy Daily Progress Note      Patient: Liza Aaron   : 1955  Referring practitioner: Minnie Whaley MD  Date of Initial Visit: Type: THERAPY  Noted: 2025  Diagnosis/ICD-10 Code:  Primary osteoarthritis of right knee [M17.11]  Today's Date: 2025  Patient seen for 2 sessions         Liza Aaron reports: did exercises and ROM bike.  I forgot my pain pill today.        Subjective       Objective   See Exercise, Manual, and Modality Logs for complete treatment.   Gait training: walker, stairs  Moderate assist with heelslides, min with hip add/abduction.      Assessment/Plan  Patient presents with improving PROM.  Discomfort with prolonged extension but she did not have her pain medication today.  Ambulating well with cane and min A.  No balance loss but increased antalgia. Safe at home to use for short distances when there is furniture to hold.  She should continue walker outside of home  Less need for assistance with AAROM exercises.       Progress per Plan of Care  Assess new exercises and Nustep           Timed:  Manual Therapy:    8     mins  88976;     Therapeutic Exercise:    10     mins  20983;     Neuromuscular Fide:    12    mins  71995;    Therapeutic Activity:     -     mins  83015;     Gait Training:      10  mins  11380;     Ultrasound:     -     mins  79286;    Ionto                               -    mins   72329  Self Care                       -     mins   78185  Group Therapy            _____  Self Pay 1-29  -       mins   PTSPMIN2  Self Pay 30+  -      mins   PTSPVT   Dry Needling Tri __-__ DNTRIAL      Un-Timed:  Electrical Stimulation:    -     mins  38598 (MC );  Dry Needling     -     mins self-pay  Traction     -     mins 94105  Re-Eval                           -    mins  30134    Timed Treatment:   40   mins   Total Treatment:     55   mins  Jasmine Salgado PT, [facility]  Physical Therapist                  \"Parts of this note may be an electronic " transcription/translation of spoken language to printed text using the Dragon dictation system

## 2025-04-21 ENCOUNTER — PATIENT OUTREACH (OUTPATIENT)
Dept: CASE MANAGEMENT | Facility: OTHER | Age: 70
End: 2025-04-21
Payer: COMMERCIAL

## 2025-04-21 NOTE — OUTREACH NOTE
AMBULATORY CASE MANAGEMENT NOTE    Names and Relationships of Patient/Support Persons: Contact: Aaron Liza PIA; Relationship: Self -     Late entry. Text message exchange with patient on 4/18/25 regarding knee replacement recovery.  Patient had follow up with Dr. Johnson, Orthopedic surgeon on 4/16/25.  She reports she is doing well. She continues, exercising, icing, elevating and going to PT. Encouraged to reach out to ECM as needed.    Education Documentation  No documentation found.        JANES PALACIO  Ambulatory Case Management    4/21/2025, 11:30 EDT

## 2025-04-22 ENCOUNTER — TREATMENT (OUTPATIENT)
Age: 70
End: 2025-04-22
Payer: COMMERCIAL

## 2025-04-22 DIAGNOSIS — Z96.651 S/P TKR (TOTAL KNEE REPLACEMENT), RIGHT: ICD-10-CM

## 2025-04-22 DIAGNOSIS — M25.461 PAIN AND SWELLING OF RIGHT KNEE: ICD-10-CM

## 2025-04-22 DIAGNOSIS — M25.561 PAIN AND SWELLING OF RIGHT KNEE: ICD-10-CM

## 2025-04-22 DIAGNOSIS — M17.11 PRIMARY OSTEOARTHRITIS OF RIGHT KNEE: Primary | ICD-10-CM

## 2025-04-22 DIAGNOSIS — M54.50 ACUTE LEFT-SIDED LOW BACK PAIN, UNSPECIFIED WHETHER SCIATICA PRESENT: ICD-10-CM

## 2025-04-22 DIAGNOSIS — M25.561 ACUTE PAIN OF RIGHT KNEE: ICD-10-CM

## 2025-04-22 NOTE — PROGRESS NOTES
[facility] [place]   [address]  [place] [address]  Phone [phone]  Fax [phone]      Physical Therapy Daily Progress Note      Patient: Liza Aaron   : 1955  Referring practitioner: Minnie Whaley MD  Date of Initial Visit: Type: THERAPY  Noted: 2025  Diagnosis/ICD-10 Code:  Primary osteoarthritis of right knee [M17.11]  Today's Date: 2025  Patient seen for 3 sessions         Liza Aaron reports: more sore after up on it this weekend at [facility].  Noticed new bruising along toes. I'm having a lot of trouble with the seated knee stretch (extension).       Subjective       Objective   See Exercise, Manual, and Modality Logs for complete treatment.    Purple ecchymosis dorsum toes at IP joint.  No tenderness or pain with PROM/AROM of toes.  Shown alternate ways of prolonged extension: prone or supine knee prop.  Needs assist for safety to get in/out prone but well tolerated.  Decreased patellar mobility R in all planes.   Incisions healing well.  No exudate noted, no s/s of infection, no erythema present.      Assessment/Plan  Patient presents with improving gait with cane with CGA only.  Improved stairs in nonreciprocal pattern. Increased distance and standing tolerance.  Less edema noted.  Definite acute ecchymosis dorsum of toes but no pain with ROM or pressure, likely gravity related as there is no pain and sensation is intact.  Continue to monitor.  Stretching still most difficult exercises for patient and we added alternative positions for comfort but she does need some standby assistance in/out bed.       Progress per Plan of Care           Timed:  Manual Therapy:    10     mins  80367;     Therapeutic Exercise:    15     mins  40055;     Neuromuscular Fide:    -    mins  84075;    Therapeutic Activity:     12     mins  62649;     Gait Training:      15     mins  66747;     Ultrasound:     -     mins  22300;    Ionto               "                 -    mins   54521  Self Care                       -     mins   37447  Group Therapy            _____  Self Pay 1-29  -       mins   PTSPMIN2  Self Pay 30+  -      mins   PTSPVT   Dry Needling Tri __-__ DNTRIAL      Un-Timed:  Electrical Stimulation:    -     mins  51551 ( );  Dry Needling     -     mins self-pay  Traction     -     mins 03071  Re-Eval                           -    mins  24970    Timed Treatment:   62   mins   Total Treatment:     80   mins  Jasmine Salgado, PT, [facility]  Physical Therapist                  \"Parts of this note may be an electronic transcription/translation of spoken language to printed text using the Dragon dictation system  "

## 2025-04-23 ENCOUNTER — PATIENT OUTREACH (OUTPATIENT)
Dept: CASE MANAGEMENT | Facility: OTHER | Age: 70
End: 2025-04-23
Payer: COMMERCIAL

## 2025-04-23 ENCOUNTER — TELEPHONE (OUTPATIENT)
Dept: ORTHOPEDIC SURGERY | Facility: CLINIC | Age: 70
End: 2025-04-23
Payer: COMMERCIAL

## 2025-04-23 NOTE — OUTREACH NOTE
AMBULATORY CASE MANAGEMENT NOTE    Names and Relationships of Patient/Support Persons: Contact: Liza Aaron; Relationship: Self -     Patient Outreach    Text exchange with patient.  Patient requests to have an extension on the [employer] device. Patient reports she thinks it is helpful.  I contacted Nesha Braxton, Nurse Educator [phone] from Dr. Johnson's office to share patient's request.  Nesha will contact [employer] to see if this is a possibility for an extension.      Education Documentation  No documentation found.        JANES PALACIO  Ambulatory Case Management    4/23/2025, 14:12 EDT

## 2025-04-23 NOTE — TELEPHONE ENCOUNTER
Received a call from the  for this patient she is requesting to get an extension for her ROM tech for more than 30 days.  Have advised them that I would send information to ROM tech to see if we can get it approved however Dr. Johnson would not recommend that she use it more than 6 weeks total will have [employer] get in touch with them regarding the approval

## 2025-04-24 ENCOUNTER — TREATMENT (OUTPATIENT)
Age: 70
End: 2025-04-24
Payer: COMMERCIAL

## 2025-04-24 DIAGNOSIS — M17.11 PRIMARY OSTEOARTHRITIS OF RIGHT KNEE: Primary | ICD-10-CM

## 2025-04-24 DIAGNOSIS — M25.561 PAIN AND SWELLING OF RIGHT KNEE: ICD-10-CM

## 2025-04-24 DIAGNOSIS — M25.561 ACUTE PAIN OF RIGHT KNEE: ICD-10-CM

## 2025-04-24 DIAGNOSIS — Z96.651 S/P TKR (TOTAL KNEE REPLACEMENT), RIGHT: ICD-10-CM

## 2025-04-24 DIAGNOSIS — M25.461 PAIN AND SWELLING OF RIGHT KNEE: ICD-10-CM

## 2025-04-24 PROCEDURE — 97110 THERAPEUTIC EXERCISES: CPT | Performed by: PHYSICAL THERAPIST

## 2025-04-24 PROCEDURE — 97530 THERAPEUTIC ACTIVITIES: CPT | Performed by: PHYSICAL THERAPIST

## 2025-04-24 PROCEDURE — 97140 MANUAL THERAPY 1/> REGIONS: CPT | Performed by: PHYSICAL THERAPIST

## 2025-04-24 PROCEDURE — 97535 SELF CARE MNGMENT TRAINING: CPT | Performed by: PHYSICAL THERAPIST

## 2025-04-24 NOTE — PROGRESS NOTES
Physical Therapy Daily Treatment Note    [facility]  [address]  [address]  [place] [address]     Patient: Liza Aaron   : 1955  Referring practitioner: Minnie Whaley MD  Date of Initial Visit: Type: THERAPY  Noted: 2025  Today's Date: 2025  Patient seen for 4 sessions         Liza Aaron reports: toes not as bothersome as previous session.          Subjective     Objective   Ambulates in/out of clinic with straight cane and noted right LE antalgia with decreased active R hip/knee flexion and decreased R unilateral stance time   AROM R knee 94 deg flexion; AAROM R knee  flex 97 deg  AROM R knee extension - 8 deg;  AAROM R knee ext - 6 deg    - healing incision with steri strips R knee without signs/symptoms of infection.  Able to remove 4 steri strips revealing closed incision R knee    See Exercise, Manual, and Modality Logs for complete treatment.   Exercise rationale/ pain free exercise performance  Anatomy and structure of affected musculature  Ice application to affected area - frequent/often  Sleeping positions with pillows  Alternate exercise positions - seated AROM and stretches  Verbal/Tactile cues to ensure correct exercise performance/technique          Assessment/Plan  Compliant/cooperative with rehab efforts to date.  Subjectively, reports performing exercises for right knee and that right foot/toe tenderness/swelling seems better.  Objectively, she ambulates with assistive device and noted R LE antalgia with decreased TKE and unilateral stance time in single leg phase of gait.  A/AAROM R knee flex/ext improved vs initial measurements but still deficits and limitations present.  Progressing with exercises for R knee mobility, flexibility and strengthening without increased symptoms, mild discomfort and early isolated mm fatigue noted.  Positive response to manual interventions and ice application R LE this session.        Progress per Plan of  Care and Progress strengthening /stabilization /functional activity           Timed:  Manual Therapy:   15      mins  72566;  Therapeutic Exercise:     20    mins  11196;     Neuromuscular Fide:        mins  83993;    Therapeutic Activity:      10    mins  91715;     Gait Training:           mins  64098;     Ultrasound:          mins  02280;    Self Care                    _12__      mins 07492    Untimed:  Electrical Stimulation:         mins  63193 ( );  Mechanical Traction:         mins  76978;     Timed Treatment:  57    mins   Total Treatment:     65   mins  Osiel Burns PTA  Physical Therapist  Assistant  [id number]

## 2025-04-28 ENCOUNTER — TELEPHONE (OUTPATIENT)
Dept: ORTHOPEDIC SURGERY | Facility: HOSPITAL | Age: 70
End: 2025-04-28
Payer: COMMERCIAL

## 2025-04-28 DIAGNOSIS — Z96.651 S/P TOTAL KNEE REPLACEMENT, RIGHT: ICD-10-CM

## 2025-04-28 RX ORDER — HYDROCODONE BITARTRATE AND ACETAMINOPHEN 7.5; 325 MG/1; MG/1
1 TABLET ORAL EVERY 4 HOURS PRN
Qty: 40 TABLET | Refills: 0 | Status: SHIPPED | OUTPATIENT
Start: 2025-04-28

## 2025-04-28 NOTE — TELEPHONE ENCOUNTER
Called and spoke with Ms. Aaron to see how she has been doing since her RTK 3/31. She said she is doing a lot better. The first week was rough. She is still working with PT and doing well. She is using the [employer] bike and had asked for an extension on it as it has really seemed to help. She hasn't heard anything back on that yet. She is cutting way back on the pain medication taking 1.5 a day, but feels she will need a refill. All in all things are getting back on track and she is doing well. Ms. Aaron doesn't have any other questions for me at this time. She has my contact information should she need anything.       She is requesting a refill on her pain medication  Medication: Norco 7.5/325  Last Fill 4/9/25  Surgery: 3/31  Next Appt: 5/27  Pharmacy: [facility]  [phone]

## 2025-04-29 ENCOUNTER — TREATMENT (OUTPATIENT)
Age: 70
End: 2025-04-29
Payer: COMMERCIAL

## 2025-04-29 DIAGNOSIS — Z96.651 S/P TKR (TOTAL KNEE REPLACEMENT), RIGHT: ICD-10-CM

## 2025-04-29 DIAGNOSIS — M17.11 PRIMARY OSTEOARTHRITIS OF RIGHT KNEE: Primary | ICD-10-CM

## 2025-04-29 DIAGNOSIS — M25.461 PAIN AND SWELLING OF RIGHT KNEE: ICD-10-CM

## 2025-04-29 DIAGNOSIS — M25.561 PAIN AND SWELLING OF RIGHT KNEE: ICD-10-CM

## 2025-04-29 DIAGNOSIS — M25.561 ACUTE PAIN OF RIGHT KNEE: ICD-10-CM

## 2025-04-29 NOTE — PROGRESS NOTES
[facility] Physical Therapy [place]   [address]  [place] [address]  Phone [phone]  Fax [phone]      Physical Therapy Daily Progress Note      Patient: Liza Aaron   : 1955  Referring practitioner: Minnie Whaley MD  Date of Initial Visit: Type: THERAPY  Noted: 2025  Diagnosis/ICD-10 Code:  Primary osteoarthritis of right knee [M17.11]  Today's Date: 2025  Patient seen for 5 sessions         Liza Aaron reports: easier with walking and I've been walking inside more.  Improved stability.       Subjective       Objective   See Exercise, Manual, and Modality Logs for complete treatment.    Seated knee flexion 106 degrees.  Incisions closed, 4 steristrips remain, no s/s of exudate or infection.  SLR with minimal lag, hip abduction and adduction 3/5     Assessment/Plan  Patient presents with improving quad strength demonstrated in single leg stance without buckling, ability to perform SLR with minimal lag, and progressed to 3/5 strength with hip abductors and adductors.  Less balance difficulty with use of cane and minimal antalgia however she needs cueing for knee extension on heel strike.  Continues with difficulty with ROM exercises.Updated HEP to reflect strength improvements.       Progress strengthening /stabilization /functional activity           Timed:  Manual Therapy:    8     mins  58459;     Therapeutic Exercise:    15     mins  84924;     Neuromuscular Fide:    9    mins  15473;    Therapeutic Activity:     -     mins  30550;     Gait Trainin     mins  60636;     Ultrasound:     -     mins  60207;    Ionto                               -    mins   43060  Self Care                       -     mins   93770  Group Therapy            _____  Self Pay 1-29  -       mins   PTSPMIN2  Self Pay 30+  -      mins   PTSPVT   Dry Needling Tri __-__ DNTRIAL      Un-Timed:  Electrical Stimulation:    -     mins  89267 ( );  Dry Needling     " -     mins self-pay  Traction     -     mins 08421  Re-Eval                           -    mins  54089    Timed Treatment:   40   mins   Total Treatment:     60   mins  Jasmine Salgado, PT, [facility]  Physical Therapist                  \"Parts of this note may be an electronic transcription/translation of spoken language to printed text using the Dragon dictation system  "

## 2025-05-02 ENCOUNTER — PATIENT OUTREACH (OUTPATIENT)
Dept: CASE MANAGEMENT | Facility: OTHER | Age: 70
End: 2025-05-02
Payer: COMMERCIAL

## 2025-05-02 ENCOUNTER — TREATMENT (OUTPATIENT)
Age: 70
End: 2025-05-02
Payer: COMMERCIAL

## 2025-05-02 DIAGNOSIS — M17.11 PRIMARY OSTEOARTHRITIS OF RIGHT KNEE: Primary | ICD-10-CM

## 2025-05-02 DIAGNOSIS — M25.461 PAIN AND SWELLING OF RIGHT KNEE: ICD-10-CM

## 2025-05-02 DIAGNOSIS — M25.561 PAIN AND SWELLING OF RIGHT KNEE: ICD-10-CM

## 2025-05-02 DIAGNOSIS — M25.561 ACUTE PAIN OF RIGHT KNEE: ICD-10-CM

## 2025-05-02 DIAGNOSIS — M54.50 ACUTE LEFT-SIDED LOW BACK PAIN, UNSPECIFIED WHETHER SCIATICA PRESENT: ICD-10-CM

## 2025-05-02 NOTE — OUTREACH NOTE
Patient Outreach    Care Coordination    Late Entry from 4/24/25 Email sent to  [employer] regarding extended coverage for [employer]. Follow up with patient with updates.

## 2025-05-02 NOTE — PROGRESS NOTES
[facility] Physical Therapy [place]   [address]  [place] [address]  Phone [phone]  Fax [phone]      Physical Therapy Daily Progress Note      Patient: Liza Aaron   : 1955  Referring practitioner: Minnie Whaley MD  Date of Initial Visit: Type: THERAPY  Noted: 2025  Diagnosis/ICD-10 Code:  Primary osteoarthritis of right knee [M17.11]  Today's Date: 2025  Patient seen for 6 sessions         Liza Aaron reports: better today.  Difficulty pushing as far with the knee bends. Did stairs at home and felt more comfortable.       Subjective       Objective   See Exercise, Manual, and Modality Logs for complete treatment.    R heelslide 95/100  Improved edema.  No exudate.      Assessment/Plan  Patient presents with improving tolerance for flexion.  Added multiple exercises to assist with stretching including knee to chest, rolling ball, hamstring curls, stationary bike as she has more restriction in this direction.  Incision continues to heal well with some hypersensitivity around scar.  Improving step up to 6 inches but continue with nonreciprocal for safety at home.       Progress strengthening /stabilization /functional activity           Timed:  Manual Therapy:    8     mins  17996;     Therapeutic Exercise:    15     mins  54588;     Neuromuscular Fide:    8   mins  47268;    Therapeutic Activity:     5     mins  23585;     Gait Trainin     mins  38133;     Ultrasound:     -     mins  82717;    Ionto                               -    mins   69647  Self Care                       -     mins   51205  Group Therapy            _____  Self Pay 1-29  -       mins   PTSPMIN2  Self Pay 30+  -      mins   PTSPVT   Dry Needling Tri __-__ DNTRIAL      Un-Timed:  Electrical Stimulation:    -     mins  70122 ( );  Dry Needling     -     mins self-pay  Traction     -     mins 25421  Re-Eval                           -    mins  02796    Timed  "Treatment:   44   mins   Total Treatment:     60   mins  Jasmine Salgado, PT, [facility]  Physical Therapist                  \"Parts of this note may be an electronic transcription/translation of spoken language to printed text using the Dragon dictation system  "

## 2025-05-02 NOTE — OUTREACH NOTE
Care Coordination    Late entry from 4/28/25  Email sent to  Benefits informed extension for Blake tavera 'd by Dr. Johnson's office.  Requesting if extension will be covered by health plan.

## 2025-05-02 NOTE — OUTREACH NOTE
Care Coordination  Late entry from 5/1/25. Message sent to Dr. Johnson informing him patient would like 2 week extension on [employer] equipment and order is required. Kena with Dr. Johnson's office has outreached to [employer] and waiting for response . Order placed. Telephone from [employer] and I confirmed insurance questions should be discussed with [employer]. Informed patient that [employer] is reaching out to [employer].  Emailed HR that patient concerned of timeliness of decision of benefits. Communicated with patient that I had relayed her concerns with HR of timeliness of matter.Relayed message from HR to patient that HR is working on this and will get back with update as soon as they hear from [employer].  Deferred benefit decisions to HR. Patient sent text and informed that [employer] has been reactivated for 15 days. Thanked patient for letting me know.

## 2025-05-06 ENCOUNTER — TREATMENT (OUTPATIENT)
Age: 70
End: 2025-05-06
Payer: COMMERCIAL

## 2025-05-06 DIAGNOSIS — M25.561 PAIN AND SWELLING OF RIGHT KNEE: ICD-10-CM

## 2025-05-06 DIAGNOSIS — M25.561 ACUTE PAIN OF RIGHT KNEE: ICD-10-CM

## 2025-05-06 DIAGNOSIS — M25.461 PAIN AND SWELLING OF RIGHT KNEE: ICD-10-CM

## 2025-05-06 DIAGNOSIS — M17.11 PRIMARY OSTEOARTHRITIS OF RIGHT KNEE: Primary | ICD-10-CM

## 2025-05-06 DIAGNOSIS — Z96.651 S/P TKR (TOTAL KNEE REPLACEMENT), RIGHT: ICD-10-CM

## 2025-05-06 NOTE — PROGRESS NOTES
[facility] Physical Therapy [place]   [address]  [place] [address]  Phone [phone]  Fax [phone]      Physical Therapy Daily Progress Note      Patient: Liza Aaron   : 1955  Referring practitioner: Minnie Whaley MD  Date of Initial Visit: Type: THERAPY  Noted: 2025  Diagnosis/ICD-10 Code:  Primary osteoarthritis of right knee [M17.11]  Today's Date: 2025  Patient seen for 6 sessions         Liza Aaron reports: knee did well over the weekend.  Out some for [place] but not too long to bother her knee. I'm having some trouble with the hip lifting exercise.       Subjective       Objective   See Exercise, Manual, and Modality Logs for complete treatment.   Modified right side-lying hip ABduction and supine seated knee extension stretch      Assessment/Plan  Patient presents with improving tolerance for knee flexion with alternative positions.  She is progressing well with tolerating extension stretching and we discussed alternative ways in the recliner or with weighted ice packs to increase the stretch.  Still no longer than about 5 minutes to avoid significant soreness.    Progress per Plan of Care           Timed:  Manual Therapy:    8     mins  55013;     Therapeutic Exercise:    15     mins  44748;     Neuromuscular Fide:    12   mins  78211;    Therapeutic Activity:     -     mins  17457;     Gait Training:      15     mins  89700;     Ultrasound:     -     mins  45818;    Ionto                               -    mins   10224  Self Care                       -     mins   85442  Group Therapy            _____  Self Pay 1-29  -       mins   PTSPMIN2  Self Pay 30+  -      mins   PTSPVT   Dry Needling Tri __-__ DNTRIAL      Un-Timed:  Electrical Stimulation:    -     mins  23797 ( );  Dry Needling     -     mins self-pay  Traction     -     mins 23282  Re-Eval                           -    mins  17575    Timed Treatment:   50   mins   Total  "Treatment:     70   mins  Jasmine Salgado, PT, [facility]  Physical Therapist                  \"Parts of this note may be an electronic transcription/translation of spoken language to printed text using the Dragon dictation system  "

## 2025-05-08 ENCOUNTER — TREATMENT (OUTPATIENT)
Age: 70
End: 2025-05-08
Payer: COMMERCIAL

## 2025-05-08 DIAGNOSIS — M25.561 PAIN AND SWELLING OF RIGHT KNEE: ICD-10-CM

## 2025-05-08 DIAGNOSIS — M17.11 PRIMARY OSTEOARTHRITIS OF RIGHT KNEE: Primary | ICD-10-CM

## 2025-05-08 DIAGNOSIS — M25.461 PAIN AND SWELLING OF RIGHT KNEE: ICD-10-CM

## 2025-05-08 DIAGNOSIS — M25.561 ACUTE PAIN OF RIGHT KNEE: ICD-10-CM

## 2025-05-08 DIAGNOSIS — Z96.651 S/P TKR (TOTAL KNEE REPLACEMENT), RIGHT: ICD-10-CM

## 2025-05-08 DIAGNOSIS — M54.50 ACUTE LEFT-SIDED LOW BACK PAIN, UNSPECIFIED WHETHER SCIATICA PRESENT: ICD-10-CM

## 2025-05-08 NOTE — PROGRESS NOTES
Physical Therapy Daily Treatment Note    Patient: Liza Aaron  : 1955  Diagnosis/ICD-10 Code:  Primary osteoarthritis of right knee [M17.11]  Referring practitioner: Minnie Whaley MD  Today's Date: 2025    VISIT#: 7      Subjective   Liza Aaron reports: Doing well, feels like the knee is getting better, still stiff.       Objective     See Exercise, Manual, and Modality Logs for complete treatment.     Patient Education: continue HEP.     Assessment/Plan  Good response to session, continued manual therapy due to joint stiffness and limited ROM. Following by ROM exercises and strengthening. Demonstrated good form with step up 6 inch step, but not yet ready to progress to 8 inch step. Improving stability and less reliance on SPC over short distances.     Progress per Plan of Care            Timed:         Manual Therapy:    8     mins  15908;     Therapeutic Exercise:     24    mins  57100;     Neuromuscular Fide:        mins  77019;    Therapeutic Activity:     8     mins  63957;     Gait Training:           mins  08980;     Ultrasound:          mins  21614;    Ionto:                                   mins   00149  Self Care:                            mins   17603    Un-Timed:  Electrical Stimulation:         mins  94507 ( );  Dry Needling          mins self-pay  Traction          mins 34526  Re-Eval                               mins  30570    Timed Treatment:   40   mins   Total Treatment:     40   mins    Elizabeth Bailey, PT  Physical Therapist  [place] PT license #: [id number]  [place] PT license #: [id number]

## 2025-05-13 ENCOUNTER — TREATMENT (OUTPATIENT)
Age: 70
End: 2025-05-13
Payer: COMMERCIAL

## 2025-05-13 DIAGNOSIS — M25.561 ACUTE PAIN OF RIGHT KNEE: ICD-10-CM

## 2025-05-13 DIAGNOSIS — M25.461 PAIN AND SWELLING OF RIGHT KNEE: ICD-10-CM

## 2025-05-13 DIAGNOSIS — M25.561 PAIN AND SWELLING OF RIGHT KNEE: ICD-10-CM

## 2025-05-13 DIAGNOSIS — M54.50 ACUTE LEFT-SIDED LOW BACK PAIN, UNSPECIFIED WHETHER SCIATICA PRESENT: ICD-10-CM

## 2025-05-13 DIAGNOSIS — M17.11 PRIMARY OSTEOARTHRITIS OF RIGHT KNEE: Primary | ICD-10-CM

## 2025-05-13 DIAGNOSIS — Z96.651 S/P TKR (TOTAL KNEE REPLACEMENT), RIGHT: ICD-10-CM

## 2025-05-13 NOTE — PROGRESS NOTES
[facility] Physical Therapy [place]   [address]  [place] [address]  Phone [phone]  Fax [phone]      Physical Therapy Daily Progress Note      Patient: Liza Aaron   : 1955  Referring practitioner: Minnie Whaley MD  Date of Initial Visit: Type: THERAPY  Noted: 2025  Diagnosis/ICD-10 Code:  Primary osteoarthritis of right knee [M17.11]  Today's Date: 2025  Patient seen for 8 sessions         Liza Aaron reports: doing better, less need for walker.  Feel like I am ready to try driving.       Subjective       Objective   See Exercise, Manual, and Modality Logs for complete treatment.    Functional exercise and assessment: stand to sit and turn simulate in/out car, SLB 30 seconds on right, R knee single leg squat with no pain but limited to ~2 inches.  Leg press single leg for simulation of brakinlbs.      Assessment/Plan  Patient presents with improving right LE strength and stability.  Able to perform single leg balance, easily press 20lbs, and perform small range single leg squat in simulation of returning to braking/driving with right LE.  Patient demonstrates functional strength to complete activity.  Continues to progress with gait improvements with heel strike and improved hip flexion to clear the floor without lateral hip and knee movement.  Flexion still most limited range.        Progress per Plan of Care Emphasis on continued knee flexion frequently for ROM.  Scar and patellar mobilization for home to assist with fleixon.           Timed:  Manual Therapy:    8     mins  94566;     Therapeutic Exercise:    12     mins  68154;     Neuromuscular Fide:    5    mins  34373;    Therapeutic Activity:     10     mins  73955;     Gait Training:      10     mins  61200;     Ultrasound:     -     mins  89674;    Ionto                               -    mins   68595  Self Care                       -     mins   80950  Group Therapy             "_____  Self Pay 1-29  -       mins   PTSPMIN2  Self Pay 30+  -      mins   PTSPVT   Dry Needling Tri __-__ DNTRIAL      Un-Timed:  Electrical Stimulation:    -     mins  95681 ( );  Dry Needling     -     mins self-pay  Traction     -     mins 88668  Re-Eval                           -    mins  82700    Timed Treatment:   45   mins   Total Treatment:     60   mins  Jasmine Salgado, PT, [facility]  Physical Therapist                  \"Parts of this note may be an electronic transcription/translation of spoken language to printed text using the Dragon dictation system  "

## 2025-05-15 ENCOUNTER — OFFICE VISIT (OUTPATIENT)
Dept: SLEEP MEDICINE | Facility: HOSPITAL | Age: 70
End: 2025-05-15
Payer: COMMERCIAL

## 2025-05-15 VITALS
DIASTOLIC BLOOD PRESSURE: 70 MMHG | HEIGHT: 67 IN | HEART RATE: 70 BPM | BODY MASS INDEX: 32.33 KG/M2 | SYSTOLIC BLOOD PRESSURE: 111 MMHG | WEIGHT: 206 LBS | OXYGEN SATURATION: 96 %

## 2025-05-15 DIAGNOSIS — G47.33 OSA ON CPAP: Primary | ICD-10-CM

## 2025-05-15 DIAGNOSIS — R56.9 GENERALIZED CONVULSIVE SEIZURES: ICD-10-CM

## 2025-05-15 DIAGNOSIS — E66.9 OBESITY (BMI 30-39.9): ICD-10-CM

## 2025-05-15 PROCEDURE — G0463 HOSPITAL OUTPT CLINIC VISIT: HCPCS

## 2025-05-15 RX ORDER — ASPIRIN 81 MG/1
TABLET ORAL
Qty: 60 TABLET | Refills: 0 | Status: CANCELLED | OUTPATIENT
Start: 2025-05-15

## 2025-05-15 NOTE — PROGRESS NOTES
"  [facility]  Sleep medicine  [address]  [address]  [place] [address]  Phone   Fax       SLEEP CLINIC FOLLOW UP PROGRESS NOTE.    Liza Aaron  [id number]   1955  70 y.o.  female      PCP: Minnie Whaley MD      Date of visit: 5/15/2025    Chief Complaint   Patient presents with    Sleep Apnea    Obesity       HPI:  This is a 70 y.o. years old patient is here for the management of obstructive sleep apnea.  Sleep apnea is mild in severity with a AHI of 7.4/hr. Patient is using positive airway pressure therapy with auto CPAP and the symptoms of sleep apnea have improved significantly on the therapy. Normally patient goes to bed at 11 PM and wakes up at 7 AM .  The patient wakes up 3 time(s) during the night and has no problem going back to sleep.  Feels refreshed after waking up.  She also has a history of seizures on Keppra.  She had a knee replacement recently and recovering nicely.  She works at [employer] and she is a     Medications and allergies are reviewed by me and documented in the encounter.     SOCIAL (habits pertaining to sleep medicine)  History tobacco use:No   History of alcohol use: 2 per week  Caffeine use: 2     REVIEW OF SYSTEMS:   Pertaining positive symptoms are:  [place] Sleepiness Scale :Total score: 8   Cough  Dry mouth      PHYSICAL EXAMINATION:  CONSTITUTIONAL:  Vitals:    05/15/25 0834   BP: 111/70   Pulse: 70   SpO2: 96%   Weight: 93.4 kg (206 lb)   Height: 168.9 cm (66.5\")    Body mass index is 32.75 kg/m².   NOSE: nasal passages are clear, No deformities noted   RESP SYSTEM: Not in any respiratory distress, no chest deformities noted,   CARDIOVASULAR: No edema noted  NEURO: Oriented x 3, gait normal,  Mood and affect appeared appropriate      Data reviewed:  The Smart card downloaded on 5/15/2025 has been reviewed independently by me for compliance and discussed the data with the patient.   Compliance; 100%  More than 4 hr " use, 100% for the last 1 week  Average use of the device 5 hours and 55 per night  Residual AHI: 1.4 /hr (Optimal < 5/hr, Good <10/hr, Adequate reduce by 75% from baseline)  Mask type: Fullface mask,   Device: ResMed  DME: [employer]      ASSESSMENT AND PLAN:  Obstructive sleep apnea ( G 47.33).  The symptoms of sleep apnea have improved with the device and the treatment.  Patient's compliance with the device is excellent for treatment of sleep apnea.  I have independently reviewed the smart card down load and discussed with the patient the download data and encouarged the patient to continue to use the device.The residual AHI is acceptable. The device is benefiting the patient and the device is medically necessary.  Without proper control of sleep apnea and good compliance there is a increased risk for hypertension, diabetes mellitus and nonrestorative sleep with hypersomnia which can increase risk for motor vehicle accidents.  Untreated sleep apnea is also a risk factor for development of atrial fibrillation, pulmonary hypertension, insulin resistance and stroke. The patient is also instructed to get the supplies from the [employer] and and change them on a regular basis.  A prescription for supplies has been sent to the [employer].  I have also discussed the good sleep hygiene habits and adequate amount of sleep needed for good health.    Obesity  2 with BMI is Body mass index is 32.75 kg/m².. I have discuss the relationship between the weight and sleep apnea. The benefit of weight loss in reducing severity of sleep apnea was discussed. Discussed diet and exercise with the patient to achieve ideal BMI.  History of seizures  Return in about 1 year (around 5/15/2026) for with smart card down load. . Patient's questions were answered.    5/15/2025  Lucía Horn MD  Sleep Medicine.  Medical Director,   [facility] sleep centers.

## 2025-05-16 ENCOUNTER — TREATMENT (OUTPATIENT)
Age: 70
End: 2025-05-16
Payer: COMMERCIAL

## 2025-05-16 DIAGNOSIS — M25.561 PAIN AND SWELLING OF RIGHT KNEE: ICD-10-CM

## 2025-05-16 DIAGNOSIS — M54.50 ACUTE LEFT-SIDED LOW BACK PAIN, UNSPECIFIED WHETHER SCIATICA PRESENT: ICD-10-CM

## 2025-05-16 DIAGNOSIS — M25.561 ACUTE PAIN OF RIGHT KNEE: ICD-10-CM

## 2025-05-16 DIAGNOSIS — M25.461 PAIN AND SWELLING OF RIGHT KNEE: ICD-10-CM

## 2025-05-16 DIAGNOSIS — Z96.651 S/P TKR (TOTAL KNEE REPLACEMENT), RIGHT: ICD-10-CM

## 2025-05-16 DIAGNOSIS — M17.11 PRIMARY OSTEOARTHRITIS OF RIGHT KNEE: Primary | ICD-10-CM

## 2025-05-16 NOTE — PROGRESS NOTES
[facility] Physical Therapy [place]   [address]  [place] [address]  Phone [phone]  Fax [phone]        Physical Therapy Re Certification Of Plan of Care    Patient: Liza Aaron   : 1955  Diagnosis/ICD-10 Code:  Primary osteoarthritis of right knee [M17.11]  Referring practitioner: Cesar Johnson MD  Date of Initial Visit: 2025  Today's Date: 2025  Patient seen for 9 sessions         Visit Diagnoses:    ICD-10-CM ICD-9-CM   1. Primary osteoarthritis of right knee  M17.11 715.16   2. Pain and swelling of right knee  M25.561 719.46    M25.461 719.06   3. Acute pain of right knee  M25.561 719.46   4. S/P TKR (total knee replacement), right  Z96.651 V43.65   5. Acute left-sided low back pain, unspecified whether sciatica present  M54.50 724.2         Liza Aaron reports: not much resting pain except after the exercises.  Sleeping better. I'm having a lot of trouble with the bending.  Able to go downstairs to use my recumbent bike.  Subjective Questionnaire:WOMAC 36/96  Clinical Progress: improved  Home Program Compliance: Yes  Treatment has included: therapeutic exercise, neuromuscular re-education, manual therapy, therapeutic activity, and gait training      Subjective       Objective     Right Knee   Positive for edema and incision. Healed incision.  Peeling of skin but no s/s infection or wound.     Right Ankle/Foot   Positive for edema.      Improved ecchymosis in R LE        Tenderness      Right LJL, distal lateral hamstring,     Neurological Testing      Sensation      Knee   Left Knee   Intact: Light touch     Right Knee   Intact: light touch      Ankle/Foot   Left Ankle/Foot   Intact: light touch     Right Ankle/Foot   Intact: light touch      Active Range of Motion   Left Knee   Flexion: 125 degrees   Extension: 6 degrees      Right Knee   Flexion: 90 degrees   Extension: 7 degrees       Passive Range of Motion      Right Knee 96 degrees   Extension: 6  degrees      Strength/Myotome Testing      Right Hip   Planes of Motion   Flexion: 5-  Extension: 4-  Abduction: 4  Adduction: 4+     Left Knee   Flexion: 5  Extension: 5  Quadriceps contraction: good     Right Knee   Flexion: 5  Extension: 5  Quadriceps contraction: good     Right Ankle/Foot   Dorsiflexion:5  Plantar flexion: 5-    Joint mobility  Limited patellar mobility superior and inferior     Tests      Additional Tests Details  (-) Juan's     Swelling      Left Knee Girth Measurement (cm)   Joint line:    10 cm above joint line: 48.8.  10 cm below joint line: 41.2.     Right Knee Girth Measurement (cm)   Joint line: 42.7  10 cm above joint line: 49.1  10 cm below joint line: 43.0.     Ambulation   Weight-Bearing Status   Weight-Bearing Status (Right): weight-bearing as tolerated         Ambulation: Level Surfaces   Ambulation with assistive device: independent without cane     Observational Gait   Decreased walking speed, stride length and right stance time.         Assessment/Plan  Patient demonstrates improvement in strength, edema, and pain.  Her main issue is progressive tightness in knee flexion.  Her AROM has not really improved and PROM has gotten worse.  She has some guarding but the main limitation feels like scar tissue.  Her patella mobility is stiff in superior and inferior directions.  Her gait, weightbearing, and function continue to improve but she may need intervention with the knee flexion issue.    Goal progress:  STG X 4 weeks. Pt will:  1. Demonstrate improve R knee AROM of 115-5. NOT MET  2. Report pain of </=3/10 with all ADLs without pain meds MET  3. Ambulate with cane FWB safely without hip or foot pain in a normal pattern MET  4. Strength at least 4/5 right LE MET  LTG X 12 weeks.  Progressing  Pt will:  1. Demonstrate improved right LE MMT of >4+/5  2. R knee A/PROM 4/2-120/125 degrees to allow for normal gait  3. Ambulate without AD safely up to 15 minutes to allow for airport  "and vacation walking this summer.  4. Return to ambulating without AD community distances, driving unrestricted and navigate full flight of stairs.     Recommendations: Continue with recommendations possible Dynasplint for flexion?  Timeframe: 1 month  Prognosis to achieve goals: good      Timed:         Manual Therapy:    15     mins  54131;     Therapeutic Exercise:    10     mins  36265;     Neuromuscular Fide:    -    mins  35996;    Therapeutic Activity:     -     mins  62890;     Gait Training:      -     mins  89129;     Ultrasound:     -     mins  15909;    Ionto                               -    mins   68449  Self Care                       -     mins   03363  Group Therapy            _____  Self Pay 1-29  -       mins   PTSPMIN2  Self Pay 30+  --      mins   PTSPVT   Dry Needling Tri __-__ DNTRIAL      Un-Timed:  Electrical Stimulation:    -     mins  38950 ( );  Dry Needling     -     mins self-pay  Traction     -     mins 94348  Re-Eval                           25    mins  32756      Timed Treatment:   25   mins   Total Treatment:     65   mins          PT: Jasmine Salgado PT, [facility]     [place] License:  [id number]    Electronically signed by Jasmine Salgado PT, 05/16/25, 1:02 PM EDT    Certification Period: 5/16/2025 thru 8/13/2025  I certify that the therapy services are furnished while this patient is under my care.  The services outlined above are required by this patient, and will be reviewed every 90 days.         Physician Signature:__________________________________________________    PHYSICIAN: Cesar Johnson MD      DATE:     Please sign and return via fax to [phone].  Thank you, [facility] Physical Therapy.            \"Parts of this note may be an electronic transcription/translation of spoken language to printed text using the Dragon dictation    "

## 2025-05-16 NOTE — LETTER
Physical Therapy Re Certification Of Plan of Care    Patient: Liza Aaron   : 1955  Diagnosis/ICD-10 Code:  Primary osteoarthritis of right knee [M17.11]  Referring practitioner: Cesar Johnson MD  Date of Initial Visit: 2025  Today's Date: 2025  Patient seen for 9 sessions         Visit Diagnoses:    ICD-10-CM ICD-9-CM   1. Primary osteoarthritis of right knee  M17.11 715.16   2. Pain and swelling of right knee  M25.561 719.46    M25.461 719.06   3. Acute pain of right knee  M25.561 719.46   4. S/P TKR (total knee replacement), right  Z96.651 V43.65   5. Acute left-sided low back pain, unspecified whether sciatica present  M54.50 724.2         Liza Aaron reports: not much resting pain except after the exercises.  Sleeping better. I'm having a lot of trouble with the bending.  Able to go downstairs to use my recumbent bike.  Subjective Questionnaire:WOMAC 36/96  Clinical Progress: improved  Home Program Compliance: Yes  Treatment has included: therapeutic exercise, neuromuscular re-education, manual therapy, therapeutic activity, and gait training        Objective     Right Knee   Positive for edema and incision. Healed incision.  Peeling of skin but no s/s infection or wound.     Right Ankle/Foot   Positive for edema.      Improved ecchymosis in R LE        Tenderness      Right LJL, distal lateral hamstring     Neurological Testing      Sensation      Knee   Left Knee   Intact: Light touch     Right Knee   Intact: light touch      Ankle/Foot   Left Ankle/Foot   Intact: light touch     Right Ankle/Foot   Intact: light touch      Active Range of Motion   Left Knee   Flexion: 125 degrees   Extension: 6 degrees      Right Knee   Flexion: 90 degrees   Extension: 7 degrees       Passive Range of Motion      Right Knee 96 degrees   Extension: 6 degrees      Strength/Myotome Testing      Right Hip   Planes of Motion   Flexion: 5-  Extension: 4-  Abduction: 4  Adduction: 4+     Left Knee   Flexion:  5  Extension: 5  Quadriceps contraction: good     Right Knee   Flexion: 5  Extension: 5  Quadriceps contraction: good     Right Ankle/Foot   Dorsiflexion:5  Plantar flexion: 5-    Joint mobility  Limited patellar mobility superior and inferior     Tests      Additional Tests Details  (-) Juan's     Swelling      Left Knee Girth Measurement (cm)   Joint line:    10 cm above joint line: 48.8.  10 cm below joint line: 41.2.     Right Knee Girth Measurement (cm)   Joint line: 42.7  10 cm above joint line: 49.1  10 cm below joint line: 43.0.     Ambulation   Weight-Bearing Status   Weight-Bearing Status (Right): weight-bearing as tolerated         Ambulation: Level Surfaces   Ambulation with assistive device: independent with/out cane     Observational Gait   Decreased walking speed, stride length and right stance time.         Assessment/Plan  Patient demonstrates improvement in strength, edema, and pain.  Her main issue is progressive tightness in knee flexion.  Her AROM has not really improved and PROM has gotten worse.  She has some guarding but the main limitation feels like scar tissue.  Her patella mobility is stiff in superior and inferior directions.  Her gait, weightbearing, and function continue to improve but she may need intervention with the knee flexion issue.    Goal progress:  STG X 4 weeks. Pt will:  1. Demonstrate improve R knee AROM of 115-5. NOT MET  2. Report pain of </=3/10 with all ADLs without pain meds MET  3. Ambulate with cane FWB safely without hip or foot pain in a normal pattern MET  4. Strength at least 4/5 right LE MET  LTG X 12 weeks.  Progressing  Pt will:  1. Demonstrate improved right LE MMT of >4+/5  2. R knee A/PROM 4/2-120/125 degrees to allow for normal gait  3. Ambulate without AD safely up to 15 minutes to allow for airport and vacation walking this summer.  4. Return to ambulating without AD community distances, driving unrestricted and navigate full flight of  "stairs.     Recommendations: Continue with recommendations possible Dynasplint for flexion?  Timeframe: 1 month  Prognosis to achieve goals: good        PT: Jasmine Salgado PT, CIDN     [place] License:  [id number]    Electronically signed by Jasmine Salgado PT, 05/16/25, 1:02 PM EDT    Certification Period: 5/16/2025 thru 8/13/2025  I certify that the therapy services are furnished while this patient is under my care.  The services outlined above are required by this patient, and will be reviewed every 90 days.         Physician Signature:__________________________________________________    PHYSICIAN: Cesar Johnson MD      DATE:     Please sign and return via fax to [phone].  Thank you, [facility].            \"Parts of this note may be an electronic transcription/translation of spoken language to printed text using the Dragon dictation        "

## 2025-05-20 ENCOUNTER — TREATMENT (OUTPATIENT)
Age: 70
End: 2025-05-20
Payer: COMMERCIAL

## 2025-05-20 ENCOUNTER — OFFICE VISIT (OUTPATIENT)
Dept: ORTHOPEDIC SURGERY | Facility: CLINIC | Age: 70
End: 2025-05-20
Payer: COMMERCIAL

## 2025-05-20 VITALS — BODY MASS INDEX: 32.49 KG/M2 | HEIGHT: 67 IN | WEIGHT: 207 LBS | TEMPERATURE: 97.3 F

## 2025-05-20 DIAGNOSIS — M25.461 PAIN AND SWELLING OF RIGHT KNEE: ICD-10-CM

## 2025-05-20 DIAGNOSIS — Z96.651 S/P TKR (TOTAL KNEE REPLACEMENT), RIGHT: ICD-10-CM

## 2025-05-20 DIAGNOSIS — M25.561 ACUTE PAIN OF RIGHT KNEE: ICD-10-CM

## 2025-05-20 DIAGNOSIS — M25.561 PAIN AND SWELLING OF RIGHT KNEE: ICD-10-CM

## 2025-05-20 DIAGNOSIS — Z96.651 S/P TOTAL KNEE REPLACEMENT, RIGHT: Primary | ICD-10-CM

## 2025-05-20 DIAGNOSIS — M17.11 PRIMARY OSTEOARTHRITIS OF RIGHT KNEE: Primary | ICD-10-CM

## 2025-05-20 PROCEDURE — 99024 POSTOP FOLLOW-UP VISIT: CPT | Performed by: ORTHOPAEDIC SURGERY

## 2025-05-20 NOTE — PROGRESS NOTES
Physical Therapy Daily Treatment Note    [facility]  [address]  [address]  [place] [address]     Patient: Liza Aaron   : 1955  Referring practitioner: Cesar Johnson MD  Date of Initial Visit: Type: THERAPY  Noted: 2025  Today's Date: 2025  Patient seen for 10 sessions         Liza Aaron reports: Saw Ortho MD who wanted patient to increase therapy to 3 times a week for the next 3 weeks.  States that Ortho MD had wanted her to have more active range of motion in flexion and that if she did not obtain enough they may have to consider a manipulation.        Subjective     Objective   See Exercise, Manual, and Modality Logs for complete treatment.   Discussed strategies and techniques at home for improved extension when icing (i.e. static stretch position without support).  Discussed strategies and techniques regarding exercises designed to improve right knee flexion both active and active assist.    Assessment/Plan presents to clinic unscheduled, due to MD visit and need to have increased frequency of visits for the next 3 weeks to help improve range of motion right knee.  Patient benefits from manual efforts for soft tissue mobilization, passive stretching and passive range of motion to help improve in both flexion and extension plane.  Patient benefits from education and strategies regarding home techniques to improve carryover from treatments.  Positive response to ice and e-stim application for swelling and pain control right knee this session.        Progress strengthening /stabilization /functional activity  and manual interventions to improve right knee mobility.        Timed:  Manual Therapy:    18     mins  47533;  Therapeutic Exercise:   23      mins  79484;     Neuromuscular Fide:        mins  84785;    Therapeutic Activity:         mins  43780;     Gait Training:           mins  71906;     Ultrasound:          mins  38785;    Self Care                     _8__      mins 37530    Untimed:  Electrical Stimulation:   15      mins  14712 ( );  Mechanical Traction:         mins  38825;     Timed Treatment:   49   mins   Total Treatment:    64    mins  Osiel Burns [facility]  Physical Therapist  Assistant  [id number]

## 2025-05-20 NOTE — PROGRESS NOTES
Patient: Liza Aaron  YOB: 1955 70 y.o. female  Medical Record Number: [id number]    Chief Complaints:   Chief Complaint   Patient presents with    Right Knee - Post-op Follow-up       History of Present Illness:Liza Aaron is a 70 y.o. female who presents for follow-up of right total knee replacement she is 7 weeks and 1 day out from surgery overall she seems to be doing okay but she states the knee feels very stiff.  It was reported from her therapist that she was struggling little bit so I asked her to come in to take a look.  Denies any fever or chills she is doing physical therapy twice a week with her physical therapist.    Allergies:   Allergies   Allergen Reactions    Nickel Rash     Pt states cannot wear jewelry with Nickel.     Penicillins Rash       Medications:   Current Outpatient Medications   Medication Sig Dispense Refill    HYDROcodone-acetaminophen (NORCO) 7.5-325 MG per tablet Take 1 tablet by mouth Every 4 (Four) Hours As Needed for Moderate Pain or Severe Pain. 40 tablet 0    latanoprost (XALATAN) 0.005 % ophthalmic solution Administer 1 drop to both eyes Every Night. 2.5 mL 12    levETIRAcetam XR (KEPPRA XR) 500 MG tablet Take 2 tablets by mouth Daily. (Patient taking differently: Take 2 tablets by mouth Every Night.) 180 tablet 3    melatonin 5 MG tablet tablet Take 1 tablet by mouth At Night As Needed. Chewy ones      Psyllium (METAMUCIL PO) Take 1 tablet by mouth Daily.      rosuvastatin (CRESTOR) 10 MG tablet Take 1 tablet by mouth Every Night. 90 tablet 1    aspirin 81 MG EC tablet Take 1 tablet by mouth twice daily for 14 days; then take 1 tablet by mouth daily for 28 days. (Patient not taking: Reported on 5/20/2025) 60 tablet 0    cyclobenzaprine (FLEXERIL) 5 MG tablet Take 1 tablet by mouth 3 (Three) Times a Day As Needed for Muscle Spasms. (Patient not taking: Reported on 5/20/2025)      Docusate Calcium (STOOL SOFTENER PO) Take 1 tablet by mouth Daily. (Patient not  "taking: Reported on 5/20/2025)      latanoprost (XALATAN) 0.005 % ophthalmic solution Administer 1 drop to both eyes every night at bedtime. (Patient not taking: Reported on 5/20/2025) 2.5 mL 12    meloxicam (Mobic) 15 MG tablet Take 1 tablet by mouth Daily. (Patient not taking: Reported on 5/20/2025) 14 tablet 0    ondansetron (Zofran) 4 MG tablet Take 1 tablet by mouth Every 8 (Eight) Hours As Needed for Nausea or Vomiting for up to 10 doses. (Patient not taking: Reported on 5/20/2025) 10 tablet 0     No current facility-administered medications for this visit.         The following portions of the patient's history were reviewed and updated as appropriate: allergies, current medications, past family history, past medical history, past social history, past surgical history and problem list.    Review of Systems:   Pertinent positives/negative listed in HPI above    Physical Exam:   Vitals:    05/20/25 0950   Temp: 97.3 °F (36.3 °C)   TempSrc: Temporal   Weight: 93.9 kg (207 lb)   Height: 170.2 cm (67\")   PainSc: 0-No pain   PainLoc: Knee       General: A and O x 3, ASA, NAD   Knee range of motion is 5 to 90 degrees the incision is well-healed there is no joint effusion her tissues anteriorly feel somewhat stiff the calf is soft negative Homans' sign.  She walks with slight knee flexion antalgic gait       Radiology:  Xrays 3views right knee (ap,lateral, sunrise) were ordered and reviewed for evaluation of knee pain demonstratinga well positioned knee replacement without evidence of wear, loosening or osteolysis  todays xrays were compared to previous xrays and demonstrate no change    Assessment/Plan:  Right total knee I am concerned about her stiffness I had a long discussion about duration and frequency of flexion and extension exercises.  More than strength or other therapy maneuvers I want her to mostly work on both flexion and extension I discussed proper ways to work a stationary bike to help loosen the " knee.  I am going to check her in 3 weeks if she has not made progress past 90 degrees we will consider scheduling her for a manipulation.      Diagnoses and all orders for this visit:    1. S/P total knee replacement, right (Primary)  -     XR Knee 3 View Right        Cesar Johnson MD  5/20/2025

## 2025-05-21 ENCOUNTER — TREATMENT (OUTPATIENT)
Age: 70
End: 2025-05-21
Payer: COMMERCIAL

## 2025-05-21 DIAGNOSIS — M25.461 PAIN AND SWELLING OF RIGHT KNEE: ICD-10-CM

## 2025-05-21 DIAGNOSIS — M25.561 ACUTE PAIN OF RIGHT KNEE: ICD-10-CM

## 2025-05-21 DIAGNOSIS — M17.11 PRIMARY OSTEOARTHRITIS OF RIGHT KNEE: Primary | ICD-10-CM

## 2025-05-21 DIAGNOSIS — Z96.651 S/P TKR (TOTAL KNEE REPLACEMENT), RIGHT: ICD-10-CM

## 2025-05-21 DIAGNOSIS — M25.561 PAIN AND SWELLING OF RIGHT KNEE: ICD-10-CM

## 2025-05-21 PROCEDURE — 97014 ELECTRIC STIMULATION THERAPY: CPT | Performed by: PHYSICAL THERAPIST

## 2025-05-21 PROCEDURE — 97110 THERAPEUTIC EXERCISES: CPT | Performed by: PHYSICAL THERAPIST

## 2025-05-21 PROCEDURE — 97140 MANUAL THERAPY 1/> REGIONS: CPT | Performed by: PHYSICAL THERAPIST

## 2025-05-21 NOTE — PROGRESS NOTES
Physical Therapy Daily Treatment Note    [facility]  [address]  [address]  [place] [address]     Patient: Liza Aaron   : 1955  Referring practitioner: Minnie Whaley MD  Date of Initial Visit: Type: THERAPY  Noted: 2025  Today's Date: 2025  Patient seen for 11 sessions         Liza Aaron reports: felt extended pain relief from estim unit.  Not as sore as I thought I would be.  Worked on recumbent bike at home and felt like it bent a little better        Subjective     Objective   See Exercise, Manual, and Modality Logs for complete treatment.   Progressive R LE ROM activities:  NuStep - Lv4  x 12 min with progression of seat to improve knee flexion  Scifit LE bike x 8 min with progression of seat(from 11-8)  Windjammer x 6 min 1/2 to 3/4 of full revolutions    Assessment/Plan patient continues to be cooperative, compliant and motivated with in clinic and out of clinic rehab exercises activities to improve right knee flexion range of motion.  Positive response, per patient report in regards to e-stim application last session in regards to extended pain relief.  Continues to benefit from manual interventions for soft tissue mobilization as well as passive stretching though muscle guarding and restriction directions prevent passive motion greater than approximately 95 degrees.                   Timed:  Manual Therapy:    15     mins  34537;  Therapeutic Exercise:    20     mins  36138;     Neuromuscular Fide:        mins  46578;    Therapeutic Activity:          mins  99985;     Gait Training:           mins  47670;     Ultrasound:          mins  58774;    Self Care                    ___      mins 53125    Untimed:  Electrical Stimulation:    15     mins  75688 ( );  Mechanical Traction:         mins  42714;     Timed Treatment:  35    mins   Total Treatment:     50   mins  Osiel Burns PTA  Physical Therapist  Assistant  [id number]

## 2025-05-23 ENCOUNTER — TREATMENT (OUTPATIENT)
Age: 70
End: 2025-05-23
Payer: COMMERCIAL

## 2025-05-23 DIAGNOSIS — M25.561 PAIN AND SWELLING OF RIGHT KNEE: ICD-10-CM

## 2025-05-23 DIAGNOSIS — M54.50 ACUTE LEFT-SIDED LOW BACK PAIN, UNSPECIFIED WHETHER SCIATICA PRESENT: ICD-10-CM

## 2025-05-23 DIAGNOSIS — M25.461 PAIN AND SWELLING OF RIGHT KNEE: ICD-10-CM

## 2025-05-23 DIAGNOSIS — M25.561 ACUTE PAIN OF RIGHT KNEE: ICD-10-CM

## 2025-05-23 DIAGNOSIS — Z96.651 S/P TKR (TOTAL KNEE REPLACEMENT), RIGHT: ICD-10-CM

## 2025-05-23 DIAGNOSIS — M17.11 PRIMARY OSTEOARTHRITIS OF RIGHT KNEE: Primary | ICD-10-CM

## 2025-05-23 NOTE — PROGRESS NOTES
[facility] [place]   [address]  [place] [address]  Phone [phone]  Fax [phone]      Physical Therapy Daily Progress Note      Patient: Liza Aaron   : 1955  Referring practitioner: Minnie Whaley MD  Date of Initial Visit: Type: THERAPY  Noted: 2025  Diagnosis/ICD-10 Code:  Primary osteoarthritis of right knee [M17.11]  Today's Date: 2025  Patient seen for 12 sessions         Liza Aaron reports: I feel like I have made some progress with my bending.  Trying to stretch 5 times day, doing strength once a day.  Using ice.       Subjective       Objective   See Exercise, Manual, and Modality Logs for complete treatment.    Discussed hep, bike, possibly pool for ease of exercise.  Message surgeon regarding ok for pool.  Prone knee extension PROM 5 degrees.      Assessment/Plan  Patient presents with improving knee extension.  Noted increased tissue tightness along inferior scar, inferior patellar tendon, and medial hamstring.  Added more soft tissue work to improve scar tissue restriction.  Discussed exercises and modifications, help from family for PROM etc .  Overall patient made gains with flexion and extension this week with increased focused on ROM at home and increases sessions.       Progress per Plan of Care  Continue to focus on HEP at home and manual treatment for improvement.           Timed:  Manual Therapy:    25     mins  27047;     Therapeutic Exercise:    10     mins  59498;     Neuromuscular Fide:    -    mins  97769;    Therapeutic Activity:     -     mins  42400;     Gait Training:      -     mins  87720;     Ultrasound:     -     mins  73595;    Ionto                               -    mins   03369  Self Care                       -     mins   46685  Group Therapy            _____  Self Pay 1-29  -       mins   PTSPMIN2  Self Pay 30+  -      mins   PTSPVT   Dry Needling Tri __-__ DNTRIAL      Un-Timed:  Electrical  "Stimulation:    14     mins  58928 ( );  Dry Needling     -     mins self-pay  Traction     -     mins 86729  Re-Eval                           -    mins  33463    Timed Treatment:   35   mins   Total Treatment:     60   mins  Jasmine Salgado, PT, [facility]  Physical Therapist                  \"Parts of this note may be an electronic transcription/translation of spoken language to printed text using the Dragon dictation system  "

## 2025-05-27 ENCOUNTER — TREATMENT (OUTPATIENT)
Age: 70
End: 2025-05-27
Payer: COMMERCIAL

## 2025-05-27 DIAGNOSIS — M25.561 ACUTE PAIN OF RIGHT KNEE: ICD-10-CM

## 2025-05-27 DIAGNOSIS — Z96.651 S/P TKR (TOTAL KNEE REPLACEMENT), RIGHT: ICD-10-CM

## 2025-05-27 DIAGNOSIS — M17.11 PRIMARY OSTEOARTHRITIS OF RIGHT KNEE: Primary | ICD-10-CM

## 2025-05-27 DIAGNOSIS — M25.561 PAIN AND SWELLING OF RIGHT KNEE: ICD-10-CM

## 2025-05-27 DIAGNOSIS — M25.461 PAIN AND SWELLING OF RIGHT KNEE: ICD-10-CM

## 2025-05-27 PROCEDURE — 97140 MANUAL THERAPY 1/> REGIONS: CPT | Performed by: PHYSICAL THERAPIST

## 2025-05-27 PROCEDURE — 97530 THERAPEUTIC ACTIVITIES: CPT | Performed by: PHYSICAL THERAPIST

## 2025-05-27 PROCEDURE — 97110 THERAPEUTIC EXERCISES: CPT | Performed by: PHYSICAL THERAPIST

## 2025-05-27 NOTE — PROGRESS NOTES
Physical Therapy Daily Treatment Note    [facility]  [address]  [address]  [place] [address]     Patient: Liza Aaron   : 1955  Referring practitioner: Cesar Johnson MD  Date of Initial Visit: Type: THERAPY  Noted: 2025  Today's Date: 2025  Patient seen for 13 sessions         Liza Aaron reports: [facility] [place] [facility] [place]/NuStep there.        Subjective     Objective   Ambulates in and out of clinic with straight cane  97 deg R knee PROM flexion -stop due to patient discomfort reports    See Exercise, Manual, and Modality Logs for complete treatment.   NuStep - Lv4  x 12 min with progression of seat to improve knee flexion  Scifit LE bike x 8 min with progression of seat(from 11-8)  Windjammer x 6 min 3/4 to full revolutions    Assessment/Plan        Other continue manual interventions to improve right knee flexion/extension range of motion as well as lower extremity flexibility.           Timed:  Manual Therapy:  15       mins  06434;  Therapeutic Exercise:     26    mins  62414;     Neuromuscular Fide:        mins  85060;    Therapeutic Activity:    10      mins  60585;     Gait Training:           mins  76233;     Ultrasound:          mins  91356;    Self Care                    ___      mins 99233    Untimed:  Electrical Stimulation:         mins  19758 ( );  Mechanical Traction:         mins  51484;     Timed Treatment: 51     mins   Total Treatment:    51    mins  Osiel Burns PTA  Physical Therapist  Assistant  [id number]

## 2025-05-28 ENCOUNTER — TREATMENT (OUTPATIENT)
Age: 70
End: 2025-05-28
Payer: COMMERCIAL

## 2025-05-28 DIAGNOSIS — M25.461 PAIN AND SWELLING OF RIGHT KNEE: ICD-10-CM

## 2025-05-28 DIAGNOSIS — Z96.651 S/P TKR (TOTAL KNEE REPLACEMENT), RIGHT: ICD-10-CM

## 2025-05-28 DIAGNOSIS — M25.561 PAIN AND SWELLING OF RIGHT KNEE: ICD-10-CM

## 2025-05-28 DIAGNOSIS — M17.11 PRIMARY OSTEOARTHRITIS OF RIGHT KNEE: Primary | ICD-10-CM

## 2025-05-28 DIAGNOSIS — M25.561 ACUTE PAIN OF RIGHT KNEE: ICD-10-CM

## 2025-05-28 NOTE — PROGRESS NOTES
Physical Therapy Daily Treatment Note    [facility]  [address]  [address]  [place] [address]     Patient: Liza Aaron   : 1955  Referring practitioner: Minnie Whaley MD  Date of Initial Visit: Type: THERAPY  Noted: 3/18/2025  Today's Date: 2025  Patient seen for 5 sessions         Liza Aaron reports: Continue to work on right knee exercises at home, in therapy and at [employer] gym.         Subjective     Objective   See Exercise, Manual, and Modality Logs for complete treatment.   NuStep - Lv4  x 12 min with progression of seat to improve knee flexion  Scifit LE bike x 8 min with progression of seat(from 11-8)  Windjammer x 6 min 3/4 to full revolutions    Assessment/Plan slow progress right knee flexion in regards to gains despite aggressive manual interventions.  Still exhibits decreased full right knee extension despite passive stretching of affected musculature.  She remains quite motivated with her in and out of clinic exercises to improve her range of motion.        Progress strengthening /stabilization /functional activity to improve right knee range of motion.           Timed:  Manual Therapy:  15       mins  76235;  Therapeutic Exercise:    26     mins  43208;     Neuromuscular Fide:        mins  09276;    Therapeutic Activity:          mins  03203;     Gait Training:           mins  69605;     Ultrasound:          mins  02971;    Self Care                    ___      mins 85787    Untimed:  Electrical Stimulation:         mins  77954 ( );  Mechanical Traction:         mins  06497;     Timed Treatment:   41   mins   Total Treatment:    51    mins  Osiel Burns PTA  Physical Therapist  Assistant  [id number]

## 2025-05-30 ENCOUNTER — TREATMENT (OUTPATIENT)
Age: 70
End: 2025-05-30
Payer: COMMERCIAL

## 2025-05-30 DIAGNOSIS — M25.461 PAIN AND SWELLING OF RIGHT KNEE: ICD-10-CM

## 2025-05-30 DIAGNOSIS — M17.11 PRIMARY OSTEOARTHRITIS OF RIGHT KNEE: Primary | ICD-10-CM

## 2025-05-30 DIAGNOSIS — M25.561 PAIN AND SWELLING OF RIGHT KNEE: ICD-10-CM

## 2025-05-30 DIAGNOSIS — Z96.651 S/P TKR (TOTAL KNEE REPLACEMENT), RIGHT: ICD-10-CM

## 2025-05-30 DIAGNOSIS — M25.561 ACUTE PAIN OF RIGHT KNEE: ICD-10-CM

## 2025-05-30 NOTE — PROGRESS NOTES
"Physical Therapy Daily Treatment Note    [facility]  [address]  [address]  [place] [address]     Patient: Liza Aaron   : 1955  Referring practitioner: Cesar Johnson MD  Date of Initial Visit: Type: THERAPY  Noted: 2025  Today's Date: 2025  Patient seen for 16 sessions         Liza Aaron reports: continuing to work on right knee ROM, has been going to [facility] in the evening to do Nustep.   Feels she is not making good progress despite all of hers/our efforts in PT and is inquiring about potentially having manipulation done sooner vs later.      States that her knee still binds/stiffens up and that she feels has a \"band\" around her knee when she tries to bend it.    Seen 7 times since for PT to right knee since 25 MD follow up, 17 times over all since initial evaluation on 25.  Treatment has consisted of aggressive manual interventions to improve R knee ROM(flexion and extension), therapeutic exercise to improve mobility as well as patient education and prn modalities.        Subjective     Objective          Active Range of Motion     Right Knee   Extension: Right knee active extension: lacking 8 degrees from 0.     Passive Range of Motion     Right Knee   Flexion: 97 degrees   Extension: Right knee passive extension: lacking 5 deg from 0.       25 AROM Right Knee   Flexion: 90 degrees   Extension: 7 degrees       25 PROM Right Knee   Flexion: 96 deg  Extension: 6 degrees    25 AROM Right knee  Flexion: 90 degrees   Extension: 10 degrees     25 PROM Right Knee  Flexion: 102 degrees   Extension: 8 degrees    See Exercise, Manual, and Modality Logs for complete treatment.   -strategies for continued home self stretching and mechanical assisted ROM activities.  - continued ice with static stretch positioning to improve R knee ext  -discussed contacting Ortho MD to update him on current measurements and decide feasibility of next " steps(ie potential LUCAS sooner vs later)    Assessment/Plan  Compliant/cooperative/motivated with rehab efforts to date in regards to right knee.  Subjectively reports feeling that her right knee binds/stiffens up and that she has a tight band around it when she tries to bend it past a certain point.  Objectively, her R knee flexion PROM remains essentially unchanged since 4/16/25 measurement and is decreased from initial evaluation despite increased manual interventions/technique applications as well as in clinic exercise activity.    Patient may benefit from other medical management options at this time, if you concur, vs continued PT.  Please advise.      Other  Letter notification to Ortho MD regarding current progress.  Will continue PT unless directed otherwise.           Timed:  Manual Therapy:   20   mins  40074;  Therapeutic Exercise:    13     mins  77336;     Neuromuscular Fide:        mins  54912;    Therapeutic Activity:          mins  65009;     Gait Training:           mins  73442;     Ultrasound:          mins  18385;    Self Care                    ___      mins 48596    Untimed:  Electrical Stimulation:         mins  75393 ( );  Mechanical Traction:         mins  62665;     Timed Treatment:   33   mins   Total Treatment:     33   mins    Osiel Burns [facility]  Physical Therapist  Assistant  [id number]

## 2025-05-30 NOTE — LETTER
" \"Parts of this note may be an electronic transcription/translation of spoken language to printed text using the Dragon dictation system    Physical Therapy Daily Treatment Note    [facility] PT - [facility]  [address]  [place] [address]     Patient: Liza Aaron   : 1955  Referring practitioner: Cesar Johnosn MD  Date of Initial Visit: Type: THERAPY  Noted: 2025  Today's Date: 2025  Patient seen for 16 sessions         Liza Aaron reports: continuing to work on right knee ROM, has been going to [facility] in the evening to do Nustep.   Feels she is not making good progress despite all of hers/our efforts in PT and is inquiring about potentially having manipulation done sooner vs later.      States that her knee still binds/stiffens up and that she feels has a \"band\" around her knee when she tries to bend it.    Seen 7 times since for PT to right knee since 25 MD follow up, 17 times over all since initial evaluation on 25.  Treatment has consisted of aggressive manual interventions to improve R knee ROM(flexion and extension), therapeutic exercise to improve mobility as well as patient education and prn modalities.        Subjective     Objective          Active Range of Motion     Right Knee   Extension: Right knee active extension: lacking 8 degrees from 0.     Passive Range of Motion     Right Knee   Flexion: 97 degrees   Extension: Right knee passive extension: lacking 5 deg from 0.       25 AROM Right Knee   Flexion: 90 degrees   Extension: 7 degrees       25 PROM Right Knee   Flexion: 96 deg  Extension: 6 degrees    25 AROM Right knee  Flexion: 90 degrees   Extension: 10 degrees     25 PROM Right Knee  Flexion: 102 degrees   Extension: 8 degrees    See Exercise, Manual, and Modality Logs for complete treatment.   -strategies for continued home self stretching and mechanical assisted ROM activities.  - continued ice with static " stretch positioning to improve R knee ext  -discussed contacting Ortho MD to update him on current measurements and decide feasibility of next steps(ie potential LUCAS sooner vs later)    Assessment/Plan  Compliant/cooperative/motivated with rehab efforts to date in regards to right knee.  Subjectively reports feeling that her right knee binds/stiffens up and that she has a tight band around it when she tries to bend it past a certain point.  Objectively, her R knee flexion PROM remains essentially unchanged since 4/16/25 measurement and is decreased from initial evaluation despite increased manual interventions/technique applications as well as in clinic exercise activity.    Patient may benefit from other medical management options at this time, if you concur, vs continued PT.  Please advise.      Other  Letter notification to Ortho MD regarding current progress.  Will continue PT unless directed otherwise.           Timed:  Manual Therapy:   20   mins  51004;  Therapeutic Exercise:    13     mins  51051;     Neuromuscular Fide:        mins  51408;    Therapeutic Activity:          mins  11777;     Gait Training:           mins  00272;     Ultrasound:          mins  26654;    Self Care                    ___      mins 34010    Untimed:  Electrical Stimulation:         mins  68569 ( );  Mechanical Traction:         mins  62780;     Timed Treatment:   33   mins   Total Treatment:     33   mins    Osiel Burns [facility]  Physical Therapist  Assistant  [id number]

## 2025-05-30 NOTE — PROGRESS NOTES
" \"Parts of this note may be an electronic transcription/translation of spoken language to printed text using the Dragon dictation system  "

## 2025-06-02 ENCOUNTER — TREATMENT (OUTPATIENT)
Age: 70
End: 2025-06-02
Payer: COMMERCIAL

## 2025-06-02 ENCOUNTER — TELEPHONE (OUTPATIENT)
Dept: ORTHOPEDIC SURGERY | Facility: CLINIC | Age: 70
End: 2025-06-02
Payer: COMMERCIAL

## 2025-06-02 DIAGNOSIS — M25.561 ACUTE PAIN OF RIGHT KNEE: ICD-10-CM

## 2025-06-02 DIAGNOSIS — M25.461 PAIN AND SWELLING OF RIGHT KNEE: ICD-10-CM

## 2025-06-02 DIAGNOSIS — M17.11 PRIMARY OSTEOARTHRITIS OF RIGHT KNEE: Primary | ICD-10-CM

## 2025-06-02 DIAGNOSIS — M54.50 ACUTE LEFT-SIDED LOW BACK PAIN, UNSPECIFIED WHETHER SCIATICA PRESENT: ICD-10-CM

## 2025-06-02 DIAGNOSIS — Z96.651 S/P TKR (TOTAL KNEE REPLACEMENT), RIGHT: ICD-10-CM

## 2025-06-02 DIAGNOSIS — M25.561 PAIN AND SWELLING OF RIGHT KNEE: ICD-10-CM

## 2025-06-02 NOTE — TELEPHONE ENCOUNTER
Hub staff attempted to follow warm transfer process and was unsuccessful     Caller: Liza Aaron    Relationship to patient: Self    Best call back number: [phone]    Patient is needing: PT CALLING TO MAKE APPT WITH DR MORALES ON 06/03 OR 06/04. PT STATES SHE SPOKE WITH DR MORALES ON 05/30 AND DR MORALES ADVISED PT THAT DR MORALES WOULD LIKE TO HAVE SEE PT IN THE OFFICE ON 06/03 OR 06/04. UNABLE TO LOCATE MSG IN CHART. PLEASE CONTACT PT TO DISCUSS MAKING APPT.

## 2025-06-03 ENCOUNTER — PRE-ADMISSION TESTING (OUTPATIENT)
Dept: PREADMISSION TESTING | Facility: HOSPITAL | Age: 70
End: 2025-06-03
Payer: COMMERCIAL

## 2025-06-03 ENCOUNTER — OFFICE VISIT (OUTPATIENT)
Dept: ORTHOPEDIC SURGERY | Facility: CLINIC | Age: 70
End: 2025-06-03
Payer: COMMERCIAL

## 2025-06-03 ENCOUNTER — TREATMENT (OUTPATIENT)
Age: 70
End: 2025-06-03
Payer: COMMERCIAL

## 2025-06-03 VITALS — BODY MASS INDEX: 31.86 KG/M2 | HEIGHT: 67 IN | TEMPERATURE: 97.8 F | WEIGHT: 203 LBS

## 2025-06-03 VITALS
SYSTOLIC BLOOD PRESSURE: 112 MMHG | HEIGHT: 67 IN | HEART RATE: 74 BPM | OXYGEN SATURATION: 98 % | DIASTOLIC BLOOD PRESSURE: 69 MMHG | WEIGHT: 203.9 LBS | TEMPERATURE: 98.4 F | RESPIRATION RATE: 16 BRPM | BODY MASS INDEX: 32 KG/M2

## 2025-06-03 DIAGNOSIS — Z96.651 S/P TOTAL KNEE REPLACEMENT, RIGHT: Primary | ICD-10-CM

## 2025-06-03 DIAGNOSIS — M17.11 PRIMARY OSTEOARTHRITIS OF RIGHT KNEE: Primary | ICD-10-CM

## 2025-06-03 PROBLEM — Z96.659 S/P KNEE REPLACEMENT: Status: ACTIVE | Noted: 2025-06-03

## 2025-06-03 LAB
DEPRECATED RDW RBC AUTO: 45.7 FL (ref 37–54)
ERYTHROCYTE [DISTWIDTH] IN BLOOD BY AUTOMATED COUNT: 14.3 % (ref 12.3–15.4)
HCT VFR BLD AUTO: 35.3 % (ref 34–46.6)
HGB BLD-MCNC: 11 G/DL (ref 12–15.9)
MCH RBC QN AUTO: 27.4 PG (ref 26.6–33)
MCHC RBC AUTO-ENTMCNC: 31.2 G/DL (ref 31.5–35.7)
MCV RBC AUTO: 88 FL (ref 79–97)
PLATELET # BLD AUTO: 172 10*3/MM3 (ref 140–450)
PMV BLD AUTO: 9.4 FL (ref 6–12)
RBC # BLD AUTO: 4.01 10*6/MM3 (ref 3.77–5.28)
WBC NRBC COR # BLD AUTO: 3.1 10*3/MM3 (ref 3.4–10.8)

## 2025-06-03 PROCEDURE — 85027 COMPLETE CBC AUTOMATED: CPT

## 2025-06-03 PROCEDURE — 36415 COLL VENOUS BLD VENIPUNCTURE: CPT

## 2025-06-03 PROCEDURE — 97140 MANUAL THERAPY 1/> REGIONS: CPT | Performed by: PHYSICAL THERAPIST

## 2025-06-03 PROCEDURE — 97014 ELECTRIC STIMULATION THERAPY: CPT | Performed by: PHYSICAL THERAPIST

## 2025-06-03 PROCEDURE — 97110 THERAPEUTIC EXERCISES: CPT | Performed by: PHYSICAL THERAPIST

## 2025-06-03 PROCEDURE — 99024 POSTOP FOLLOW-UP VISIT: CPT | Performed by: ORTHOPAEDIC SURGERY

## 2025-06-03 RX ORDER — MELOXICAM 7.5 MG/1
15 TABLET ORAL ONCE
OUTPATIENT
Start: 2025-06-03 | End: 2025-06-03

## 2025-06-03 RX ORDER — PREGABALIN 150 MG/1
150 CAPSULE ORAL ONCE
OUTPATIENT
Start: 2025-06-03 | End: 2025-06-03

## 2025-06-03 NOTE — DISCHARGE INSTRUCTIONS
Take the following medications the morning of surgery:  FOLLOW NEUROLOGY INSTRUCTIONS REGARDING KEPPRA      If you are on an Aspirin or a Blood Thinner please clarify with the surgeon and prescribing physician if and when you are to hold the medication or if you are to continue the medication.  If you are on prescription narcotic pain medication to control your pain you may also take that medication the morning of surgery.      General Instructions:     Do not eat solid food after midnight the night before surgery.  Clear liquids day of surgery are allowed but must be stopped at least two hours before your hospital arrival time.       Allowed clear liquids      Water, sodas, and tea or coffee with no cream or milk added.       12 to 20 ounces of a clear liquid that contains carbohydrates is recommended.  If non-diabetic, have Gatorade or Powerade.  If diabetic, have G2 or Powerade Zero.     Do not have liquids red in color.  Do not consume chicken, beef, pork or vegetable broth or bouillon cubes of any variety as they are not considered clear liquids and are not allowed.      Infants may have breast milk up to four hours before surgery.  Infants drinking formula may drink formula up to six hours before surgery.   Patients who avoid smoking, chewing tobacco and alcohol for 4 weeks prior to surgery have a reduced risk of post-operative complications.  Quit smoking as many days before surgery as you can.  Do not smoke, use chewing tobacco or drink alcohol the day of surgery.   If applicable bring your C-PAP/ BI-PAP machine in with you to preop day of surgery.  Bring any papers given to you in the doctor’s office.  Wear clean comfortable clothes.  Do not wear contact lenses, false eyelashes or make-up.  Bring a case for your glasses.   Bring crutches or walker if applicable.  Remove all piercings.  Leave jewelry and any other valuables at home.  Hair extensions with metal clips must be removed prior to surgery.  The  Pre-Admission Testing nurse will instruct you to bring medications if unable to obtain an accurate list in Pre-Admission Testing.    Day of surgery you will need to let the preoperative nurse know the last time you took each of your medications.  To ensure a safe environment for patients and staff, we kindly ask that children under the age of 16 not accompany patients.  If you must bring a dependent child or dependent adult please ensure a responsible adult, other than yourself, is present to supervise them.        Preventing a Surgical Site Infection:  For 2 to 3 days before surgery, avoid shaving with a razor because the razor can irritate skin and make it easier to develop an infection.    Any areas of open skin can increase the risk of a post-operative wound infection by allowing bacteria to enter and travel throughout the body.  Notify your surgeon if you have any skin wounds / rashes even if it is not near the expected surgical site.  The area will need assessed to determine if surgery should be delayed until it is healed.  The night prior to surgery shower using a fresh bar of anti-bacterial soap (such as Dial) and clean washcloth.  Sleep in a clean bed with clean clothing.  Do not allow pets to sleep with you.  Shower on the morning of surgery using a fresh bar of anti-bacterial soap (such as Dial) and clean washcloth.  Dry with a clean towel and dress in clean clothing.  Ask your surgeon if you will be receiving antibiotics prior to surgery.  Make sure you, your family, and all healthcare providers clean their hands with soap and water or an alcohol based hand  before caring for you or your wound.    Day of surgery:  Your arrival time is approximately two hours before your scheduled surgery time.  Please note if you have an early arrival time the surgery doors do not open before 5:00 AM.  Upon arrival, a Pre-op nurse and Anesthesiologist will review your health history, obtain vital signs, and  answer questions you may have.  The only belongings needed at this time will be a list of your home medications and if applicable your C-PAP/BI-PAP machine.  A Pre-op nurse will start an IV and you may receive medication in preparation for surgery, including something to help you relax.     Please be aware that surgery does come with discomfort.  We want to make every effort to control your discomfort so please discuss any uncontrolled symptoms with your nurse.   Your doctor will most likely have prescribed pain medications.      If you are going home after surgery you will receive individualized written care instructions before being discharged.  A responsible adult must drive you to and from the hospital on the day of your surgery and ideally stay with you through the night.   .  Discharge prescriptions can be filled by the hospital pharmacy during regular pharmacy hours.  If you are having surgery late in the day/evening your prescription may be e-prescribed to your pharmacy.  Please verify your pharmacy hours or chose a 24 hour pharmacy to avoid not having access to your prescription because your pharmacy has closed for the day.    If you are staying overnight following surgery, you will be transported to your hospital room following the recovery period.  [facility] has all private rooms.    If you have any questions please call Pre-Admission Testing at [phone].  Deductibles and co-payments are collected on the day of service. Please be prepared to pay the required co-pay, deductible or deposit on the day of service as defined by your plan.    Call your surgeon immediately if you experience any of the following symptoms:  Sore Throat  Shortness of Breath or difficulty breathing  Cough  Chills  Body soreness or muscle pain  Headache  Fever  New loss of taste or smell  Do not arrive for your surgery ill.  Your procedure will need to be rescheduled to another time.  You will need to call your  physician before the day of surgery to avoid any unnecessary exposure to hospital staff as well as other patients.

## 2025-06-03 NOTE — H&P (VIEW-ONLY)
Patient: Liza Aaron  YOB: 1955 70 y.o. female  Medical Record Number: [id number]    Chief Complaints:   Chief Complaint   Patient presents with    Right Knee - Post-op Follow-up, Pain       History of Present Illness:Liza Aaron is a 70 y.o. female who presents for follow-up of right total knee she is now 9 weeks and a day out from knee replacement she still struggling with motion.  She in physical therapy feel that she has plateaued she has been working very aggressively on motion but it continues to be difficult for her.  Denies any fever chills or other constitutional symptoms.    Allergies:   Allergies   Allergen Reactions    Nickel Rash     Pt states cannot wear jewelry with Nickel.     Penicillins Rash       Medications:   Current Outpatient Medications   Medication Sig Dispense Refill    HYDROcodone-acetaminophen (NORCO) 7.5-325 MG per tablet Take 1 tablet by mouth Every 4 (Four) Hours As Needed for Moderate Pain or Severe Pain. 40 tablet 0    latanoprost (XALATAN) 0.005 % ophthalmic solution Administer 1 drop to both eyes Every Night. 2.5 mL 12    levETIRAcetam XR (KEPPRA XR) 500 MG tablet Take 2 tablets by mouth Daily. (Patient taking differently: Take 2 tablets by mouth Every Night.) 180 tablet 3    melatonin 5 MG tablet tablet Take 1 tablet by mouth At Night As Needed. Chewy ones      Psyllium (METAMUCIL PO) Take 1 tablet by mouth Daily.      rosuvastatin (CRESTOR) 10 MG tablet Take 1 tablet by mouth Every Night. 90 tablet 1    aspirin 81 MG EC tablet Take 1 tablet by mouth twice daily for 14 days; then take 1 tablet by mouth daily for 28 days. (Patient not taking: Reported on 6/3/2025) 60 tablet 0    cyclobenzaprine (FLEXERIL) 5 MG tablet Take 1 tablet by mouth 3 (Three) Times a Day As Needed for Muscle Spasms. (Patient not taking: Reported on 4/15/2025)      Docusate Calcium (STOOL SOFTENER PO) Take 1 tablet by mouth Daily. (Patient not taking: Reported on 4/15/2025)       "latanoprost (XALATAN) 0.005 % ophthalmic solution Administer 1 drop to both eyes every night at bedtime. (Patient not taking: Reported on 6/3/2025) 2.5 mL 12    meloxicam (Mobic) 15 MG tablet Take 1 tablet by mouth Daily. (Patient not taking: Reported on 4/15/2025) 14 tablet 0    ondansetron (Zofran) 4 MG tablet Take 1 tablet by mouth Every 8 (Eight) Hours As Needed for Nausea or Vomiting for up to 10 doses. (Patient not taking: Reported on 4/15/2025) 10 tablet 0     No current facility-administered medications for this visit.         The following portions of the patient's history were reviewed and updated as appropriate: allergies, current medications, past family history, past medical history, past social history, past surgical history and problem list.    Review of Systems:   Pertinent positives/negative listed in HPI above    Physical Exam:   Vitals:    06/03/25 1349   Temp: 97.8 °F (36.6 °C)   TempSrc: Temporal   Weight: 92.1 kg (203 lb)   Height: 170.2 cm (67\")   PainSc: 3    PainLoc: Knee       General: A and O x 3, ASA, NAD    Knee ROM 8-97  Incision well healed  No effusion  No erythema      Radiology:  Xrays 3views right knee (ap,lateral, sunrise) taken previously demonstratinga well positioned knee replacement without evidence of wear, loosening or osteolysis      Assessment/Plan:  Right knee arthrofibrosis  - has pl;ateaued with PT -  will plan on LUCAS -  discussed the RBA including fx, soft tissue injury, need for further surgery, persistent stiffness.      There are no diagnoses linked to this encounter.    Cesar Johnson MD  6/3/2025  "

## 2025-06-03 NOTE — PROGRESS NOTES
Patient: Liza Aaron  YOB: 1955 70 y.o. female  Medical Record Number: [id number]    Chief Complaints:   Chief Complaint   Patient presents with    Right Knee - Post-op Follow-up, Pain       History of Present Illness:Liza Aaron is a 70 y.o. female who presents for follow-up of right total knee she is now 9 weeks and a day out from knee replacement she still struggling with motion.  She in physical therapy feel that she has plateaued she has been working very aggressively on motion but it continues to be difficult for her.  Denies any fever chills or other constitutional symptoms.    Allergies:   Allergies   Allergen Reactions    Nickel Rash     Pt states cannot wear jewelry with Nickel.     Penicillins Rash       Medications:   Current Outpatient Medications   Medication Sig Dispense Refill    HYDROcodone-acetaminophen (NORCO) 7.5-325 MG per tablet Take 1 tablet by mouth Every 4 (Four) Hours As Needed for Moderate Pain or Severe Pain. 40 tablet 0    latanoprost (XALATAN) 0.005 % ophthalmic solution Administer 1 drop to both eyes Every Night. 2.5 mL 12    levETIRAcetam XR (KEPPRA XR) 500 MG tablet Take 2 tablets by mouth Daily. (Patient taking differently: Take 2 tablets by mouth Every Night.) 180 tablet 3    melatonin 5 MG tablet tablet Take 1 tablet by mouth At Night As Needed. Chewy ones      Psyllium (METAMUCIL PO) Take 1 tablet by mouth Daily.      rosuvastatin (CRESTOR) 10 MG tablet Take 1 tablet by mouth Every Night. 90 tablet 1    aspirin 81 MG EC tablet Take 1 tablet by mouth twice daily for 14 days; then take 1 tablet by mouth daily for 28 days. (Patient not taking: Reported on 6/3/2025) 60 tablet 0    cyclobenzaprine (FLEXERIL) 5 MG tablet Take 1 tablet by mouth 3 (Three) Times a Day As Needed for Muscle Spasms. (Patient not taking: Reported on 4/15/2025)      Docusate Calcium (STOOL SOFTENER PO) Take 1 tablet by mouth Daily. (Patient not taking: Reported on 4/15/2025)       "latanoprost (XALATAN) 0.005 % ophthalmic solution Administer 1 drop to both eyes every night at bedtime. (Patient not taking: Reported on 6/3/2025) 2.5 mL 12    meloxicam (Mobic) 15 MG tablet Take 1 tablet by mouth Daily. (Patient not taking: Reported on 4/15/2025) 14 tablet 0    ondansetron (Zofran) 4 MG tablet Take 1 tablet by mouth Every 8 (Eight) Hours As Needed for Nausea or Vomiting for up to 10 doses. (Patient not taking: Reported on 4/15/2025) 10 tablet 0     No current facility-administered medications for this visit.         The following portions of the patient's history were reviewed and updated as appropriate: allergies, current medications, past family history, past medical history, past social history, past surgical history and problem list.    Review of Systems:   Pertinent positives/negative listed in HPI above    Physical Exam:   Vitals:    06/03/25 1349   Temp: 97.8 °F (36.6 °C)   TempSrc: Temporal   Weight: 92.1 kg (203 lb)   Height: 170.2 cm (67\")   PainSc: 3    PainLoc: Knee       General: A and O x 3, ASA, NAD    Knee ROM 8-97  Incision well healed  No effusion  No erythema      Radiology:  Xrays 3views right knee (ap,lateral, sunrise) taken previously demonstratinga well positioned knee replacement without evidence of wear, loosening or osteolysis      Assessment/Plan:  Right knee arthrofibrosis  - has pl;ateaued with PT -  will plan on LUCAS -  discussed the RBA including fx, soft tissue injury, need for further surgery, persistent stiffness.      There are no diagnoses linked to this encounter.    Cesar Johnson MD  6/3/2025  "

## 2025-06-03 NOTE — PROGRESS NOTES
[facility] Physical Therapy [place]   [address]  [place] [address]  Phone [phone]  Fax [phone]      Physical Therapy Daily Progress Note      Patient: Liza Aaron   : 1955  Referring practitioner: Minnie Whaley MD  Date of Initial Visit: Type: THERAPY  Noted: 2025  Diagnosis/ICD-10 Code:  Primary osteoarthritis of right knee [M17.11]  Today's Date: 6/3/2025  Patient seen for 15 sessions         Liza Aaron reports: she has an appointment with Dr. Johnson right after PT.  I've been going to [facility] and stretching every chance I can.  Not lingering pain in between and less use for cane.  It still gets stiff if I sit very long.       Subjective       Objective   See Exercise, Manual, and Modality Logs for complete treatment.    PROM right knee: bike 97 degrees, seated 97, supine heelslide 98  Supine 8 extension  Improved superior patellar glide, scar mobility.      Assessment/Plan  Patient presents with improving soft tissue mobility in the patellar tendon, scar, and quad muscle.  She does have some hamstring guarding at end range of flexion.  Still limited in tibia/femur joint mobility at end range.  Overall improvement in ROM due to soft tissue flexibility but still restricted in deep joint mobility.        Awaiting MD orders           Timed:  Manual Therapy:    25     mins  80008;     Therapeutic Exercise:    15     mins  78237;     Neuromuscular Fide:    -    mins  30061;    Therapeutic Activity:     -     mins  21683;     Gait Training:      -     mins  85320;     Ultrasound:     -     mins  43671;    Ionto                               -    mins   61674  Self Care                       -     mins   21378  Group Therapy            _____  Self Pay 1-29  -       mins   [id number]  Self Pay 30+  -      mins   PTSPVT   Dry Needling Tri __-__ DNTRIAL      Un-Timed:  Electrical Stimulation:    15     mins  67059 ( );  Dry Needling     -     mins  "self-pay  Traction     -     mins 40826  Re-Eval                           -    mins  35069    Timed Treatment:   40   mins   Total Treatment:     60   mins  Jasmine Salgado, PT, [facility]  Physical Therapist                  \"Parts of this note may be an electronic transcription/translation of spoken language to printed text using the Dragon dictation system  "

## 2025-06-04 NOTE — PROGRESS NOTES
Physical Therapy Daily Treatment Note    [facility] PT - [facility]  [address]  [address]  [place] [address]     Patient: Liza Aaron   : 1955  Referring practitioner: Cesar Johnson MD  Date of Initial Visit: Type: THERAPY  Noted: 2025  Today's Date: 25  Patient seen for 15 sessions         Liza Aaron reports: Spoke to Dr. Johnson on Friday of this week and take a look of her right knee.  Continue working hard.  Went to [employer] over the weekend to use her equipment to help get many moving.        Subjective     Objective   Ambulates into clinic without straight cane this session.  Noted increased active right lower extremity hip and knee flexion  Passive range of motion right knee flexion measurement approximately 95 today    See Exercise, Manual, and Modality Logs for complete treatment.   NuStep - Lv4  x 12 min with progression of seat to improve knee flexion  Scifit LE bike x 8 min with progression of seat(from 11-8)  Windjammer x 6 min 3/4 to full revolutions    Reviewed and performed alternate range of motion and stretching along the stairs for right lower extremity in regards to improving right knee flexion mobility, hip flexor stretching and hamstring.    Assessment/Plan right knee appears to be easier with passive range of motion flexion plane, though overall range of motion number has not improved, as he continues to have over between 95 and 97 degrees of allowable range of motion due to restriction and patient tolerance for aggressive flexion range of motion attempts.  Right knee extension still with deficit despite passive hamstring stretching.        Progress per Plan of Care           Timed:  Manual Therapy:    15     mins  61806;  Therapeutic Exercise:    26     mins  20368;     Neuromuscular Fide:        mins  41701;    Therapeutic Activity:    10      mins  64063;     Gait Training:           mins  44305;     Ultrasound:          mins  77016;    Self Care                     ___      mins 62726    Untimed:  Electrical Stimulation:         mins  52372 ( );  Mechanical Traction:         mins  69823;     Timed Treatment:    51  mins   Total Treatment:     61   mins  Osiel Burns PTA  Physical Therapist  Assistant  [id number]

## 2025-06-06 ENCOUNTER — TELEPHONE (OUTPATIENT)
Dept: ORTHOPEDIC SURGERY | Facility: CLINIC | Age: 70
End: 2025-06-06

## 2025-06-06 ENCOUNTER — TREATMENT (OUTPATIENT)
Age: 70
End: 2025-06-06
Payer: COMMERCIAL

## 2025-06-06 DIAGNOSIS — M25.561 ACUTE PAIN OF RIGHT KNEE: ICD-10-CM

## 2025-06-06 DIAGNOSIS — M54.50 ACUTE LEFT-SIDED LOW BACK PAIN, UNSPECIFIED WHETHER SCIATICA PRESENT: ICD-10-CM

## 2025-06-06 DIAGNOSIS — M17.11 PRIMARY OSTEOARTHRITIS OF RIGHT KNEE: Primary | ICD-10-CM

## 2025-06-06 DIAGNOSIS — M25.461 PAIN AND SWELLING OF RIGHT KNEE: ICD-10-CM

## 2025-06-06 DIAGNOSIS — M25.561 PAIN AND SWELLING OF RIGHT KNEE: ICD-10-CM

## 2025-06-06 DIAGNOSIS — Z96.651 S/P TKR (TOTAL KNEE REPLACEMENT), RIGHT: ICD-10-CM

## 2025-06-06 NOTE — TELEPHONE ENCOUNTER
Spoke to patient and moved appointment time for 6/24/25. Patient wanted to keep the 6/24/25, but for later in the afternoon.

## 2025-06-06 NOTE — PROGRESS NOTES
[facility] [place]   [address]  [place] [address]  Phone [phone]  Fax [phone]      Physical Therapy Daily Progress Note      Patient: Liza Aaron   : 1955  Referring practitioner: Minnie Whaley MD  Date of Initial Visit: Type: THERAPY  Noted: 2025  Diagnosis/ICD-10 Code:  Primary osteoarthritis of right knee [M17.11]  Today's Date: 2025  Patient seen for 17 sessions         Liza Aaron reports: saw Dr. Johnson, scheduled for manipulation next week.  Still going to [facility], doing lots of stretching.       Subjective       Objective   See Exercise, Manual, and Modality Logs for complete treatment.    Limited tibial mobility, patellar glide.    Discussed procedure, plan of care, timeline, pain control etc      Assessment/Plan  Patient presents with improving patellar and scar tissue mobility.  Hamstring guarding continues and we focused on soft tissue and joint mobility. Less restriction with anterior tibial glide vs posterior         Manipulation scheduled next week.  Patient want to continue PT until procedure.         Timed:  Manual Therapy:    30     mins  54459;     Therapeutic Exercise: 5    mins  95661;     Neuromuscular Fide:    -    mins  14645;    Therapeutic Activity:     -     mins  37766;     Gait Training:      -     mins  93333;     Ultrasound:     -     mins  37644;    Ionto                               -    mins   81160  Self Care                      10   mins   62423  Group Therapy            _____  Self Pay 1-29  -       mins   PTSPMIN2  Self Pay 30+  -      mins   PTSPVT   Dry Needling Tri __-__ DNTRIAL      Un-Timed:  Electrical Stimulation:    13     mins  61729 ( );  Dry Needling     -     mins self-pay  Traction     -     mins 05608  Re-Eval                           -    mins  75785    Timed Treatment:   40   mins   Total Treatment:     60   mins  Jasmine Salgado, PT, [facility]ID[facility]  Physical  "Therapist                  \"Parts of this note may be an electronic transcription/translation of spoken language to printed text using the Dragon dictation system  "

## 2025-06-06 NOTE — TELEPHONE ENCOUNTER
Caller: Liza Aaron    Relationship to patient: Self    Best call back number: [phone]*    Patient is needing: PT IS CALLING TO RS HER APPOINTMENT ON 06/24/25 AT 9AM.. IT IS A POST OP APPOINTMENT.. PLEASE ADVISE..

## 2025-06-09 ENCOUNTER — OFFICE VISIT (OUTPATIENT)
Dept: INTERNAL MEDICINE | Facility: CLINIC | Age: 70
End: 2025-06-09
Payer: COMMERCIAL

## 2025-06-09 VITALS
TEMPERATURE: 97 F | HEART RATE: 69 BPM | BODY MASS INDEX: 31.39 KG/M2 | OXYGEN SATURATION: 96 % | DIASTOLIC BLOOD PRESSURE: 70 MMHG | HEIGHT: 67 IN | SYSTOLIC BLOOD PRESSURE: 114 MMHG | WEIGHT: 200 LBS

## 2025-06-09 DIAGNOSIS — E78.00 PURE HYPERCHOLESTEROLEMIA: Primary | ICD-10-CM

## 2025-06-09 DIAGNOSIS — D64.9 ANEMIA, UNSPECIFIED TYPE: ICD-10-CM

## 2025-06-09 DIAGNOSIS — R73.01 IMPAIRED FASTING GLUCOSE: ICD-10-CM

## 2025-06-09 PROCEDURE — 99214 OFFICE O/P EST MOD 30 MIN: CPT | Performed by: FAMILY MEDICINE

## 2025-06-09 NOTE — PROGRESS NOTES
Subjective   Liza Aaron is a 70 y.o. female.   Chief Complaint   Patient presents with    Hyperlipidemia    impaired fasting glucose     Anemia       History of Present Illness     Hyperlipidemia-on rosuvastatin 10 mg a day.  She takes it every day.  She has no side effects.  LDL 42 from 80 from 69, HDL 46.    She exercises every day.  PT/home exercise.  No chest pain, no lightheadedness, no palpitations, no shortness of breath.    She had CT cardiac calcium score in April 2024.  It was negative.     IFG-FBS 88 from 103.  A1c at 4.5  She lost 37 pounds from October.  She did it with weight watchers.  She continues to follow-up with them.    She had total knee arthroplasty on 3/31/2025.  Still limited range of motion despite of physical therapy.  She is scheduled for manipulation tomorrow.  She had CBC done few days ago in preparation to procedure.  Hemoglobin at 11.0 from 12.0 (prior to knee arthroplasty).    Review of Systems   Gastrointestinal:  Negative for blood in stool.   Genitourinary:  Negative for hematuria and vaginal bleeding.         Objective   Wt Readings from Last 3 Encounters:   06/09/25 90.7 kg (200 lb)   06/03/25 92.5 kg (203 lb 14.4 oz)   06/03/25 92.1 kg (203 lb)      Vitals:    06/09/25 1422   BP: 114/70   Pulse: 69   Temp: 97 °F (36.1 °C)   SpO2: 96%     Temp Readings from Last 3 Encounters:   06/09/25 97 °F (36.1 °C)   06/03/25 98.4 °F (36.9 °C) (Oral)   06/03/25 97.8 °F (36.6 °C) (Temporal)     BP Readings from Last 3 Encounters:   06/09/25 114/70   06/03/25 112/69   05/15/25 111/70     Pulse Readings from Last 3 Encounters:   06/09/25 69   06/03/25 74   05/15/25 70     Body mass index is 31.32 kg/m².    Physical Exam  Constitutional:       Appearance: Normal appearance.   Cardiovascular:      Rate and Rhythm: Normal rate and regular rhythm.   Pulmonary:      Effort: Pulmonary effort is normal.      Breath sounds: Normal breath sounds.   Skin:     General: Skin is warm and dry.    Psychiatric:         Behavior: Behavior normal.         Assessment & Plan   Diagnoses and all orders for this visit:    1. Pure hypercholesterolemia (Primary)    2. Impaired fasting glucose    3. Anemia, unspecified type  -     Cancel: Iron Profile w/o Ferritin  -     Cancel: Vitamin B12 & Folate  -     CBC (No Diff)  -     Vitamin B12 & Folate  -     Iron Profile w/o Ferritin        Hyperlipidemia-continue current treatment.  Follow-up in 6 months.    Impaired fasting glucose-resolved after weight loss.  Will continue to monitor.    Anemia- likely secondary to knee arthroplasty.  We are checking iron studies, B12 and folate.  Further recommendations depending on test results

## 2025-06-10 ENCOUNTER — TREATMENT (OUTPATIENT)
Age: 70
End: 2025-06-10
Payer: COMMERCIAL

## 2025-06-10 DIAGNOSIS — M25.461 PAIN AND SWELLING OF RIGHT KNEE: ICD-10-CM

## 2025-06-10 DIAGNOSIS — M17.11 PRIMARY OSTEOARTHRITIS OF RIGHT KNEE: Primary | ICD-10-CM

## 2025-06-10 DIAGNOSIS — Z96.651 S/P TKR (TOTAL KNEE REPLACEMENT), RIGHT: ICD-10-CM

## 2025-06-10 DIAGNOSIS — M25.561 ACUTE PAIN OF RIGHT KNEE: ICD-10-CM

## 2025-06-10 DIAGNOSIS — M54.50 ACUTE LEFT-SIDED LOW BACK PAIN, UNSPECIFIED WHETHER SCIATICA PRESENT: ICD-10-CM

## 2025-06-10 DIAGNOSIS — M25.561 PAIN AND SWELLING OF RIGHT KNEE: ICD-10-CM

## 2025-06-10 LAB
ERYTHROCYTE [DISTWIDTH] IN BLOOD BY AUTOMATED COUNT: 14.2 % (ref 11.7–15.4)
FOLATE SERPL-MCNC: 18.4 NG/ML
HCT VFR BLD AUTO: 38.7 % (ref 34–46.6)
HGB BLD-MCNC: 11.8 G/DL (ref 11.1–15.9)
IRON SATN MFR SERPL: 15 % (ref 15–55)
IRON SERPL-MCNC: 47 UG/DL (ref 27–139)
MCH RBC QN AUTO: 26.8 PG (ref 26.6–33)
MCHC RBC AUTO-ENTMCNC: 30.5 G/DL (ref 31.5–35.7)
MCV RBC AUTO: 88 FL (ref 79–97)
PLATELET # BLD AUTO: 200 X10E3/UL (ref 150–450)
RBC # BLD AUTO: 4.4 X10E6/UL (ref 3.77–5.28)
TIBC SERPL-MCNC: 304 UG/DL (ref 250–450)
UIBC SERPL-MCNC: 257 UG/DL (ref 118–369)
VIT B12 SERPL-MCNC: 543 PG/ML (ref 232–1245)
WBC # BLD AUTO: 3.5 X10E3/UL (ref 3.4–10.8)

## 2025-06-10 NOTE — PROGRESS NOTES
[facility] Physical Therapy [place]   [address]  [place] [address]  Phone [phone]  Fax [phone]      Physical Therapy Daily Progress Note      Patient: Liza Aaron   : 1955  Referring practitioner: Cesar Johnson MD  Date of Initial Visit: Type: THERAPY  Noted: 2025  Diagnosis/ICD-10 Code:  Primary osteoarthritis of right knee [M17.11]  Today's Date: 6/10/2025  Patient seen for 18 sessions         Liza Aaron reports: sore but stiff.  Using bike       Subjective       Objective   See Exercise, Manual, and Modality Logs for complete treatment.    Exercises post manipulation - will review after surgery.  Access Code: [email or web address]  URL: [email or web address]  Date: 06/10/2025  Prepared by: Jasmine Salgado    Exercises  - Seated Knee Flexion Slide  - 3 x daily - 7 x weekly - 2 sets - 10 reps  - Supine Heel Slide with Strap  - 3 x daily - 7 x weekly - 1 sets - 30 reps  - Supine Knee Flexion AAROM at Wall  - 3 x daily - 7 x weekly - 1 sets - 10 reps - 10 hold  - Supine Hip and Knee Flexion AROM with Swiss Ball  - 3 x daily - 7 x weekly - 3 sets - 10 reps  - Long Sitting Calf Stretch with Strap  - 3 x daily - 7 x weekly - 1 sets - 3 reps - 30 hold  - Hooklying Hamstring Stretch with Strap  - 3 x daily - 7 x weekly - 1 sets - 3 reps - 30 hold  - Prone Quadriceps Stretch with Strap  - 3 x daily - 7 x weekly - 1 sets - 10 reps - 10 hold  - Prone Knee Flexion Extension AROM  - 1 x daily - 7 x weekly - 1 sets - 10 reps  - Seated Table Hamstring Stretch  - 3 x daily - 7 x weekly - 1 sets - 3 reps - 30 hold  - Standing Knee Flexion Stretch on Step  - 1 x daily - 7 x weekly - 3 sets - 1 reps - 30 hold  - Standing Hamstring Stretch with Step  - 1 x daily - 7 x weekly - 3 sets - 1 reps - 30 hold    Assessment/Plan  Patient having manipulation tomorrow.  Discussed HEP as patient will have nerve block tomorrow and unable to perform new exercises.  Patient understands and  "performs with some assist.  She will bring her  tomorrow so we can go over the instructions again.    Awaiting MD orders           Timed:  Manual Therapy:    25     mins  93857;     Therapeutic Exercise:    20     mins  72735;     Neuromuscular Fide:    -    mins  76309;    Therapeutic Activity:     -     mins  91878;     Gait Training:      -     mins  25305;     Ultrasound:     -     mins  92776;    Ionto                               -    mins   14184  Self Care                       -     mins   80773  Group Therapy            _____  Self Pay 1-29  -       mins   PTSPMIN2  Self Pay 30+  -      mins   PTSPVT   Dry Needling Tri __-__ DNTRIAL      Un-Timed:  Electrical Stimulation:    15     mins  94860 (MC );  Dry Needling     -     mins self-pay  Traction     -     mins 57554  Re-Eval                           -    mins  75778    Timed Treatment:   45   mins   Total Treatment:     60   mins  Jasmine Salgado, PT, [facility]  Physical Therapist                  \"Parts of this note may be an electronic transcription/translation of spoken language to printed text using the Dragon dictation system  "

## 2025-06-11 ENCOUNTER — HOSPITAL ENCOUNTER (OUTPATIENT)
Facility: HOSPITAL | Age: 70
Discharge: HOME | End: 2025-06-11
Attending: ORTHOPAEDIC SURGERY | Admitting: ORTHOPAEDIC SURGERY
Payer: COMMERCIAL

## 2025-06-11 ENCOUNTER — ANESTHESIA (OUTPATIENT)
Dept: PERIOP | Facility: HOSPITAL | Age: 70
End: 2025-06-11
Payer: COMMERCIAL

## 2025-06-11 ENCOUNTER — ANESTHESIA EVENT (OUTPATIENT)
Dept: PERIOP | Facility: HOSPITAL | Age: 70
End: 2025-06-11
Payer: COMMERCIAL

## 2025-06-11 ENCOUNTER — TREATMENT (OUTPATIENT)
Age: 70
End: 2025-06-11
Payer: COMMERCIAL

## 2025-06-11 VITALS
DIASTOLIC BLOOD PRESSURE: 84 MMHG | SYSTOLIC BLOOD PRESSURE: 127 MMHG | HEART RATE: 56 BPM | OXYGEN SATURATION: 100 % | RESPIRATION RATE: 16 BRPM | TEMPERATURE: 97.7 F

## 2025-06-11 DIAGNOSIS — M25.461 PAIN AND SWELLING OF RIGHT KNEE: ICD-10-CM

## 2025-06-11 DIAGNOSIS — Z96.651 S/P TOTAL KNEE REPLACEMENT, RIGHT: ICD-10-CM

## 2025-06-11 DIAGNOSIS — Z98.890 S/P SURGICAL MANIPULATION OF KNEE JOINT: Primary | ICD-10-CM

## 2025-06-11 DIAGNOSIS — M17.11 PRIMARY OSTEOARTHRITIS OF RIGHT KNEE: ICD-10-CM

## 2025-06-11 DIAGNOSIS — M25.561 ACUTE PAIN OF RIGHT KNEE: ICD-10-CM

## 2025-06-11 DIAGNOSIS — M25.561 PAIN AND SWELLING OF RIGHT KNEE: ICD-10-CM

## 2025-06-11 DIAGNOSIS — Z96.651 S/P TKR (TOTAL KNEE REPLACEMENT), RIGHT: ICD-10-CM

## 2025-06-11 PROCEDURE — 25010000002 MIDAZOLAM PER 1 MG: Performed by: ANESTHESIOLOGY

## 2025-06-11 PROCEDURE — 25010000002 FENTANYL CITRATE (PF) 50 MCG/ML SOLUTION: Performed by: ANESTHESIOLOGY

## 2025-06-11 PROCEDURE — 25010000002 LIDOCAINE PF 2% 2 % SOLUTION: Performed by: NURSE ANESTHETIST, CERTIFIED REGISTERED

## 2025-06-11 PROCEDURE — 25010000002 HYDROMORPHONE 1 MG/ML SOLUTION: Performed by: NURSE ANESTHETIST, CERTIFIED REGISTERED

## 2025-06-11 PROCEDURE — 97530 THERAPEUTIC ACTIVITIES: CPT | Performed by: PHYSICAL THERAPIST

## 2025-06-11 PROCEDURE — 97164 PT RE-EVAL EST PLAN CARE: CPT | Performed by: PHYSICAL THERAPIST

## 2025-06-11 PROCEDURE — 25010000002 ONDANSETRON PER 1 MG: Performed by: NURSE ANESTHETIST, CERTIFIED REGISTERED

## 2025-06-11 PROCEDURE — 25010000002 ROPIVACAINE PER 1 MG: Performed by: ANESTHESIOLOGY

## 2025-06-11 PROCEDURE — 97110 THERAPEUTIC EXERCISES: CPT | Performed by: PHYSICAL THERAPIST

## 2025-06-11 PROCEDURE — 25010000002 DEXAMETHASONE PER 1 MG: Performed by: ANESTHESIOLOGY

## 2025-06-11 PROCEDURE — 97116 GAIT TRAINING THERAPY: CPT | Performed by: PHYSICAL THERAPIST

## 2025-06-11 PROCEDURE — 27570 FIXATION OF KNEE JOINT: CPT | Performed by: ORTHOPAEDIC SURGERY

## 2025-06-11 PROCEDURE — 25810000003 LACTATED RINGERS PER 1000 ML: Performed by: ANESTHESIOLOGY

## 2025-06-11 PROCEDURE — 25010000002 FAMOTIDINE 10 MG/ML SOLUTION: Performed by: ANESTHESIOLOGY

## 2025-06-11 PROCEDURE — 25010000002 PROPOFOL 10 MG/ML EMULSION: Performed by: NURSE ANESTHETIST, CERTIFIED REGISTERED

## 2025-06-11 PROCEDURE — 97140 MANUAL THERAPY 1/> REGIONS: CPT | Performed by: PHYSICAL THERAPIST

## 2025-06-11 RX ORDER — SODIUM CHLORIDE 0.9 % (FLUSH) 0.9 %
3 SYRINGE (ML) INJECTION EVERY 12 HOURS SCHEDULED
Status: DISCONTINUED | OUTPATIENT
Start: 2025-06-11 | End: 2025-06-11 | Stop reason: HOSPADM

## 2025-06-11 RX ORDER — SODIUM CHLORIDE 0.9 % (FLUSH) 0.9 %
3-10 SYRINGE (ML) INJECTION AS NEEDED
Status: DISCONTINUED | OUTPATIENT
Start: 2025-06-11 | End: 2025-06-11 | Stop reason: HOSPADM

## 2025-06-11 RX ORDER — FLUMAZENIL 0.1 MG/ML
0.2 INJECTION INTRAVENOUS AS NEEDED
Status: DISCONTINUED | OUTPATIENT
Start: 2025-06-11 | End: 2025-06-11 | Stop reason: HOSPADM

## 2025-06-11 RX ORDER — PROMETHAZINE HYDROCHLORIDE 25 MG/1
25 SUPPOSITORY RECTAL ONCE AS NEEDED
Status: DISCONTINUED | OUTPATIENT
Start: 2025-06-11 | End: 2025-06-11 | Stop reason: HOSPADM

## 2025-06-11 RX ORDER — HYDROCODONE BITARTRATE AND ACETAMINOPHEN 5; 325 MG/1; MG/1
1-2 TABLET ORAL EVERY 4 HOURS PRN
Qty: 20 TABLET | Refills: 0 | OUTPATIENT
Start: 2025-06-11

## 2025-06-11 RX ORDER — LABETALOL HYDROCHLORIDE 5 MG/ML
5 INJECTION, SOLUTION INTRAVENOUS
Status: DISCONTINUED | OUTPATIENT
Start: 2025-06-11 | End: 2025-06-11 | Stop reason: HOSPADM

## 2025-06-11 RX ORDER — DROPERIDOL 2.5 MG/ML
0.62 INJECTION, SOLUTION INTRAMUSCULAR; INTRAVENOUS
Status: DISCONTINUED | OUTPATIENT
Start: 2025-06-11 | End: 2025-06-11 | Stop reason: HOSPADM

## 2025-06-11 RX ORDER — SODIUM CHLORIDE, SODIUM LACTATE, POTASSIUM CHLORIDE, CALCIUM CHLORIDE 600; 310; 30; 20 MG/100ML; MG/100ML; MG/100ML; MG/100ML
9 INJECTION, SOLUTION INTRAVENOUS CONTINUOUS
Status: DISCONTINUED | OUTPATIENT
Start: 2025-06-11 | End: 2025-06-11 | Stop reason: HOSPADM

## 2025-06-11 RX ORDER — ONDANSETRON 2 MG/ML
INJECTION INTRAMUSCULAR; INTRAVENOUS AS NEEDED
Status: DISCONTINUED | OUTPATIENT
Start: 2025-06-11 | End: 2025-06-11 | Stop reason: SURG

## 2025-06-11 RX ORDER — DIPHENHYDRAMINE HYDROCHLORIDE 50 MG/ML
12.5 INJECTION, SOLUTION INTRAMUSCULAR; INTRAVENOUS
Status: DISCONTINUED | OUTPATIENT
Start: 2025-06-11 | End: 2025-06-11 | Stop reason: HOSPADM

## 2025-06-11 RX ORDER — FENTANYL CITRATE 50 UG/ML
50 INJECTION, SOLUTION INTRAMUSCULAR; INTRAVENOUS
Status: DISCONTINUED | OUTPATIENT
Start: 2025-06-11 | End: 2025-06-11 | Stop reason: HOSPADM

## 2025-06-11 RX ORDER — ROPIVACAINE HYDROCHLORIDE 5 MG/ML
INJECTION, SOLUTION EPIDURAL; INFILTRATION; PERINEURAL
Status: COMPLETED | OUTPATIENT
Start: 2025-06-11 | End: 2025-06-11

## 2025-06-11 RX ORDER — PREGABALIN 75 MG/1
150 CAPSULE ORAL ONCE
Status: COMPLETED | OUTPATIENT
Start: 2025-06-11 | End: 2025-06-11

## 2025-06-11 RX ORDER — EPHEDRINE SULFATE 50 MG/ML
5 INJECTION, SOLUTION INTRAVENOUS ONCE AS NEEDED
Status: DISCONTINUED | OUTPATIENT
Start: 2025-06-11 | End: 2025-06-11 | Stop reason: HOSPADM

## 2025-06-11 RX ORDER — HYDRALAZINE HYDROCHLORIDE 20 MG/ML
5 INJECTION INTRAMUSCULAR; INTRAVENOUS
Status: DISCONTINUED | OUTPATIENT
Start: 2025-06-11 | End: 2025-06-11 | Stop reason: HOSPADM

## 2025-06-11 RX ORDER — MELOXICAM 15 MG/1
15 TABLET ORAL ONCE
Status: COMPLETED | OUTPATIENT
Start: 2025-06-11 | End: 2025-06-11

## 2025-06-11 RX ORDER — DEXAMETHASONE SODIUM PHOSPHATE 4 MG/ML
INJECTION, SOLUTION INTRA-ARTICULAR; INTRALESIONAL; INTRAMUSCULAR; INTRAVENOUS; SOFT TISSUE
Status: COMPLETED | OUTPATIENT
Start: 2025-06-11 | End: 2025-06-11

## 2025-06-11 RX ORDER — HYDROMORPHONE HYDROCHLORIDE 1 MG/ML
0.5 INJECTION, SOLUTION INTRAMUSCULAR; INTRAVENOUS; SUBCUTANEOUS
Status: DISCONTINUED | OUTPATIENT
Start: 2025-06-11 | End: 2025-06-11 | Stop reason: HOSPADM

## 2025-06-11 RX ORDER — HYDROCODONE BITARTRATE AND ACETAMINOPHEN 5; 325 MG/1; MG/1
1 TABLET ORAL ONCE AS NEEDED
Status: DISCONTINUED | OUTPATIENT
Start: 2025-06-11 | End: 2025-06-11 | Stop reason: HOSPADM

## 2025-06-11 RX ORDER — ACETAMINOPHEN 500 MG
1000 TABLET ORAL ONCE
Status: COMPLETED | OUTPATIENT
Start: 2025-06-11 | End: 2025-06-11

## 2025-06-11 RX ORDER — FENTANYL CITRATE 50 UG/ML
50 INJECTION, SOLUTION INTRAMUSCULAR; INTRAVENOUS
Refills: 0 | Status: DISCONTINUED | OUTPATIENT
Start: 2025-06-11 | End: 2025-06-11 | Stop reason: HOSPADM

## 2025-06-11 RX ORDER — ATROPINE SULFATE 0.4 MG/ML
0.4 INJECTION, SOLUTION INTRAMUSCULAR; INTRAVENOUS; SUBCUTANEOUS ONCE AS NEEDED
Status: DISCONTINUED | OUTPATIENT
Start: 2025-06-11 | End: 2025-06-11 | Stop reason: HOSPADM

## 2025-06-11 RX ORDER — ONDANSETRON 2 MG/ML
4 INJECTION INTRAMUSCULAR; INTRAVENOUS ONCE AS NEEDED
Status: DISCONTINUED | OUTPATIENT
Start: 2025-06-11 | End: 2025-06-11 | Stop reason: HOSPADM

## 2025-06-11 RX ORDER — NALOXONE HCL 0.4 MG/ML
0.2 VIAL (ML) INJECTION AS NEEDED
Status: DISCONTINUED | OUTPATIENT
Start: 2025-06-11 | End: 2025-06-11 | Stop reason: HOSPADM

## 2025-06-11 RX ORDER — MIDAZOLAM HYDROCHLORIDE 1 MG/ML
1 INJECTION, SOLUTION INTRAMUSCULAR; INTRAVENOUS
Status: DISCONTINUED | OUTPATIENT
Start: 2025-06-11 | End: 2025-06-11 | Stop reason: HOSPADM

## 2025-06-11 RX ORDER — LIDOCAINE HYDROCHLORIDE 20 MG/ML
INJECTION, SOLUTION EPIDURAL; INFILTRATION; INTRACAUDAL; PERINEURAL AS NEEDED
Status: DISCONTINUED | OUTPATIENT
Start: 2025-06-11 | End: 2025-06-11 | Stop reason: SURG

## 2025-06-11 RX ORDER — IPRATROPIUM BROMIDE AND ALBUTEROL SULFATE 2.5; .5 MG/3ML; MG/3ML
3 SOLUTION RESPIRATORY (INHALATION) ONCE AS NEEDED
Status: DISCONTINUED | OUTPATIENT
Start: 2025-06-11 | End: 2025-06-11 | Stop reason: HOSPADM

## 2025-06-11 RX ORDER — HYDROCODONE BITARTRATE AND ACETAMINOPHEN 7.5; 325 MG/1; MG/1
1-2 TABLET ORAL
Qty: 20 TABLET | Refills: 0 | Status: SHIPPED | OUTPATIENT
Start: 2025-06-11 | End: 2025-06-19 | Stop reason: SDUPTHER

## 2025-06-11 RX ORDER — OXYCODONE AND ACETAMINOPHEN 7.5; 325 MG/1; MG/1
1 TABLET ORAL EVERY 4 HOURS PRN
Status: DISCONTINUED | OUTPATIENT
Start: 2025-06-11 | End: 2025-06-11 | Stop reason: HOSPADM

## 2025-06-11 RX ORDER — PROMETHAZINE HYDROCHLORIDE 25 MG/1
25 TABLET ORAL ONCE AS NEEDED
Status: DISCONTINUED | OUTPATIENT
Start: 2025-06-11 | End: 2025-06-11 | Stop reason: HOSPADM

## 2025-06-11 RX ORDER — FAMOTIDINE 10 MG/ML
20 INJECTION, SOLUTION INTRAVENOUS ONCE
Status: COMPLETED | OUTPATIENT
Start: 2025-06-11 | End: 2025-06-11

## 2025-06-11 RX ORDER — PROPOFOL 10 MG/ML
VIAL (ML) INTRAVENOUS AS NEEDED
Status: DISCONTINUED | OUTPATIENT
Start: 2025-06-11 | End: 2025-06-11 | Stop reason: SURG

## 2025-06-11 RX ORDER — MIDAZOLAM HYDROCHLORIDE 1 MG/ML
0.5 INJECTION, SOLUTION INTRAMUSCULAR; INTRAVENOUS
Status: DISCONTINUED | OUTPATIENT
Start: 2025-06-11 | End: 2025-06-11 | Stop reason: HOSPADM

## 2025-06-11 RX ADMIN — LIDOCAINE HYDROCHLORIDE 100 MG: 20 INJECTION, SOLUTION EPIDURAL; INFILTRATION; INTRACAUDAL; PERINEURAL at 07:04

## 2025-06-11 RX ADMIN — ACETAMINOPHEN 1000 MG: 500 TABLET, FILM COATED ORAL at 06:04

## 2025-06-11 RX ADMIN — HYDROMORPHONE HYDROCHLORIDE 1 MG: 1 INJECTION, SOLUTION INTRAMUSCULAR; INTRAVENOUS; SUBCUTANEOUS at 07:00

## 2025-06-11 RX ADMIN — PREGABALIN 75 MG: 75 CAPSULE ORAL at 06:04

## 2025-06-11 RX ADMIN — PROPOFOL 70 MG: 10 INJECTION, EMULSION INTRAVENOUS at 07:04

## 2025-06-11 RX ADMIN — PROPOFOL 20 MG: 10 INJECTION, EMULSION INTRAVENOUS at 07:06

## 2025-06-11 RX ADMIN — FENTANYL CITRATE 50 MCG: 50 INJECTION, SOLUTION INTRAMUSCULAR; INTRAVENOUS at 06:31

## 2025-06-11 RX ADMIN — FAMOTIDINE 20 MG: 10 INJECTION INTRAVENOUS at 06:28

## 2025-06-11 RX ADMIN — DEXAMETHASONE SODIUM PHOSPHATE 4 MG: 4 INJECTION, SOLUTION INTRA-ARTICULAR; INTRALESIONAL; INTRAMUSCULAR; INTRAVENOUS; SOFT TISSUE at 06:35

## 2025-06-11 RX ADMIN — ONDANSETRON 4 MG: 2 INJECTION, SOLUTION INTRAMUSCULAR; INTRAVENOUS at 07:05

## 2025-06-11 RX ADMIN — ROPIVACAINE HYDROCHLORIDE 20 ML: 5 INJECTION EPIDURAL; INFILTRATION; PERINEURAL at 06:35

## 2025-06-11 RX ADMIN — MIDAZOLAM HYDROCHLORIDE 0.5 MG: 1 INJECTION, SOLUTION INTRAMUSCULAR; INTRAVENOUS at 06:31

## 2025-06-11 RX ADMIN — SODIUM CHLORIDE, POTASSIUM CHLORIDE, SODIUM LACTATE AND CALCIUM CHLORIDE: 600; 310; 30; 20 INJECTION, SOLUTION INTRAVENOUS at 07:00

## 2025-06-11 RX ADMIN — MELOXICAM 15 MG: 15 TABLET ORAL at 06:04

## 2025-06-11 NOTE — PROGRESS NOTES
Physical Therapy Re-Evaluation & Re-Certification  [facility] [place]   [address] [place] [address]  P: [phone]       F: [phone]        Patient: Liza Aaron   : 1955  Visit Diagnoses:     ICD-10-CM ICD-9-CM   1. S/P surgical manipulation of knee joint  Z98.890 V45.89   2. Primary osteoarthritis of right knee  M17.11 715.16   3. Acute pain of right knee  M25.561 719.46   4. Pain and swelling of right knee  M25.561 719.46    M25.461 719.06   5. S/P TKR (total knee replacement), right  Z96.651 V43.65     Referring practitioner: Cesar Johnson MD  Date of Initial Visit: Type: THERAPY  Noted: 2025  Today's Date: 2025  Patient seen for 19 sessions      Subjective:   Liza Aaron reports:   Subjective Questionnaire: WOMAC: 39/96  Clinical Progress: improved  Home Program Compliance: Yes  Treatment has included: therapeutic exercise, neuromuscular re-education, manual therapy, therapeutic activity, gait training, electrical stimulation, and cryotherapy    Subjective   Patient reports receiving manipulation procedure on right knee this morning.  States she is not having too much pain or numbness in her leg.    Objective     Right Knee   Positive for edema and incision. Healed incision.       Right Ankle/Foot   Positive for edema.      No ecchymosis in R LE        Tenderness      Right knee  Medial and lateral joint line, distal hamstrings, proximal calf complex muscles     Neurological Testing      Sensation      Knee   Left Knee   Intact: Light touch     Right Knee   Intact: light touch      Ankle/Foot   Left Ankle/Foot   Intact: light touch     Right Ankle/Foot   Intact: light touch      Active Range of Motion   Right Knee   Flexion: 90 degrees   Extension La degrees      Passive Range of Motion      Right Knee   Flexion: 105 degrees   Extension: 0 degrees      Strength/Myotome Testing      Right Hip   Planes of Motion   Flexion:  5-  Extension: 4-  Abduction: 4  Adduction: 4+     Left Knee   Flexion: 5  Extension: 5  Quadriceps contraction: good     Right Knee   Flexion: 5  Extension: 5  Quadriceps contraction: good     Right Ankle/Foot   Dorsiflexion:5  Plantar flexion: 5-     Joint mobility  Limited patellar mobility superior and inferior     Tests      Additional Tests Details  (-) Juan's     Swelling      Left Knee Girth Measurement (cm)   Joint line:    10 cm above joint line: 48.8.  10 cm below joint line: 41.2.     Right Knee Girth Measurement (cm)   Joint line: 45  10 cm above joint line: 48.8  10 cm below joint line: 42     Ambulation   Weight-Bearing Status   Weight-Bearing Status (Right): weight-bearing as tolerated          Ambulation: Level Surfaces   Ambulation with assistive device: RW     Observational Gait   Decreased walking speed, stride length, right stance time.  Trunk flexion over right stance.    See Exercise, Manual, and Modality Logs for complete treatment.     Assessment/Plan  Patient presents to PT following close knee manipulation procedure today.  She presents with altered gait and reduced range of motion in right knee.  She also has increased swelling.  She performed exercise program to promote right knee flexion and extension as well as strengthening.  She had difficulty activating and isolating right quadricep muscles about her with facilitation techniques she was able to improve her isolation of quad contraction.  She benefited from manual therapy with decreased muscle guarding and improved right knee range of motion and patellar mobility.  She had improved gait pattern following education and training on mechanics and posture with decreased reliance on rolling walker.  Extensive education to patient/ on importance of capturing range of motion within the next couple days following the procedure to maximize outcomes as well as importance of performing HEP multiple times a day followed by 10 minutes of  ice after exercise performance.  Educated on anatomy and healing process and soft tissue protection and maturation process.  Patient/has been educated in passive overpressure and soft tissue massage to aid in comfort and muscle flexibility.  Patient will require daily PT for the next 1 to 2 weeks with tapering as appropriate.  Progress toward previous goals: Partially Met    Goal Review/Update  STG X 3 weeks. Pt will:  1. Demonstrate improve R knee AROM of 115-5. NOT MET  2. Report pain of </=3/10 with all ADLs without pain meds.- renew  3. Ambulate with cane FWB safely without hip or foot pain in a normal pattern.-renew  4. Strength at least 4/5 right LE.- previously MET    LTG X 6 weeks.  Progressing  Pt will:  1. Demonstrate improved right LE MMT of >4+/5.-progressing  2. R knee A/PROM 4/2-120/125 degrees to allow for normal gait.-progressing  3. Ambulate without AD safely up to 15 minutes to allow for airport and vacation walking this summer.-not met  4. Return to ambulating without AD community distances, driving unrestricted and navigate full flight of stairs.-not met      Recommendations: Daily x 2 weeks with tapering and frequency as appropriate  Timeframe: 6 weeks  Prognosis to achieve goals: good    PT SIGNATURE: Dee Moise PT     License Number: [id number]  Electronically signed by Dee Moise PT, 06/11/25, 9:33 AM EDT    Certification Period: 6/11/2025 thru 9/8/2025  I certify that the therapy services are furnished while this patient is under my care.  The services outlined above are required by this patient, and will be reviewed every 90 days.    Signature: __________________________________    Cesar Johnson MD   NPI: [id number]    Please sign and return via fax to [phone]. Thank you, [facility].      Timed:         Manual Therapy:    16     mins  35284;     Therapeutic Exercise:    45     mins  42253;     Neuromuscular Fide:        mins  79792;    Therapeutic  Activity:    29      mins  06339;     Gait Training:      10     mins  46877;     Ultrasound:          mins  55797;    Ionto                                  mins  54332  Self Care                            mins  49967  Canalith Repos         mins  91711  Orthotic MGMT/Train         mins  99890    Un-Timed:  Electrical Stimulation:         mins  00836 ( );  Dry Needling:          mins  07302 self-pay;  Dry Needling:          mins  20966 self-pay  Traction          mins  85927  Low Eval          mins  16996  Mod Eval          mins  13777  High Eval                            mins  83678  RE Eval                         20     mins  22841    Timed Treatment:   100   mins   Total Treatment:     130   mins

## 2025-06-11 NOTE — ANESTHESIA PREPROCEDURE EVALUATION
Anesthesia Evaluation     no history of anesthetic complications:   NPO Solid Status: > 8 hours  NPO Liquid Status: > 2 hours           Airway   Mallampati: II  Neck ROM: full  no difficulty expected  Dental - normal exam     Pulmonary - normal exam   (+) a smoker Former,sleep apnea on CPAP  (-) COPD, asthma    PE comment: nonlabored  Cardiovascular - normal exam  Exercise tolerance: good (4-7 METS)    Rhythm: regular  Rate: normal    (+) dysrhythmias (SVT) PVC, hyperlipidemia  (-) hypertension, valvular problems/murmurs, past MI, CAD, angina      Neuro/Psych  (+) seizures, numbness  (-) TIA, CVA  GI/Hepatic/Renal/Endo    (+) obesity  (-) GERD, liver disease, no renal disease, diabetes, no thyroid disorder    Musculoskeletal     (+) arthralgias  Abdominal    Substance History      OB/GYN          Other   arthritis,   history of cancer (squamous cell skin cancer)    ROS/Med Hx Other: Glaucoma              Anesthesia Plan    ASA 3     general     (AC block for post-op pain PSR)  intravenous induction     Anesthetic plan, risks, benefits, and alternatives have been provided, discussed and informed consent has been obtained with: patient.    CODE STATUS:

## 2025-06-11 NOTE — ANESTHESIA POSTPROCEDURE EVALUATION
Patient: Liza Aaron    Procedure Summary       Date: 06/11/25 Room / Location:  [facility] [address] [place] [address] [facility]    Anesthesia Start: 0659 Anesthesia Stop: 0714    Procedure: KNEE MANIPULATION (Right) Diagnosis:       S/P total knee replacement, right      (S/P total knee replacement, right [Z96.651])    Surgeons: Cesar Johnson MD Provider: Stephen Bird MD    Anesthesia Type: general ASA Status: 3            Anesthesia Type: general    Vitals  Vitals Value Taken Time   /82 06/11/25 07:30   Temp 36.5 °C (97.7 °F) 06/11/25 07:11   Pulse 59 06/11/25 07:32   Resp 17 06/11/25 07:20   SpO2 100 % 06/11/25 07:32   Vitals shown include unfiled device data.        Post Anesthesia Care and Evaluation    Patient location during evaluation: bedside  Patient participation: complete - patient participated  Level of consciousness: awake  Pain management: adequate    Airway patency: patent  Anesthetic complications: No anesthetic complications    Cardiovascular status: acceptable  Respiratory status: acceptable  Hydration status: acceptable    Comments: */84 (BP Location: Left arm, Patient Position: Sitting)   Pulse 56   Temp 36.5 °C (97.7 °F) (Oral)   Resp 16   LMP  (LMP Unknown)   SpO2 100%

## 2025-06-11 NOTE — OP NOTE
Name: Liza Aaron  YOB: 1955    DATE OF SURGERY: 6/11/2025    PREOPERATIVE DIAGNOSIS: Right knee arthrofibrosis    POSTOPERATIVE DIAGNOSIS: Right knee arthrofibrosis    PROCEDURE PERFORMED: Right total knee manipulation under anesthesia    SURGEON: Cesar Johnson M.D.    ASSISTANT: none    Findings: Premanipulation ROM: 0 to 90    Postmanipulation ROM 0 to 120    Estimated Blood Loss: 0  Specimens : none  Complications: none    DESCRIPTION OF PROCEDURE: After informed consent and abductor canal block was performed.  The patient was then administered an anesthetic and when she was appropriately anesthetized knee range of motion was evaluated and is listed above.  Using gentle pressure and a short fulcrum we manipulated the knee.  There was audible lysis of scar tissue.  We gently took the knee through a range of motion again flexed the knee and held it in that position for roughly 30 seconds.  There were no complicating factors noted.  The patient will be discharged to outpatient therapy.  I counseled the patient on precautions given the abductor canal block.  Patient should use a walker for the remainder of the day until cleared by physical therapy.    Cesar Johnson MD  6/11/2025

## 2025-06-11 NOTE — ANESTHESIA PROCEDURE NOTES
Peripheral Block      Patient reassessed immediately prior to procedure    Patient location during procedure: pre-op  Start time: 6/11/2025 6:32 AM  Stop time: 6/11/2025 6:35 AM  Reason for block: at surgeon's request and post-op pain management  Performed by  Anesthesiologist: Stephen Bird MD  Preanesthetic Checklist  Completed: patient identified, IV checked, site marked, risks and benefits discussed, surgical consent, monitors and equipment checked, pre-op evaluation and timeout performed  Prep:  Pt Position: supine  Sterile barriers:cap, gloves, mask, alcohol skin prep and washed/disinfected hands  Prep: ChloraPrep  Patient monitoring: blood pressure monitoring, continuous pulse oximetry and EKG  Procedure    Sedation: yes    Guidance:ultrasound guided    ULTRASOUND INTERPRETATION.  Using ultrasound guidance a 21 G gauge needle was placed in close proximity to the femoral nerve, at which point, under ultrasound guidance anesthetic was injected in the area of the nerve and spread of the anesthesia was seen on ultrasound in close proximity thereto.  There were no abnormalities seen on ultrasound; a digital image was taken; and the patient tolerated the procedure with no complications. Images:still images obtained, printed/placed on chart    Laterality:right  Block Type:adductor canal block (Femoral Nerve at Adductor Canal)  Injection Technique:single-shot  Needle Type:short-bevel  Needle Gauge:21 G  Loss of resistance: normal.    Medications Used: dexamethasone (DECADRON) injection - Injection   4 mg - 6/11/2025 6:35:00 AM  ropivacaine (NAROPIN) 0.5 % injection - Injection   20 mL - 6/11/2025 6:35:00 AM      Medications  Comment:Ultrasound Interpretation:  Ultrasound guidance utilized for visualization of needle approach to femoral artery/nerve at adductor canal level and verification of local anesthetic disbursement to surrounding tissues. Photo printed and placed on chart for reference.    Post  Assessment  Injection Assessment: negative aspiration for heme, no paresthesia on injection and incremental injection  Patient Tolerance:comfortable throughout block  Complications:no  Performed by: Stephen Bird MD

## 2025-06-12 ENCOUNTER — TREATMENT (OUTPATIENT)
Age: 70
End: 2025-06-12
Payer: COMMERCIAL

## 2025-06-12 ENCOUNTER — PATIENT OUTREACH (OUTPATIENT)
Dept: CASE MANAGEMENT | Facility: OTHER | Age: 70
End: 2025-06-12
Payer: COMMERCIAL

## 2025-06-12 DIAGNOSIS — Z98.890 S/P SURGICAL MANIPULATION OF KNEE JOINT: Primary | ICD-10-CM

## 2025-06-12 DIAGNOSIS — M25.561 PAIN AND SWELLING OF RIGHT KNEE: ICD-10-CM

## 2025-06-12 DIAGNOSIS — M25.461 PAIN AND SWELLING OF RIGHT KNEE: ICD-10-CM

## 2025-06-12 DIAGNOSIS — M25.561 ACUTE PAIN OF RIGHT KNEE: ICD-10-CM

## 2025-06-12 NOTE — PROGRESS NOTES
Physical Therapy Daily Treatment Note    Patient: Liza Aaron  : 1955  Diagnosis/ICD-10 Code:  S/P surgical manipulation of knee joint [Z98.890]  Referring practitioner: Cesar Johnson MD  Today's Date: 2025    VISIT#: 20      Subjective   Liza Aaron reports: Doing ok, has been doing stretches frequently every hour. Not in much pain currently.       Objective     Active Range of Motion   Right Knee   Flexion: 95 degrees   Extension: 10 degrees      Passive Range of Motion      Right Knee   Flexion: 105 degrees   Extension: 0 degrees     See Exercise, Manual, and Modality Logs for complete treatment.     Patient Education: continue HEP, frequently, every hour, push harder with self stretching.    Assessment/Plan  Her ROM is still quite limited, but held steady compared to yesterday. Her knee extension at rest was improved. Continued manual therapy in order to improve ROM. After discussion with her it appears that she may not be pushing her home stretches enough but she agreed to stretch harder at home.     Progress per Plan of Care            Timed:         Manual Therapy:    15     mins  88203;     Therapeutic Exercise:    25     mins  62400;     Neuromuscular Fide:        mins  77631;    Therapeutic Activity:          mins  36515;     Gait Training:           mins  09083;     Ultrasound:          mins  84159;    Ionto:                                   mins   45260  Self Care:                            mins   37897    Un-Timed:  Electrical Stimulation:         mins  81590 ( );  Dry Needling          mins self-pay  Traction          mins 26864  Re-Eval                               mins  84362    Timed Treatment:   40   mins   Total Treatment:     40   mins    Elizabeth Bailey, PT  Physical Therapist  [place] PT license #: [id number]  [place] PT license #: [id number]

## 2025-06-13 ENCOUNTER — TREATMENT (OUTPATIENT)
Age: 70
End: 2025-06-13
Payer: COMMERCIAL

## 2025-06-13 DIAGNOSIS — Z96.651 S/P TKR (TOTAL KNEE REPLACEMENT), RIGHT: ICD-10-CM

## 2025-06-13 DIAGNOSIS — Z98.890 S/P SURGICAL MANIPULATION OF KNEE JOINT: ICD-10-CM

## 2025-06-13 DIAGNOSIS — M25.561 PAIN AND SWELLING OF RIGHT KNEE: ICD-10-CM

## 2025-06-13 DIAGNOSIS — M25.461 PAIN AND SWELLING OF RIGHT KNEE: ICD-10-CM

## 2025-06-13 DIAGNOSIS — M54.50 ACUTE LEFT-SIDED LOW BACK PAIN, UNSPECIFIED WHETHER SCIATICA PRESENT: ICD-10-CM

## 2025-06-13 DIAGNOSIS — M17.11 PRIMARY OSTEOARTHRITIS OF RIGHT KNEE: ICD-10-CM

## 2025-06-13 DIAGNOSIS — M25.561 ACUTE PAIN OF RIGHT KNEE: Primary | ICD-10-CM

## 2025-06-13 NOTE — PROGRESS NOTES
[facility] [place]   [address]  [place] [address]  Phone [phone]  Fax [phone]      Physical Therapy Daily Progress Note      Patient: Liza Aaron   : 1955  Referring practitioner: Minnie Whaley MD  Date of Initial Visit: Type: THERAPY  Noted: 2025  Diagnosis/ICD-10 Code:  Acute pain of right knee [M25.561]  Today's Date: 2025  Patient seen for 21 sessions         Liza Aaron reports: not much soreness post treatment, trying to use pain meds to help tolerance.  Stretching frequently but it still seems to tighten back up frequently.       Subjective       Objective   See Exercise, Manual, and Modality Logs for complete treatment.      Active Range of Motion   Right Knee   Flexion: 100 degrees   Extension: 10 degrees      Passive Range of Motion      Right Knee   Flexion: 110 degrees   Extension: 5 degrees     Discussed stretches in pool: retro walk, squats, stair stretch, etc.      Assessment/Plan  Patient presents with improving flexion stiffness and guarding.  More extension restriction noted today with joint mobility and hamstring medial gastroc tightness.  Tolerating manual and exercises better since the manipulation.       Progress per Plan of Care  Continue frequent stretching to point of tissue restriction frequently every 1-2 hours when up.  Ice as needed for pain.           Timed:  Manual Therapy:    25     mins  22819;     Therapeutic Exercise:    25     mins  41888;     Neuromuscular Fide:    -    mins  38252;    Therapeutic Activity:     -     mins  04766;     Gait Training:      -     mins  81748;     Ultrasound:     --     mins  48918;    Ionto                               -    mins   97756  Self Care                       --     mins   13907  Group Therapy            _____  Self Pay 1-29  -       mins   PTSPMIN2  Self Pay 30+  -      mins   PTSPVT   Dry Needling Tri __-__ DNTRIAL      Un-Timed:  Electrical  SUBJECTIVE:                                                    London Benedict is a 59 Stimulation:    -     mins  74435 ( );  Dry Needling     -     mins self-pay  Traction     --     mins 96732  Re-Eval                           -    mins  68951    Timed Treatment:   50   mins   Total Treatment:     65   mins  Jasmine Salgado, PT, [facility]  Physical Therapist                  \"Parts of this note may be an electronic transcription/translation of spoken language to printed text using the Dragon dictation system  " "[facility]

## 2025-06-16 ENCOUNTER — TREATMENT (OUTPATIENT)
Age: 70
End: 2025-06-16
Payer: COMMERCIAL

## 2025-06-16 DIAGNOSIS — Z98.890 S/P SURGICAL MANIPULATION OF KNEE JOINT: ICD-10-CM

## 2025-06-16 DIAGNOSIS — M54.50 ACUTE LEFT-SIDED LOW BACK PAIN, UNSPECIFIED WHETHER SCIATICA PRESENT: ICD-10-CM

## 2025-06-16 DIAGNOSIS — M17.11 PRIMARY OSTEOARTHRITIS OF RIGHT KNEE: ICD-10-CM

## 2025-06-16 DIAGNOSIS — M25.461 PAIN AND SWELLING OF RIGHT KNEE: ICD-10-CM

## 2025-06-16 DIAGNOSIS — M25.561 PAIN AND SWELLING OF RIGHT KNEE: ICD-10-CM

## 2025-06-16 DIAGNOSIS — M25.561 ACUTE PAIN OF RIGHT KNEE: Primary | ICD-10-CM

## 2025-06-16 DIAGNOSIS — Z96.651 S/P TKR (TOTAL KNEE REPLACEMENT), RIGHT: ICD-10-CM

## 2025-06-16 PROCEDURE — 97140 MANUAL THERAPY 1/> REGIONS: CPT | Performed by: PHYSICAL THERAPIST

## 2025-06-16 PROCEDURE — 97110 THERAPEUTIC EXERCISES: CPT | Performed by: PHYSICAL THERAPIST

## 2025-06-16 NOTE — PROGRESS NOTES
Physical Therapy Treatment Note  [facility] Physical Therapy [place]   [address] [place] [address]  P: [phone]       F: [phone]      Patient: Liza Aaron   : 1955  Treatment Diagnosis:     ICD-10-CM ICD-9-CM   1. Acute pain of right knee  M25.561 719.46   2. S/P surgical manipulation of knee joint  Z98.890 V45.89   3. Pain and swelling of right knee  M25.561 719.46    M25.461 719.06   4. Primary osteoarthritis of right knee  M17.11 715.16   5. S/P TKR (total knee replacement), right  Z96.651 V43.65   6. Acute left-sided low back pain, unspecified whether sciatica present  M54.50 724.2     Referring practitioner: Cesar Johnson MD  Date of Initial Visit: Type: THERAPY  Noted: 2025  Today's Date: 2025  Patient seen for 22 sessions           Subjective   Patient reports her knee is feeling better.  States it does feel stiff after being sedentary for period of time.  States today is the first day she felt like she could feel her kneecap moving.    Objective     See Exercise, Manual, and Modality Logs for complete treatment.       Assessment/Plan  Patient performed exercise program to target right knee flexion and extension.  She continues to have limited endrange flexion and extension and discomfort with endrange overpressure.  She responded positively to manual therapy with improved soft tissue mobility, joint mobility and muscle flexibility.  She was able to achieve 115 degrees of right knee flexion with firm passive overpressure and 0 degrees knee extension with firm passive overpressure.  Progress per Plan of Care           Timed:         Manual Therapy:    25     mins  94851;     Therapeutic Exercise:    45     mins  15592;    Neuromuscular Fide:        mins  37947;    Therapeutic Activity:          mins  83983;     Gait Training:           mins  63928;     Ultrasound:          mins  52281;    Ionto                                  mins  07841  Self Care                             mins  71420  Canalith Repos         mins  70193  Orthotic MGMT/Train         mins  99434    Un-Timed:  Electrical Stimulation:         mins  39408 ( );  Dry Needling:          mins  27010 self-pay;  Dry Needling:          mins  79678 self-pay  Traction          mins  98009  Group Therapy:          mins  07856;    Timed Treatment:   70   mins   Total Treatment:     90   mins        PT SIGNATURE: Dee Moise PT     License Number: [id number]  Electronically signed by Dee Moise PT, 06/16/25, 3:50 PM EDT

## 2025-06-17 ENCOUNTER — TREATMENT (OUTPATIENT)
Age: 70
End: 2025-06-17
Payer: COMMERCIAL

## 2025-06-17 DIAGNOSIS — M25.561 ACUTE PAIN OF RIGHT KNEE: Primary | ICD-10-CM

## 2025-06-17 DIAGNOSIS — M25.561 PAIN AND SWELLING OF RIGHT KNEE: ICD-10-CM

## 2025-06-17 DIAGNOSIS — M25.461 PAIN AND SWELLING OF RIGHT KNEE: ICD-10-CM

## 2025-06-17 DIAGNOSIS — M54.50 ACUTE LEFT-SIDED LOW BACK PAIN, UNSPECIFIED WHETHER SCIATICA PRESENT: ICD-10-CM

## 2025-06-17 DIAGNOSIS — M17.11 PRIMARY OSTEOARTHRITIS OF RIGHT KNEE: ICD-10-CM

## 2025-06-17 DIAGNOSIS — Z98.890 S/P SURGICAL MANIPULATION OF KNEE JOINT: ICD-10-CM

## 2025-06-17 DIAGNOSIS — Z96.651 S/P TKR (TOTAL KNEE REPLACEMENT), RIGHT: ICD-10-CM

## 2025-06-17 NOTE — PROGRESS NOTES
[facility] Physical Therapy [place]   [address]  [place] [address]  Phone [phone]  Fax [phone]      Physical Therapy Daily Progress Note      Patient: Liza Aaron   : 1955  Referring practitioner: Minnie Whaley MD  Date of Initial Visit: Type: THERAPY  Noted: 2025  Diagnosis/ICD-10 Code:  Acute pain of right knee [M25.561]  Today's Date: 2025  Patient seen for 23 sessions         Liza Aaron reports: still tightens up with prolonged sitting.  I've been working out daily over an hour at the gym.  Try to stretch every hour.       Subjective       Objective   See Exercise, Manual, and Modality Logs for complete treatment.    Seated R knee flexion 112 with overpressure  Prone R knee extension 0.      Assessment/Plan  Patient presents with improving hamstring and posterior muscle guarding with full ROM in extension today.  End range flexion mobility still limited but can achieve 112-115 with grade 3 stretch.  Continues with edema but improving with manual retrograde and frequent movement.       Progress per Plan of Care           Timed:  Manual Therapy:    30     mins  54635;     Therapeutic Exercise:    25     mins  75560;     Neuromuscular Fide:    -    mins  35971;    Therapeutic Activity:     -     mins  05915;     Gait Training:      -     mins  46983;     Ultrasound:     -     mins  56892;    Ionto                               -    mins   09952  Self Care                       -     mins   77766  Group Therapy            _____  Self Pay 1-29  -       mins   PTSPMIN2  Self Pay 30+  -      mins   PTSPVT   Dry Needling Tri __-__ DNTRIAL      Un-Timed:  Electrical Stimulation:    -     mins  85100 ( );  Dry Needling     -     mins self-pay  Traction     -     mins 07821  Re-Eval                           -    mins  91163    Timed Treatment:   55   mins   Total Treatment:     70   mins  Jasmine Salgado, PT, [facility]  Physical  "Therapist                  \"Parts of this note may be an electronic transcription/translation of spoken language to printed text using the Dragon dictation system  "

## 2025-06-18 ENCOUNTER — TREATMENT (OUTPATIENT)
Age: 70
End: 2025-06-18
Payer: COMMERCIAL

## 2025-06-18 DIAGNOSIS — M17.11 PRIMARY OSTEOARTHRITIS OF RIGHT KNEE: ICD-10-CM

## 2025-06-18 DIAGNOSIS — M25.461 PAIN AND SWELLING OF RIGHT KNEE: ICD-10-CM

## 2025-06-18 DIAGNOSIS — Z98.890 S/P SURGICAL MANIPULATION OF KNEE JOINT: ICD-10-CM

## 2025-06-18 DIAGNOSIS — M25.561 PAIN AND SWELLING OF RIGHT KNEE: ICD-10-CM

## 2025-06-18 DIAGNOSIS — M25.561 ACUTE PAIN OF RIGHT KNEE: Primary | ICD-10-CM

## 2025-06-18 DIAGNOSIS — M54.50 ACUTE LEFT-SIDED LOW BACK PAIN, UNSPECIFIED WHETHER SCIATICA PRESENT: ICD-10-CM

## 2025-06-18 NOTE — PROGRESS NOTES
[facility] Physical Therapy [place]   [address]  [place] [address]  Phone [phone]  Fax [phone]      Physical Therapy Daily Progress Note      Patient: Liza Aaron   : 1955  Referring practitioner: Cesar Johnson MD  Date of Initial Visit: Type: THERAPY  Noted: 2025  Diagnosis/ICD-10 Code:  Acute pain of right knee [M25.561]  Today's Date: 2025  Patient seen for 24 sessions         Liza Aaron reports: stiff, haven't had a chance to do much today.  I am sleeping better, just difficulty going back to sleep if I wake up       Subjective       Objective   See Exercise, Manual, and Modality Logs for complete treatment.      Active Range of Motion heelslide and with ball in supine  Right Knee   Flexion: 100 degrees      Passive Range of Motion      Right Knee   Flexion: 110 degrees    Extension yesterday 0    Assessment/Plan  Patient presents with improving flexion and extension mobility, patellar mobility, muscle guarding.  Continues with end range stiffness in tibial mobility.       Progress per Plan of Care Daily frequent stretching           Timed:  Manual Therapy:    20     mins  35782;     Therapeutic Exercise:    15     mins  15609;     Neuromuscular Fide:    -    mins  37753;    Therapeutic Activity:     -     mins  90315;     Gait Training:      -     mins  86306;     Ultrasound:     -     mins  49058;    Ionto                               -    mins   74925  Self Care                       -     mins   49517  Group Therapy            _____  Self Pay 1-29  -       mins   PTSPMIN2  Self Pay 30+  -      mins   PTSPVT   Dry Needling Tri __-__ DNTRIAL      Un-Timed:  Electrical Stimulation:    -     mins  04710 ( );  Dry Needling     -     mins self-pay  Traction     -     mins 11637  Re-Eval                           -    mins  28778    Timed Treatment:   35   mins   Total Treatment:     60   mins  Jasmine Salgado, PT, [facility]  Physical  "Therapist                  \"Parts of this note may be an electronic transcription/translation of spoken language to printed text using the Dragon dictation system  "

## 2025-06-19 ENCOUNTER — TREATMENT (OUTPATIENT)
Age: 70
End: 2025-06-19
Payer: COMMERCIAL

## 2025-06-19 DIAGNOSIS — M25.561 ACUTE PAIN OF RIGHT KNEE: Primary | ICD-10-CM

## 2025-06-19 DIAGNOSIS — Z96.651 S/P TOTAL KNEE REPLACEMENT, RIGHT: ICD-10-CM

## 2025-06-19 DIAGNOSIS — M17.11 PRIMARY OSTEOARTHRITIS OF RIGHT KNEE: ICD-10-CM

## 2025-06-19 DIAGNOSIS — M25.561 PAIN AND SWELLING OF RIGHT KNEE: ICD-10-CM

## 2025-06-19 DIAGNOSIS — M54.50 ACUTE LEFT-SIDED LOW BACK PAIN, UNSPECIFIED WHETHER SCIATICA PRESENT: ICD-10-CM

## 2025-06-19 DIAGNOSIS — Z96.651 S/P TKR (TOTAL KNEE REPLACEMENT), RIGHT: ICD-10-CM

## 2025-06-19 DIAGNOSIS — M25.461 PAIN AND SWELLING OF RIGHT KNEE: ICD-10-CM

## 2025-06-19 DIAGNOSIS — Z98.890 S/P SURGICAL MANIPULATION OF KNEE JOINT: ICD-10-CM

## 2025-06-19 RX ORDER — HYDROCODONE BITARTRATE AND ACETAMINOPHEN 7.5; 325 MG/1; MG/1
1-2 TABLET ORAL
Qty: 20 TABLET | Refills: 0 | Status: SHIPPED | OUTPATIENT
Start: 2025-06-19

## 2025-06-19 NOTE — PROGRESS NOTES
Physical Therapy Treatment Note  [facility] Physical Therapy [place]   [address] [place] [address]  P: [phone]       F: [phone]      Patient: Liza Aaron   : 1955  Treatment Diagnosis:     ICD-10-CM ICD-9-CM   1. Acute pain of right knee  M25.561 719.46   2. S/P surgical manipulation of knee joint  Z98.890 V45.89   3. Pain and swelling of right knee  M25.561 719.46    M25.461 719.06   4. Primary osteoarthritis of right knee  M17.11 715.16   5. Acute left-sided low back pain, unspecified whether sciatica present  M54.50 724.2   6. S/P TKR (total knee replacement), right  Z96.651 V43.65     Referring practitioner: Cesar Johnson MD  Date of Initial Visit: Type: THERAPY  Noted: 2025  Today's Date: 2025  Patient seen for 25 sessions           Subjective   Patient reports her knee is feeling stiff today.  States she has been trying to stretch but work has been busy.    Objective     See Exercise, Manual, and Modality Logs for complete treatment.       Assessment/Plan  Patient presented with increased right knee mobility restrictions and mild limp with ambulation.  She was possibly to manual therapy with improved soft tissue mobility and flexibility as well as increased right knee range of motion.  She continues to have endrange pain and guarding limiting endrange stretching into soft tissue barriers.  She performed exercises to reinforce right knee flexion and extension as well as right quad activation.  She was able to achieve right knee flexion to 107 degrees with firm passive overpressure and 0 degrees extension.  Actively she is limited to 10 degree extension lag and 100 degrees flexion.  Progress per Plan of Care           Timed:         Manual Therapy:    25     mins  21391;     Therapeutic Exercise:    47     mins  89541;    Neuromuscular Fide:        mins  03027;    Therapeutic Activity:    8      mins  75579;     Gait Training:           mins  81594;      Ultrasound:          mins  84142;    Ionto                                  mins  67590  Self Care                            mins  16501  Canalith Repos         mins  51864  Orthotic MGMT/Train         mins  19065    Un-Timed:  Electrical Stimulation:         mins  67908 ( );  Dry Needling:          mins  78374 self-pay;  Dry Needling:          mins  69379 self-pay  Traction          mins  83320  Group Therapy:          mins  97943;    Timed Treatment:   80   mins   Total Treatment:     80   mins        PT SIGNATURE: Dee Moise PT     License Number: [id number]  Electronically signed by Dee Moise PT, 06/19/25, 11:43 AM EDT

## 2025-06-20 ENCOUNTER — TREATMENT (OUTPATIENT)
Age: 70
End: 2025-06-20
Payer: COMMERCIAL

## 2025-06-20 DIAGNOSIS — M25.561 PAIN AND SWELLING OF RIGHT KNEE: ICD-10-CM

## 2025-06-20 DIAGNOSIS — M25.561 ACUTE PAIN OF RIGHT KNEE: Primary | ICD-10-CM

## 2025-06-20 DIAGNOSIS — Z96.651 S/P TKR (TOTAL KNEE REPLACEMENT), RIGHT: ICD-10-CM

## 2025-06-20 DIAGNOSIS — M17.11 PRIMARY OSTEOARTHRITIS OF RIGHT KNEE: ICD-10-CM

## 2025-06-20 DIAGNOSIS — M25.461 PAIN AND SWELLING OF RIGHT KNEE: ICD-10-CM

## 2025-06-20 DIAGNOSIS — Z98.890 S/P SURGICAL MANIPULATION OF KNEE JOINT: ICD-10-CM

## 2025-06-20 DIAGNOSIS — M54.50 ACUTE LEFT-SIDED LOW BACK PAIN, UNSPECIFIED WHETHER SCIATICA PRESENT: ICD-10-CM

## 2025-06-20 NOTE — PROGRESS NOTES
[facility] Physical Therapy [place]   [address]  [place] [address]  Phone [phone]  Fax [phone]      Physical Therapy Daily Progress Note      Patient: Liza Aaron   : 1955  Referring practitioner: Minnie Whaley MD  Date of Initial Visit: Type: THERAPY  Noted: 2025  Diagnosis/ICD-10 Code:  Acute pain of right knee [M25.561]  Today's Date: 2025  Patient seen for 26 sessions         Liza Aaron reports: stiffness persists but I'm trying to do my exercises frequently.       Subjective       Objective   See Exercise, Manual, and Modality Logs for complete treatment.      Active Range of Motion heelslide    Right Knee   Extension: 8  Flexion: 100 degrees     Passive Range of Motion      Right Knee   Flexion: 105 degrees    Extension prone 0      Assessment/Plan  Patient presents with improving relaxation in prone position.  She has consistently been able to achieve her full extension but  has muscle guarding in supine position.  Knee flexion more limited the last two days with max at 105 with overpressure in supine.  Advised to continue frequent, low load, prolonged stretch to help control muscle guarding.  Continued focus on flexion in positions that she is able to relax more comfortably.          Progress per Plan of Care  MD Tuesday after PT.         Timed:  Manual Therapy:    25     mins  47318;     Therapeutic Exercise:    23     mins  71195;     Neuromuscular Fide:    -    mins  00287;    Therapeutic Activity:     -     mins  34277;     Gait Training:      -     mins  39245;     Ultrasound:     -     mins  71150;    Ionto                               -    mins   85866  Self Care                       -     mins   40332  Group Therapy            _____  Self Pay 1-29  -       mins   PTSPMIN2  Self Pay 30+  -      mins   PTSPVT   Dry Needling Tri __-__ DNTRIAL      Un-Timed:  Electrical Stimulation:    -     mins  77819 ( );  Dry Needling    "  -     mins self-pay  Traction     -     mins 15631  Re-Eval                           -    mins  02365    Timed Treatment:   48   mins   Total Treatment:     60   mins  Jasmine Salgado, PT, [facility]  Physical Therapist                  \"Parts of this note may be an electronic transcription/translation of spoken language to printed text using the Dragon dictation system  " [Y] : Patient is sexually active [Monogamous (Male Partner)] : is monogamous with a male partner [FreeTextEntry1] : 22 yo presents to discussed positive Ureaplasma results.  No current complaints at this time [PapSmeardate] : 7/2024 [LMPDate] : 1/26/25

## 2025-06-23 ENCOUNTER — TREATMENT (OUTPATIENT)
Age: 70
End: 2025-06-23
Payer: COMMERCIAL

## 2025-06-23 DIAGNOSIS — M17.11 PRIMARY OSTEOARTHRITIS OF RIGHT KNEE: ICD-10-CM

## 2025-06-23 DIAGNOSIS — M25.561 ACUTE PAIN OF RIGHT KNEE: Primary | ICD-10-CM

## 2025-06-23 DIAGNOSIS — Z96.651 S/P TKR (TOTAL KNEE REPLACEMENT), RIGHT: ICD-10-CM

## 2025-06-23 DIAGNOSIS — M25.561 PAIN AND SWELLING OF RIGHT KNEE: ICD-10-CM

## 2025-06-23 DIAGNOSIS — Z98.890 S/P SURGICAL MANIPULATION OF KNEE JOINT: ICD-10-CM

## 2025-06-23 DIAGNOSIS — M25.461 PAIN AND SWELLING OF RIGHT KNEE: ICD-10-CM

## 2025-06-23 PROCEDURE — 97110 THERAPEUTIC EXERCISES: CPT | Performed by: PHYSICAL THERAPIST

## 2025-06-23 PROCEDURE — 97140 MANUAL THERAPY 1/> REGIONS: CPT | Performed by: PHYSICAL THERAPIST

## 2025-06-23 NOTE — PROGRESS NOTES
Physical Therapy Daily Treatment Note    [facility] PT - [facility]  [address]  [address]  [place] [address]     Patient: Liza Aaron   : 1955  Referring practitioner: Cesar Johnson MD  Date of Initial Visit: Type: THERAPY  Noted: 2025  Today's Date: 2025  Patient seen for 27 sessions         Liza Aaron reports: right knee still has some tightness/stiffness in it.  I continue to work on my PT exercises and going to gym to do additional ROM activities(bike)          Subjective     Objective   Supine R knee AROM flexion 101 deg  Supine R knee PROM flexion 107 deg  Supine R knee PROM extension 0 deg    See Exercise, Manual, and Modality Logs for complete treatment.   -discussed use of leg press machine at gym as well as home squatting activities.  -ice with static stretching without support under right knee.    Assessment/Plan  Manual interventions helpful in regards to decreasing edema/swelling along right knee joint line, improving R knee mobility and decreasing mm tightness.  A/PROM R knee flexion improved since last seen by clinician/manipulation.          Other  See one more time prior to MD follow up 25           Timed:  Manual Therapy:    25     mins  32391;  Therapeutic Exercise:   20      mins  58383;     Neuromuscular Fide:        mins  78979;    Therapeutic Activity:    5      mins  34248;     Gait Training:           mins  46940;     Ultrasound:          mins  85181;    Self Care                    ___      mins 61705    Untimed:  Electrical Stimulation:         mins  12843 ( );  Mechanical Traction:         mins  09527;     Timed Treatment:   50   mins   Total Treatment:    60    mins  Osiel Burns PTA  Physical Therapist  Assistant  [id number]

## 2025-06-24 ENCOUNTER — TREATMENT (OUTPATIENT)
Age: 70
End: 2025-06-24
Payer: COMMERCIAL

## 2025-06-24 ENCOUNTER — OFFICE VISIT (OUTPATIENT)
Dept: ORTHOPEDIC SURGERY | Facility: CLINIC | Age: 70
End: 2025-06-24
Payer: COMMERCIAL

## 2025-06-24 VITALS — HEIGHT: 66 IN | BODY MASS INDEX: 31.43 KG/M2 | WEIGHT: 195.6 LBS | TEMPERATURE: 97.8 F

## 2025-06-24 DIAGNOSIS — Z98.890 S/P SURGICAL MANIPULATION OF KNEE JOINT: ICD-10-CM

## 2025-06-24 DIAGNOSIS — M25.561 PAIN AND SWELLING OF RIGHT KNEE: ICD-10-CM

## 2025-06-24 DIAGNOSIS — M17.11 PRIMARY OSTEOARTHRITIS OF RIGHT KNEE: ICD-10-CM

## 2025-06-24 DIAGNOSIS — M25.561 ACUTE PAIN OF RIGHT KNEE: Primary | ICD-10-CM

## 2025-06-24 DIAGNOSIS — M25.461 PAIN AND SWELLING OF RIGHT KNEE: ICD-10-CM

## 2025-06-24 DIAGNOSIS — Z96.651 S/P TKR (TOTAL KNEE REPLACEMENT), RIGHT: ICD-10-CM

## 2025-06-24 DIAGNOSIS — Z96.651 STATUS POST RIGHT KNEE REPLACEMENT: Primary | ICD-10-CM

## 2025-06-24 PROCEDURE — 99024 POSTOP FOLLOW-UP VISIT: CPT | Performed by: ORTHOPAEDIC SURGERY

## 2025-06-24 RX ORDER — PREDNISONE 10 MG/1
TABLET ORAL
Qty: 42 TABLET | Refills: 0 | Status: SHIPPED | OUTPATIENT
Start: 2025-06-24 | End: 2025-07-15

## 2025-06-24 NOTE — PROGRESS NOTES
Physical Therapy Progress Note    [facility] PT - [facility]  [address]  [place] [address]            2025  Dr. Cesar Johnson    Re: Liza Aaron  ________________________________________________________________    Ms. Liza Aaron, has attended 10/10 PT sessions.to right knee since undergoing LUCAS on 25.    Treatment has consisted of: manual interventions designed to improve right knee ROM, flexibility and decrease swelling as well as therapeutic exercises and prn modalities.    S: Ms. Liza Aaron states: right knee feels better since the manipulation.  Feels she can bend the knee back further/easier and do the exercises better.  Still with some stiffness/tightness in right knee between work out session/prolonged positioning(ie sitting).      Subjective Evaluation    Pain  Current pain ratin  At best pain ratin  At worst pain ratin  Quality: discomfort (joint line medial/lateral)           Objective          Observations     Additional Knee Observation Details  Diffuse swelling medial lateral joint lines right knee    Tenderness     Right Knee   Tenderness in the lateral joint line and medial joint line.     Active Range of Motion     Right Knee   Flexion: 107 degrees   Extension: Right knee active extension: lacking 4 deg from full extension.     Passive Range of Motion     Right Knee   Flexion: 111 degrees   Extension: 0 degrees       See Exercise, Manual, and Modality Logs for complete treatment.   -continued flexion related exercises/activities to improve R knee freedom of movement.  -continued ice application with static stretching hamstring    Assessment/Plan compliant, cooperative and motivated with rehab efforts to date since undergoing right knee manipulation under anesthesia on 2025.  Subjectively patient reports that she feels she can bend her knee back further/easier though still notes some stiffness/tightness in right knee with prolonged  positioning (i.e. sitting).  Objectively she presents with improved right knee flexion versus last measurement prior to manipulation though flexion range of motion slightly less then measurement obtained first PT session postmanipulation.      Patient should benefit from continued PT interventions to further improve her right knee flexion range of motion as well as to progress activities to improve gait in order to allow for better return to prior functional level activities.    Other  To Md with letter.  Await further orders regarding continued PT intervention.             Manual Therapy:  15       mins  06493;  Therapeutic Exercise:         mins  34289;     Neuromuscular Fide:        mins  66876;    Therapeutic Activity:   10       mins  17811;     Gait Training:           mins  42012;     Ultrasound:          mins  19045;    Electrical Stimulation:         mins  02191 ( );      Timed Treatment: 25     mins   Total Treatment:    25    mins    Osiel Burns PTA,  Physical Therapist Assistant # [id number]

## 2025-06-24 NOTE — PROGRESS NOTES
Returns for follow-up of right knee she is now 3 months out from knee replacement and 2 weeks out from knee manipulation for arthrofibrosis.  She comes with her daily notes of range of motion measurements which range from 111-115 passive and around 110 active.    On exam her incision is healing appropriately I see no erythema no sign of infection there is no joint effusion she does have mild diffuse tissue fullness or swelling about the knee.  She lacks about 3 to 4 degrees of extension and walks with a slight flexed knee gait.  I placed her on prednisone taper 30 mg daily x 1 week, 20 mg daily x 1 week, then 10 mg daily x 1 week.  Hopefully this will decrease the diffuse tightness and inflammation and I will recheck her in 6 weeks with repeat x-rays.

## 2025-06-27 ENCOUNTER — TREATMENT (OUTPATIENT)
Age: 70
End: 2025-06-27
Payer: COMMERCIAL

## 2025-06-27 DIAGNOSIS — M25.461 PAIN AND SWELLING OF RIGHT KNEE: ICD-10-CM

## 2025-06-27 DIAGNOSIS — M25.561 PAIN AND SWELLING OF RIGHT KNEE: ICD-10-CM

## 2025-06-27 DIAGNOSIS — M17.11 PRIMARY OSTEOARTHRITIS OF RIGHT KNEE: ICD-10-CM

## 2025-06-27 DIAGNOSIS — Z98.890 S/P SURGICAL MANIPULATION OF KNEE JOINT: ICD-10-CM

## 2025-06-27 DIAGNOSIS — M25.561 ACUTE PAIN OF RIGHT KNEE: Primary | ICD-10-CM

## 2025-06-27 DIAGNOSIS — Z96.651 S/P TKR (TOTAL KNEE REPLACEMENT), RIGHT: ICD-10-CM

## 2025-06-27 NOTE — PROGRESS NOTES
Physical Therapy Daily Treatment Note    [facility]  [address]  [address]  [place] [address]     Patient: Liza Aaron   : 1955  Referring practitioner: Minnie Whaley MD  Date of Initial Visit: Type: THERAPY  Noted: 2025  Today's Date: 2025  Patient seen for 29 sessions         Liza Aaron reports: Right knee still feels like it has a band low open I got something from HIM FROM [facility]OVE WHAT I TOLD ME I WANTED TO SHAKE MILLIGRAMS ALREADY BUT YOU DO NOT WHEN YOU ADMIT TO THE OR SO THEY WERE GOING TO DIE LIKE YEAH I THINK MILKSHAKE        Subjective     Objective   See Exercise, Manual, and Modality Logs for complete treatment.   Added:  TKE ball against wall; standing TKE R LE with sapp t band    Assessment/Plan manual interventions right knee remain beneficial to decrease hamstring tightness to improve knee extension both with manual stretching and soft tissue/deep tissue mobilization and was performed standing exercises right lower extremity to improve knee extension.        Progress per Plan of Care and Progress strengthening /stabilization /functional activity           Timed:  Manual Therapy:   18      mins  29908;  Therapeutic Exercise:   20      mins  74476;     Neuromuscular Fide:        mins  44315;    Therapeutic Activity:    10      mins  04078;     Gait Training:           mins  68469;     Ultrasound:          mins  21943;    Self Care                    ___      mins 12003    Untimed:  Electrical Stimulation:         mins  35862 ( );  Mechanical Traction:         mins  45617;     Timed Treatment:  48    mins   Total Treatment:     58   mins  Osiel Burns PTA  Physical Therapist  Assistant  [id number]

## 2025-06-30 ENCOUNTER — TREATMENT (OUTPATIENT)
Age: 70
End: 2025-06-30
Payer: COMMERCIAL

## 2025-06-30 DIAGNOSIS — M25.561 PAIN AND SWELLING OF RIGHT KNEE: ICD-10-CM

## 2025-06-30 DIAGNOSIS — M25.561 ACUTE PAIN OF RIGHT KNEE: Primary | ICD-10-CM

## 2025-06-30 DIAGNOSIS — Z96.651 S/P TKR (TOTAL KNEE REPLACEMENT), RIGHT: ICD-10-CM

## 2025-06-30 DIAGNOSIS — Z98.890 S/P SURGICAL MANIPULATION OF KNEE JOINT: ICD-10-CM

## 2025-06-30 DIAGNOSIS — M25.461 PAIN AND SWELLING OF RIGHT KNEE: ICD-10-CM

## 2025-06-30 DIAGNOSIS — M17.11 PRIMARY OSTEOARTHRITIS OF RIGHT KNEE: ICD-10-CM

## 2025-06-30 NOTE — PROGRESS NOTES
Physical Therapy Daily Treatment Note    [facility]  [address]  [address]  [place] [address]     Patient: Liza Aaron   : 1955  Referring practitioner: Cesar Johnson MD  Date of Initial Visit: Type: THERAPY  Noted: 2025  Today's Date: 2025  Patient seen for 30 sessions         Liza Aaron reports: Doing some activities over [facility] including the pool and some of the machines quadrant therapy.        Subjective     Objective   See Exercise, Manual, and Modality Logs for complete treatment.       Assessment/Plan Decreased subjective complaints of R knee discomfort, feels stretches are helping, but still feels some tightness in the knee Able to progress exercises without increased discomfort of R knee, just early fatigue of isolated musculature.  Benefits from verbal/tactile cues to ensure proper exercise performance, technique, positioning and goal of achieving TKE with standing and isotonic activities.         Progress strengthening /stabilization /functional activity           Timed:  Manual Therapy:  18       mins  47584;  Therapeutic Exercise:    24     mins  16709;     Neuromuscular Fide:        mins  79614;    Therapeutic Activity:    10      mins  06362;     Gait Training:           mins  98793;     Ultrasound:          mins  19455;    Self Care                    ___      mins 68800    Untimed:  Electrical Stimulation:         mins  59526 ( );  Mechanical Traction:         mins  36945;     Timed Treatment:  52    mins   Total Treatment:    62    mins  Osiel Burns PTA  Physical Therapist  Assistant  [id number]

## 2025-07-02 ENCOUNTER — TREATMENT (OUTPATIENT)
Age: 70
End: 2025-07-02
Payer: COMMERCIAL

## 2025-07-02 DIAGNOSIS — M25.461 PAIN AND SWELLING OF RIGHT KNEE: ICD-10-CM

## 2025-07-02 DIAGNOSIS — M17.11 PRIMARY OSTEOARTHRITIS OF RIGHT KNEE: ICD-10-CM

## 2025-07-02 DIAGNOSIS — M25.561 ACUTE PAIN OF RIGHT KNEE: Primary | ICD-10-CM

## 2025-07-02 DIAGNOSIS — Z98.890 S/P SURGICAL MANIPULATION OF KNEE JOINT: ICD-10-CM

## 2025-07-02 DIAGNOSIS — M25.561 PAIN AND SWELLING OF RIGHT KNEE: ICD-10-CM

## 2025-07-02 DIAGNOSIS — Z96.651 S/P TKR (TOTAL KNEE REPLACEMENT), RIGHT: ICD-10-CM

## 2025-07-02 DIAGNOSIS — Z96.651 S/P TOTAL KNEE REPLACEMENT, RIGHT: ICD-10-CM

## 2025-07-02 PROCEDURE — 97110 THERAPEUTIC EXERCISES: CPT | Performed by: PHYSICAL THERAPIST

## 2025-07-02 PROCEDURE — 97530 THERAPEUTIC ACTIVITIES: CPT | Performed by: PHYSICAL THERAPIST

## 2025-07-02 PROCEDURE — 97140 MANUAL THERAPY 1/> REGIONS: CPT | Performed by: PHYSICAL THERAPIST

## 2025-07-02 RX ORDER — HYDROCODONE BITARTRATE AND ACETAMINOPHEN 7.5; 325 MG/1; MG/1
1 TABLET ORAL
Qty: 20 TABLET | Refills: 0 | Status: SHIPPED | OUTPATIENT
Start: 2025-07-02

## 2025-07-02 NOTE — TELEPHONE ENCOUNTER
Please advise   [phone]    Sx date 06/11/2025 - RT KNEE MANIPULATION     Last FD 06/19/2025  Last OV 06/24/2025  Next OV 08/12/2025

## 2025-07-03 ENCOUNTER — TREATMENT (OUTPATIENT)
Age: 70
End: 2025-07-03
Payer: COMMERCIAL

## 2025-07-03 DIAGNOSIS — M25.561 ACUTE PAIN OF RIGHT KNEE: Primary | ICD-10-CM

## 2025-07-03 DIAGNOSIS — Z98.890 S/P SURGICAL MANIPULATION OF KNEE JOINT: ICD-10-CM

## 2025-07-03 DIAGNOSIS — M17.11 PRIMARY OSTEOARTHRITIS OF RIGHT KNEE: ICD-10-CM

## 2025-07-03 DIAGNOSIS — M25.561 PAIN AND SWELLING OF RIGHT KNEE: ICD-10-CM

## 2025-07-03 DIAGNOSIS — M25.461 PAIN AND SWELLING OF RIGHT KNEE: ICD-10-CM

## 2025-07-03 DIAGNOSIS — Z96.651 S/P TKR (TOTAL KNEE REPLACEMENT), RIGHT: ICD-10-CM

## 2025-07-03 NOTE — PROGRESS NOTES
"[facility] [place]   [address]  [place] [address]  Phone [phone]  Fax [phone]        Physical Therapy Re Certification Of Plan of Care    Patient: Liza Aaron   : 1955  Diagnosis/ICD-10 Code:  Acute pain of right knee [M25.561]  Referring practitioner: Cesar Johnson MD  Date of Initial Visit: 2025  Today's Date: 2025  Patient seen for 33 sessions         Visit Diagnoses:    ICD-10-CM ICD-9-CM   1. Acute pain of right knee  M25.561 719.46   2. S/P surgical manipulation of knee joint  Z98.890 V45.89   3. Pain and swelling of right knee  M25.561 719.46    M25.461 719.06   4. Primary osteoarthritis of right knee  M17.11 715.16   5. S/P TKR (total knee replacement), right  Z96.651 V43.65   6. Acute left-sided low back pain, unspecified whether sciatica present  M54.50 724.2         Liza Aaron reports: down to one pain pill a day.  None today. Still sleeping in the recliner, going to [employer] daily, bike, water.  Subjective Questionnaire:  WOMAC   Clinical Progress: improved  Home Program Compliance: Yes  Treatment has included: therapeutic exercise, neuromuscular re-education, manual therapy, therapeutic activity, gait training, and electrical stimulation      Subjective       Objective          Functional Assessment   Squat   Left tibial anterior translation beyond toes and right tibial anterior translation beyond toes. No pain.     Single Leg Squat     Right Leg  Valgus. No pain.     Forward Step Up 6\"     Right Leg  Within functional limits.     Forward Step Up 8\"     Right Leg  Within functional limits.     Forward Step Down 6\"     Right Leg  Increased forward trunk lean.         Right Knee   Improved edema, healed incision.      No ecchymosis in R LE        Tenderness      Right knee  Medial and lateral joint line       Neurological Testing      Sensation      Knee   Left Knee   Intact: Light touch     Right Knee   Intact: light " touch mild decrease lateral knee     Ankle/Foot   Left Ankle/Foot   Intact: light touch     Right Ankle/Foot   Intact: light touch      Active Range of Motion   Right Knee   Flexion: 110 degrees  Extension: 2      Passive Range of Motion      Right Knee   Flexion: 113 degrees   Extension: 0 degrees      Strength/Myotome Testing      Right Hip   Planes of Motion   Flexion: 5  Extension: 5  Abduction: 4+  Adduction: 4+     Left Knee   Flexion: 5  Extension: 5  Quadriceps contraction: good     Right Knee   Flexion: 5  Extension: 5  Quadriceps contraction: good     Right Ankle/Foot   Dorsiflexion:5  Plantar flexion: 5-      Joint mobility  Improved but restriction in flexion at tib/femur     Tests      Additional Tests Details  (-) Juan's     Swelling      Left Knee Girth Measurement (cm)   Joint line:  43.4  10 cm above joint line: 48.8.  10 cm below joint line: 41.2.     Right Knee Girth Measurement (cm)   Joint line: 46.6  10 cm above joint line: 48.8  10 cm below joint line: 39.9     Ambulation   Weight-Bearing Status   Weight-Bearing Status (Right): full        Ambulation: Level Surfaces   Ambulation with assistive device: none     Observational Gait   Mild loss of terminal knee extension    Assessment/Plan  Patient demonstrates improvement in ROM with flexion up to 113 degrees passively and full extension.  Her stength has progress well, edema is down, and she is ambulating without AD for community distanced.  She can perform stairs but still has mild difficulty with descent on right.  She is ready to return to more strengthening but needs to continue to focus on ROM.  She is progressing well towards the goals set during the initial evaluation.      Goal progress:  STG X 3 weeks. Pt will:  1. Demonstrate improve R knee AROM of 115-5. NOT MET  2. Report pain of </=3/10 with all ADLs without pain meds.- MET  3. Ambulate with cane FWB safely without hip or foot pain in a normal pattern.-MET  4. Strength at least 4/5  "right LE.- previously MET     LTG X 6 weeks.  Progressing  Pt will:  1. Demonstrate improved right LE MMT of >4+/5.MET  2. R knee A/PROM 4/2-120/125 degrees to allow for normal gait.-progressing  3. Ambulate without AD safely up to 15 minutes to allow for airport and vacation walking this summer.-progressing  4. Return to ambulating without AD community distances, driving unrestricted and navigate full flight of stairs.-progressing     Recommendations: Continue as planned  Timeframe: 1 month  Prognosis to achieve goals: good      Timed:         Manual Therapy:    25     mins  20219;     Therapeutic Exercise:    10     mins  12417;     Neuromuscular Fide:    -    mins  92455;    Therapeutic Activity:     10     mins  02062;     Gait Training:      -     mins  16161;     Ultrasound:     -     mins  41449;    [place]                               -    mins   19315  Self Care                       -     mins   34054  Group Therapy            _____  Self Pay 1-29  -       mins   [id number]  Self Pay 30+  -      mins   PTSPVT   Dry Needling Tri __-__ DNTRIAL      Un-Timed:  Electrical Stimulation:    -     mins  88901 ( );  Dry Needling     -     mins self-pay  Traction     -     mins 66702  Re-Eval                           -    mins  52643      Timed Treatment:   45   mins   Total Treatment:     80   mins          PT: Jasmine Salgado PT, CIDN     [place] License:  [id number]    Electronically signed by Jasmine Salgado PT, 07/08/25, 5:18 PM EDT    Certification Period: 7/8/2025 thru 10/5/2025  I certify that the therapy services are furnished while this patient is under my care.  The services outlined above are required by this patient, and will be reviewed every 90 days.         Physician Signature:__________________________________________________    PHYSICIAN: Cesar Johnson MD      DATE:     Please sign and return via fax to [phone].  Thank you, [facility].            \"Parts of this note may be an " electronic transcription/translation of spoken language to printed text using the Dragon dictation system

## 2025-07-03 NOTE — PROGRESS NOTES
Physical Therapy Daily Treatment Note    [facility] - [facility]  [address]  [address]  [place] [address]     Patient: Liza Aaron   : 1955  Referring practitioner: Cesar Johnson MD  Date of Initial Visit: Type: THERAPY  Noted: 2025  Today's Date: 7/3/2025  Patient seen for 32 sessions         Liza Aaron reports: continuing to work on right knee exercises/activities when away from PT by using exercise bikes and warm water pool at [facility] as time allows.         Subjective     Objective   See Exercise, Manual, and Modality Logs for complete treatment.   Treadmill walking x 10 min for gait normalization in regards to increased step/stride length, foot clearance and upright trunk posture/positioning with TA contraction.   Amb 1.0-1.4mph.        Assessment/Plan  Right knee continues to progress in regards to exercise and ambulation capability.  Continues to have some ROM deficits in both flex/ext planes, but improves post manual interventions.          Progress strengthening /stabilization /functional activity right knee affected musculature.  Continue manual and soft tissue intervention to improve R knee ROM.           Timed:  Manual Therapy:   15      mins  94212;  Therapeutic Exercise:    20     mins  48022;     Neuromuscular Fide:        mins  68369;    Therapeutic Activity:   10       mins  46371;     Gait Training:      10     mins  39835;     Ultrasound:          mins  15742;    Self Care                    ___      mins 09697    Untimed:  Electrical Stimulation:         mins  49399 ( );  Mechanical Traction:         mins  37824;     Timed Treatment:   55   mins   Total Treatment:     65   mins  Osiel Burns PTA  Physical Therapist  Assistant  [id number]

## 2025-07-08 ENCOUNTER — TREATMENT (OUTPATIENT)
Age: 70
End: 2025-07-08
Payer: COMMERCIAL

## 2025-07-08 DIAGNOSIS — M25.561 ACUTE PAIN OF RIGHT KNEE: Primary | ICD-10-CM

## 2025-07-08 DIAGNOSIS — M17.11 PRIMARY OSTEOARTHRITIS OF RIGHT KNEE: ICD-10-CM

## 2025-07-08 DIAGNOSIS — M54.50 ACUTE LEFT-SIDED LOW BACK PAIN, UNSPECIFIED WHETHER SCIATICA PRESENT: ICD-10-CM

## 2025-07-08 DIAGNOSIS — M25.561 PAIN AND SWELLING OF RIGHT KNEE: ICD-10-CM

## 2025-07-08 DIAGNOSIS — Z96.651 S/P TKR (TOTAL KNEE REPLACEMENT), RIGHT: ICD-10-CM

## 2025-07-08 DIAGNOSIS — M25.461 PAIN AND SWELLING OF RIGHT KNEE: ICD-10-CM

## 2025-07-08 DIAGNOSIS — Z98.890 S/P SURGICAL MANIPULATION OF KNEE JOINT: ICD-10-CM

## 2025-07-09 ENCOUNTER — TREATMENT (OUTPATIENT)
Age: 70
End: 2025-07-09
Payer: COMMERCIAL

## 2025-07-09 ENCOUNTER — TELEPHONE (OUTPATIENT)
Dept: NEUROLOGY | Facility: CLINIC | Age: 70
End: 2025-07-09
Payer: COMMERCIAL

## 2025-07-09 DIAGNOSIS — M25.561 ACUTE PAIN OF RIGHT KNEE: Primary | ICD-10-CM

## 2025-07-09 DIAGNOSIS — Z96.651 S/P TKR (TOTAL KNEE REPLACEMENT), RIGHT: ICD-10-CM

## 2025-07-09 DIAGNOSIS — M25.561 PAIN AND SWELLING OF RIGHT KNEE: ICD-10-CM

## 2025-07-09 DIAGNOSIS — M17.11 PRIMARY OSTEOARTHRITIS OF RIGHT KNEE: ICD-10-CM

## 2025-07-09 DIAGNOSIS — M25.461 PAIN AND SWELLING OF RIGHT KNEE: ICD-10-CM

## 2025-07-09 DIAGNOSIS — Z98.890 S/P SURGICAL MANIPULATION OF KNEE JOINT: ICD-10-CM

## 2025-07-09 NOTE — PROGRESS NOTES
Physical Therapy Daily Treatment Note    [facility]  [address]  [place] [address]     Patient: Liza Aaron   : 1955  Referring practitioner: Cesar Johnson MD  Date of Initial Visit: Type: THERAPY  Noted: 2025  Today's Date: 2025  Patient seen for 34 sessions         Liza Aaron reports:         Subjective     Objective   See Exercise, Manual, and Modality Logs for complete treatment.   Retro treadmill .05-.10 mph x 8 min for R knee TKE    Assessment/Plan gait normalizing R knee, though still lacks full TKE in single leg stance phase of gait.  Progressing/performing in clinic isotonic and functional activities for R quad and hip strengthening.          Progress strengthening /stabilization /functional activity R knee.  Continue with manual interventions to improve R knee flex/ext ROM           Timed:  Manual Therapy:         mins  72883;  Therapeutic Exercise:    24     mins  13938;     Neuromuscular Fide:        mins  11162;    Therapeutic Activity:      10    mins  77118;     Gait Trainin      mins  27298;     Ultrasound:          mins  33380;    Self Care                    ___      mins 48918    Untimed:  Electrical Stimulation:         mins  20265 ( );  Mechanical Traction:        mins  19731;     Timed Treatment:   42   mins   Total Treatment:     42   mins  Osiel Burns PTA  Physical Therapist  Assistant  [id number]

## 2025-07-11 ENCOUNTER — OFFICE VISIT (OUTPATIENT)
Dept: INTERNAL MEDICINE | Facility: CLINIC | Age: 70
End: 2025-07-11
Payer: COMMERCIAL

## 2025-07-11 ENCOUNTER — TREATMENT (OUTPATIENT)
Age: 70
End: 2025-07-11
Payer: COMMERCIAL

## 2025-07-11 ENCOUNTER — TELEPHONE (OUTPATIENT)
Dept: OBSTETRICS AND GYNECOLOGY | Age: 70
End: 2025-07-11
Payer: COMMERCIAL

## 2025-07-11 VITALS
HEART RATE: 75 BPM | HEIGHT: 66 IN | SYSTOLIC BLOOD PRESSURE: 110 MMHG | TEMPERATURE: 97.5 F | WEIGHT: 194 LBS | BODY MASS INDEX: 31.18 KG/M2 | DIASTOLIC BLOOD PRESSURE: 70 MMHG | OXYGEN SATURATION: 94 %

## 2025-07-11 DIAGNOSIS — M25.561 ACUTE PAIN OF RIGHT KNEE: Primary | ICD-10-CM

## 2025-07-11 DIAGNOSIS — Z96.651 S/P TKR (TOTAL KNEE REPLACEMENT), RIGHT: ICD-10-CM

## 2025-07-11 DIAGNOSIS — M25.461 PAIN AND SWELLING OF RIGHT KNEE: ICD-10-CM

## 2025-07-11 DIAGNOSIS — L02.91 ABSCESS: Primary | ICD-10-CM

## 2025-07-11 DIAGNOSIS — M17.11 PRIMARY OSTEOARTHRITIS OF RIGHT KNEE: ICD-10-CM

## 2025-07-11 DIAGNOSIS — Z98.890 S/P SURGICAL MANIPULATION OF KNEE JOINT: ICD-10-CM

## 2025-07-11 DIAGNOSIS — M25.561 PAIN AND SWELLING OF RIGHT KNEE: ICD-10-CM

## 2025-07-11 PROCEDURE — 99213 OFFICE O/P EST LOW 20 MIN: CPT | Performed by: FAMILY MEDICINE

## 2025-07-11 RX ORDER — SULFAMETHOXAZOLE AND TRIMETHOPRIM 800; 160 MG/1; MG/1
1 TABLET ORAL 2 TIMES DAILY
Qty: 20 TABLET | Refills: 0 | Status: SHIPPED | OUTPATIENT
Start: 2025-07-11 | End: 2025-07-21

## 2025-07-11 NOTE — TELEPHONE ENCOUNTER
Caller: Liza Aaron    Relationship: Self    Best call back number: [phone]    Who are you requesting to speak with (clinical staff,  LINK      What was the call regarding: PATIENT ADVISED THAT SHE HAS A RAISED BUMP NEAR VAGINAL AREA, MAY BE INFECTED, WOULD LIKE TO BE SEEN TODAY, PLEASE ASSIST.     ”

## 2025-07-11 NOTE — PROGRESS NOTES
Subjective   Liza Aaron is a 70 y.o. female.   Chief Complaint   Patient presents with    lesion skin       History of Present Illness     Painful nodule in the vaginal area  -she rides a bike every day and she has soreness in vaginal area on and off.  Yesterday she noticed more soreness on the right side and she noticed a nodule.  It is painful, there is no drainage.  She has no fever, no chills.  She had abscess in the same area in 2020 and it responded to treatment with antibiotic.    Review of Systems   Constitutional:  Negative for chills and fever.         Objective   Wt Readings from Last 3 Encounters:   07/11/25 88 kg (194 lb)   06/24/25 88.7 kg (195 lb 9.6 oz)   06/09/25 90.7 kg (200 lb)      Vitals:    07/11/25 0923   BP: 110/70   Pulse: 75   Temp: 97.5 °F (36.4 °C)   SpO2: 94%     Temp Readings from Last 3 Encounters:   07/11/25 97.5 °F (36.4 °C)   06/24/25 97.8 °F (36.6 °C) (Temporal)   06/11/25 97.7 °F (36.5 °C) (Oral)     BP Readings from Last 3 Encounters:   07/11/25 110/70   06/11/25 127/84   06/09/25 114/70     Pulse Readings from Last 3 Encounters:   07/11/25 75   06/11/25 56   06/09/25 69     Body mass index is 31.33 kg/m².    Physical Exam  Constitutional:       Appearance: Normal appearance.   Genitourinary:         Comments: ~2 cm abscess.  Not draining.        Assessment & Plan   Diagnoses and all orders for this visit:    1. Abscess (Primary)    Other orders  -     sulfamethoxazole-trimethoprim (Bactrim DS) 800-160 MG per tablet; Take 1 tablet by mouth 2 (Two) Times a Day for 10 days.  Dispense: 20 tablet; Refill: 0        Abscess-not draining.  We are starting Bactrim and patient will schedule follow-up with her GYN.  Risks for kidneys with Bactrim discussed.  Stay well-hydrated.  Follow-up as needed.

## 2025-07-15 ENCOUNTER — TREATMENT (OUTPATIENT)
Age: 70
End: 2025-07-15
Payer: COMMERCIAL

## 2025-07-15 DIAGNOSIS — Z98.890 S/P SURGICAL MANIPULATION OF KNEE JOINT: ICD-10-CM

## 2025-07-15 DIAGNOSIS — M25.561 ACUTE PAIN OF RIGHT KNEE: Primary | ICD-10-CM

## 2025-07-15 DIAGNOSIS — Z96.651 S/P TKR (TOTAL KNEE REPLACEMENT), RIGHT: ICD-10-CM

## 2025-07-15 DIAGNOSIS — M17.11 PRIMARY OSTEOARTHRITIS OF RIGHT KNEE: ICD-10-CM

## 2025-07-15 DIAGNOSIS — M25.461 PAIN AND SWELLING OF RIGHT KNEE: ICD-10-CM

## 2025-07-15 DIAGNOSIS — M25.561 PAIN AND SWELLING OF RIGHT KNEE: ICD-10-CM

## 2025-07-15 PROCEDURE — 97110 THERAPEUTIC EXERCISES: CPT | Performed by: PHYSICAL THERAPIST

## 2025-07-15 PROCEDURE — 97530 THERAPEUTIC ACTIVITIES: CPT | Performed by: PHYSICAL THERAPIST

## 2025-07-15 PROCEDURE — 97140 MANUAL THERAPY 1/> REGIONS: CPT | Performed by: PHYSICAL THERAPIST

## 2025-07-15 NOTE — PROGRESS NOTES
Physical Therapy Daily Treatment Note    [facility]  [address]  [place] [address]     Patient: Liza Aaron   : 1955  Referring practitioner: Cesar Johnson MD  Date of Initial Visit: Type: THERAPY  Noted: 2025  Today's Date: 7/15/2025  Patient seen for 35 sessions         Liza Aaron reports: To do exercises at home and at the gym as well as in therapy to improve right knee motion and strength.        Subjective     Objective   See Exercise, Manual, and Modality Logs for complete treatment.       Assessment/Plan subjectively continues to report good compliance to home exercise and gym exercise activities to help maintain progress right knee range of motion and strength when not in therapy.  Manual interventions remain positive in order to allow for improved right knee passive range of motion in flexion and extension planes as well as with stretching for affected musculature.        Progress per Plan of Care and Progress strengthening /stabilization /functional activity           Timed:  Manual Therapy:  12       mins  67825;  Therapeutic Exercise:    24     mins  51567;     Neuromuscular Fide:        mins  24554;    Therapeutic Activity:     10     mins  34851;     Gait Training:           mins  57639;     Ultrasound:          mins  02983;    Self Care                    ___      mins 17800    Untimed:  Electrical Stimulation:         mins  58085 ( );  Mechanical Traction:         mins  57566;     Timed Treatment: 46     mins   Total Treatment:     46   mins  Osiel Burns PTA  Physical Therapist  Assistant  [id number]

## 2025-07-16 ENCOUNTER — TREATMENT (OUTPATIENT)
Age: 70
End: 2025-07-16
Payer: COMMERCIAL

## 2025-07-16 DIAGNOSIS — M25.461 PAIN AND SWELLING OF RIGHT KNEE: ICD-10-CM

## 2025-07-16 DIAGNOSIS — Z98.890 S/P SURGICAL MANIPULATION OF KNEE JOINT: ICD-10-CM

## 2025-07-16 DIAGNOSIS — M25.561 ACUTE PAIN OF RIGHT KNEE: Primary | ICD-10-CM

## 2025-07-16 DIAGNOSIS — M25.561 PAIN AND SWELLING OF RIGHT KNEE: ICD-10-CM

## 2025-07-16 DIAGNOSIS — M17.11 PRIMARY OSTEOARTHRITIS OF RIGHT KNEE: ICD-10-CM

## 2025-07-16 DIAGNOSIS — Z96.651 S/P TKR (TOTAL KNEE REPLACEMENT), RIGHT: ICD-10-CM

## 2025-07-16 NOTE — PROGRESS NOTES
Physical Therapy Daily Treatment Note    [facility]  [address]  [address]  [place] [address]     Patient: Liza Aaron   : 1955  Referring practitioner: Cesar Johnson MD  Date of Initial Visit: Type: THERAPY  Noted: 2025  Today's Date: 2025  Patient seen for 36 sessions         Liza Aaron reports: right knee seems to be doing better.  Still feel an area or band of tightness around R knee        Subjective     Objective   See Exercise, Manual, and Modality Logs for complete treatment.   Rocker board ROM/Balance Fwd/bwd and side/side x 2 min each direction  Low waqas step overs fwd and lateral  Standing 3 way hip 2 lbs and 3 lbs x 10 each R LE plus hamstring curls at // bars  Leg press B LE 88 lbs 3 x 10  Single R LE leg press 44 lbs 2 x 10    Kneeling on chair on foam square x 2  Partial/full squats at balance bar 2 x 5    Discussed home 3 way balance activities R LE    Assessment/Plan standing isotonic and balance activities without increased symptoms or discomfort.  Demonstrates improved right knee flexion range of motion with simulated kneeling activities on foam square on chair.  Gait improving with lessened noted antalgia right lower extremity.  Demonstrates increased capability for hip flexion and knee flexion with low waqas step overs.  Patient is continuing to improve overall in regards to right lower extremity/knee rehab efforts.        Progress per Plan of Care and Progress strengthening /stabilization /functional activity           Timed:  Manual Therapy:         mins  45530;  Therapeutic Exercise:     26    mins  65321;     Neuromuscular Fide:      8  mins  74519;    Therapeutic Activity:      10    mins  19594;     Gait Training:           mins  58750;     Ultrasound:          mins  24212;    Self Care                    ___      mins 59660    Untimed:  Electrical Stimulation:         mins  67113 ( );  Mechanical Traction:         mins   23420;     Timed Treatment:   44   mins   Total Treatment:  44      mins  Osiel Burns, ANNABELLE  Physical Therapist  Assistant  [id number]

## 2025-07-18 ENCOUNTER — TREATMENT (OUTPATIENT)
Age: 70
End: 2025-07-18
Payer: COMMERCIAL

## 2025-07-18 DIAGNOSIS — Z98.890 S/P SURGICAL MANIPULATION OF KNEE JOINT: ICD-10-CM

## 2025-07-18 DIAGNOSIS — M25.561 PAIN AND SWELLING OF RIGHT KNEE: ICD-10-CM

## 2025-07-18 DIAGNOSIS — Z96.651 S/P TKR (TOTAL KNEE REPLACEMENT), RIGHT: ICD-10-CM

## 2025-07-18 DIAGNOSIS — M25.461 PAIN AND SWELLING OF RIGHT KNEE: ICD-10-CM

## 2025-07-18 DIAGNOSIS — M25.561 ACUTE PAIN OF RIGHT KNEE: Primary | ICD-10-CM

## 2025-07-18 DIAGNOSIS — M17.11 PRIMARY OSTEOARTHRITIS OF RIGHT KNEE: ICD-10-CM

## 2025-07-18 NOTE — PROGRESS NOTES
Physical Therapy Daily Treatment Note    [facility]  [address]  [address]  [place] [address]     Patient: Liza Aaron   : 1955  Referring practitioner: Cesar Johnson MD  Date of Initial Visit: Type: THERAPY  Noted: 2025  Today's Date: 2025  Patient seen for 37 sessions         Liza Aaron reports: no new complaints R knee        Subjective     Objective   - some evident hip weakness noted RLE with ambulation(decreased R unilateral single leg stance time).    See Exercise, Manual, and Modality Logs for complete treatment.   Yellow miniband for home use hip abd, flex, ext    Assessment/Plan Benefits from addition of hip strengthening exercise/band activities to strengthen and improve gait R LE.        Progress strengthening /stabilization /functional activity for hips           Timed:  Manual Therapy:         mins  67877;  Therapeutic Exercise:  28       mins  73770;     Neuromuscular Fide:  4     mins  86657;    Therapeutic Activity:    10      mins  99455;     Gait Training:           mins  08026;     Ultrasound:          mins  51907;    Self Care                    ___      mins 12919    Untimed:  Electrical Stimulation:         mins  84472 ( );  Mechanical Traction:         mins  60472;     Timed Treatment:   42   mins   Total Treatment:     42   mins  Osiel Burns PTA  Physical Therapist  Assistant  [id number]

## 2025-07-21 ENCOUNTER — TREATMENT (OUTPATIENT)
Age: 70
End: 2025-07-21
Payer: COMMERCIAL

## 2025-07-21 DIAGNOSIS — M25.561 ACUTE PAIN OF RIGHT KNEE: Primary | ICD-10-CM

## 2025-07-21 DIAGNOSIS — Z98.890 S/P SURGICAL MANIPULATION OF KNEE JOINT: ICD-10-CM

## 2025-07-21 DIAGNOSIS — M25.561 PAIN AND SWELLING OF RIGHT KNEE: ICD-10-CM

## 2025-07-21 DIAGNOSIS — M25.461 PAIN AND SWELLING OF RIGHT KNEE: ICD-10-CM

## 2025-07-21 DIAGNOSIS — M17.11 PRIMARY OSTEOARTHRITIS OF RIGHT KNEE: ICD-10-CM

## 2025-07-21 DIAGNOSIS — Z96.651 S/P TKR (TOTAL KNEE REPLACEMENT), RIGHT: ICD-10-CM

## 2025-07-21 NOTE — PROGRESS NOTES
Physical Therapy Daily Treatment Note    [facility] - [facility]  [address]  [place] [address]     Patient: Liza Aaron   : 1955  Referring practitioner: Cesar Johnson MD  Date of Initial Visit: Type: THERAPY  Noted: 2025  Today's Date: 2025  Patient seen for 38 sessions         Liza Aaron reports: States right knee is doing okay.  Still feels tightness in and around knee.        Subjective     Objective   AROM R knee 110-111 deg flexion  AAROM R knee 115 deg flexion  PROM R knee 117 deg flexion  R knee ext - 5 deg  See Exercise, Manual, and Modality Logs for complete treatment.       Assessment/Plan    Patient demonstrates an approved active assist and passive range of motion right knee in regards to flexion.  She continues to maintain her active range of motion in approximately the 110-111 degree range consistently over the last few weeks.  Still with extension deficit though minimal and not affecting gait at this time.  She remains highly motivated in regards to exercise performance and progression.  Should continue to improve with continued PT efforts.      Progress strengthening /stabilization /functional activity           Timed:  Manual Therapy:     10    mins  96240;  Therapeutic Exercise:      25   mins  83140;     Neuromuscular Fide:        mins  50530;    Therapeutic Activity:      10    mins  68838;     Gait Training:           mins  51922;     Ultrasound:          mins  44768;    Self Care                    ___      mins 73501    Untimed:  Electrical Stimulation:         mins  52441 ( );  Mechanical Traction:         mins  45719;     Timed Treatment:  45    mins   Total Treatment:   45     mins  Osiel Burns PTA  Physical Therapist  Assistant  [id number]

## 2025-07-23 ENCOUNTER — TREATMENT (OUTPATIENT)
Age: 70
End: 2025-07-23
Payer: COMMERCIAL

## 2025-07-23 DIAGNOSIS — M17.11 PRIMARY OSTEOARTHRITIS OF RIGHT KNEE: ICD-10-CM

## 2025-07-23 DIAGNOSIS — M25.461 PAIN AND SWELLING OF RIGHT KNEE: ICD-10-CM

## 2025-07-23 DIAGNOSIS — M25.561 PAIN AND SWELLING OF RIGHT KNEE: ICD-10-CM

## 2025-07-23 DIAGNOSIS — M25.561 ACUTE PAIN OF RIGHT KNEE: Primary | ICD-10-CM

## 2025-07-23 DIAGNOSIS — Z98.890 S/P SURGICAL MANIPULATION OF KNEE JOINT: ICD-10-CM

## 2025-07-23 DIAGNOSIS — Z96.651 S/P TKR (TOTAL KNEE REPLACEMENT), RIGHT: ICD-10-CM

## 2025-07-23 NOTE — PROGRESS NOTES
Physical Therapy Daily Treatment Note    [facility]  [address]  [address]  [place] [address]     Patient: Liza Aaron   : 1955  Referring practitioner: Cesar Johnson MD  Date of Initial Visit: Type: THERAPY  Noted: 2025  Today's Date: 2025  Patient seen for 39 sessions         Liza Aaron reports:   Some soreness in right knee from where I have been working at it hard  In the pool and with the bikes.    Subjective     Objective   Observed ambulating with improved steadiness of gait.  Ambulating more normalized right lower extremity.    See Exercise, Manual, and Modality Logs for complete treatment.   Universal machine four-way walkouts forward, backwards and side-to-side facing each side with 6 pounds x 5 each.    Assessment/Plan patient demonstrates improved steadiness of gait and exhibits more normalized gait pattern with ambulation in clinic and with in-clinic exercises and task especially in regards to resistive walking forwards and backwards.  Patient able to perform without loss of balance.        Progress per Plan of Care and Progress strengthening /stabilization /functional activity           Timed:  Manual Therapy:         mins  03839;  Therapeutic Exercise:    28     mins  91879;     Neuromuscular Fide:        mins  58015;    Therapeutic Activity:     12     mins  76246;     Gait Training:           mins  83234;     Ultrasound:          mins  23976;    Self Care                    ___      mins 48817    Untimed:  Electrical Stimulation:         mins  43488 ( );  Mechanical Traction:         mins  03385;     Timed Treatment:   40   mins   Total Treatment:     40   mins  Osiel Burns PTA  Physical Therapist  Assistant  [id number]

## 2025-07-25 ENCOUNTER — TREATMENT (OUTPATIENT)
Age: 70
End: 2025-07-25
Payer: COMMERCIAL

## 2025-07-25 DIAGNOSIS — M25.561 PAIN AND SWELLING OF RIGHT KNEE: ICD-10-CM

## 2025-07-25 DIAGNOSIS — Z96.651 S/P TKR (TOTAL KNEE REPLACEMENT), RIGHT: ICD-10-CM

## 2025-07-25 DIAGNOSIS — M25.461 PAIN AND SWELLING OF RIGHT KNEE: ICD-10-CM

## 2025-07-25 DIAGNOSIS — M17.11 PRIMARY OSTEOARTHRITIS OF RIGHT KNEE: ICD-10-CM

## 2025-07-25 DIAGNOSIS — Z98.890 S/P SURGICAL MANIPULATION OF KNEE JOINT: ICD-10-CM

## 2025-07-25 DIAGNOSIS — M25.561 ACUTE PAIN OF RIGHT KNEE: Primary | ICD-10-CM

## 2025-07-25 NOTE — PROGRESS NOTES
Physical Therapy Daily Treatment Note    [facility]  [address]  [address]  [place] [address]     Patient: Liza Aaron   : 1955  Referring practitioner: Cesar Johnson MD  Date of Initial Visit: Type: THERAPY  Noted: 2025  Today's Date: 2025  Patient seen for 40 sessions         Liza Aaron reports: Right knee feels better after the manual interventions in regards to freedom of movement and decreased banded feeling around knee.        Subjective     Objective   See Exercise, Manual, and Modality Logs for complete treatment.       Assessment/Plan patient remains compliant, cooperative motivated with all rehab efforts.  Patient states manual interventions beneficial to improving and lessening tightness around right knee.  Patient demonstrates improved steadiness and normalization of gait.  She is able to perform increased weight on leg press activities though still has some weakness with initiation of task as well as obtaining terminal knee extension right.        Progress per Plan of Care toward all goals l           Timed:  Manual Therapy:    12     mins  79228;  Therapeutic Exercise:     26    mins  23321;     Neuromuscular Fide:        mins  80203;    Therapeutic Activity:   10       mins  40950;     Gait Training:           mins  14381;     Ultrasound:          mins  19231;    Self Care                    ___      mins 72653    Untimed:  Electrical Stimulation:         mins  71194 ( );  Mechanical Traction:         mins  78526;     Timed Treatment:   48   mins   Total Treatment:      48  mins  Osiel Burns PTA  Physical Therapist  Assistant  [id number]

## 2025-07-28 ENCOUNTER — TREATMENT (OUTPATIENT)
Age: 70
End: 2025-07-28
Payer: COMMERCIAL

## 2025-07-28 DIAGNOSIS — M25.561 PAIN AND SWELLING OF RIGHT KNEE: ICD-10-CM

## 2025-07-28 DIAGNOSIS — M17.11 PRIMARY OSTEOARTHRITIS OF RIGHT KNEE: ICD-10-CM

## 2025-07-28 DIAGNOSIS — Z98.890 S/P SURGICAL MANIPULATION OF KNEE JOINT: ICD-10-CM

## 2025-07-28 DIAGNOSIS — Z96.651 S/P TKR (TOTAL KNEE REPLACEMENT), RIGHT: ICD-10-CM

## 2025-07-28 DIAGNOSIS — M25.461 PAIN AND SWELLING OF RIGHT KNEE: ICD-10-CM

## 2025-07-28 DIAGNOSIS — M25.561 ACUTE PAIN OF RIGHT KNEE: Primary | ICD-10-CM

## 2025-07-28 NOTE — PROGRESS NOTES
Physical Therapy Daily Treatment Note    [facility]  [address]  [place] [address]     Patient: Liza Aaron   : 1955  Referring practitioner: Cesar Johnson MD  Date of Initial Visit: Type: THERAPY  Noted: 2025  Today's Date:25  Patient seen for 35 sessions         Liza Aaron reports:         Subjective     Objective   See Exercise, Manual, and Modality Logs for complete treatment.       Assessment/Plan exhibits more normalize gait upon presentation to clinic and within clinic activities.  Right knee flexion plane mobility is improved though still with some tightness in right quad and swelling diffusely around knee that prevents full knee flexionMild extension deficit remains present right knee.        Progress strengthening /stabilization /functional activity right knee to improve ROM, function and strength           Timed:  Manual Therapy:   10      mins  90172;  Therapeutic Exercise:     20    mins  74752;     Neuromuscular Fide:        mins  71582;    Therapeutic Activity:    10      mins  61636;     Gait Training:           mins  53835;     Ultrasound:          mins  16399;    Self Care                    ___      mins 58998    Untimed:  Electrical Stimulation:         mins  26387 ( );  Mechanical Traction:         mins  77674;     Timed Treatment:    40  mins   Total Treatment:      40  mins  Osiel Burns PTA  Physical Therapist  Assistant  [id number]

## 2025-07-29 NOTE — PROGRESS NOTES
Physical Therapy Daily Treatment Note    [facility]  [address]  [address]  [place] [address]     Patient: Liza Aaron   : 1955  Referring practitioner: Cesar Johnson MD  Date of Initial Visit: Type: THERAPY  Noted: 2025  Today's Date: 2025  Patient seen for 42 sessions         Liza Aaron reports: feels that right knee is doing better/moving better today with less tightness        Subjective     Objective '  AROM R knee flexion 1115-116 deg  PROM R knee flexion 120 deg  -still exhibits mild R ext deficit from full - 6 deg to -3 deg     Ambulates with improved steadiness and active right hip and knee flexion     See Exercise, Manual, and Modality Logs for complete treatment.   Leg press B LE 88 lbs seat position 4 vs 5.5  Unilateral Leg press R 22 lbs and 44 lbs x 15 each    Assessment/Plan Presents to clinic reporting decreased right knee tightness and states that she feels she is moving better.  Objectively, she presents ambulating/moving with improved steadiness of gait.  A/PROM right knee flexion improved vs last measurement.  Still with some extension deficit present R knee.  Able to progress isotonic weight with unilateral R LE.    Will benefit from continued knee/hip strengthening as well as balance activities to R LE.        Progress strengthening /stabilization /functional activity to include hip strengthening and unilateral LE stance/balance activities next session            Timed:  Manual Therapy:         mins  67295;  Therapeutic Exercise:     30    mins  54381;     Neuromuscular Fide:        mins  61643;    Therapeutic Activity:     12     mins  77046;     Gait Training:           mins  48451;     Ultrasound:          mins  66729;    Self Care                    ___      mins [id number]    Untimed:  Electrical Stimulation:         mins  86301 ( );  Mechanical Traction:         mins  69344;     Timed Treatment: 42     mins   Total Treatment:     42    mins  Osiel Burns, [facility]  Physical Therapist  Assistant  [id number]

## 2025-07-30 ENCOUNTER — TREATMENT (OUTPATIENT)
Age: 70
End: 2025-07-30
Payer: COMMERCIAL

## 2025-07-30 DIAGNOSIS — M54.50 ACUTE LEFT-SIDED LOW BACK PAIN, UNSPECIFIED WHETHER SCIATICA PRESENT: ICD-10-CM

## 2025-07-30 DIAGNOSIS — Z98.890 S/P SURGICAL MANIPULATION OF KNEE JOINT: ICD-10-CM

## 2025-07-30 DIAGNOSIS — M25.561 PAIN AND SWELLING OF RIGHT KNEE: ICD-10-CM

## 2025-07-30 DIAGNOSIS — M25.461 PAIN AND SWELLING OF RIGHT KNEE: ICD-10-CM

## 2025-07-30 DIAGNOSIS — M17.11 PRIMARY OSTEOARTHRITIS OF RIGHT KNEE: ICD-10-CM

## 2025-07-30 DIAGNOSIS — M25.561 ACUTE PAIN OF RIGHT KNEE: Primary | ICD-10-CM

## 2025-07-30 NOTE — PROGRESS NOTES
[facility] [place]   [address]  [place] [address]  Phone [phone]  Fax [phone]      Physical Therapy Daily Progress Note      Patient: Liza Aaron   : 1955  Referring practitioner: Cesar Johnson MD  Date of Initial Visit: Type: THERAPY  Noted: 2025  Diagnosis/ICD-10 Code:  Acute pain of right knee [M25.561]  Today's Date: 2025  Patient seen for 43 sessions         Liza Aaron reports: some soreness outside of knee today.  Did a long workout and continue to go to [facility] on a regular basis.  Still with tightness after sitting long periods.       Subjective       Objective   See Exercise, Manual, and Modality Logs for complete treatment.    Knee flexion AAROM 115-0 supine, prone  Normal patellar glide today.    Assessment/Plan  Patient presents with improving ROM despite continued c/o stiffness.  ITB tenderness and soreness today.  Discussed with patient changing activity at the gym to work different muscles to avoid overuse now that we have regained more mobility.  Balance and hip focus today with exercises with good tolerance.       Progress strengthening /stabilization /functional activity  Ready for HEP soon.  Review hip strengthening for home, progress as needed.         Timed:  Manual Therapy:    15     mins  96265;     Therapeutic Exercise:    10     mins  72392;     Neuromuscular Fide:    2   mins  75615;    Therapeutic Activity:           mins  67723;     Gait Trainin     mins  91010;     Ultrasound:     -     mins  78049;    Ionto                               -    mins   48907  Self Care                       -     mins   63517  Group Therapy            _____  Self Pay 1-29  -       mins   [id number]  Self Pay 30+  -      mins   PTSPVT   Dry Needling Tri __-__ DNTRIAL      Un-Timed:  Electrical Stimulation:    -     mins  48827 ( );  Dry Needling     -     mins self-pay  Traction     -     mins  "[id number]  Re-Eval                           -    mins  04074    Timed Treatment:   35   mins   Total Treatment:     75   mins  Jasmine Salgado, PT, [facility]  Physical Therapist                  \"Parts of this note may be an electronic transcription/translation of spoken language to printed text using the Dragon dictation system  "

## 2025-08-04 ENCOUNTER — TREATMENT (OUTPATIENT)
Age: 70
End: 2025-08-04
Payer: COMMERCIAL

## 2025-08-04 DIAGNOSIS — M25.561 ACUTE PAIN OF RIGHT KNEE: Primary | ICD-10-CM

## 2025-08-04 DIAGNOSIS — M25.461 PAIN AND SWELLING OF RIGHT KNEE: ICD-10-CM

## 2025-08-04 DIAGNOSIS — Z98.890 S/P SURGICAL MANIPULATION OF KNEE JOINT: ICD-10-CM

## 2025-08-04 DIAGNOSIS — M25.561 PAIN AND SWELLING OF RIGHT KNEE: ICD-10-CM

## 2025-08-04 PROCEDURE — 97140 MANUAL THERAPY 1/> REGIONS: CPT | Performed by: PHYSICAL THERAPIST

## 2025-08-04 PROCEDURE — 97110 THERAPEUTIC EXERCISES: CPT | Performed by: PHYSICAL THERAPIST

## 2025-08-04 PROCEDURE — 97530 THERAPEUTIC ACTIVITIES: CPT | Performed by: PHYSICAL THERAPIST

## 2025-08-05 DIAGNOSIS — Z96.651 S/P TOTAL KNEE REPLACEMENT, RIGHT: ICD-10-CM

## 2025-08-05 RX ORDER — HYDROCODONE BITARTRATE AND ACETAMINOPHEN 7.5; 325 MG/1; MG/1
1 TABLET ORAL
Qty: 20 TABLET | Refills: 0 | Status: SHIPPED | OUTPATIENT
Start: 2025-08-05

## 2025-08-11 ENCOUNTER — TREATMENT (OUTPATIENT)
Age: 70
End: 2025-08-11
Payer: COMMERCIAL

## 2025-08-11 DIAGNOSIS — M25.461 PAIN AND SWELLING OF RIGHT KNEE: ICD-10-CM

## 2025-08-11 DIAGNOSIS — Z98.890 S/P SURGICAL MANIPULATION OF KNEE JOINT: ICD-10-CM

## 2025-08-11 DIAGNOSIS — M17.11 PRIMARY OSTEOARTHRITIS OF RIGHT KNEE: ICD-10-CM

## 2025-08-11 DIAGNOSIS — M25.561 PAIN AND SWELLING OF RIGHT KNEE: ICD-10-CM

## 2025-08-11 DIAGNOSIS — Z96.651 S/P TKR (TOTAL KNEE REPLACEMENT), RIGHT: ICD-10-CM

## 2025-08-11 DIAGNOSIS — M25.561 ACUTE PAIN OF RIGHT KNEE: Primary | ICD-10-CM

## 2025-08-11 PROCEDURE — 97110 THERAPEUTIC EXERCISES: CPT | Performed by: PHYSICAL THERAPIST

## 2025-08-11 PROCEDURE — 97140 MANUAL THERAPY 1/> REGIONS: CPT | Performed by: PHYSICAL THERAPIST

## 2025-08-12 ENCOUNTER — OFFICE VISIT (OUTPATIENT)
Dept: ORTHOPEDIC SURGERY | Facility: CLINIC | Age: 70
End: 2025-08-12
Payer: COMMERCIAL

## 2025-08-12 VITALS — TEMPERATURE: 98 F | WEIGHT: 194.2 LBS | BODY MASS INDEX: 30.48 KG/M2 | HEIGHT: 67 IN

## 2025-08-12 DIAGNOSIS — Z96.651 STATUS POST RIGHT KNEE REPLACEMENT: ICD-10-CM

## 2025-08-12 DIAGNOSIS — R52 PAIN: Primary | ICD-10-CM

## 2025-08-12 PROCEDURE — 99212 OFFICE O/P EST SF 10 MIN: CPT | Performed by: ORTHOPAEDIC SURGERY

## 2025-08-12 PROCEDURE — 73562 X-RAY EXAM OF KNEE 3: CPT | Performed by: ORTHOPAEDIC SURGERY

## 2025-08-12 RX ORDER — CEPHALEXIN 500 MG/1
2000 CAPSULE ORAL ONCE
Qty: 4 CAPSULE | Refills: 5 | Status: SHIPPED | OUTPATIENT
Start: 2025-08-12 | End: 2025-08-15

## 2025-08-13 ENCOUNTER — TREATMENT (OUTPATIENT)
Age: 70
End: 2025-08-13
Payer: COMMERCIAL

## 2025-08-13 DIAGNOSIS — M17.11 PRIMARY OSTEOARTHRITIS OF RIGHT KNEE: ICD-10-CM

## 2025-08-13 DIAGNOSIS — M25.561 ACUTE PAIN OF RIGHT KNEE: Primary | ICD-10-CM

## 2025-08-13 DIAGNOSIS — M25.561 PAIN AND SWELLING OF RIGHT KNEE: ICD-10-CM

## 2025-08-13 DIAGNOSIS — M25.461 PAIN AND SWELLING OF RIGHT KNEE: ICD-10-CM

## 2025-08-13 DIAGNOSIS — Z96.651 S/P TKR (TOTAL KNEE REPLACEMENT), RIGHT: ICD-10-CM

## 2025-08-13 DIAGNOSIS — Z98.890 S/P SURGICAL MANIPULATION OF KNEE JOINT: ICD-10-CM

## 2025-08-15 RX ORDER — SULFAMETHOXAZOLE AND TRIMETHOPRIM 800; 160 MG/1; MG/1
1 TABLET ORAL 2 TIMES DAILY
COMMUNITY
End: 2025-08-15 | Stop reason: SDUPTHER

## 2025-08-15 RX ORDER — SULFAMETHOXAZOLE AND TRIMETHOPRIM 800; 160 MG/1; MG/1
1 TABLET ORAL 2 TIMES DAILY
Qty: 20 TABLET | Refills: 0 | Status: SHIPPED | OUTPATIENT
Start: 2025-08-15

## 2025-08-19 ENCOUNTER — OFFICE VISIT (OUTPATIENT)
Dept: OBSTETRICS AND GYNECOLOGY | Age: 70
End: 2025-08-19
Payer: COMMERCIAL

## 2025-08-19 VITALS
BODY MASS INDEX: 30.13 KG/M2 | DIASTOLIC BLOOD PRESSURE: 74 MMHG | WEIGHT: 192 LBS | SYSTOLIC BLOOD PRESSURE: 128 MMHG | HEIGHT: 67 IN

## 2025-08-19 DIAGNOSIS — L72.0 EPIDERMAL INCLUSION CYST: Primary | ICD-10-CM

## 2025-08-19 DIAGNOSIS — N95.2 ATROPHIC VAGINITIS: ICD-10-CM

## 2025-08-19 DIAGNOSIS — Z12.31 BREAST CANCER SCREENING BY MAMMOGRAM: ICD-10-CM

## 2025-08-26 ENCOUNTER — TREATMENT (OUTPATIENT)
Age: 70
End: 2025-08-26
Payer: COMMERCIAL

## 2025-08-26 DIAGNOSIS — Z98.890 S/P SURGICAL MANIPULATION OF KNEE JOINT: ICD-10-CM

## 2025-08-26 DIAGNOSIS — M25.461 PAIN AND SWELLING OF RIGHT KNEE: ICD-10-CM

## 2025-08-26 DIAGNOSIS — M17.11 PRIMARY OSTEOARTHRITIS OF RIGHT KNEE: ICD-10-CM

## 2025-08-26 DIAGNOSIS — M25.561 PAIN AND SWELLING OF RIGHT KNEE: ICD-10-CM

## 2025-08-26 DIAGNOSIS — M25.561 ACUTE PAIN OF RIGHT KNEE: Primary | ICD-10-CM

## 2025-08-26 DIAGNOSIS — Z96.651 S/P TKR (TOTAL KNEE REPLACEMENT), RIGHT: ICD-10-CM

## (undated) DEVICE — ADAPT CLN BIOGUARD AIR/H2O DISP

## (undated) DEVICE — APPL CHLORAPREP HI/LITE 26ML ORNG

## (undated) DEVICE — GLV SURG SENSICARE PI MIC PF SZ7 LF STRL

## (undated) DEVICE — TRAP FLD MINIVAC MEGADYNE 100ML

## (undated) DEVICE — PREMIUM WET SKIN PREP TRAY: Brand: MEDLINE INDUSTRIES, INC.

## (undated) DEVICE — SPNG GZ WOVN 4X4IN 12PLY 10/BX STRL

## (undated) DEVICE — GLV SURG SENSICARE W/ALOE PF LF 7.5 STRL

## (undated) DEVICE — SUT VIC 1 CT1 36IN J947H

## (undated) DEVICE — SOL NACL 0.9PCT 100ML SGL

## (undated) DEVICE — GLV SURG SENSICARE W/ALOE PF LF 8 STRL

## (undated) DEVICE — LN SMPL CO2 SHTRM SD STREAM W/M LUER

## (undated) DEVICE — PK HIP TOTL 40

## (undated) DEVICE — SUT PDS 1 CT1 36IN Z347H

## (undated) DEVICE — INTENDED TO SUPPORT AND MAINTAIN THE POSITION OF AN ANESTHETIZED PATIENT DURING SURGERY: Brand: HERMANTOR XL PINK KNEE POSITIONING PAD

## (undated) DEVICE — PREP SOL POVIDONE/IODINE BT 4OZ

## (undated) DEVICE — HANDPIECE SET WITH COAXIAL HIGH FLOW TIP AND SUCTION TUBE: Brand: INTERPULSE

## (undated) DEVICE — THE TORRENT IRRIGATION SCOPE CONNECTOR IS USED WITH THE TORRENT IRRIGATION TUBING TO PROVIDE IRRIGATION FLUIDS SUCH AS STERILE WATER DURING GASTROINTESTINAL ENDOSCOPIC PROCEDURES WHEN USED IN CONJUNCTION WITH AN IRRIGATION PUMP (OR ELECTROSURGICAL UNIT).: Brand: TORRENT

## (undated) DEVICE — SENSR O2 OXIMAX FNGR A/ 18IN NONSTR

## (undated) DEVICE — MAT FLR ABSORBENT LG 4FT 10 2.5FT

## (undated) DEVICE — KT DRN EVAC WND PVC PCH WTROC RND 10F400

## (undated) DEVICE — CANN O2 ETCO2 FITS ALL CONN CO2 SMPL A/ 7IN DISP LF

## (undated) DEVICE — GLV SURG SENSICARE MICRO PF LF 7 STRL

## (undated) DEVICE — ADHS SKIN DERMABOND TOP ADVANCED

## (undated) DEVICE — TUBING, SUCTION, 1/4" X 10', STRAIGHT: Brand: MEDLINE

## (undated) DEVICE — 3M™ IOBAN™ 2 ANTIMICROBIAL INCISE DRAPE 6640EZ: Brand: IOBAN™ 2

## (undated) DEVICE — PENCL E/S ULTRAVAC TELESCP NOSE HOLSTR 10FT

## (undated) DEVICE — SUT VIC 0 CT1 36IN J946H

## (undated) DEVICE — UNDERGLV SURG BIOGEL INDICATOR LF PF 7.5

## (undated) DEVICE — SOL IRR NACL 0.9PCT 3000ML

## (undated) DEVICE — NEEDLE, QUINCKE 22GX3.5": Brand: MEDLINE INDUSTRIES, INC.

## (undated) DEVICE — SNAR POLYP SENSATION STDOVL 27 240 BX40

## (undated) DEVICE — PATIENT RETURN ELECTRODE, SINGLE-USE, CONTACT QUALITY MONITORING, ADULT, WITH 9FT CORD, FOR PATIENTS WEIGING OVER 33LBS. (15KG): Brand: MEGADYNE

## (undated) DEVICE — BNDG,ELSTC,MATRIX,STRL,6"X5YD,LF,HOOK&LP: Brand: MEDLINE

## (undated) DEVICE — 450 ML BOTTLE OF 0.05% CHLORHEXIDINE GLUCONATE IN 99.95% STERILE WATER FOR IRRIGATION, USP AND APPLICATOR.: Brand: IRRISEPT ANTIMICROBIAL WOUND LAVAGE

## (undated) DEVICE — DUAL CUT SAGITTAL BLADE

## (undated) DEVICE — KT ORCA ORCAPOD DISP STRL

## (undated) DEVICE — DRSNG GZ PETROLTM XEROFORM CURAD 1X8IN STRL

## (undated) DEVICE — PK KN TOTL 40

## (undated) DEVICE — PENCL SMOKE/EVAC MEGADYNE TELESCP 10FT

## (undated) DEVICE — THE SINGLE USE ETRAP – POLYP TRAP IS USED FOR SUCTION RETRIEVAL OF ENDOSCOPICALLY REMOVED POLYPS.: Brand: ETRAP

## (undated) DEVICE — GLV SURG PREMIERPRO ORTHO LTX PF SZ7.5 BRN

## (undated) DEVICE — 3M™ IOBAN™ 2 ANTIMICROBIAL INCISE DRAPE 6650EZ: Brand: IOBAN™ 2

## (undated) DEVICE — SYR LUERLOK 30CC

## (undated) DEVICE — GLV SURG SENSICARE PI PF LF 7 GRN STRL

## (undated) DEVICE — SKIN PREP TRAY 4 COMPARTM TRAY: Brand: MEDLINE INDUSTRIES, INC.

## (undated) DEVICE — ANTIBACTERIAL UNDYED BRAIDED (POLYGLACTIN 910), SYNTHETIC ABSORBABLE SUTURE: Brand: COATED VICRYL

## (undated) DEVICE — UNDERCAST PADDING: Brand: DEROYAL

## (undated) DEVICE — DRSNG SURESITE WNDW 2.38X2.75

## (undated) DEVICE — YANKAUER,BULB TIP,W/O VENT,RIGID,STERILE: Brand: MEDLINE

## (undated) DEVICE — APPL DURAPREP IODOPHOR APL 26ML

## (undated) DEVICE — SYS SKIN EXOFIN WND CLS 4X22CM

## (undated) DEVICE — GLV SURG BIOGEL M LTX PF 7 1/2

## (undated) DEVICE — THE STERILE LIGHT HANDLE COVER IS USED WITH STERIS SURGICAL LIGHTING AND VISUALIZATION SYSTEMS.